# Patient Record
Sex: MALE | Race: WHITE | NOT HISPANIC OR LATINO | Employment: FULL TIME | URBAN - METROPOLITAN AREA
[De-identification: names, ages, dates, MRNs, and addresses within clinical notes are randomized per-mention and may not be internally consistent; named-entity substitution may affect disease eponyms.]

---

## 2017-01-24 ENCOUNTER — RADIANT APPOINTMENT (OUTPATIENT)
Dept: GENERAL RADIOLOGY | Facility: CLINIC | Age: 42
End: 2017-01-24
Attending: NURSE PRACTITIONER
Payer: COMMERCIAL

## 2017-01-24 ENCOUNTER — OFFICE VISIT (OUTPATIENT)
Dept: FAMILY MEDICINE | Facility: CLINIC | Age: 42
End: 2017-01-24
Payer: COMMERCIAL

## 2017-01-24 VITALS
DIASTOLIC BLOOD PRESSURE: 82 MMHG | HEART RATE: 90 BPM | RESPIRATION RATE: 18 BRPM | OXYGEN SATURATION: 97 % | HEIGHT: 74 IN | SYSTOLIC BLOOD PRESSURE: 134 MMHG | TEMPERATURE: 99 F | BODY MASS INDEX: 38.07 KG/M2 | WEIGHT: 296.6 LBS

## 2017-01-24 DIAGNOSIS — J06.9 VIRAL URI WITH COUGH: ICD-10-CM

## 2017-01-24 DIAGNOSIS — R50.9 FEVER, UNSPECIFIED: ICD-10-CM

## 2017-01-24 DIAGNOSIS — J98.9 REACTIVE AIRWAY DISEASE THAT IS NOT ASTHMA: Primary | ICD-10-CM

## 2017-01-24 LAB
FLUAV+FLUBV AG SPEC QL: NORMAL
FLUAV+FLUBV AG SPEC QL: NORMAL
SPECIMEN SOURCE: NORMAL

## 2017-01-24 PROCEDURE — 99213 OFFICE O/P EST LOW 20 MIN: CPT | Performed by: NURSE PRACTITIONER

## 2017-01-24 PROCEDURE — 87804 INFLUENZA ASSAY W/OPTIC: CPT | Performed by: NURSE PRACTITIONER

## 2017-01-24 PROCEDURE — 71020 XR CHEST 2 VW: CPT

## 2017-01-24 RX ORDER — ALBUTEROL SULFATE 90 UG/1
2 AEROSOL, METERED RESPIRATORY (INHALATION) EVERY 4 HOURS PRN
Qty: 1 INHALER | Refills: 1 | Status: SHIPPED | OUTPATIENT
Start: 2017-01-24 | End: 2017-03-14

## 2017-01-24 RX ORDER — PREDNISONE 20 MG/1
40 TABLET ORAL DAILY
Qty: 10 TABLET | Refills: 0 | Status: SHIPPED | OUTPATIENT
Start: 2017-01-24 | End: 2017-01-29

## 2017-01-24 NOTE — NURSING NOTE
"Chief Complaint   Patient presents with     Urgent Care     URI       Initial /82 mmHg  Pulse 90  Temp(Src) 99  F (37.2  C) (Oral)  Resp 18  Ht 6' 2\" (1.88 m)  Wt 296 lb 9.6 oz (134.537 kg)  BMI 38.07 kg/m2  SpO2 97% Estimated body mass index is 38.07 kg/(m^2) as calculated from the following:    Height as of this encounter: 6' 2\" (1.88 m).    Weight as of this encounter: 296 lb 9.6 oz (134.537 kg).  BP completed using cuff size: charles Hernandez CMA      "

## 2017-01-24 NOTE — PROGRESS NOTES
"  SUBJECTIVE:                                                    Abhijit Garcia is a 42 year old male who presents to clinic today for the following health issues:      RESPIRATORY SYMPTOMS      Duration: 4 days    Description  Heavy in chest, cough, hearing popping noise in chest. Low fever    Severity: moderate    Accompanying signs and symptoms: None    History (predisposing factors):  none    Precipitating or alleviating factors: None    Therapies tried and outcome:  oral decongestant     It appears like patient developed flulike symptoms about 4 days ago which included some body aches, coughing, sore throat and fevers. Some of those symptoms have improved but he continues to have some fevers and persistent cough with occasional noisiness of the lungs. Has been using over-the-counter treatments. Never smoked. No history of asthma. No shortness of breath.          ROS:  Constitutional, HEENT, cardiovascular, pulmonary, gi and gu systems are negative, except as otherwise noted.    OBJECTIVE:                                                    /82 mmHg  Pulse 90  Temp(Src) 99  F (37.2  C) (Oral)  Resp 18  Ht 6' 2\" (1.88 m)  Wt 296 lb 9.6 oz (134.537 kg)  BMI 38.07 kg/m2  SpO2 97%  Body mass index is 38.07 kg/(m^2).  GENERAL: healthy, alert and no distress  EYES: Eyes grossly normal to inspection, PERRL and conjunctivae and sclerae normal  HENT: ear canals and TM's normal, nose and mouth without ulcers or lesions  NECK: no adenopathy, no asymmetry, masses, or scars and thyroid normal to palpation  RESP: rhonchi R lower posterior, expiratory wheezes throughout and inspiratory wheezes throughout  CV: regular rate and rhythm, normal S1 S2, no S3 or S4, no murmur, click or rub, no peripheral edema and peripheral pulses strong  ABDOMEN: soft, nontender, no hepatosplenomegaly, no masses and bowel sounds normal  MS: no gross musculoskeletal defects noted, no edema    Results for orders placed or performed in " visit on 01/24/17 (from the past 24 hour(s))   Influenza A/B antigen   Result Value Ref Range    Influenza A/B Agn Specimen Nasal     Influenza A  NEG     Negative   Test results must be correlated with clinical data. If necessary, results   should be confirmed by a molecular assay or viral culture.      Influenza B  NEG     Negative   Test results must be correlated with clinical data. If necessary, results   should be confirmed by a molecular assay or viral culture.      chest x-ray was also normal      ASSESSMENT/PLAN:                                                      Probable upper respiratory symptoms with reactive airway component. Was treated as below. Differentials discussed. Continue to monitor closely. Follow-up with persisting concerns.        ICD-10-CM    1. Fever, unspecified R50.9 Influenza A/B antigen   2. Viral URI with cough J06.9 XR Chest 2 Views    B97.89    3. Reactive airway disease that is not asthma R09.89 predniSONE (DELTASONE) 20 MG tablet     albuterol (PROAIR HFA/PROVENTIL HFA/VENTOLIN HFA) 108 (90 BASE) MCG/ACT Inhaler         STEFANI [facility]

## 2017-01-24 NOTE — PATIENT INSTRUCTIONS
Bronchospasm (Adult)    Bronchospasm occurs when the airways (bronchial tubes) go into spasm and contract. This makes it hard to breathe and causes wheezing (a high-pitched whistling sound). Bronchospasm can also cause frequent coughing without wheezing.  Bronchospasm is due to irritation, inflammation, or allergic reaction of the airways. People with asthma get bronchospasm. However, not everyone with bronchospasm has asthma.  Being exposed to harmful fumes, a recent case of bronchitis, exercise, or a flare-up of chronic obstructive pulmonary disease (COPD) may cause the airways to spasm. An episode of bronchospasm may last 7 to 14 days. Medicine may be prescribed to relax the airways and prevent wheezing. Antibiotics will be prescribed only if your healthcare provider thinks there is a bacterial infection. Antibiotics do not help a viral infection.  Home care     Drink lots of water or other fluids (at least 10 glasses a day) during an attack. This will loosen lung secretions and make it easier to breathe. If you have heart or kidney disease, check with your doctor before you drink extra fluids.    Take prescribed medicine exactly at the times advised. If you take an inhaled medicine to help with breathing, do not use it more than once every 4 hours, unless told to do so. If prescribed an antibiotic or prednisone, take all of the medicine, even if you are feeling better after a few days.    Do not smoke. Also avoid being exposed to secondhand smoke.    If you were given an inhaler, use it exactly as directed. If you need to use it more often than prescribed, your condition may be getting worse. Contact your healthcare provider.  Follow-up care  Follow up with your healthcare provider, or as advised.   (Note: If you are age 65 or older, have a chronic lung disease or condition that affects your immune system, or you smoke, we recommend getting pneumococcal vaccinations, as well as an influenza vaccination (flu  shot) every autumn. Ask your healthcare provider about this.)  When to seek medical advice  Call your healthcare provider right away if any of these occur:    You need to use your inhalers more often than usual.    You develop a fever of 100.4 F (38 C) or higher.    You are coughing up lots of dark-colored sputum (mucus).    You do not start to improve within 24 hours.  Call 911, or get immediate medical care  Contact emergency services if any of these occur:    Coughing up bloody sputum (mucus)    Chest pain with each breath    Increased wheezing or shortness of breath    0184-7546 [employer]. [address] [place] [address], [place] [address]. All rights reserved. This information is not intended as a substitute for professional medical care. Always follow your healthcare professional's instructions.

## 2017-01-24 NOTE — MR AVS SNAPSHOT
After Visit Summary   1/24/2017    Abhijit Garcia    MRN: [id number]           Patient Information     Date Of Birth          1975        Visit Information        Provider Department      1/24/2017 4:15 PM Rhona Lopez APRN [facility]        Today's Diagnoses     Fever, unspecified    -  1     Viral URI with cough         Reactive airway disease that is not asthma           Care Instructions      Bronchospasm (Adult)    Bronchospasm occurs when the airways (bronchial tubes) go into spasm and contract. This makes it hard to breathe and causes wheezing (a high-pitched whistling sound). Bronchospasm can also cause frequent coughing without wheezing.  Bronchospasm is due to irritation, inflammation, or allergic reaction of the airways. People with asthma get bronchospasm. However, not everyone with bronchospasm has asthma.  Being exposed to harmful fumes, a recent case of bronchitis, exercise, or a flare-up of chronic obstructive pulmonary disease (COPD) may cause the airways to spasm. An episode of bronchospasm may last 7 to 14 days. Medicine may be prescribed to relax the airways and prevent wheezing. Antibiotics will be prescribed only if your healthcare provider thinks there is a bacterial infection. Antibiotics do not help a viral infection.  Home care     Drink lots of water or other fluids (at least 10 glasses a day) during an attack. This will loosen lung secretions and make it easier to breathe. If you have heart or kidney disease, check with your doctor before you drink extra fluids.    Take prescribed medicine exactly at the times advised. If you take an inhaled medicine to help with breathing, do not use it more than once every 4 hours, unless told to do so. If prescribed an antibiotic or prednisone, take all of the medicine, even if you are feeling better after a few days.    Do not smoke. Also avoid being exposed to secondhand smoke.    If you were  given an inhaler, use it exactly as directed. If you need to use it more often than prescribed, your condition may be getting worse. Contact your healthcare provider.  Follow-up care  Follow up with your healthcare provider, or as advised.   (Note: If you are age 65 or older, have a chronic lung disease or condition that affects your immune system, or you smoke, we recommend getting pneumococcal vaccinations, as well as an influenza vaccination (flu shot) every autumn. Ask your healthcare provider about this.)  When to seek medical advice  Call your healthcare provider right away if any of these occur:    You need to use your inhalers more often than usual.    You develop a fever of 100.4 F (38 C) or higher.    You are coughing up lots of dark-colored sputum (mucus).    You do not start to improve within 24 hours.  Call 911, or get immediate medical care  Contact emergency services if any of these occur:    Coughing up bloody sputum (mucus)    Chest pain with each breath    Increased wheezing or shortness of breath    2211-2964 [employer]. [address] [place] [address]. All rights reserved. This information is not intended as a substitute for professional medical care. Always follow your healthcare professional's instructions.              Follow-ups after your visit        Who to contact     If you have questions or need follow up information about today's clinic visit or your schedule please contact [facility] directly at [phone].  Normal or non-critical lab and imaging results will be communicated to you by MyChart, letter or phone within 4 business days after the clinic has received the results. If you do not hear from us within 7 days, please contact the clinic through MyChart or phone. If you have a critical or abnormal lab result, we will notify you by phone as soon as possible.  Submit refill requests through [employer] or call your pharmacy and they will forward  "the refill request to us. Please allow 3 business days for your refill to be completed.          Additional Information About Your Visit        "[employer] Information     [employer] gives you secure access to your electronic health record. If you see a primary care provider, you can also send messages to your care team and make appointments. If you have questions, please call your primary care clinic.  If you do not have a primary care provider, please call [phone] and they will assist you.        Care EveryWhere ID     This is your Care EveryWhere ID. This could be used by other organizations to access your [place] medical records  [id number]        Your Vitals Were     Pulse Temperature Respirations Height BMI (Body Mass Index) Pulse Oximetry    90 99  F (37.2  C) (Oral) 18 6' 2\" (1.88 m) 38.07 kg/m2 97%       Blood Pressure from Last 3 Encounters:   01/24/17 134/82   11/02/16 122/88   10/28/16 122/84    Weight from Last 3 Encounters:   01/24/17 296 lb 9.6 oz (134.537 kg)   11/02/16 312 lb 1.6 oz (141.568 kg)   10/28/16 310 lb 9.6 oz (140.887 kg)              We Performed the Following     Influenza A/B antigen          Today's Medication Changes          These changes are accurate as of: 1/24/17  5:29 PM.  If you have any questions, ask your nurse or doctor.               Start taking these medicines.        Dose/Directions    albuterol 108 (90 BASE) MCG/ACT Inhaler   Commonly known as:  PROAIR HFA/PROVENTIL HFA/VENTOLIN HFA   Used for:  Reactive airway disease that is not asthma   Started by:  Rhona Lopez APRN CNP        Dose:  2 puff   Inhale 2 puffs into the lungs every 4 hours as needed for shortness of breath / dyspnea or wheezing   Quantity:  1 Inhaler   Refills:  1       predniSONE 20 MG tablet   Commonly known as:  DELTASONE   Used for:  Reactive airway disease that is not asthma   Started by:  Rhona Lopez APRN CNP        Dose:  40 mg   Take 2 tablets (40 mg) by mouth daily for " 5 days   Quantity:  10 tablet   Refills:  0            Where to get your medicines      These medications were sent to [facility] - [place], [place] - [address] [facility] AT [address] [address] [facility], [facility] [address]     Phone:  [phone]    - albuterol 108 (90 BASE) MCG/ACT Inhaler  - predniSONE 20 MG tablet             Primary Care Provider Office Phone # Fax #    Samir De La O [phone]       [facility] [address] [facility] [address]        Thank you!     Thank you for choosing [facility]  for your care. Our goal is always to provide you with excellent care. Hearing back from our patients is one way we can continue to improve our services. Please take a few minutes to complete the written survey that you may receive in the mail after your visit with us. Thank you!             Your Updated Medication List - Protect others around you: Learn how to safely use, store and throw away your medicines at www.disposemymeds.org.          This list is accurate as of: 1/24/17  5:29 PM.  Always use your most recent med list.                   Brand Name Dispense Instructions for use    albuterol 108 (90 BASE) MCG/ACT Inhaler    PROAIR HFA/PROVENTIL HFA/VENTOLIN HFA    1 Inhaler    Inhale 2 puffs into the lungs every 4 hours as needed for shortness of breath / dyspnea or wheezing       irbesartan 300 MG tablet    AVAPRO    90 tablet    Take 1 tablet (300 mg) by mouth daily       predniSONE 20 MG tablet    DELTASONE    10 tablet    Take 2 tablets (40 mg) by mouth daily for 5 days

## 2017-02-04 ENCOUNTER — OFFICE VISIT (OUTPATIENT)
Dept: URGENT CARE | Facility: URGENT CARE | Age: 42
End: 2017-02-04
Payer: COMMERCIAL

## 2017-02-04 VITALS
OXYGEN SATURATION: 98 % | DIASTOLIC BLOOD PRESSURE: 78 MMHG | WEIGHT: 296 LBS | TEMPERATURE: 98.7 F | RESPIRATION RATE: 18 BRPM | BODY MASS INDEX: 37.99 KG/M2 | SYSTOLIC BLOOD PRESSURE: 126 MMHG | HEART RATE: 65 BPM

## 2017-02-04 DIAGNOSIS — M79.645 PAIN OF FINGER OF LEFT HAND: Primary | ICD-10-CM

## 2017-02-04 PROCEDURE — 99213 OFFICE O/P EST LOW 20 MIN: CPT | Performed by: FAMILY MEDICINE

## 2017-02-04 RX ORDER — CEFDINIR 300 MG/1
300 CAPSULE ORAL 2 TIMES DAILY
Qty: 20 CAPSULE | Refills: 0 | Status: SHIPPED | OUTPATIENT
Start: 2017-02-04 | End: 2017-03-10

## 2017-02-04 NOTE — PROGRESS NOTES
SUBJECTIVE:                                                    Abhijit Garcia is a 42 year old male who presents to clinic today for the following health issues:    Swollen middle finger right hand, injured last week. Swelling and stiffness           Problem list and histories reviewed & adjusted, as indicated.  Additional history:     Patient Active Problem List   Diagnosis     Herpes simplex virus (HSV) infection     Mixed Hyperlipidemia LDL goal <130     Predominant disturbance of emotions     Essential hypertension     Morbid obesity (HCC)     Gout     Lumbar radiculitis     Prostatitis     Past Surgical History   Procedure Laterality Date     [place] teeth       Appendectomy         Social History   Substance Use Topics     Smoking status: Never Smoker      Smokeless tobacco: Never Used     Alcohol Use: 0.0 oz/week     0 Standard drinks or equivalent per week      Comment: 1-5 drinks per week     Family History   Problem Relation Age of Onset     DIABETES Mother      Obesity Mother      Cancer - colorectal Mother      Hypertension Mother      Arthritis Father      Respiratory Father      asbestos exposure     Chronic Obstructive Pulmonary Disease Father      Coronary Artery Disease Father 71     Allergies Brother      Respiratory Brother          OBJECTIVE:  /78 mmHg  Pulse 65  Temp(Src) 98.7  F (37.1  C) (Oral)  Resp 18  Wt 296 lb (134.265 kg)  SpO2 98%  Exam; RT middle finger. Swelling literally from the MCP  To the distal end. Hard to close his finger. Finger erythematous ventral part of finger has a laceration which is superficial.   Both hands are eczematous      ASSESSMENT:  1. Finger infection; this could be viral  In nature. In addition could be bacterial.     PLAN:  1. Cefdinir  2. Lotion to his hands  3. Follow-up in 2 weeks  4. Leave  message with us, Monday to inform us how he is doing.

## 2017-02-04 NOTE — NURSING NOTE
"Chief Complaint   Patient presents with     Urgent Care     Finger       Initial /78 mmHg  Pulse 65  Temp(Src) 98.7  F (37.1  C) (Oral)  Resp 18  Wt 296 lb (134.265 kg)  SpO2 98% Estimated body mass index is 37.99 kg/(m^2) as calculated from the following:    Height as of 1/24/17: 6' 2\" (1.88 m).    Weight as of this encounter: 296 lb (134.265 kg).  Medication Reconciliation: complete     Ilda Hernandez CMA      "

## 2017-02-06 ENCOUNTER — TELEPHONE (OUTPATIENT)
Dept: URGENT CARE | Facility: URGENT CARE | Age: 42
End: 2017-02-06

## 2017-02-06 NOTE — TELEPHONE ENCOUNTER
Pt calling with update on his finger.  Pain is no worse and swelling is not any better but no worsening issues.  Pt states that on Sunday he came down with a gout flare in his left ankle and the pain is very different from his finger.    He will continue taking antibiotic as directed    FYI to UC per Dr Marroquin's request    Dawood Spencer RN, BSN

## 2017-02-14 ENCOUNTER — MYC MEDICAL ADVICE (OUTPATIENT)
Dept: INTERNAL MEDICINE | Facility: CLINIC | Age: 42
End: 2017-02-14

## 2017-02-14 DIAGNOSIS — M79.644 PAIN OF FINGER OF RIGHT HAND: Primary | ICD-10-CM

## 2017-02-28 ENCOUNTER — MYC MEDICAL ADVICE (OUTPATIENT)
Dept: INTERNAL MEDICINE | Facility: CLINIC | Age: 42
End: 2017-02-28

## 2017-03-01 ENCOUNTER — TRANSFERRED RECORDS (OUTPATIENT)
Dept: HEALTH INFORMATION MANAGEMENT | Facility: CLINIC | Age: 42
End: 2017-03-01

## 2017-03-04 ENCOUNTER — OFFICE VISIT (OUTPATIENT)
Dept: URGENT CARE | Facility: URGENT CARE | Age: 42
End: 2017-03-04
Payer: COMMERCIAL

## 2017-03-04 DIAGNOSIS — R30.0 PAINFUL URGING TO URINATE: Primary | ICD-10-CM

## 2017-03-04 LAB
ALBUMIN UR-MCNC: NEGATIVE MG/DL
APPEARANCE UR: CLEAR
BILIRUB UR QL STRIP: NEGATIVE
COLOR UR AUTO: YELLOW
GLUCOSE UR STRIP-MCNC: NEGATIVE MG/DL
HGB UR QL STRIP: NEGATIVE
KETONES UR STRIP-MCNC: NEGATIVE MG/DL
LEUKOCYTE ESTERASE UR QL STRIP: NEGATIVE
NITRATE UR QL: NEGATIVE
PH UR STRIP: 6 PH (ref 5–7)
SP GR UR STRIP: 1.02 (ref 1–1.03)
URN SPEC COLLECT METH UR: NORMAL
UROBILINOGEN UR STRIP-ACNC: 0.2 EU/DL (ref 0.2–1)

## 2017-03-04 PROCEDURE — 99213 OFFICE O/P EST LOW 20 MIN: CPT | Performed by: FAMILY MEDICINE

## 2017-03-04 PROCEDURE — 81003 URINALYSIS AUTO W/O SCOPE: CPT | Performed by: FAMILY MEDICINE

## 2017-03-04 RX ORDER — DOXYCYCLINE 100 MG/1
100 CAPSULE ORAL 2 TIMES DAILY
Qty: 20 CAPSULE | Refills: 0 | Status: SHIPPED | OUTPATIENT
Start: 2017-03-04 | End: 2017-03-14

## 2017-03-04 NOTE — MR AVS SNAPSHOT
After Visit Summary   3/4/2017    Abhijit Garcia    MRN: [id number]           Patient Information     Date Of Birth          1975        Visit Information        Provider Department      3/4/2017 11:00 AM Mark Marroquin MD [facility]        Today's Diagnoses     Painful urging to urinate    -  1       Follow-ups after your visit        Your next 10 appointments already scheduled     Mar 14, 2017  7:40 AM CDT   Pre-Op physical with Mary Gonzalez PA-C   [facility]    [address] [address] [phone]              Who to contact     If you have questions or need follow up information about today's clinic visit or your schedule please contact [facility] directly at [phone].  Normal or non-critical lab and imaging results will be communicated to you by MyChart, letter or phone within 4 business days after the clinic has received the results. If you do not hear from us within 7 days, please contact the clinic through [employer] or phone. If you have a critical or abnormal lab result, we will notify you by phone as soon as possible.  Submit refill requests through [employer] or call your pharmacy and they will forward the refill request to us. Please allow 3 business days for your refill to be completed.          Additional Information About Your Visit        MyChart Information     [employer] gives you secure access to your electronic health record. If you see a primary care provider, you can also send messages to your care team and make appointments. If you have questions, please call your primary care clinic.  If you do not have a primary care provider, please call [phone] and they will assist you.        Care EveryWhere ID     This is your Care EveryWhere ID. This could be used by other organizations to access your [place] medical records  [id number]          Blood Pressure from Last 3 Encounters:   02/04/17 126/78   01/24/17 134/82   11/02/16 122/88    Weight from Last 3 Encounters:   02/04/17 296 lb (134.3 kg)   01/24/17 296 lb 9.6 oz (134.5 kg)   11/02/16 (!) 312 lb 1.6 oz (141.6 kg)              We Performed the Following     UA reflex to Microscopic and Culture          Today's Medication Changes          These changes are accurate as of: 3/4/17 11:59 PM.  If you have any questions, ask your nurse or doctor.               Start taking these medicines.        Dose/Directions    doxycycline 100 MG capsule   Commonly known as:  VIBRAMYCIN   Used for:  Painful urging to urinate        Dose:  100 mg   Take 1 capsule (100 mg) by mouth 2 times daily   Quantity:  20 capsule   Refills:  0            Where to get your medicines      These medications were sent to [facility] [address] [place] [address] [facility] AT [address] [address] [facility] [address]     Phone:  [phone]     doxycycline 100 MG capsule                Primary Care Provider Office Phone # Fax #    Samir De La O [phone]       [facility] [address] [facility] [address]        Thank you!     Thank you for choosing [facility]  for your care. Our goal is always to provide you with excellent care. Hearing back from our patients is one way we can continue to improve our services. Please take a few minutes to complete the written survey that you may receive in the mail after your visit with us. Thank you!             Your Updated Medication List - Protect others around you: Learn how to safely use, store and throw away your medicines at www.disposemymeds.org.          This list is accurate as of: 3/4/17 11:59 PM.  Always use your most recent med list.                   Brand Name Dispense Instructions for use    albuterol 108 (90 BASE) MCG/ACT Inhaler    PROAIR HFA/PROVENTIL HFA/VENTOLIN HFA    1  Inhaler    Inhale 2 puffs into the lungs every 4 hours as needed for shortness of breath / dyspnea or wheezing       cefdinir 300 MG capsule    OMNICEF    20 capsule    Take 1 capsule (300 mg) by mouth 2 times daily       COLCHICINE PO          doxycycline 100 MG capsule    VIBRAMYCIN    20 capsule    Take 1 capsule (100 mg) by mouth 2 times daily       irbesartan 300 MG tablet    AVAPRO    90 tablet    Take 1 tablet (300 mg) by mouth daily       MELOXICAM PO

## 2017-03-04 NOTE — NURSING NOTE
"Chief Complaint   Patient presents with     UTI     Burning, pain post urination x1.5 week       Initial There were no vitals taken for this visit. Estimated body mass index is 38 kg/(m^2) as calculated from the following:    Height as of 1/24/17: 6' 2\" (1.88 m).    Weight as of 2/4/17: 296 lb (134.3 kg).  Medication Reconciliation: complete     Grazyna Cardona CMA (JOSEE)        "

## 2017-03-04 NOTE — PROGRESS NOTES
SUBJECTIVE:                                                    Abhijit Garcia is a 42 year old male who presents to clinic today for the following health issues:      Genitourinary symptoms      Duration: x1.5 weeks    Description:  dysuria, frequency and hesitancy    Intensity:  severe    Accompanying signs and symptoms (fever/discharge/nausea/vomiting/back or abdominal pain):  None    History (frequent UTI's/kidney stones/prostate problems):   Sexually active: YES    Precipitating or alleviating factors: None    Therapies tried and outcome: none   Outcome: N/A       OBJECTIVE: Appears well, in no apparent distress.  Vital signs are normal. The abdomen is soft without tenderness, guarding, mass, rebound or organomegaly. No CVA tenderness or inguinal adenopathy noted.   Results for orders placed or performed in visit on 03/04/17   UA reflex to Microscopic and Culture   Result Value Ref Range    Color Urine Yellow     Appearance Urine Clear     Glucose Urine Negative NEG mg/dL    Bilirubin Urine Negative NEG    Ketones Urine Negative NEG mg/dL    Specific Gravity Urine 1.025 1.003 - 1.035    Blood Urine Negative NEG    pH Urine 6.0 5.0 - 7.0 pH    Protein Albumin Urine Negative NEG mg/dL    Urobilinogen Urine 0.2 0.2 - 1.0 EU/dL    Nitrite Urine Negative NEG    Leukocyte Esterase Urine Negative NEG    Source Midstream Urine      .     ASSESSMENT: UTI uncomplicated without evidence of pyelonephritis    PLAN: Treatment per orders - also push fluids, may use Pyridium OTC prn. Call or return to clinic prn if these symptoms worsen or fail to improve as anticipated.

## 2017-03-10 ENCOUNTER — OFFICE VISIT (OUTPATIENT)
Dept: URGENT CARE | Facility: URGENT CARE | Age: 42
End: 2017-03-10
Payer: COMMERCIAL

## 2017-03-10 VITALS
SYSTOLIC BLOOD PRESSURE: 110 MMHG | DIASTOLIC BLOOD PRESSURE: 70 MMHG | OXYGEN SATURATION: 98 % | HEIGHT: 74 IN | HEART RATE: 98 BPM | RESPIRATION RATE: 18 BRPM | BODY MASS INDEX: 37.99 KG/M2 | WEIGHT: 296 LBS | TEMPERATURE: 98.2 F

## 2017-03-10 DIAGNOSIS — K64.4 EXTERNAL HEMORRHOIDS: ICD-10-CM

## 2017-03-10 DIAGNOSIS — L01.00 IMPETIGO: Primary | ICD-10-CM

## 2017-03-10 PROCEDURE — 99213 OFFICE O/P EST LOW 20 MIN: CPT | Performed by: FAMILY MEDICINE

## 2017-03-10 RX ORDER — MUPIROCIN 20 MG/G
OINTMENT TOPICAL 2 TIMES DAILY
Qty: 22 G | Refills: 1 | Status: SHIPPED | OUTPATIENT
Start: 2017-03-10 | End: 2017-03-17

## 2017-03-10 NOTE — MR AVS SNAPSHOT
After Visit Summary   3/10/2017    Abhijit Gracia    MRN: [id number]           Patient Information     Date Of Birth          1975        Visit Information        Provider Department      3/10/2017 7:15 PM Perla Lucas MD [facility] URGENT CARE        Today's Diagnoses     Impetigo    -  1      Care Instructions      Understanding Impetigo (Adult)  Impetigo is a skin infection that causes red, crusty sores. It s more common in young children, but adults can also get it. It can be spread easily from person to person.  What causes impetigo?  Impetigo is caused by bacteria. It can be caused by group A streptococci (strep). Or it can be caused by Staphylococcus aureus (staph). The bacteria can cause the infection after an insect bite, cut, or other skin problem causes a break in your skin.  Symptoms of impetigo  Impetigo causes itchy red sores that ooze fluid (pus) and form honey-colored crusts. They are most common on the face, arms, and legs. The sores can get bigger and spread to other parts of your body.  Diagnosing and treating impetigo  Your health care provider will give you a physical exam. He or she will be able to diagnose impetigo by looking at your sores. A bacterial culture may be performed. Impetigo can be treated with antibiotics. You may be given oral medication. Use the antibiotic medication exactly as directed. Do not stop using it if your symptoms get better. Use all of the medication until it is gone.  Your symptoms will likely get better within 3 days. The sores will take several weeks to heal fully.  If you have impetigo  Impetigo is easily spread from person to person. A person can get sores 1 to 3 days after being exposed to another person s sores. Make sure to protect those around you. To prevent the bacteria from spreading to others:    Cover your sores with a bandage. Wash your hands often. This will also help you avoid spreading the infection to other  parts of your body.    Don t share washcloths, towels, pillows, sheets, or clothes with others. Wash these items in hot water before using them again.    Don t scratch or pick at your sores.    Wash your sores with soap and water. Apply an antibacterial cream as advised.     When to call the health care provider  Make sure to contact your health care provider if you have:    Sores that spread or have more pus after 3 days of treatment    Pain or swelling that s new or worse    Fever of 100.4 F (38 C) or higher    Loss of appetite or vomiting     3850-2602 [employer]. [address] [place], [place] [address]. All rights reserved. This information is not intended as a substitute for professional medical care. Always follow your healthcare professional's instructions.              Follow-ups after your visit        Your next 10 appointments already scheduled     Mar 14, 2017  7:40 AM CDT   Pre-Op physical with Mary Gonzalez PA-C   [facility]    [address] [place] [address] [phone]              Who to contact     If you have questions or need follow up information about today's clinic visit or your schedule please contact [facility] URGENT CARE directly at [phone].  Normal or non-critical lab and imaging results will be communicated to you by [employer], letter or phone within 4 business days after the clinic has received the results. If you do not hear from us within 7 days, please contact the clinic through MyChart or phone. If you have a critical or abnormal lab result, we will notify you by phone as soon as possible.  Submit refill requests through [employer] or call your pharmacy and they will forward the refill request to us. Please allow 3 business days for your refill to be completed.          Additional Information About Your Visit        [employer] Information     [employer] gives you secure  "access to your electronic health record. If you see a primary care provider, you can also send messages to your care team and make appointments. If you have questions, please call your primary care clinic.  If you do not have a primary care provider, please call [phone] and they will assist you.        Care EveryWhere ID     This is your Care EveryWhere ID. This could be used by other organizations to access your [facility] medical records  [id number]        Your Vitals Were     Pulse Temperature Respirations Height Pulse Oximetry BMI (Body Mass Index)    98 98.2  F (36.8  C) (Oral) 18 6' 2\" (1.88 m) 98% 38 kg/m2       Blood Pressure from Last 3 Encounters:   03/10/17 110/70   02/04/17 126/78   01/24/17 134/82    Weight from Last 3 Encounters:   03/10/17 296 lb (134.3 kg)   02/04/17 296 lb (134.3 kg)   01/24/17 296 lb 9.6 oz (134.5 kg)              Today, you had the following     No orders found for display         Today's Medication Changes          These changes are accurate as of: 3/10/17  7:46 PM.  If you have any questions, ask your nurse or doctor.               Start taking these medicines.        Dose/Directions    mupirocin 2 % ointment   Commonly known as:  BACTROBAN   Used for:  Impetigo   Started by:  Perla Lucas MD        Apply topically 2 times daily for 7 days   Quantity:  22 g   Refills:  1            Where to get your medicines      These medications were sent to [facility] [address] [place] [address] AT [address] [address] [facility] [address]     Phone:  [phone]     mupirocin 2 % ointment                Primary Care Provider Office Phone # Fax #    Samir Jaison De La O [phone]       [facility] [address] [facility] [address]        Thank you!     Thank you for choosing [facility] URGENT CARE  for your care. Our goal is always to provide you with excellent care. Hearing back from our " patients is one way we can continue to improve our services. Please take a few minutes to complete the written survey that you may receive in the mail after your visit with us. Thank you!             Your Updated Medication List - Protect others around you: Learn how to safely use, store and throw away your medicines at www.disposemymeds.org.          This list is accurate as of: 3/10/17  7:46 PM.  Always use your most recent med list.                   Brand Name Dispense Instructions for use    albuterol 108 (90 BASE) MCG/ACT Inhaler    PROAIR HFA/PROVENTIL HFA/VENTOLIN HFA    1 Inhaler    Inhale 2 puffs into the lungs every 4 hours as needed for shortness of breath / dyspnea or wheezing       COLCHICINE PO          doxycycline 100 MG capsule    VIBRAMYCIN    20 capsule    Take 1 capsule (100 mg) by mouth 2 times daily       irbesartan 300 MG tablet    AVAPRO    90 tablet    Take 1 tablet (300 mg) by mouth daily       MELOXICAM PO          mupirocin 2 % ointment    BACTROBAN    22 g    Apply topically 2 times daily for 7 days

## 2017-03-11 NOTE — PATIENT INSTRUCTIONS
Understanding Impetigo (Adult)  Impetigo is a skin infection that causes red, crusty sores. It s more common in young children, but adults can also get it. It can be spread easily from person to person.  What causes impetigo?  Impetigo is caused by bacteria. It can be caused by group A streptococci (strep). Or it can be caused by Staphylococcus aureus (staph). The bacteria can cause the infection after an insect bite, cut, or other skin problem causes a break in your skin.  Symptoms of impetigo  Impetigo causes itchy red sores that ooze fluid (pus) and form honey-colored crusts. They are most common on the face, arms, and legs. The sores can get bigger and spread to other parts of your body.  Diagnosing and treating impetigo  Your health care provider will give you a physical exam. He or she will be able to diagnose impetigo by looking at your sores. A bacterial culture may be performed. Impetigo can be treated with antibiotics. You may be given oral medication. Use the antibiotic medication exactly as directed. Do not stop using it if your symptoms get better. Use all of the medication until it is gone.  Your symptoms will likely get better within 3 days. The sores will take several weeks to heal fully.  If you have impetigo  Impetigo is easily spread from person to person. A person can get sores 1 to 3 days after being exposed to another person s sores. Make sure to protect those around you. To prevent the bacteria from spreading to others:    Cover your sores with a bandage. Wash your hands often. This will also help you avoid spreading the infection to other parts of your body.    Don t share washcloths, towels, pillows, sheets, or clothes with others. Wash these items in hot water before using them again.    Don t scratch or pick at your sores.    Wash your sores with soap and water. Apply an antibacterial cream as advised.     When to call the health care provider  Make sure to contact your health care  provider if you have:    Sores that spread or have more pus after 3 days of treatment    Pain or swelling that s new or worse    Fever of 100.4 F (38 C) or higher    Loss of appetite or vomiting     8991-4213 [employer]. [address] [place] [address], [place] [address]. All rights reserved. This information is not intended as a substitute for professional medical care. Always follow your healthcare professional's instructions.

## 2017-03-11 NOTE — NURSING NOTE
"Abhijit Garcia is a 42 year old male.      Chief Complaint   Patient presents with     Urgent Care     Derm Problem     blister on his nose     Rectal Problem     thinks that he possible may have a hemrroid        Initial /70 (BP Location: Right arm, Patient Position: Chair, Cuff Size: Adult Large)  Pulse 98  Temp 98.2  F (36.8  C) (Oral)  Resp 18  Ht 6' 2\" (1.88 m)  Wt 296 lb (134.3 kg)  SpO2 98%  BMI 38 kg/m2 Estimated body mass index is 38 kg/(m^2) as calculated from the following:    Height as of this encounter: 6' 2\" (1.88 m).    Weight as of this encounter: 296 lb (134.3 kg).  Medication Reconciliation: complete      Questioned patient about current smoking habits.  Pt. has never smoked.      Nina Woo CMA      "

## 2017-03-11 NOTE — PROGRESS NOTES
SUBJECTIVE:  Chief Complaint   Patient presents with     Urgent Care     Derm Problem     blister on his nose     Rectal Problem     thinks that he possible may have a hemrroid      Abhijit Garcia is a 42 year old male  who presents with chief complaint of blistering with honey crust on the nose-  Had sunburn with blistering -  Has been taking doxycycline for  infection    Also had symptoms of anal tenderness/pain which began today.  He complains of sudden onset and improving of pain which is mild.  This AM it felt like a tender pea near the anus.  He applied preparation H  Symptoms exacerbated by prolonged sitting.  Pain improved with topical treatment.  He denies any abdominal pain, nausea or vomiting or fevers.  No history of melena, profuse bleeding.      Past Medical History   Diagnosis Date     Dysuria      Hypertension        ALLERGIES:  Allopurinol; Amlodipine; Contrast dye; Indomethacin; Levaquin [levofloxacin]; Lisinopril; Losartan; and Seasonal allergies      Current Outpatient Prescriptions on File Prior to Visit:  MELOXICAM PO    COLCHICINE PO    doxycycline (VIBRAMYCIN) 100 MG capsule Take 1 capsule (100 mg) by mouth 2 times daily   albuterol (PROAIR HFA/PROVENTIL HFA/VENTOLIN HFA) 108 (90 BASE) MCG/ACT Inhaler Inhale 2 puffs into the lungs every 4 hours as needed for shortness of breath / dyspnea or wheezing   irbesartan (AVAPRO) 300 MG tablet Take 1 tablet (300 mg) by mouth daily     No current facility-administered medications on file prior to visit.     Social History   Substance Use Topics     Smoking status: Never Smoker     Smokeless tobacco: Never Used     Alcohol use 0.0 oz/week     0 Standard drinks or equivalent per week      Comment: 1-5 drinks per week       Family History   Problem Relation Age of Onset     DIABETES Mother      Obesity Mother      Cancer - colorectal Mother      Hypertension Mother      Arthritis Father      Respiratory Father      asbestos exposure     Chronic  "Obstructive Pulmonary Disease Father      Coronary Artery Disease Father 71     Allergies Brother      Respiratory Brother          ROS:  EYES: NEGATIVE for vision changes or irritation  RESP:NEGATIVE for significant cough or SOB  GI: NEGATIVE for nausea, abdominal pain, heartburn, or change in bowel habits    OBJECTIVE:  /70 (BP Location: Right arm, Patient Position: Chair, Cuff Size: Adult Large)  Pulse 98  Temp 98.2  F (36.8  C) (Oral)  Resp 18  Ht 6' 2\" (1.88 m)  Wt 296 lb (134.3 kg)  SpO2 98%  BMI 38 kg/m2  General appearance: in no apparent distress.  Face-  Nose bridge with few blisters with honey crust rash  Chest: clear to auscultation  Cardiac: regular rate and rhythm.  Abdomen: normal bowel sound, soft,non-tender.  Rectal exam: soft hemorrhoid at 10 o'clock  Mildly tender, soft,  Not inflamed    ASSESSMENT:  Impetigo   on the nose  - mupirocin (BACTROBAN) 2 % ointment; Apply topically 2 times daily for 7 days    External hemorrhoids     Recommend Sitz baths, ice pack to perineum, high fiber diet for stool softening and f/u with primary physician.  Patient may use a squirt bottle with warm water to clean the rectal area to limit irritation to the hemorrhoid  May treat local discomfort with OTC hemorrhoid treatment       "

## 2017-03-14 ENCOUNTER — OFFICE VISIT (OUTPATIENT)
Dept: INTERNAL MEDICINE | Facility: CLINIC | Age: 42
End: 2017-03-14
Payer: COMMERCIAL

## 2017-03-14 VITALS
WEIGHT: 298 LBS | BODY MASS INDEX: 38.26 KG/M2 | HEART RATE: 74 BPM | DIASTOLIC BLOOD PRESSURE: 86 MMHG | OXYGEN SATURATION: 95 % | SYSTOLIC BLOOD PRESSURE: 112 MMHG | TEMPERATURE: 97.8 F

## 2017-03-14 DIAGNOSIS — Z01.818 PRE-OP EXAM: Primary | ICD-10-CM

## 2017-03-14 DIAGNOSIS — I10 ESSENTIAL HYPERTENSION: ICD-10-CM

## 2017-03-14 DIAGNOSIS — S96.912D TEAR OF TENDON OF LEFT ANKLE, SUBSEQUENT ENCOUNTER: ICD-10-CM

## 2017-03-14 DIAGNOSIS — E78.5 HYPERLIPIDEMIA LDL GOAL <130: ICD-10-CM

## 2017-03-14 LAB
ANION GAP SERPL CALCULATED.3IONS-SCNC: 6 MMOL/L (ref 3–14)
BUN SERPL-MCNC: 15 MG/DL (ref 7–30)
CALCIUM SERPL-MCNC: 8.8 MG/DL (ref 8.5–10.1)
CHLORIDE SERPL-SCNC: 106 MMOL/L (ref 94–109)
CO2 SERPL-SCNC: 30 MMOL/L (ref 20–32)
CREAT SERPL-MCNC: 1.13 MG/DL (ref 0.66–1.25)
GFR SERPL CREATININE-BSD FRML MDRD: 70 ML/MIN/1.7M2
GLUCOSE SERPL-MCNC: 88 MG/DL (ref 70–99)
POTASSIUM SERPL-SCNC: 4.2 MMOL/L (ref 3.4–5.3)
SODIUM SERPL-SCNC: 142 MMOL/L (ref 133–144)

## 2017-03-14 PROCEDURE — 80048 BASIC METABOLIC PNL TOTAL CA: CPT | Performed by: PHYSICIAN ASSISTANT

## 2017-03-14 PROCEDURE — 99215 OFFICE O/P EST HI 40 MIN: CPT | Performed by: PHYSICIAN ASSISTANT

## 2017-03-14 PROCEDURE — 36415 COLL VENOUS BLD VENIPUNCTURE: CPT | Performed by: PHYSICIAN ASSISTANT

## 2017-03-14 NOTE — NURSING NOTE
"Chief Complaint   Patient presents with     Pre-Op Exam       Initial /86  Pulse 74  Temp 97.8  F (36.6  C) (Oral)  Wt 298 lb (135.2 kg)  SpO2 95%  BMI 38.26 kg/m2 Estimated body mass index is 38.26 kg/(m^2) as calculated from the following:    Height as of 3/10/17: 6' 2\" (1.88 m).    Weight as of this encounter: 298 lb (135.2 kg).  Medication Reconciliation: complete    "

## 2017-03-14 NOTE — PROGRESS NOTES
[address] [address]  [phone]  Dept: [phone]    PRE-OP EVALUATION:  Today's date: 3/14/2017    Abhijit Garcia (: 1975) presents for pre-operative evaluation assessment as requested by Dr. Ohara.  He requires evaluation and anesthesia risk assessment prior to undergoing surgery/procedure for treatment of torn tendon left ankle .  Proposed procedure: repair torn tendon exterior left ankle    Date of Surgery/ Procedure: 3-23-17  Time of Surgery/ Procedure: 9am  Hospital/Surgical Facility: [facility]  Fax number for surgical facility:   Primary Physician: Samir De La O  Type of Anesthesia Anticipated: to be determined    Patient has a Health Care Directive or Living Will:  NO    1. NO - Do you have a history of heart attack, stroke, stent, bypass or surgery on an artery in the head, neck, heart or legs?  2. NO - Do you ever have any pain or discomfort in your chest?  3. NO - Do you have a history of  Heart Failure?  4. NO - Are you troubled by shortness of breath when: walking on the level, up a slight hill or at night?  5. NO - Do you currently have a cold, bronchitis or other respiratory infection?  6. NO - Do you have a cough, shortness of breath or wheezing?  7. NO - Do you sometimes get pains in the calves of your legs when you walk?  8. NO - Do you or anyone in your family have previous history of blood clots?  9. NO - Do you or does anyone in your family have a serious bleeding problem such as prolonged bleeding following surgeries or cuts?  10. NO - Have you ever had problems with anemia or been told to take iron pills?  11. NO - Have you had any abnormal blood loss such as black, tarry or bloody stools, or abnormal vaginal bleeding?  12. NO - Have you ever had a blood transfusion?  13. NO - Have you or any of your relatives ever had problems with anesthesia?  14. NO - Do you have sleep apnea,  excessive snoring or daytime drowsiness?  15. NO - Do you have any prosthetic heart valves?  16. NO - Do you have prosthetic joints?  17. NO - Is there any chance that you may be pregnant?          HPI:                                                      Brief HPI related to upcoming procedure: tendon repair left ankle       See problem list for active medical problems.  Problems all longstanding and stable, except as noted/documented.  See ROS for pertinent symptoms related to these conditions.                                                                                                  .  HYPERTENSION - Patient has longstanding history of mod-severe HTN , currently denies any symptoms referable to elevated blood pressure. Specifically denies chest pain, palpitations, dyspnea, orthopnea, PND or peripheral edema. Blood pressure readings have been in normal range. Current medication regimen is as listed below. Patient denies any side effects of medication.                                                                                                                                                                                          .  HYPERLIPIDEMIA - Patient has a long history of significant Hyperlipidemia diet for treatment with recent good control.    MEDICAL HISTORY:                                                      Patient Active Problem List    Diagnosis Date Noted     Prostatitis 05/16/2016     Priority: Medium     ~ 1999, again 2016       Lumbar radiculitis 11/15/2015     Priority: Medium     Gout 01/07/2015     Priority: Medium     Onset age 33.  High uric acid.  Has had flares of great toe, midfoot, and ankles.  Uses meloxicam and cholchicine for acute flares.  Used allopurinol, developed low back pain after a few months.  Ultrasound and CT fine.  Eye problems.  Symptoms all resolved with stopping med       Morbid obesity (HCC) 04/19/2007     Priority: Medium     Essential hypertension  04/18/2007     Priority: Medium     Onset age 17, medications started in 2015.       Herpes simplex virus (HSV) infection 02/06/2007     Priority: Medium     Gets on lips.  Valtrex and Abreva work.  Episodes x 2 tip of nose, including 2/2016.  No fluid collected.         Mixed Hyperlipidemia LDL goal <130 02/06/2007     Priority: Medium     Predominant disturbance of emotions 02/06/2007     Priority: Medium      Past Medical History   Diagnosis Date     Dysuria      Hypertension      Past Surgical History   Procedure Laterality Date     [place] teeth       Appendectomy       Current Outpatient Prescriptions   Medication Sig Dispense Refill     mupirocin (BACTROBAN) 2 % ointment Apply topically 2 times daily for 7 days 22 g 1     MELOXICAM PO        COLCHICINE PO        irbesartan (AVAPRO) 300 MG tablet Take 1 tablet (300 mg) by mouth daily 90 tablet prn     OTC products: no recent use of OTC ASA, NSAIDS or Steroids    Allergies   Allergen Reactions     Allopurinol      Kidney area pain, eye problems     Amlodipine      Edema at 10 mg dose     Contrast Dye      Indomethacin GI Disturbance     Levaquin [Levofloxacin]      Gout flare in 2011     Lisinopril      cough     Losartan      Penile rash     Seasonal Allergies       Latex Allergy: NO    Social History   Substance Use Topics     Smoking status: Never Smoker     Smokeless tobacco: Never Used     Alcohol use 0.0 oz/week     0 Standard drinks or equivalent per week      Comment: 1-5 drinks per week     History   Drug Use No       REVIEW OF SYSTEMS:                                                    C: NEGATIVE for fever, chills, change in weight  INTEGUMENTARY/SKIN: NEGATIVE for worrisome rashes, moles or lesions  EYES: NEGATIVE for vision changes or irritation  E/M: NEGATIVE for ear, mouth and throat problems  R: NEGATIVE for significant cough or SOB  CV: NEGATIVE for chest pain, palpitations or peripheral edema  GI: NEGATIVE for nausea, abdominal pain, heartburn,  or change in bowel habits  MUSCULOSKELETAL: NEGATIVE for significant arthralgias or myalgia  NEURO: NEGATIVE for weakness, dizziness or paresthesias  ENDOCRINE: NEGATIVE for temperature intolerance, skin/hair changes  HEME/ALLERGY/IMMUNE: NEGATIVE for bleeding problems  PSYCHIATRIC: NEGATIVE for changes in mood or affect  ROS otherwise negative    EXAM:                                                    /86  Pulse 74  Temp 97.8  F (36.6  C) (Oral)  Wt 298 lb (135.2 kg)  SpO2 95%  BMI 38.26 kg/m2  GENERAL APPEARANCE: healthy, alert and no distress  EYES: Eyes grossly normal to inspection, PERRL and conjunctivae and sclerae normal  HENT: ear canals and TM's normal and nose and mouth without ulcers or lesions  RESP: lungs clear to auscultation - no rales, rhonchi or wheezes  CV: regular rate and rhythm, normal S1 S2, no S3 or S4 and no murmur, click or rub   ABDOMEN: soft, nontender, no HSM or masses and bowel sounds normal  MS: extremities normal- no gross deformities noted  SKIN: no suspicious lesions or rashes  NEURO: Normal strength and tone, sensory exam grossly normal, mentation intact and speech normal  PSYCH: mentation appears normal and affect normal/bright    DIAGNOSTICS:                                                      Labs Resulted Today:   Results for orders placed or performed in visit on 03/14/17   Basic metabolic panel   Result Value Ref Range    Sodium 142 133 - 144 mmol/L    Potassium 4.2 3.4 - 5.3 mmol/L    Chloride 106 94 - 109 mmol/L    Carbon Dioxide 30 20 - 32 mmol/L    Anion Gap 6 3 - 14 mmol/L    Glucose 88 70 - 99 mg/dL    Urea Nitrogen 15 7 - 30 mg/dL    Creatinine 1.13 0.66 - 1.25 mg/dL    GFR Estimate 70 >60 mL/min/1.7m2    GFR Estimate If Black 85 >60 mL/min/1.7m2    Calcium 8.8 8.5 - 10.1 mg/dL       Recent Labs   Lab Test  06/10/16   0823  05/12/16   0757  03/31/16   1943   HGB   --    --   14.7   PLT   --    --   237   NA  141  138  137   POTASSIUM  4.2  4.1  3.8   CR  1.13   1.35*  1.03        IMPRESSION:                                                    Reason for surgery/procedure: left ankle tendon tear  Diagnosis/reason for consult: hypertension, hyperlipidemia, cardiopulmonary clearance for surgery     The proposed surgical procedure is considered INTERMEDIATE risk.    REVISED CARDIAC RISK INDEX  The patient has the following serious cardiovascular risks for perioperative complications such as (MI, PE, VFib and 3  AV Block):  No serious cardiac risks  INTERPRETATION: 0 risks: Class I (very low risk - 0.4% complication rate)    The patient has the following additional risks for perioperative complications:  No identified additional risks      ICD-10-CM    1. Pre-op exam Z01.818 Basic metabolic panel   2. Tear of tendon of left ankle, subsequent encounter S96.912D Basic metabolic panel   3. Essential hypertension I10 Basic metabolic panel   4. Mixed Hyperlipidemia LDL goal <130 E78.5        RECOMMENDATIONS:                                                          --Patient is to take all scheduled medications on the day of surgery EXCEPT for modifications listed below.    ACE Inhibitor or Angiotensin Receptor Blocker (ARB) Use  Ace inhibitor or Angiotensin Receptor Blocker (ARB) and should HOLD this medication for the 24 hours prior to surgery.      APPROVAL GIVEN to proceed with proposed procedure, without further diagnostic evaluation       Signed Electronically by: Mary Gonzalez PA-C    Copy of this evaluation report is provided to requesting physician.    [facility] Preop Guidelines

## 2017-03-14 NOTE — MR AVS SNAPSHOT
After Visit Summary   3/14/2017    Abhijit Garcia    MRN: [id number]           Patient Information     Date Of Birth          1975        Visit Information        Provider Department      3/14/2017 7:40 AM Mary Gonzalez PA-C [facility]        Today's Diagnoses     Pre-op exam    -  1    Tear of tendon of left ankle, subsequent encounter        Essential hypertension        Mixed Hyperlipidemia LDL goal <130          Care Instructions    Hold irbesartan the morning of surgery.   NO ibuprofen or aspirin Aleve    Ok for tylenol            Follow-ups after your visit        Who to contact     If you have questions or need follow up information about today's clinic visit or your schedule please contact [facility] directly at [phone].  Normal or non-critical lab and imaging results will be communicated to you by dELiAshart, letter or phone within 4 business days after the clinic has received the results. If you do not hear from us within 7 days, please contact the clinic through dELiAshart or phone. If you have a critical or abnormal lab result, we will notify you by phone as soon as possible.  Submit refill requests through [employer]Lake or call your pharmacy and they will forward the refill request to us. Please allow 3 business days for your refill to be completed.          Additional Information About Your Visit        MyChart Information     PolarLake gives you secure access to your electronic health record. If you see a primary care provider, you can also send messages to your care team and make appointments. If you have questions, please call your primary care clinic.  If you do not have a primary care provider, please call [phone] and they will assist you.        Care EveryWhere ID     This is your Care EveryWhere ID. This could be used by other organizations to access your [place] medical records  [id number]        Your Vitals Were      Pulse Temperature Pulse Oximetry BMI (Body Mass Index)          74 97.8  F (36.6  C) (Oral) 95% 38.26 kg/m2         Blood Pressure from Last 3 Encounters:   03/14/17 112/86   03/10/17 110/70   02/04/17 126/78    Weight from Last 3 Encounters:   03/14/17 298 lb (135.2 kg)   03/10/17 296 lb (134.3 kg)   02/04/17 296 lb (134.3 kg)              We Performed the Following     Basic metabolic panel        Primary Care Provider Office Phone # Fax #    Samir De La O [phone]       [facility] [address] [facility] [address]        Thank you!     Thank you for choosing [facility]  for your care. Our goal is always to provide you with excellent care. Hearing back from our patients is one way we can continue to improve our services. Please take a few minutes to complete the written survey that you may receive in the mail after your visit with us. Thank you!             Your Updated Medication List - Protect others around you: Learn how to safely use, store and throw away your medicines at www.disposemymeds.org.          This list is accurate as of: 3/14/17  7:59 AM.  Always use your most recent med list.                   Brand Name Dispense Instructions for use    albuterol 108 (90 BASE) MCG/ACT Inhaler    PROAIR HFA/PROVENTIL HFA/VENTOLIN HFA    1 Inhaler    Inhale 2 puffs into the lungs every 4 hours as needed for shortness of breath / dyspnea or wheezing       COLCHICINE PO          irbesartan 300 MG tablet    AVAPRO    90 tablet    Take 1 tablet (300 mg) by mouth daily       MELOXICAM PO          mupirocin 2 % ointment    BACTROBAN    22 g    Apply topically 2 times daily for 7 days

## 2017-03-22 ENCOUNTER — TELEPHONE (OUTPATIENT)
Dept: INTERNAL MEDICINE | Facility: CLINIC | Age: 42
End: 2017-03-22

## 2017-03-22 NOTE — TELEPHONE ENCOUNTER
Reason for Call:  Other PreOp    Detailed comments: Please fax pre op report from 3/14 to [phone]. Pt has surgery tomorrow 3/23/17    Phone Number Patient can be reached at: Other phone number:  [phone]    Best Time: ASAP    Can we leave a detailed message on this number? YES    Call taken on 3/22/2017 at 8:52 AM by Shanthi Enriquez

## 2017-03-27 ENCOUNTER — TELEPHONE (OUTPATIENT)
Dept: INTERNAL MEDICINE | Facility: CLINIC | Age: 42
End: 2017-03-27

## 2017-03-27 NOTE — TELEPHONE ENCOUNTER
Pt called stating he accidentally took 2 of his BP pills instead of taking 1.  Pt just had ankle surgery and meant to take a pain pill.  Informed pt to monitor BP through out day, increase fluids, get up slowly as BP could drop, have lightheadedness or dizziness.  Current BP has been 117/80 and 112/85.  Poison Control # given  Will CB if any other sx.   Mary Gonzalez PAC notified  Carolina Elizabeth RN    Guideline used: Overdose [phone]  Telephone Triage Protocols for Nurses, Fifth Edition, Saranya ELIZABETH, RN

## 2017-04-04 ENCOUNTER — HOSPITAL ENCOUNTER (OUTPATIENT)
Dept: ULTRASOUND IMAGING | Facility: CLINIC | Age: 42
Discharge: HOME | End: 2017-04-04
Attending: PHYSICIAN ASSISTANT | Admitting: PHYSICIAN ASSISTANT
Payer: OTHER MISCELLANEOUS

## 2017-04-04 DIAGNOSIS — M79.662 PAIN OF LEFT CALF: ICD-10-CM

## 2017-04-04 PROCEDURE — 93971 EXTREMITY STUDY: CPT | Mod: LT

## 2017-04-05 ENCOUNTER — OFFICE VISIT (OUTPATIENT)
Dept: FAMILY MEDICINE | Facility: CLINIC | Age: 42
End: 2017-04-05
Payer: OTHER MISCELLANEOUS

## 2017-04-05 ENCOUNTER — ANTICOAGULATION THERAPY VISIT (OUTPATIENT)
Dept: FAMILY MEDICINE | Facility: CLINIC | Age: 42
End: 2017-04-05
Payer: COMMERCIAL

## 2017-04-05 VITALS
TEMPERATURE: 99 F | HEART RATE: 93 BPM | HEIGHT: 74 IN | DIASTOLIC BLOOD PRESSURE: 86 MMHG | BODY MASS INDEX: 38.24 KG/M2 | SYSTOLIC BLOOD PRESSURE: 110 MMHG | WEIGHT: 298 LBS

## 2017-04-05 DIAGNOSIS — I82.409 DEEP VEIN THROMBOSIS (DVT) (H): ICD-10-CM

## 2017-04-05 DIAGNOSIS — I82.492 ACUTE DEEP VEIN THROMBOSIS (DVT) OF OTHER SPECIFIED VEIN OF LEFT LOWER EXTREMITY (H): Primary | ICD-10-CM

## 2017-04-05 DIAGNOSIS — Z79.01 LONG TERM CURRENT USE OF ANTICOAGULANT THERAPY: Primary | ICD-10-CM

## 2017-04-05 DIAGNOSIS — Z79.01 LONG-TERM (CURRENT) USE OF ANTICOAGULANTS: ICD-10-CM

## 2017-04-05 PROCEDURE — 99207 ZZC NO CHARGE NURSE ONLY: CPT | Performed by: FAMILY MEDICINE

## 2017-04-05 PROCEDURE — 99214 OFFICE O/P EST MOD 30 MIN: CPT | Performed by: FAMILY MEDICINE

## 2017-04-05 RX ORDER — WARFARIN SODIUM 5 MG/1
TABLET ORAL
Qty: 30 TABLET | Refills: 0 | Status: SHIPPED | OUTPATIENT
Start: 2017-04-05 | End: 2017-04-19 | Stop reason: DRUGHIGH

## 2017-04-05 RX ORDER — TRAMADOL HYDROCHLORIDE 50 MG/1
TABLET ORAL DAILY PRN
Refills: 0 | COMMUNITY
Start: 2017-03-23 | End: 2017-04-05

## 2017-04-05 NOTE — NURSING NOTE
"Chief Complaint   Patient presents with     Work Comp     from 10-4-2016 leg injury       Initial /86 (BP Location: Right arm, Patient Position: Chair, Cuff Size: Adult Large)  Pulse 93  Temp 99  F (37.2  C) (Oral)  Ht 6' 2\" (1.88 m)  Wt 298 lb (135.2 kg)  BMI 38.26 kg/m2 Estimated body mass index is 38.26 kg/(m^2) as calculated from the following:    Height as of this encounter: 6' 2\" (1.88 m).    Weight as of this encounter: 298 lb (135.2 kg).  Medication Reconciliation: complete     Matt Granados CMA          "

## 2017-04-05 NOTE — MR AVS SNAPSHOT
After Visit Summary   4/5/2017    Abhijit Garcia    MRN: [id number]           Patient Information     Date Of Birth          1975        Visit Information        Provider Department      4/5/2017 10:00 AM Madeleine Lynch MD [facility]        Today's Diagnoses     Acute deep vein thrombosis (DVT) of other specified vein of left lower extremity (H)    -  1       Follow-ups after your visit        Additional Services     INR CLINIC REFERRAL       Your provider has referred you to INR Services.    Please be aware that coverage of these services is subject to the terms and limitations of your health insurance plan.  Call member services at your health plan with any benefit or coverage questions.    Indication for Anticoagulation: DVT (first time)  If nonstandard INR is desired, indicate goal range and explanation: 2-3  Expected Duration of Therapy: 3 Months                  Your next 10 appointments already scheduled     Apr 07, 2017  8:30 AM CDT   Anticoagulation Visit with  ANTICOAGULATION CLINIC   [facility] ([facility])    [address] [place] [address] [phone]              Future tests that were ordered for you today     Open Future Orders        Priority Expected Expires Ordered    US Lower Extremity Venous Duplex Left STAT  4/4/2018 4/4/2017            Who to contact     If you have questions or need follow up information about today's clinic visit or your schedule please contact [facility] directly at [phone].  Normal or non-critical lab and imaging results will be communicated to you by MyChart, letter or phone within 4 business days after the clinic has received the results. If you do not hear from us within 7 days, please contact the clinic through MyChart or phone. If you have a critical or abnormal lab result, we will notify you by phone as soon as possible.  Submit refill requests through  [facility] or call your pharmacy and they will forward the refill request to us. Please allow 3 business days for your refill to be completed.          Additional Information About Your Visit        MyChart Information     [employer] gives you secure access to your electronic health record. If you see a primary care provider, you can also send messages to your care team and make appointments. If you have questions, please call your primary care clinic.  If you do not have a primary care provider, please call [phone] and they will assist you.        Care EveryWhere ID     This is your Care EveryWhere ID. This could be used by other organizations to access your [place] medical records  [id number]        Your Vitals Were     Pulse Temperature Height BMI (Body Mass Index)          93 99  F (37.2  C) (Oral) 6' 2\" (1.88 m) 38.26 kg/m2         Blood Pressure from Last 3 Encounters:   04/05/17 110/86   03/14/17 112/86   03/10/17 110/70    Weight from Last 3 Encounters:   04/05/17 298 lb (135.2 kg)   03/14/17 298 lb (135.2 kg)   03/10/17 296 lb (134.3 kg)              We Performed the Following     INR CLINIC REFERRAL          Today's Medication Changes          These changes are accurate as of: 4/5/17  2:52 PM.  If you have any questions, ask your nurse or doctor.               Start taking these medicines.        Dose/Directions    warfarin 5 MG tablet   Commonly known as:  COUMADIN   Used for:  Long-term (current) use of anticoagulants, Deep vein thrombosis (DVT) (H)   Started by:  Madeleine Lynch MD        5 mg daily or as direct by INR   Quantity:  30 tablet   Refills:  0         Stop taking these medicines if you haven't already. Please contact your care team if you have questions.     traMADol 50 MG tablet   Commonly known as:  ULTRAM   Stopped by:  Madeleine Lynch MD                Where to get your medicines      These medications were sent to [facility] [address] [address] [place] " [address]  [address] [facility] [address]     Phone:  [phone]     warfarin 5 MG tablet                Primary Care Provider Office Phone # Fax #    Samir De La O [phone]       [facility] [address]  [facility] [address]        Thank you!     Thank you for choosing [facility]  for your care. Our goal is always to provide you with excellent care. Hearing back from our patients is one way we can continue to improve our services. Please take a few minutes to complete the written survey that you may receive in the mail after your visit with us. Thank you!             Your Updated Medication List - Protect others around you: Learn how to safely use, store and throw away your medicines at www.disposemymeds.org.          This list is accurate as of: 4/5/17  2:52 PM.  Always use your most recent med list.                   Brand Name Dispense Instructions for use    COLCHICINE PO          irbesartan 300 MG tablet    AVAPRO    90 tablet    Take 1 tablet (300 mg) by mouth daily       MELOXICAM PO      Reported on 4/5/2017       warfarin 5 MG tablet    COUMADIN    30 tablet    5 mg daily or as direct by INR

## 2017-04-05 NOTE — PROGRESS NOTES
SUBJECTIVE:                                                    Abhijit Garcia is a 42 year old male who presents to clinic today for the following health issues:      Workers Comp Visit    Date of Injury - 10/4/2016    Mechanism of Injury - was working as a [employer] location, moving boxes, rolled his left ankle    Subsequent events - I do not see any specialists notes in his chart, but he reports that he met with orthopedics, had imaging identifying ligament tears in the lateral ankle. On March 23, 2017, he had surgery to repair these ligaments. Following this he was in a walking boot. He developed posterior left calf pain on April 3, was seen by orthopedics, who ordered an ultrasound. He was noted to have a DVT in his left peroneal vein at the mid calf.    Today, he is here to establish care and to discuss anticoagulation. He was given a prescription for Xarelto by orthopedics. He has questions regarding taking this medication and if it will be a good fit for him.      Problem list and histories reviewed & adjusted, as indicated.  Additional history: as documented    Patient Active Problem List   Diagnosis     Herpes simplex virus (HSV) infection     Mixed Hyperlipidemia LDL goal <130     Predominant disturbance of emotions     Essential hypertension     Morbid obesity (HCC)     Gout     Lumbar radiculitis     Prostatitis     Long-term (current) use of anticoagulants [Z79.01]     Deep vein thrombosis (DVT) (H) [I82.409]     Past Surgical History:   Procedure Laterality Date     APPENDECTOMY       wisdom teeth         Social History   Substance Use Topics     Smoking status: Never Smoker     Smokeless tobacco: Never Used     Alcohol use 0.0 oz/week     0 Standard drinks or equivalent per week      Comment: 1-5 drinks per week     Family History   Problem Relation Age of Onset     DIABETES Mother      Obesity Mother      Cancer - colorectal Mother      Hypertension Mother      Arthritis Father      Respiratory  "Father      asbestos exposure     Chronic Obstructive Pulmonary Disease Father      Coronary Artery Disease Father 71     Allergies Brother      Respiratory Brother            Reviewed and updated as needed this visit by clinical staff       Reviewed and updated as needed this visit by Provider         ROS:  Constitutional, HEENT, cardiovascular, pulmonary, gi and gu systems are negative, except as otherwise noted.    OBJECTIVE:                                                    /86 (BP Location: Right arm, Patient Position: Chair, Cuff Size: Adult Large)  Pulse 93  Temp 99  F (37.2  C) (Oral)  Ht 6' 2\" (1.88 m)  Wt 298 lb (135.2 kg)  BMI 38.26 kg/m2  Body mass index is 38.26 kg/(m^2).  GENERAL: healthy, alert and no distress  MS: Tenderness to palpation at the left posterior mid calf, swelling of the left ankle and foot, nontender to palpation, no palpable cords of the midcalf, positive Homans sign    Diagnostic Test Results:  Results for orders placed or performed during the hospital encounter of 04/04/17 (from the past 24 hour(s))   US Lower Extremity Venous Duplex Left    Narrative    ULTRASOUND LEFT LOWER EXTREMITY VENOUS DUPLEX  4/4/2017 3:05 PM     HISTORY: Left lower leg pain.    COMPARISON: None.    TECHNIQUE: Spectral doppler and waveform analysis were performed on  the left lower extremity veins.    FINDINGS: The left common femoral, femoral, and popliteal veins are  patent, compressible, and negative for deep venous thrombosis. There  is a short segment of occlusive deep venous thrombosis in the left  peroneal vein at the level of the midcalf. Calf veins are segmentally  seen but appear otherwise negative for DVT where visualized.       Impression    IMPRESSION: Short segment of occlusive deep venous thrombosis is noted  in the left peroneal vein at the level of the midcalf.      Findings were phoned to the referring physician by the ultrasound  technologist at the conclusion of the " exam.    BRITTNEY LINDQUIST MD        ASSESSMENT/PLAN:                                                      1. Acute deep vein thrombosis (DVT) of other specified vein of left lower extremity (H) - discussed anticoagulation options. He elects to initiate warfarin given its potential for reversibility. He is willing to participate in periodic monitoring. Will plan for a 3 month course, check a lower extremity ultrasound at that time, determine if further treatment is needed.  - INR CLINIC REFERRAL    25 minutes spent with patient, greater than 50% in counseling    Madeleine Lynch MD  [facility]

## 2017-04-05 NOTE — PROGRESS NOTES
ANTICOAGULATION INITIAL CLINIC VISIT    Patient Name:  Abhijit Garcia  Date:  4/5/2017  Referred by: Madeleine Lynch  Contact Type:  Face to Face    SUBJECTIVE:  Coumadin education was completed today.  Topics covered include:  -Introduction to coumadin  -Proper Administration  -INR Testing  -Sign/Symptoms of Bleeding  -Signs/Symptoms of Clot Formation or Stroke  -Dietary Intake of Vitamin K  -Drug Interactions  -Anticoagulation Identification (bracelet, necklace or wallet card)  -Future Surgery  -Effects of Alcohol, Tobacco, and Exercise on Coumadin    Coumadin Education Booklet and Coumadin Identification Wallet Card were given to the patient.       Patient Findings     Positives Initiation of therapy          OBJECTIVE    No results found for: INR, [id number], [id number], 2AGN, F2    ASSESSMENT / PLAN  INR assessment SUB    Recheck INR In: 2 DAYS    INR Location Clinic      Anticoagulation Summary as of 4/5/2017     INR goal 2.0-3.0   Today's INR No new INR was available at the time of this encounter.   Maintenance plan No maintenance plan   Full instructions 4/5: 10 mg; 4/6: 10 mg; Otherwise No maintenance plan   Next INR check 4/7/2017   Target end date 7/5/2017    Indications   Long-term (current) use of anticoagulants [Z79.01] [Z79.01]  Deep vein thrombosis (DVT) (H) [I82.409] [I82.409]         Anticoagulation Episode Summary     INR check location     Preferred lab     Send INR reminders to RN POOL - LK    Comments             See the Encounter Report to view Anticoagulation Flowsheet and Dosing Calendar (Go to Encounters tab in chart review, and find the Anticoagulation Therapy Visit)    Dosage adjustment made based on physician directed care plan.    Deya Spencer RN

## 2017-04-05 NOTE — MR AVS SNAPSHOT
Abhijit Jose   4/5/2017   Anticoagulation Therapy Visit    Description:  42 year old male   Provider:  Madeleine Lynch MD   Department:   Family Practice           INR as of 4/5/2017     Today's INR No new INR was available at the time of this encounter.      Anticoagulation Summary as of 4/5/2017     INR goal 2.0-3.0   Today's INR No new INR was available at the time of this encounter.   Full instructions 4/5: 10 mg; 4/6: 10 mg; Otherwise No maintenance plan   Next INR check 4/7/2017    Indications   Long-term (current) use of anticoagulants [Z79.01] [Z79.01]  Deep vein thrombosis (DVT) (H) [I82.409] [I82.409]         Your next Anticoagulation Clinic appointment(s)     Apr 07, 2017  8:30 AM CDT   Anticoagulation Visit with  ANTICOAGULATION CLINIC   [facility]    [id number] [place] [address]   [phone]              April 2017 Details    Sun Mon Tue Wed Thu Fri Sat           1                 2               3               4               5      10 mg   See details      6      10 mg         7            8                 9               10               11               12               13               14               15                 16               17               18               19               20               21               22                 23               24               25               26               27               28               29                 30                      Date Details   04/05 This INR check       Date of next INR:  4/7/2017         How to take your warfarin dose     To take:  10 mg Take 2 of the 5 mg tablets.

## 2017-04-07 ENCOUNTER — ANTICOAGULATION THERAPY VISIT (OUTPATIENT)
Dept: NURSING | Facility: CLINIC | Age: 42
End: 2017-04-07
Payer: OTHER MISCELLANEOUS

## 2017-04-07 DIAGNOSIS — Z79.01 LONG-TERM (CURRENT) USE OF ANTICOAGULANTS: ICD-10-CM

## 2017-04-07 LAB — INR POINT OF CARE: 1.1 (ref 0.86–1.14)

## 2017-04-07 PROCEDURE — 36416 COLLJ CAPILLARY BLOOD SPEC: CPT

## 2017-04-07 PROCEDURE — 85610 PROTHROMBIN TIME: CPT | Mod: QW

## 2017-04-07 NOTE — PROGRESS NOTES
ANTICOAGULATION FOLLOW-UP CLINIC VISIT    Patient Name:  Abhijit Garcia  Date:  4/7/2017  Contact Type:  Face to Face    SUBJECTIVE:     Patient Findings     Positives Initiation of therapy           OBJECTIVE    INR Protime   Date Value Ref Range Status   04/07/2017 1.1 0.86 - 1.14 Final       ASSESSMENT / PLAN  INR assessment SUB    Recheck INR In: 3 DAYS    INR Location Clinic      Anticoagulation Summary as of 4/7/2017     INR goal 2.0-3.0   Today's INR 1.1!   Maintenance plan No maintenance plan   Full instructions 4/7: 15 mg; 4/8: 10 mg; 4/9: 5 mg; Otherwise No maintenance plan   Next INR check 4/10/2017   Target end date 7/5/2017    Indications   Long-term (current) use of anticoagulants [Z79.01] [Z79.01]  Deep vein thrombosis (DVT) (H) [I82.409] (Resolved) [I82.409]         Anticoagulation Episode Summary     INR check location     Preferred lab     Send INR reminders to RN POOL - LK    Comments             See the Encounter Report to view Anticoagulation Flowsheet and Dosing Calendar (Go to Encounters tab in chart review, and find the Anticoagulation Therapy Visit)    Dosage adjustment made based on physician directed care plan.    Deya Spencer RN

## 2017-04-07 NOTE — MR AVS SNAPSHOT
Abhijit Garcia   4/7/2017 8:30 AM   Anticoagulation Therapy Visit    Description:  42 year old male   Provider:   ANTICOAGULATION CLINIC   Department:   Nurse           INR as of 4/7/2017     Today's INR 1.1!      Anticoagulation Summary as of 4/7/2017     INR goal 2.0-3.0   Today's INR 1.1!   Full instructions 4/7: 15 mg; 4/8: 10 mg; 4/9: 5 mg; Otherwise No maintenance plan   Next INR check 4/10/2017    Indications   Long-term (current) use of anticoagulants [Z79.01] [Z79.01]  Deep vein thrombosis (DVT) (H) [I82.409] (Resolved) [I82.409]         Your next Anticoagulation Clinic appointment(s)     Apr 10, 2017  9:30 AM CDT   Anticoagulation Visit with  [facility]AGULATION CLINIC   [facility]    [address] [facility] [address] [phone]              Contact Numbers     [facility]  Please call [phone] with any problems or questions regarding your therapy, or to cancel or reschedule your appointment.          April 2017 Details    Sun Mon Tue Wed Thu Fri Sat           1                 2               3               4               5               6               7      15 mg   See details      8      10 mg           9      5 mg         10            11               12               13               14               15                 16               17               18               19               20               21               22                 23               24               25               26               27               28               29                 30                      Date Details   04/07 This INR check       Date of next INR:  4/10/2017         How to take your warfarin dose     To take:  5 mg Take 1 of the 5 mg tablets.    To take:  10 mg Take 2 of the 5 mg tablets.    To take:  15 mg Take 3 of the 5 mg tablets.

## 2017-04-10 ENCOUNTER — ANTICOAGULATION THERAPY VISIT (OUTPATIENT)
Dept: NURSING | Facility: CLINIC | Age: 42
End: 2017-04-10
Payer: OTHER MISCELLANEOUS

## 2017-04-10 DIAGNOSIS — Z79.01 LONG-TERM (CURRENT) USE OF ANTICOAGULANTS: ICD-10-CM

## 2017-04-10 LAB — INR POINT OF CARE: 1.3 (ref 0.86–1.14)

## 2017-04-10 PROCEDURE — 36416 COLLJ CAPILLARY BLOOD SPEC: CPT

## 2017-04-10 PROCEDURE — 85610 PROTHROMBIN TIME: CPT | Mod: QW

## 2017-04-10 NOTE — MR AVS SNAPSHOT
Abhijit Sanchezveland   4/10/2017 11:00 AM   Anticoagulation Therapy Visit    Description:  42 year old male   Provider:   ANTICOAGULATION CLINIC   Department:   Nurse           INR as of 4/10/2017     Today's INR 1.3!      Anticoagulation Summary as of 4/10/2017     INR goal 2.0-3.0   Today's INR 1.3!   Full instructions 4/10: 15 mg; 4/11: 10 mg; Otherwise No maintenance plan   Next INR check 4/12/2017    Indications   Long-term (current) use of anticoagulants [Z79.01] [Z79.01]  Deep vein thrombosis (DVT) (H) [I82.409] (Resolved) [I82.409]         Your next Anticoagulation Clinic appointment(s)     Apr 12, 2017  8:45 AM CDT   Anticoagulation Visit with  ANTICOAGULATION CLINIC   [facility]    [address] [facility] [address] [phone]              Contact Numbers     [facility]  Please call [phone] with any problems or questions regarding your therapy, or to cancel or reschedule your appointment.          April 2017 Details    Sun Mon Tue Wed Thu Fri Sat           1                 2               3               4               5               6               7               8                 9               10      15 mg   See details      11      10 mg         12            13               14               15                 16               17               18               19               20               21               22                 23               24               25               26               27               28               29                 30                      Date Details   04/10 This INR check       Date of next INR:  4/12/2017         How to take your warfarin dose     To take:  10 mg Take 2 of the 5 mg tablets.    To take:  15 mg Take 3 of the 5 mg tablets.

## 2017-04-12 ENCOUNTER — ANTICOAGULATION THERAPY VISIT (OUTPATIENT)
Dept: NURSING | Facility: CLINIC | Age: 42
End: 2017-04-12
Payer: COMMERCIAL

## 2017-04-12 DIAGNOSIS — Z79.01 LONG-TERM (CURRENT) USE OF ANTICOAGULANTS: ICD-10-CM

## 2017-04-12 DIAGNOSIS — M10.9 ACUTE GOUTY ARTHRITIS: Primary | ICD-10-CM

## 2017-04-12 LAB — INR POINT OF CARE: 1.6 (ref 0.86–1.14)

## 2017-04-12 PROCEDURE — 36416 COLLJ CAPILLARY BLOOD SPEC: CPT

## 2017-04-12 PROCEDURE — 85610 PROTHROMBIN TIME: CPT | Mod: QW

## 2017-04-12 RX ORDER — WARFARIN SODIUM 10 MG/1
TABLET ORAL
Qty: 45 TABLET | Refills: 0 | Status: SHIPPED | OUTPATIENT
Start: 2017-04-12 | End: 2017-05-25

## 2017-04-12 NOTE — PROGRESS NOTES
ANTICOAGULATION FOLLOW-UP CLINIC VISIT    Patient Name:  Abhijit Garcia  Date:  4/12/2017  Contact Type:  Face to Face    SUBJECTIVE:     Patient Findings     Positives Initiation of therapy           OBJECTIVE    INR Protime   Date Value Ref Range Status   04/12/2017 1.6 (A) 0.86 - 1.14 Final       ASSESSMENT / PLAN  INR assessment SUB    Recheck INR In: 1 WEEK    INR Location Clinic      Anticoagulation Summary as of 4/12/2017     INR goal 2.0-3.0   Today's INR 1.6!   Maintenance plan No maintenance plan   Full instructions 4/12: 15 mg; 4/13: 10 mg; 4/14: 10 mg; 4/15: 10 mg; 4/16: 10 mg; 4/17: 10 mg; 4/18: 10 mg; Otherwise No maintenance plan   Next INR check 4/19/2017   Target end date 7/5/2017    Indications   Long-term (current) use of anticoagulants [Z79.01] [Z79.01]  Deep vein thrombosis (DVT) (H) [I82.409] (Resolved) [I82.409]         Anticoagulation Episode Summary     INR check location     Preferred lab     Send INR reminders to RN POOL - LK    Comments             See the Encounter Report to view Anticoagulation Flowsheet and Dosing Calendar (Go to Encounters tab in chart review, and find the Anticoagulation Therapy Visit)    Dosage adjustment made based on physician directed care plan.    Madeleine Lynch MD

## 2017-04-12 NOTE — MR AVS SNAPSHOT
After Visit Summary   4/12/2017    Abhijit Garcia    MRN: [id number]           Patient Information     Date Of Birth          1975        Visit Information        Provider Department      4/12/2017 8:45 AM  ANTICOAGULATION CLINIC [facility]        Today's Diagnoses     Acute gouty arthritis    -  1    Long-term (current) use of anticoagulants          Care Instructions    Evening dose tonight, take 1.2 mg colcrys, followed by 0.6 mg colcrys 1 hour later    Then resume 0.6 mg twice daily    Call if you need something stronger for pain        Follow-ups after your visit        Additional Services     RHEUMATOLOGY REFERRAL       Your provider has referred you to: FMG:  [facility] [phone]  [email or web address]    Please be aware that coverage of these services is subject to the terms and limitations of your health insurance plan.  Call member services at your health plan with any benefit or coverage questions.      Please bring the following with you to your appointment:    (1) Any X-Rays, CTs or MRIs which have been performed.  Contact the facility where they were done to arrange for  prior to your scheduled appointment.    (2) List of current medications   (3) This referral request   (4) Any documents/labs given to you for this referral                  Who to contact     If you have questions or need follow up information about today's clinic visit or your schedule please contact [facility] directly at [phone].  Normal or non-critical lab and imaging results will be communicated to you by MyChart, letter or phone within 4 business days after the clinic has received the results. If you do not hear from us within 7 days, please contact the clinic through MyChart or phone. If you have a critical or abnormal lab result, we will notify you by phone as soon as  possible.  Submit refill requests through [employer] or call your pharmacy and they will forward the refill request to us. Please allow 3 business days for your refill to be completed.          Additional Information About Your Visit        [facility]iju Information     [employer] gives you secure access to your electronic health record. If you see a primary care provider, you can also send messages to your care team and make appointments. If you have questions, please call your primary care clinic.  If you do not have a primary care provider, please call [phone] and they will assist you.        Care EveryWhere ID     This is your Care EveryWhere ID. This could be used by other organizations to access your [facility] medical records  [id number]         Blood Pressure from Last 3 Encounters:   04/05/17 110/86   03/14/17 112/86   03/10/17 110/70    Weight from Last 3 Encounters:   04/05/17 298 lb (135.2 kg)   03/14/17 298 lb (135.2 kg)   03/10/17 296 lb (134.3 kg)              We Performed the Following     INR point of care     RHEUMATOLOGY REFERRAL        Primary Care Provider Office Phone # Fax #    Samir De La O [phone]       [facility] [address] [place] [phone]        Thank you!     Thank you for choosing [facility]  for your care. Our goal is always to provide you with excellent care. Hearing back from our patients is one way we can continue to improve our services. Please take a few minutes to complete the written survey that you may receive in the mail after your visit with us. Thank you!             Your Updated Medication List - Protect others around you: Learn how to safely use, store and throw away your medicines at www.disposemymeds.org.          This list is accurate as of: 4/12/17  9:01 AM.  Always use your most recent med list.                   Brand Name Dispense Instructions for use    COLCHICINE PO          irbesartan 300 MG tablet     AVAPRO    90 tablet    Take 1 tablet (300 mg) by mouth daily       MELOXICAM PO      Reported on 4/5/2017       warfarin 5 MG tablet    COUMADIN    30 tablet    5 mg daily or as direct by INR

## 2017-04-12 NOTE — PATIENT INSTRUCTIONS
Evening dose tonight, take 1.2 mg colcrys, followed by 0.6 mg colcrys 1 hour later    Then resume 0.6 mg twice daily    Call if you need something stronger for pain

## 2017-04-13 ENCOUNTER — TELEPHONE (OUTPATIENT)
Dept: NURSING | Facility: CLINIC | Age: 42
End: 2017-04-13

## 2017-04-13 ENCOUNTER — TELEPHONE (OUTPATIENT)
Dept: FAMILY MEDICINE | Facility: CLINIC | Age: 42
End: 2017-04-13

## 2017-04-13 DIAGNOSIS — M10.9 ACUTE GOUT, UNSPECIFIED CAUSE, UNSPECIFIED SITE: Primary | ICD-10-CM

## 2017-04-13 RX ORDER — HYDROCODONE BITARTRATE AND ACETAMINOPHEN 5; 325 MG/1; MG/1
1-2 TABLET ORAL EVERY 4 HOURS PRN
Qty: 20 TABLET | Refills: 0 | Status: SHIPPED | OUTPATIENT
Start: 2017-04-13 | End: 2017-04-19

## 2017-04-13 NOTE — TELEPHONE ENCOUNTER
He said he didn't do well with tramadol. I suggest we try norco, will have script ready but please call to see if that's okay with Deven DUTTA

## 2017-04-13 NOTE — TELEPHONE ENCOUNTER
Reason for call: Medication   If this is a refill request, has the caller requested the refill from the pharmacy already? No  Will the patient be using a [facility]? No  Name of the pharmacy and phone number for the current request: [facility]    Name of the medication requested: Tramadol/ Seen in clinic by Dr Lynch yesterday    Other request: pain having increasing pain and would now like a pain reliever.  What would you like to prescribe?    Phone Number Pt can be reached at: Home number on file [phone] (home)  Best Time: any  Can we leave a detailed message on this number? YES     Candi Escamilla

## 2017-04-13 NOTE — TELEPHONE ENCOUNTER
Pharmacy calling to say they need a hand carried document. Couldn't accept the faxed Norco prescription. Please call Yvonne.  Jena Escobar RN-[facility]

## 2017-04-13 NOTE — TELEPHONE ENCOUNTER
Faxed at first to [facility] [address] in [place], but have to p/u  Instead, will be a , will advise pt at 4pm on 4/13/2017.Arcadio Owens CMA, also talked to pt at 430pm on 4/13/2017 and advised will be at .Arcadio Owens CMA

## 2017-04-18 ENCOUNTER — APPOINTMENT (OUTPATIENT)
Dept: ULTRASOUND IMAGING | Facility: CLINIC | Age: 42
End: 2017-04-18
Attending: EMERGENCY MEDICINE
Payer: OTHER MISCELLANEOUS

## 2017-04-18 ENCOUNTER — TELEPHONE (OUTPATIENT)
Dept: FAMILY MEDICINE | Facility: CLINIC | Age: 42
End: 2017-04-18

## 2017-04-18 ENCOUNTER — HOSPITAL ENCOUNTER (EMERGENCY)
Facility: CLINIC | Age: 42
Discharge: HOME | End: 2017-04-18
Attending: EMERGENCY MEDICINE | Admitting: EMERGENCY MEDICINE
Payer: OTHER MISCELLANEOUS

## 2017-04-18 VITALS
HEART RATE: 89 BPM | OXYGEN SATURATION: 99 % | DIASTOLIC BLOOD PRESSURE: 94 MMHG | SYSTOLIC BLOOD PRESSURE: 125 MMHG | TEMPERATURE: 98.6 F

## 2017-04-18 DIAGNOSIS — Z86.718 HISTORY OF DEEP VENOUS THROMBOSIS: ICD-10-CM

## 2017-04-18 DIAGNOSIS — R79.1 SUBTHERAPEUTIC INTERNATIONAL NORMALIZED RATIO (INR): ICD-10-CM

## 2017-04-18 LAB — INR PPP: 1.71 (ref 0.86–1.14)

## 2017-04-18 PROCEDURE — 85610 PROTHROMBIN TIME: CPT | Performed by: EMERGENCY MEDICINE

## 2017-04-18 PROCEDURE — 93971 EXTREMITY STUDY: CPT | Mod: LT

## 2017-04-18 PROCEDURE — 99284 EMERGENCY DEPT VISIT MOD MDM: CPT | Mod: 25

## 2017-04-18 ASSESSMENT — ENCOUNTER SYMPTOMS: SHORTNESS OF BREATH: 0

## 2017-04-18 NOTE — ED AVS SNAPSHOT
[facility]    [address]    [facility] [address]    Phone:  [phone]    Fax:  [phone]                                       Abhijit Garcia   MRN: [id number]    Department:  [facility]   Date of Visit:  4/18/2017           After Visit Summary Signature Page     I have received my discharge instructions, and my questions have been answered. I have discussed any challenges I see with this plan with the nurse or doctor.    ..........................................................................................................................................  Patient/Patient Representative Signature      ..........................................................................................................................................  Patient Representative Print Name and Relationship to Patient    ..................................................               ................................................  Date                                            Time    ..........................................................................................................................................  Reviewed by Signature/Title    ...................................................              ..............................................  Date                                                            Time

## 2017-04-18 NOTE — DISCHARGE INSTRUCTIONS
Understanding Deep Vein Thrombosis  Deep vein thrombosis (DVT) is a condition with a blood clot or thrombus in a deep vein. A blood clot is most common in the leg, but can develop in a large vein deep inside the leg, arm, or other part of the body. Part of the clot called an embolus can separate from the vein. It may travel to the lungs and form a pulmonary embolus (PE). This can cut off the flow of blood. A PE is a medical emergency and may cause death. Health care providers use the term venous thromboembolism (VTE) to describe the 2 conditions, DVT and PE. They use the term VTE because the 2 conditions are very closely related. And, because their prevention and treatment are closely related.  Over time, a blood clot can also permanently damage veins. To protect your health, a blood clot must be treated right away.  How DVT develops  The deep veins of the legs are located in the muscles. These help carry blood from the legs to the heart. When leg muscles contract and relax, blood is squeezed through the veins back to the heart. One-way valves inside the veins help keep the blood moving in the right direction. When blood moves too slowly or not at all, it can pool in the veins. This makes a clot more likely to form.    Risk factors  Anyone can develop a blood clot. The following risk factors make a blood more likely to happen:    Being inactive for a long period, such as when you re in the hospital, or traveling by plane or car    Injury to a vein from an accident, a broken bone, or surgery    Having blood clots in the past    Personal or family history of a blood-clotting disorder    Recent surgery    Cancer and certain cancer treatments    Smoking  Other factors can also put you at higher risk for a blood clot. They include:    Age over 60 years    Pregnancy    Taking birth control or hormone replacement    Having other vein problems    Being overweight  Common symptoms  A blood clot does not always cause  obvious symptoms. If you do have symptoms, they usually occur suddenly. They may include:    Pain, especially deep in the muscle    Swelling    Aching or tenderness    Red or warm skin  Call your health care provider if you have these symptoms.  Symptoms of pulmonary embolism may include:    Trouble breathing    Fast heartbeat    Chest pain    Sweating    Coughing (may cough up blood)    Fainting  Call 911 if you have these symptoms.   If you take medicine to help prevent blood clots, you have an increased risk of bleeding. Call 911 if you have heavy or uncontrolled bleeding. Call your health care provider if you have other signs or symptoms of bleeding like blood in the urine, bleeding with bowel movements, or bleeding from the nose, gums, a cut, or vagina.  Diagnosing DVT  Diagnosis begins with thorough questions about your symptoms and medical history along with a physical exam.  Diagnostic tests include:    An imaging test called a duplex ultrasound. This test uses sound waves to create pictures of veins and blood flow.    Blood tests to check for clotting and other problems.  If your health care provider thinks you may have pulmonary embolism, additional testing will be done.  Treating DVT  Treating a blood clot may include:    Medicine to thin the blood and prevent pulmonary embolism and other complications.    Staying in the hospital. This may or may not be necessary.    Surgery for some people, like those who cannot take blood-thinning medicines.  Preventing DVT  Many people who are in the hospital are at an increased risk of developing blood clots. So, preventing blood clots is an important part of in-hospital care. The care may include getting out of bed regularly, taking medicine, or using special therapies or devices. Other factors and conditions may increase the risk of blood clots. Review your risk with your health care provider.    8936-8472 [employer] [address] [place] [address]  [place] [address]. All rights reserved. This information is not intended as a substitute for professional medical care. Always follow your healthcare professional's instructions.

## 2017-04-18 NOTE — ED AVS SNAPSHOT
[facility] Emergency Department    [address]    [facility] [address]    Phone:  [phone]    Fax:  [phone]                                       Abhijit Garcia   MRN: [id number]    Department:  [facility] Emergency Department   Date of Visit:  4/18/2017           Patient Information     Date Of Birth          1975        Your diagnoses for this visit were:     Subtherapeutic international normalized ratio (INR)     History of deep venous thrombosis        You were seen by Hany Hoyt MD.      Follow-up Information     Follow up with Samir De La O MD.    Specialty:  Internal Medicine    Why:  for re-evaluation of your symptoms, As needed    Contact information:    [facility]  [address]  [facility] [address] [phone]          Discharge Instructions         Understanding Deep Vein Thrombosis  Deep vein thrombosis (DVT) is a condition with a blood clot or thrombus in a deep vein. A blood clot is most common in the leg, but can develop in a large vein deep inside the leg, arm, or other part of the body. Part of the clot called an embolus can separate from the vein. It may travel to the lungs and form a pulmonary embolus (PE). This can cut off the flow of blood. A PE is a medical emergency and may cause death. Health care providers use the term venous thromboembolism (VTE) to describe the 2 conditions, DVT and PE. They use the term VTE because the 2 conditions are very closely related. And, because their prevention and treatment are closely related.  Over time, a blood clot can also permanently damage veins. To protect your health, a blood clot must be treated right away.  How DVT develops  The deep veins of the legs are located in the muscles. These help carry blood from the legs to the heart. When leg muscles contract and relax, blood is squeezed through the veins back to the heart. One-way valves inside the veins help keep  the blood moving in the right direction. When blood moves too slowly or not at all, it can pool in the veins. This makes a clot more likely to form.    Risk factors  Anyone can develop a blood clot. The following risk factors make a blood more likely to happen:    Being inactive for a long period, such as when you re in the hospital, or traveling by plane or car    Injury to a vein from an accident, a broken bone, or surgery    Having blood clots in the past    Personal or family history of a blood-clotting disorder    Recent surgery    Cancer and certain cancer treatments    Smoking  Other factors can also put you at higher risk for a blood clot. They include:    Age over 60 years    Pregnancy    Taking birth control or hormone replacement    Having other vein problems    Being overweight  Common symptoms  A blood clot does not always cause obvious symptoms. If you do have symptoms, they usually occur suddenly. They may include:    Pain, especially deep in the muscle    Swelling    Aching or tenderness    Red or warm skin  Call your health care provider if you have these symptoms.  Symptoms of pulmonary embolism may include:    Trouble breathing    Fast heartbeat    Chest pain    Sweating    Coughing (may cough up blood)    Fainting  Call 911 if you have these symptoms.   If you take medicine to help prevent blood clots, you have an increased risk of bleeding. Call 911 if you have heavy or uncontrolled bleeding. Call your health care provider if you have other signs or symptoms of bleeding like blood in the urine, bleeding with bowel movements, or bleeding from the nose, gums, a cut, or vagina.  Diagnosing DVT  Diagnosis begins with thorough questions about your symptoms and medical history along with a physical exam.  Diagnostic tests include:    An imaging test called a duplex ultrasound. This test uses sound waves to create pictures of veins and blood flow.    Blood tests to check for clotting and other  problems.  If your health care provider thinks you may have pulmonary embolism, additional testing will be done.  Treating DVT  Treating a blood clot may include:    Medicine to thin the blood and prevent pulmonary embolism and other complications.    Staying in the hospital. This may or may not be necessary.    Surgery for some people, like those who cannot take blood-thinning medicines.  Preventing DVT  Many people who are in the hospital are at an increased risk of developing blood clots. So, preventing blood clots is an important part of in-hospital care. The care may include getting out of bed regularly, taking medicine, or using special therapies or devices. Other factors and conditions may increase the risk of blood clots. Review your risk with your health care provider.    2810-1858 [employer]. [address] [place] [address], [place] [address]. All rights reserved. This information is not intended as a substitute for professional medical care. Always follow your healthcare professional's instructions.          Future Appointments        Provider Department Dept Phone Center    4/19/2017 8:30 AM [facility] [phone] [facility]      24 Hour Appointment Hotline       To make an appointment at any [facility], call [phone] ([phone]). If you don't have a family doctor or clinic, we will help you find one. [facility] are conveniently located to serve the needs of you and your family.             Review of your medicines      Our records show that you are taking the medicines listed below. If these are incorrect, please call your family doctor or clinic.        Dose / Directions Last dose taken    COLCHICINE PO        Refills:  0        HYDROcodone-acetaminophen 5-325 MG per tablet   Commonly known as:  NORCO   Dose:  1-2 tablet   Quantity:  20 tablet        Take 1-2 tablets by mouth every 4 hours as needed for moderate to severe pain maximum  6 tablet(s) per day   Refills:  0        irbesartan 300 MG tablet   Commonly known as:  AVAPRO   Dose:  300 mg   Quantity:  90 tablet        Take 1 tablet (300 mg) by mouth daily   Refills:  prn        MELOXICAM PO        Reported on 4/5/2017   Refills:  0        * warfarin 5 MG tablet   Commonly known as:  COUMADIN   Quantity:  30 tablet        5 mg daily or as direct by INR   Refills:  0        * warfarin 10 MG tablet   Commonly known as:  COUMADIN   Quantity:  45 tablet        15mg W 10mg all other days or as directed by warfarin clinic..   Refills:  0        * Notice:  This list has 2 medication(s) that are the same as other medications prescribed for you. Read the directions carefully, and ask your doctor or other care provider to review them with you.            Procedures and tests performed during your visit     INR    US Lower Extremity Venous Duplex Left      Orders Needing Specimen Collection     None      Pending Results     No orders found from 4/16/2017 to 4/19/2017.            Pending Culture Results     No orders found from 4/16/2017 to 4/19/2017.            Test Results From Your Hospital Stay        4/18/2017  6:21 PM      Narrative     LEFT LOWER EXTREMITY VENOUS DOPPLER ULTRASOUND  4/18/2017 6:07 PM    HISTORY: Left lower extremity pain. The concern is for deep venous  thrombosis.    COMPARISON: An ultrasound on 4/4/2017 at which time there was thrombus  in the peroneal vein.    FINDINGS: Color flow and Doppler spectral waveform analysis  demonstrates normal blood flow in the common femoral, femoral,  popliteal, posterior tibial, and greater saphenous veins of the left  lower extremity. No thrombus is seen.        Impression     IMPRESSION: There is no evidence of deep venous thrombosis in the left  lower extremity. The previously seen peroneal vein thrombus has  resolved.    AD LOCKETT MD         4/18/2017  5:49 PM      Component Results     Component Value Ref Range & Units Status     INR 1.71 (H) 0.86 - 1.14 Final                Clinical Quality Measure: Blood Pressure Screening     Your blood pressure was checked while you were in the emergency department today. The last reading we obtained was  BP: 120/86 . Please read the guidelines below about what these numbers mean and what you should do about them.  If your systolic blood pressure (the top number) is less than 120 and your diastolic blood pressure (the bottom number) is less than 80, then your blood pressure is normal. There is nothing more that you need to do about it.  If your systolic blood pressure (the top number) is 120-139 or your diastolic blood pressure (the bottom number) is 80-89, your blood pressure may be higher than it should be. You should have your blood pressure rechecked within a year by a primary care provider.  If your systolic blood pressure (the top number) is 140 or greater or your diastolic blood pressure (the bottom number) is 90 or greater, you may have high blood pressure. High blood pressure is treatable, but if left untreated over time it can put you at risk for heart attack, stroke, or kidney failure. You should have your blood pressure rechecked by a primary care provider within the next 4 weeks.  If your provider in the emergency department today gave you specific instructions to follow-up with your doctor or provider even sooner than that, you should follow that instruction and not wait for up to 4 weeks for your follow-up visit.        Thank you for choosing [facility]       Thank you for choosing [facility] for your care. Our goal is always to provide you with excellent care. Hearing back from our patients is one way we can continue to improve our services. Please take a few minutes to complete the written survey that you may receive in the mail after you visit with us. Thank you!        [facility] Information     [employer] gives you secure access to your electronic health record. If you see a primary care  provider, you can also send messages to your care team and make appointments. If you have questions, please call your primary care clinic.  If you do not have a primary care provider, please call [phone] and they will assist you.        Care EveryWhere ID     This is your Care EveryWhere ID. This could be used by other organizations to access your [facility] medical records  [id number]        After Visit Summary       This is your record. Keep this with you and show to your community pharmacist(s) and doctor(s) at your next visit.

## 2017-04-18 NOTE — TELEPHONE ENCOUNTER
"Pt calling having pain in left thigh \"somewhat like when I had the DVT\"  Pt is in walking boot and trying to be more ambulatory but still not walking as much as he has in the past.      C/O pain in the back of thigh.   Unsure of redness or warmth.   Denies SOB or any other respiratory symptoms no chest pain.      As pt has not yet been therapeutic on coumadin advised to ER to r/o worsening or new DVT    Pt expressed understanding and acceptance of the plan.  Pt had no further questions at this time.  Advised can call back to clinic at any time with concerns.       Rebecca Montoya, RN    "

## 2017-04-18 NOTE — ED NOTES
Here for thigh pain to the left leg. Ankle surgery following a injury at work 3/23. On 4/11 noted calf muscle pain and was DX with a blood clot and has been on coumadin 10 mg. Last INR 1.6 about 3 to 4 days ago Recently placed in the walking boot. Today noted thigh pain that is more painful than the surgery.

## 2017-04-18 NOTE — ED PROVIDER NOTES
History     Chief Complaint:  Leg Pain      HPI   Abhijit Garcia is a 42 year old male on Coumadin who presents to the ED with leg pain. He is on warfarin after having a blood clot believed to be provoked from 2 weeks of immobilization following left ankle surgery 3/23/17. The patient reports that his last INR was 1.6, and when he called regarding new leg pain today, it was recommended that he be seen in the ED because of his recent history and non-therapeutic INR level.  He reports that he has pain in his left calf, thigh, and now his forearm.  He denies any recent travel, family history of blood clots, chest pain, or trouble breathing.  He states that his normal dose of Coumadin is 10 mg which he has not taken today. He denies any other symptoms at this time.      Cardiac/PE/DVT Risk Factors:  History of hypertension - positive  History of hyperlipidemia - unknown  History of diabetes - negative  History of smoking - negative  Personal history of PE/DVT - positive  Family history of PE/DVT - negative  Family history of heart complications - positive  Recent travel - negative  Recent surgery - 3/23/17 tendon surgery  Other immobilizations - positive - 2 weeks leg immobilization post surgery above  Cancer - negative    Allergies:  Allopurinol  Amlodipine  Contrast dye  Indomethacin  Levaquin  Seasonal allergies  Lisinopril     Medications:    Avalide  Valtrex  Colchicine  Mobic     Past Medical History:    Gout  HTN     Past Surgical History:   Appendectomy  [place] teeth     Family History:   DM: Mother  Cancer: Mother  HTN: Mother  Arthritis: Father  COPD: Father  CAD: Father  Respiratory: Father, Brother  Allergies: Brother     Social History:  The patient presents to the ED alone.  Smoking Status: Never Smoker  Smokeless Tobacco: Never Used  Alcohol Use: No  Marital Status:           Review of Systems   Respiratory: Negative for shortness of breath.    Cardiovascular: Positive for leg swelling.  Negative for chest pain.        Left calf, thigh, and forearm   All other systems reviewed and are negative.      Physical Exam   First Vitals:  Pulse: 89  Temp: 98.6  F (37  C)  SpO2: 98 %    Physical Exam  General: Patient is alert and cooperative.  Eyes: EOMI, PERRLA.  Normal conjunctiva.  Neck: Normal range of motion, no tenderness.  Cardiovascular: Normal rate.  Pulmonary/Chest: Effort normal.   Abdominal: There is no tenderness.       Musculoskeletal: healed surgical incision overlying lateral aspect of left ankle.  No discernible left calf or thigh swelling or palpable cord or other findings.   Neurological: Patient  is alert and oriented.  Skin: Skin is warm and dry. No rash, bruising, or erythema of LLE.  Psychiatric: Patient has a normal mood and affect. Normal behavior and judgement.    Emergency Department Course   Imaging:  Radiology findings were communicated with the patient who voiced understanding of the findings.    US Lower Extremity Venous Duplex, Left    IMPRESSION:    There is no evidence of deep venous thrombosis in the left  lower extremity. The previously seen peroneal vein thrombus has resolved.    Reading per radiology          Laboratory:  Laboratory findings were communicated with the patient who voiced understanding of the findings.    INR: 1.71 (H)      Emergency Department Course:  Nursing notes and vitals reviewed.  I performed an exam of the patient as documented above.   IV was inserted and blood was drawn for laboratory testing, results above.  The patient was sent for a ultrasound of his left leg while in the emergency department, results above.   At 1829 the patient was rechecked and was updated on the results of his laboratory and imaging studies.   I discussed the treatment plan with the patient. They expressed understanding of this plan and consented to discharge. They will be discharged home with instructions for care and follow up. In addition, the patient will return to  the emergency department if their symptoms persist, worsen, if new symptoms arise or if there is any concern.  All questions were answered.  I personally reviewed the laboratory results with the patient and answered all related questions prior to discharge.    Impression & Plan      Medical Decision Making:  Abhijit Garcia is a 42 year old male who presents to the emergency department today for evaluation of leg pain. He was referred by his clinic for evaluation of left thigh discomfort.  He has a history noteworthy of left ankle surgery in March, complicated by development of a DVT in his calf diagnosed on April 4, 2017.  He is being treated with Coumadin but despite fairly high doses he has consistently been below therapeutic levels.  He has had no associated chest pain or shortness of breath.  Exam shows no significant discernable left calf tenderness or asymmetric swelling.  The same goes for the thigh.  Workup here has included repeat left lower extremity imaging shows no evidence of DVT.  Previously seen DVT thrombosis from the study earlier this month is now resolved.  His INR is 1.71. These findings were discussed with him and I recommended that he follow up with his provider as needed for further INR management and for recheck of his symptoms.  He has been using a walking boot and a knee scooter and I suspect his thigh discomfort is musculoskeletal.  Dx Left leg pain with negative left lower extremity [place]   subtherapeutic INR>        Diagnosis:      ICD-10-CM    1. Subtherapeutic international normalized ratio (INR) R79.1    2. History of deep venous thrombosis Z86.718        Disposition:   Discharge home.        Scribe Disclosure:  I, Monse Segura, am serving as a scribe at 5:21 PM on 4/18/2017 to document services personally performed by Hayn Hoyt MD, based on my observations and the provider's statements to me.      Monse Segura  4/18/2017   [facility] EMERGENCY DEPARTMENT        Hany Hoyt MD  04/19/17 0990

## 2017-04-19 ENCOUNTER — ANTICOAGULATION THERAPY VISIT (OUTPATIENT)
Dept: NURSING | Facility: CLINIC | Age: 42
End: 2017-04-19
Payer: OTHER MISCELLANEOUS

## 2017-04-19 DIAGNOSIS — Z79.01 LONG-TERM (CURRENT) USE OF ANTICOAGULANTS: ICD-10-CM

## 2017-04-19 LAB — INR POINT OF CARE: 1.71 (ref 0.86–1.14)

## 2017-04-19 PROCEDURE — 36416 COLLJ CAPILLARY BLOOD SPEC: CPT

## 2017-04-19 PROCEDURE — 85610 PROTHROMBIN TIME: CPT | Mod: QW

## 2017-04-19 NOTE — MR AVS SNAPSHOT
Abhijit Garcia   4/19/2017 8:30 AM   Anticoagulation Therapy Visit    Description:  42 year old male   Provider:   ANTICOAGULATION CLINIC   Department:   Nurse           INR as of 4/19/2017     Today's INR       Anticoagulation Summary as of 4/19/2017     INR goal 2.0-3.0   Today's INR    Full instructions 4/19: 15 mg; 4/20: 10 mg; 4/21: 15 mg; 4/22: 10 mg; 4/23: 15 mg; 4/24: 15 mg; 4/25: 10 mg   Next INR check 4/26/2017    Indications   Long-term (current) use of anticoagulants [Z79.01] [Z79.01]  Deep vein thrombosis (DVT) (H) [I82.409] (Resolved) [I82.409]         Description     Reviewed with Lucio Andrea will increase to 15 mg X 4 and 10 all other days.   Recheck in 1 week.       Your next Anticoagulation Clinic appointment(s)     Apr 19, 2017  8:30 AM CDT   Anticoagulation [facility] with  [facility] CLINIC   [facility]    [address] [address] [phone]            Apr 26, 2017  8:30 AM CDT   Anticoagulation Visit with  [facility]ULATION CLINIC   [facility]    [address] [address] [phone]              Contact Numbers     [facility]  Please call [phone] with any problems or questions regarding your therapy, or to cancel or reschedule your appointment.          April 2017 Details    Sun Mon Tue Wed Thu Fri Sat           1                 2               3               4               5               6               7               8                 9               10               11               12               13               14               15                 16               17               18               19      15 mg   See details      20      10 mg         21      15 mg         22      10 mg           23      15 mg         24      15 mg         25      10 mg         26            27               28               29                  30                      Date Details   04/19 This INR check       Date of next INR:  4/26/2017         How to take your warfarin dose     To take:  10 mg Take 1 of the 10 mg tablets.    To take:  15 mg Take 1.5 of the 10 mg tablets.

## 2017-04-19 NOTE — PROGRESS NOTES
ANTICOAGULATION FOLLOW-UP CLINIC VISIT    Patient Name:  Abhijit Garcia  Date:  4/19/2017  Contact Type:  Face to Face    SUBJECTIVE:     Patient Findings     Positives Other complaints    Comments Improving pain in left thigh.                OBJECTIVE    INR   Date Value Ref Range Status   04/18/2017 1.71 (H) 0.86 - 1.14 Final       ASSESSMENT / PLAN  INR assessment SUB    Recheck INR In: 1 WEEK    INR Location Clinic      Anticoagulation Summary as of 4/19/2017     INR goal 2.0-3.0   Today's INR    Maintenance plan No maintenance plan   Full instructions 4/19: 15 mg; 4/20: 10 mg; 4/21: 15 mg; 4/22: 10 mg; 4/23: 15 mg; 4/24: 15 mg; 4/25: 10 mg   Plan last modified Rebecca Montoya RN (4/19/2017)   Next INR check 4/26/2017   Target end date 7/5/2017    Indications   Long-term (current) use of anticoagulants [Z79.01] [Z79.01]  Deep vein thrombosis (DVT) (H) [I82.409] (Resolved) [I82.409]         Anticoagulation Episode Summary     INR check location     Preferred lab     Send INR reminders to RN POOL - LK    Comments             See the Encounter Report to view Anticoagulation Flowsheet and Dosing Calendar (Go to Encounters tab in chart review, and find the Anticoagulation Therapy Visit)    Dosage adjustment made based on physician directed care plan.    Rebecca Montoya RN

## 2017-04-26 ENCOUNTER — ANTICOAGULATION THERAPY VISIT (OUTPATIENT)
Dept: NURSING | Facility: CLINIC | Age: 42
End: 2017-04-26
Payer: OTHER MISCELLANEOUS

## 2017-04-26 DIAGNOSIS — Z79.01 LONG-TERM (CURRENT) USE OF ANTICOAGULANTS: ICD-10-CM

## 2017-04-26 LAB — INR POINT OF CARE: 2.3 (ref 0.86–1.14)

## 2017-04-26 PROCEDURE — 36416 COLLJ CAPILLARY BLOOD SPEC: CPT

## 2017-04-26 PROCEDURE — 85610 PROTHROMBIN TIME: CPT | Mod: QW

## 2017-04-26 NOTE — MR AVS SNAPSHOT
Abhijit Garcia   4/26/2017 8:30 AM   Anticoagulation Therapy Visit    Description:  42 year old male   Provider:   ANTICOAGULATION CLINIC   Department:   Nurse           INR as of 4/26/2017     Today's INR 2.3      Anticoagulation Summary as of 4/26/2017     INR goal 2.0-3.0   Today's INR 2.3   Full instructions 4/26: 15 mg; 4/27: 10 mg; 4/28: 15 mg; 4/29: 10 mg; 4/30: 15 mg; 5/1: 15 mg; 5/2: 10 mg   Next INR check 5/3/2017    Indications   Long-term (current) use of anticoagulants [Z79.01] [Z79.01]  Deep vein thrombosis (DVT) (H) [I82.409] (Resolved) [I82.409]         Your next Anticoagulation Clinic appointment(s)     May 03, 2017  8:30 AM CDT   Anticoagulation Visit with  [facility]ULATION CLINIC   [facility]    [address] [address] [phone]              Contact Numbers     [facility]  Please call [phone] with any problems or questions regarding your therapy, or to cancel or reschedule your appointment.          April 2017 Details    Sun Mon Tue Wed Thu Fri Sat           1                 2               3               4               5               6               7               8                 9               10               11               12               13               14               15                 16               17               18               19               20               21               22                 23               24               25               26      15 mg   See details      27      10 mg         28      15 mg         29      10 mg           30      15 mg                Date Details   04/26 This INR check               How to take your warfarin dose     To take:  10 mg Take 1 of the 10 mg tablets.    To take:  15 mg Take 1.5 of the 10 mg tablets.           May 2017 Details    Sun Mon Tue Wed Thu Fri Sat      1      15 mg         2      10 mg         3            4                5               6                 7               8               9               10               11               12               13                 14               15               16               17               18               19               20                 21               22               23               24               25               26               27                 28               29               30               31                   Date Details   No additional details    Date of next INR:  5/3/2017         How to take your warfarin dose     To take:  10 mg Take 1 of the 10 mg tablets.    To take:  15 mg Take 1.5 of the 10 mg tablets.

## 2017-05-03 ENCOUNTER — ANTICOAGULATION THERAPY VISIT (OUTPATIENT)
Dept: NURSING | Facility: CLINIC | Age: 42
End: 2017-05-03
Payer: OTHER MISCELLANEOUS

## 2017-05-03 DIAGNOSIS — Z79.01 LONG-TERM (CURRENT) USE OF ANTICOAGULANTS: ICD-10-CM

## 2017-05-03 LAB — INR POINT OF CARE: 2.3 (ref 0.86–1.14)

## 2017-05-03 PROCEDURE — 85610 PROTHROMBIN TIME: CPT | Mod: QW

## 2017-05-03 PROCEDURE — 99207 ZZC NO CHARGE NURSE ONLY: CPT

## 2017-05-03 PROCEDURE — 36416 COLLJ CAPILLARY BLOOD SPEC: CPT

## 2017-05-03 NOTE — PROGRESS NOTES
ANTICOAGULATION FOLLOW-UP CLINIC VISIT    Patient Name:  Abhijit Garcia  Date:  5/3/2017  Contact Type:  Face to Face    SUBJECTIVE:     Patient Findings     Positives No Problem Findings           OBJECTIVE    INR Protime   Date Value Ref Range Status   05/03/2017 2.3 (A) 0.86 - 1.14 Final       ASSESSMENT / PLAN  INR assessment THER    Recheck INR In: 2 WEEKS    INR Location Clinic      Anticoagulation Summary as of 5/3/2017     INR goal 2.0-3.0   Today's INR 2.3   Maintenance plan 15 mg (10 mg x 1.5) on Wed; 10 mg (10 mg x 1) all other days   Full instructions 15 mg on Wed; 10 mg all other days   Weekly total 75 mg   Plan last modified Rebecca Montoya RN (5/3/2017)   Next INR check    Target end date 7/5/2017    Indications   Long-term (current) use of anticoagulants [Z79.01] [Z79.01]  Deep vein thrombosis (DVT) (H) [I82.409] (Resolved) [I82.409]         Anticoagulation Episode Summary     INR check location     Preferred lab     Send INR reminders to RN POOL - [place]    Comments             See the Encounter Report to view Anticoagulation Flowsheet and Dosing Calendar (Go to Encounters tab in chart review, and find the Anticoagulation Therapy Visit)      Rebecca Montoya RN

## 2017-05-03 NOTE — MR AVS SNAPSHOT
Abhijit Garcia   5/3/2017 8:30 AM   Anticoagulation Therapy Visit    Description:  42 year old male   Provider:   ANTICOAGULATION CLINIC   Department:   Nurse           INR as of 5/3/2017     Today's INR 2.3      Anticoagulation Summary as of 5/3/2017     INR goal 2.0-3.0   Today's INR 2.3   Full instructions 15 mg on Wed; 10 mg all other days   Next INR check 5/17/2017    Indications   Long-term (current) use of anticoagulants [Z79.01] [Z79.01]  Deep vein thrombosis (DVT) (H) [I82.409] (Resolved) [I82.409]         Your next Anticoagulation Clinic appointment(s)     May 17, 2017  4:00 PM CDT   Anticoagulation Visit with  [facility]ULATION CLINIC   [facility]    [address] [address] [phone]              Contact Numbers     [facility]  Please call [phone] with any problems or questions regarding your therapy, or to cancel or reschedule your appointment.          May 2017 Details    Sun Mon Tue Wed Thu Fri Sat      1               2               3      15 mg   See details      4      10 mg         5      10 mg         6      10 mg           7      10 mg         8      10 mg         9      10 mg         10      15 mg         11      10 mg         12      10 mg         13      10 mg           14      10 mg         15      10 mg         16      10 mg         17            18               19               20                 21               22               23               24               25               26               27                 28               29               30               31                   Date Details   05/03 This INR check       Date of next INR:  5/17/2017         How to take your warfarin dose     To take:  10 mg Take 1 of the 10 mg tablets.    To take:  15 mg Take 1.5 of the 10 mg tablets.

## 2017-05-08 ENCOUNTER — OFFICE VISIT (OUTPATIENT)
Dept: FAMILY MEDICINE | Facility: CLINIC | Age: 42
End: 2017-05-08
Payer: OTHER MISCELLANEOUS

## 2017-05-08 ENCOUNTER — THERAPY VISIT (OUTPATIENT)
Dept: PHYSICAL THERAPY | Facility: CLINIC | Age: 42
End: 2017-05-08
Payer: OTHER MISCELLANEOUS

## 2017-05-08 ENCOUNTER — TELEPHONE (OUTPATIENT)
Dept: FAMILY MEDICINE | Facility: CLINIC | Age: 42
End: 2017-05-08

## 2017-05-08 VITALS
BODY MASS INDEX: 38.46 KG/M2 | WEIGHT: 299.7 LBS | DIASTOLIC BLOOD PRESSURE: 83 MMHG | SYSTOLIC BLOOD PRESSURE: 123 MMHG | OXYGEN SATURATION: 96 % | HEART RATE: 89 BPM | HEIGHT: 74 IN | TEMPERATURE: 97.9 F

## 2017-05-08 DIAGNOSIS — Z47.89 AFTERCARE FOLLOWING SURGERY OF THE MUSCULOSKELETAL SYSTEM: ICD-10-CM

## 2017-05-08 DIAGNOSIS — R60.0 LEG EDEMA, LEFT: Primary | ICD-10-CM

## 2017-05-08 DIAGNOSIS — S86.312D PERONEAL TENDON TEAR, LEFT, SUBSEQUENT ENCOUNTER: Primary | ICD-10-CM

## 2017-05-08 PROCEDURE — 97110 THERAPEUTIC EXERCISES: CPT | Mod: GP | Performed by: PHYSICAL THERAPIST

## 2017-05-08 PROCEDURE — 99213 OFFICE O/P EST LOW 20 MIN: CPT | Performed by: FAMILY MEDICINE

## 2017-05-08 PROCEDURE — 97161 PT EVAL LOW COMPLEX 20 MIN: CPT | Mod: GP | Performed by: PHYSICAL THERAPIST

## 2017-05-08 NOTE — TELEPHONE ENCOUNTER
Pt calling with concerns for left LE edema.   He states it is pitting.     No pain, redness or warmth     Advised to be seen-  appt with float after PT today    Rebecca Montoya RN

## 2017-05-08 NOTE — NURSING NOTE
"No chief complaint on file.      Initial /83 (BP Location: Right arm, Patient Position: Chair, Cuff Size: Adult Large)  Pulse 89  Temp 97.9  F (36.6  C) (Oral)  Ht 6' 2\" (1.88 m)  Wt 299 lb 11.2 oz (135.9 kg)  SpO2 96%  BMI 38.48 kg/m2 Estimated body mass index is 38.48 kg/(m^2) as calculated from the following:    Height as of this encounter: 6' 2\" (1.88 m).    Weight as of this encounter: 299 lb 11.2 oz (135.9 kg).  Medication Reconciliation: complete Yennifer Arnold CMA      "

## 2017-05-08 NOTE — PROGRESS NOTES
SUBJECTIVE:                                                    Abhijit Garcia is a 42 year old male who presents to clinic today for the following health issues:       Left leg edema    Abhijit Garcia is a 42 year old male who presents with a chief complaint of left leg   swelling.  Symptoms began 2 day(s) ago, are moderate and gradual onset  With finger pressure to the swelling a depression remains in the skin  No open wounds or weeping of the legs    He was taken out of a walking cast on the left after achilles surgery,  Now he is in a tight ankle brace  The swelling decreases with elevating the extremities and in the morning after sleeping    Has history of past DVT  -  He is taking coumadin-   Most recent INR's 2.3 twice in the past 2 weeks  Since his DVT he has had some aching in the veins, but no new or worsening pain  No color change of the leg  Has history of prolonged sitting or inactivity - sitting at desk and some standing at work     Increased salt in the diet - -  Says he has little salt in the diet     Seasonal or other allergic exposures: - he is having some allergy symptoms now    Injury: he feels some aching as he has started rehab of his achilles injury.      Patient denies chest pain, shortness of breath, orthopnea, CHF history, weakness, pallor, dysuria, change in urine characteristics, history of renal disease, history of liver disease    Past Medical History:   Diagnosis Date     Dysuria      Hypertension        ALLERGIES:  Allopurinol; Amlodipine; Contrast dye; Indomethacin; Levaquin [levofloxacin]; Lisinopril; Losartan; and Seasonal allergies      Current Outpatient Prescriptions on File Prior to Visit:  warfarin (COUMADIN) 10 MG tablet 15mg W 10mg all other days or as directed by warfarin clinic..   COLCHICINE PO    irbesartan (AVAPRO) 300 MG tablet Take 1 tablet (300 mg) by mouth daily     No current facility-administered medications on file prior to visit.     Social History  "  Substance Use Topics     Smoking status: Never Smoker     Smokeless tobacco: Never Used     Alcohol use 0.0 oz/week     0 Standard drinks or equivalent per week      Comment: 1-5 drinks per week       Family History   Problem Relation Age of Onset     DIABETES Mother      Obesity Mother      Cancer - colorectal Mother      Hypertension Mother      Arthritis Father      Respiratory Father      asbestos exposure     Chronic Obstructive Pulmonary Disease Father      Coronary Artery Disease Father 71     Allergies Brother      Respiratory Brother      Heart Defect Son          ROS:  EYES: NEGATIVE for vision changes or irritation  ENT/MOUTH: NEGATIVE for ear, mouth and throat problems  RESP:NEGATIVE for significant cough or SOB  GI: NEGATIVE for nausea, abdominal pain, heartburn, or change in bowel habits    EXAM:   /83 (BP Location: Right arm, Patient Position: Chair, Cuff Size: Adult Large)  Pulse 89  Temp 97.9  F (36.6  C) (Oral)  Ht 6' 2\" (1.88 m)  Wt 299 lb 11.2 oz (135.9 kg)  SpO2 96%  BMI 38.48 kg/m2   Initially he had tight ankle brace on arrival to clinic,  Pitting edema was noted in the region about 5 inches above the brace.  The brace was taken off.  After about 10 minutes the edema was about a third of the previous amount with no massage or treatment,       M/S Exam:left ankle, leg and thigh-  No edema of foot or ankle ( was in splint) and no edema of thigh  No palpable cords  Color of legs the same,  Calf size the same  GENERAL APPEARANCE: healthy, alert and no distress  EXTREMITIES: peripheral pulses normal  SKIN: no suspicious lesions or rashes  NEURO: Normal strength and tone, sensory exam grossly normal, mentation intact and speech normal  Marilee sign negative       ASSESSMENT:  Leg edema, left       We discussed that many conditions may produce edema/ swelling including allergies,  Inactivity,   Venous insufficiency,  Hot weather,  Also the inflammation of starting to move his frozen/ post " surgical achilles would tend to make more edema  Since the edema improved with removing the ankle splint he was having obstruction of blood flow due to the splint    Discussed that since he has had theraputic INR his risk for DVT is low, plus he has no other symptoms of DVT ( tender cords, color change, whole leg swelling, drew)        Recommend elevation of extremities to help reduce swelling and to perform ROM exercises to help with mobilize the fluid in the tissue  The edema associated with the  Splint is not extremely concerning if he can periodically remove the splint and elevate the leg

## 2017-05-08 NOTE — MR AVS SNAPSHOT
After Visit Summary   5/8/2017    Abhijit Garcia    MRN: [id number]           Patient Information     Date Of Birth          1975        Visit Information        Provider Department      5/8/2017 4:15 PM Perla Lucas MD [facility]        Today's Diagnoses     Leg edema, left    -  1       Follow-ups after your visit        Your next 10 appointments already scheduled     May 10, 2017 12:10 PM CDT   PEACE Extremity with Maximino Myers PT   [facility]    [address] [facility] [address] [phone]            May 17, 2017  4:00 PM CDT   An[facility] with  [facility] CLINIC   [facility]    [address] [facility] [address] [phone]              Who to contact     If you have questions or need follow up information about today's clinic visit or your schedule please contact [facility] directly at [phone].  Normal or non-critical lab and imaging results will be communicated to you by [employer], letter or phone within 4 business days after the clinic has received the results. If you do not hear from us within 7 days, please contact the clinic through [employer] or phone. If you have a critical or abnormal lab result, we will notify you by phone as soon as possible.  Submit refill requests through [employer] or call your pharmacy and they will forward the refill request to us. Please allow 3 business days for your refill to be completed.          Additional Information About Your Visit        [employer] Information     [employer] gives you secure access to your electronic health record. If you see a primary care provider, you can also send messages to your care team and make appointments. If you have questions, please call your primary care clinic.  If you do not have a primary care provider, please call [phone] and they will assist you.    "     Care EveryWhere ID     This is your Care EveryWhere ID. This could be used by other organizations to access your [facility] medical records  [id number]        Your Vitals Were     Pulse Temperature Height Pulse Oximetry BMI (Body Mass Index)       89 97.9  F (36.6  C) (Oral) 6' 2\" (1.88 m) 96% 38.48 kg/m2        Blood Pressure from Last 3 Encounters:   05/08/17 123/83   04/18/17 (!) 125/94   04/05/17 110/86    Weight from Last 3 Encounters:   05/08/17 299 lb 11.2 oz (135.9 kg)   04/05/17 298 lb (135.2 kg)   03/14/17 298 lb (135.2 kg)              Today, you had the following     No orders found for display       Primary Care Provider Office Phone # Fax #    Samir De La O [phone]       [facility] [address] [place] [address]        Thank you!     Thank you for choosing [facility]  for your care. Our goal is always to provide you with excellent care. Hearing back from our patients is one way we can continue to improve our services. Please take a few minutes to complete the written survey that you may receive in the mail after your visit with us. Thank you!             Your Updated Medication List - Protect others around you: Learn how to safely use, store and throw away your medicines at www.disposemymeds.org.          This list is accurate as of: 5/8/17  7:26 PM.  Always use your most recent med list.                   Brand Name Dispense Instructions for use    COLCHICINE PO          irbesartan 300 MG tablet    AVAPRO    90 tablet    Take 1 tablet (300 mg) by mouth daily       warfarin 10 MG tablet    COUMADIN    45 tablet    15mg W 10mg all other days or as directed by warfarin clinic..         "

## 2017-05-09 NOTE — PROGRESS NOTES
Subjective:    Patient is a 42 year old male presenting with rehab left ankle/foot hpi.                                      Pertinent medical history includes:  Overweight, high blood pressure and migraines (blood clot/dvt in left leg following surgery).    Other surgeries include:  Orthopedic surgery and other (left ankle/wisdom teeth, appendix).  Current medications:  Heparin/coumadin and high blood pressure medication (tylenol).  Current occupation is  and .    Primary job tasks include:  Prolonged sitting, operating a machine, lifting and repetitive tasks (carrying, pushing, pulling, computer work).        Red flags:  Numbness in perianal region, pain at rest/night, chest pain and calf pain, swelling, warmth.                        Objective:    System    Physical Exam    General     ROS    Assessment/Plan:

## 2017-05-09 NOTE — PROGRESS NOTES
Subjective:    Patient is a 42 year old male presenting with rehab left ankle/foot hpi.           Pt presents to PT s/p left ankle peroneal tendon repair.  Surgery occurred on 3/23/17.  He injured the ankle on 10/4/16 when he rolled his ankle at work.  Pt developed a blood clot in his left calf following the surgery and is currently taking blood thinners.  He is FWB and wearing an ankle brace..    Patient reports pain:  Medial and posterior.  Radiates to:  No radiation.  Pain is described as aching and is intermittent and reported as 8/10.  Associated symptoms:  Loss of motion/stiffness, loss of strength and edema. Pain is the same all the time.  Symptoms are exacerbated by activity and relieved by bracing/immobilizing and activity/movement.  Since onset symptoms are gradually improving.  Special tests:  MRI and x-ray.      General health as reported by patient is good.                  Barriers include:  None as reported by patient.                            Objective:    System    Ankle/Foot Evaluation  ROM:    AROM:    Dorsiflexion:  Left:   5  Right:   14  Plantarflexion:  Left:  36    Right:  50            Strength:    Dorsiflexion:  Left: 5-/5     Pain:   Right: 5/5   Pain:  Plantarflexion: Left: 5-/5   Pain:   Right: 5/5  Pain:  Inversion:Left: 5-/5  Pain:     Right: 5/5  Pain:  Eversion:Left: 5-/5  Pain:  Right: 5/5  Pain:                  LIGAMENT TESTING: not assessed                  EDEMA: Edema ankle: Pitting edema in left LE below the knee (pt will be seeing MD later today)                                                              General     ROS    Assessment/Plan:      Patient is a 42 year old male with left side ankle complaints.    Patient has the following significant findings with corresponding treatment plan.                Diagnosis 1:  Left ankle peroneal tendon repair  Pain -  hot/cold therapy and education  Decreased ROM/flexibility - therapeutic exercise and home program  Decreased  strength - therapeutic exercise, therapeutic activities and home program    Therapy Evaluation Codes:   1) History comprised of:   Personal factors that impact the plan of care:      None.    Comorbidity factors that impact the plan of care are:      None.     Medications impacting care: None.  2) Examination of Body Systems comprised of:   Body structures and functions that impact the plan of care:      Ankle.   Activity limitations that impact the plan of care are:      Jumping, Running, Stairs and Walking.  3) Clinical presentation characteristics are:   Stable/Uncomplicated.  4) Decision-Making    Low complexity using standardized patient assessment instrument and/or measureable assessment of functional outcome.  Cumulative Therapy Evaluation is: Low complexity.    Previous and current functional limitations:  (See Goal Flow Sheet for this information)    Short term and Long term goals: (See Goal Flow Sheet for this information)     Communication ability:  Patient appears to be able to clearly communicate and understand verbal and written communication and follow directions correctly.  Treatment Explanation - The following has been discussed with the patient:   RX ordered/plan of care  Anticipated outcomes  Possible risks and side effects  This patient would benefit from PT intervention to resume normal activities.   Rehab potential is good.    Frequency:  2 X week, once daily  Duration:  for 3 weeks tapering to 1 X a week over 2-4 weeks  Discharge Plan:  Achieve all LTG.  Independent in home treatment program.  Reach maximal therapeutic benefit.    Please refer to the daily flowsheet for treatment today, total treatment time and time spent performing 1:1 timed codes.

## 2017-05-10 ENCOUNTER — THERAPY VISIT (OUTPATIENT)
Dept: PHYSICAL THERAPY | Facility: CLINIC | Age: 42
End: 2017-05-10
Payer: OTHER MISCELLANEOUS

## 2017-05-10 DIAGNOSIS — S86.312D PERONEAL TENDON TEAR, LEFT, SUBSEQUENT ENCOUNTER: ICD-10-CM

## 2017-05-10 DIAGNOSIS — Z47.89 AFTERCARE FOLLOWING SURGERY OF THE MUSCULOSKELETAL SYSTEM: ICD-10-CM

## 2017-05-10 PROCEDURE — 97110 THERAPEUTIC EXERCISES: CPT | Mod: GP

## 2017-05-15 ENCOUNTER — THERAPY VISIT (OUTPATIENT)
Dept: PHYSICAL THERAPY | Facility: CLINIC | Age: 42
End: 2017-05-15
Payer: OTHER MISCELLANEOUS

## 2017-05-15 DIAGNOSIS — Z47.89 AFTERCARE FOLLOWING SURGERY OF THE MUSCULOSKELETAL SYSTEM: ICD-10-CM

## 2017-05-15 DIAGNOSIS — S86.312D PERONEAL TENDON TEAR, LEFT, SUBSEQUENT ENCOUNTER: ICD-10-CM

## 2017-05-15 PROCEDURE — 97110 THERAPEUTIC EXERCISES: CPT | Mod: GP

## 2017-05-17 ENCOUNTER — ANTICOAGULATION THERAPY VISIT (OUTPATIENT)
Dept: NURSING | Facility: CLINIC | Age: 42
End: 2017-05-17
Payer: OTHER MISCELLANEOUS

## 2017-05-17 DIAGNOSIS — Z79.01 LONG-TERM (CURRENT) USE OF ANTICOAGULANTS: ICD-10-CM

## 2017-05-17 LAB — INR POINT OF CARE: 2.1 (ref 0.86–1.14)

## 2017-05-17 PROCEDURE — 36416 COLLJ CAPILLARY BLOOD SPEC: CPT

## 2017-05-17 PROCEDURE — 85610 PROTHROMBIN TIME: CPT | Mod: QW

## 2017-05-17 NOTE — PROGRESS NOTES
ANTICOAGULATION FOLLOW-UP CLINIC VISIT    Patient Name:  Abhijit Garcia  Date:  5/17/2017  Contact Type:  Face to Face    SUBJECTIVE:     Patient Findings     Positives No Problem Findings           OBJECTIVE    INR Protime   Date Value Ref Range Status   05/17/2017 2.1 (A) 0.86 - 1.14 Final       ASSESSMENT / PLAN  INR assessment THER    Recheck INR In: 3 WEEKS    INR Location Clinic      Anticoagulation Summary as of 5/17/2017     INR goal 2.0-3.0   Today's INR 2.1   Maintenance plan 15 mg (10 mg x 1.5) on Wed; 10 mg (10 mg x 1) all other days   Full instructions 15 mg on Wed; 10 mg all other days   Weekly total 75 mg   Plan last modified Rebecca Montoya RN (5/3/2017)   Next INR check    Target end date 7/5/2017    Indications   Long-term (current) use of anticoagulants [Z79.01] [Z79.01]  Deep vein thrombosis (DVT) (H) [I82.409] (Resolved) [I82.409]         Anticoagulation Episode Summary     INR check location     Preferred lab     Send INR reminders to RN POOL - FARIBA    Comments             See the Encounter Report to view Anticoagulation Flowsheet and Dosing Calendar (Go to Encounters tab in chart review, and find the Anticoagulation Therapy Visit)        Rebecca Montoya RN

## 2017-05-17 NOTE — MR AVS SNAPSHOT
Abhijit Jose   5/17/2017 4:00 PM   Anticoagulation Therapy Visit    Description:  42 year old male   Provider:   ANTICOAGULATION CLINIC   Department:   Nurse           INR as of 5/17/2017     Today's INR 2.1      Anticoagulation Summary as of 5/17/2017     INR goal 2.0-3.0   Today's INR 2.1   Full instructions 15 mg on Wed; 10 mg all other days   Next INR check 6/7/2017    Indications   Long-term (current) use of anticoagulants [Z79.01] [Z79.01]  Deep vein thrombosis (DVT) (H) [I82.409] (Resolved) [I82.409]         Your next Anticoagulation Clinic appointment(s)     Jun 07, 2017  4:00 PM CDT   Anticoagulation Visit with  ANTICOAGULATION CLINIC   [facility]    [address] [facility] [address] [phone]              Contact Numbers     [facility]  Please call [phone] with any problems or questions regarding your therapy, or to cancel or reschedule your appointment.          May 2017 Details    Sun Mon Tue Wed Thu Fri Sat      1               2               3               4               5               6                 7               8               9               10               11               12               13                 14               15               16               17      15 mg   See details      18      10 mg         19      10 mg         20      10 mg           21      10 mg         22      10 mg         23      10 mg         24      15 mg         25      10 mg         26      10 mg         27      10 mg           28      10 mg         29      10 mg         30      10 mg         31      15 mg             Date Details   05/17 This INR check               How to take your warfarin dose     To take:  10 mg Take 1 of the 10 mg tablets.    To take:  15 mg Take 1.5 of the 10 mg tablets.           June 2017 Details    Sun Mon Tue Wed Thu Fri Sat         1      10 mg         2      10 mg         3      10 mg            4      10 mg         5      10 mg         6      10 mg         7            8               9               10                 11               12               13               14               15               16               17                 18               19               20               21               22               23               24                 25               26               27               28               29               30                 Date Details   No additional details    Date of next INR:  6/7/2017         How to take your warfarin dose     To take:  10 mg Take 1 of the 10 mg tablets.    To take:  15 mg Take 1.5 of the 10 mg tablets.

## 2017-05-18 ENCOUNTER — THERAPY VISIT (OUTPATIENT)
Dept: PHYSICAL THERAPY | Facility: CLINIC | Age: 42
End: 2017-05-18
Payer: OTHER MISCELLANEOUS

## 2017-05-18 DIAGNOSIS — S86.312D PERONEAL TENDON TEAR, LEFT, SUBSEQUENT ENCOUNTER: ICD-10-CM

## 2017-05-18 DIAGNOSIS — Z47.89 AFTERCARE FOLLOWING SURGERY OF THE MUSCULOSKELETAL SYSTEM: ICD-10-CM

## 2017-05-18 PROCEDURE — 97110 THERAPEUTIC EXERCISES: CPT | Mod: GP | Performed by: PHYSICAL THERAPIST

## 2017-05-22 ENCOUNTER — THERAPY VISIT (OUTPATIENT)
Dept: PHYSICAL THERAPY | Facility: CLINIC | Age: 42
End: 2017-05-22
Payer: OTHER MISCELLANEOUS

## 2017-05-22 DIAGNOSIS — S86.312D PERONEAL TENDON TEAR, LEFT, SUBSEQUENT ENCOUNTER: ICD-10-CM

## 2017-05-22 DIAGNOSIS — Z47.89 AFTERCARE FOLLOWING SURGERY OF THE MUSCULOSKELETAL SYSTEM: ICD-10-CM

## 2017-05-22 PROCEDURE — 97110 THERAPEUTIC EXERCISES: CPT | Mod: GP

## 2017-05-25 DIAGNOSIS — Z79.01 LONG-TERM (CURRENT) USE OF ANTICOAGULANTS: ICD-10-CM

## 2017-05-25 RX ORDER — WARFARIN SODIUM 10 MG/1
TABLET ORAL
Qty: 45 TABLET | Refills: 1 | Status: SHIPPED | OUTPATIENT
Start: 2017-05-25 | End: 2017-07-20

## 2017-05-25 NOTE — TELEPHONE ENCOUNTER
Prescription approved per [facility] Refill Protocol.  Rebecca Montoya RN    Warfarin   Last Written Prescription Date: 4/12/17  Last Fill Qty: 45, # refills: 0  Last Office Visit with [facility], [facility] or [facility] prescribing provider:   4/5/17       Date and Result of Last PT/INR:   Lab Results   Component Value Date    INR 2.1 05/17/2017    INR 2.3 05/03/2017    INR 1.71 04/18/2017

## 2017-05-26 ENCOUNTER — THERAPY VISIT (OUTPATIENT)
Dept: PHYSICAL THERAPY | Facility: CLINIC | Age: 42
End: 2017-05-26
Payer: OTHER MISCELLANEOUS

## 2017-05-26 DIAGNOSIS — Z47.89 AFTERCARE FOLLOWING SURGERY OF THE MUSCULOSKELETAL SYSTEM: ICD-10-CM

## 2017-05-26 DIAGNOSIS — S86.312D PERONEAL TENDON TEAR, LEFT, SUBSEQUENT ENCOUNTER: ICD-10-CM

## 2017-05-26 PROCEDURE — 97110 THERAPEUTIC EXERCISES: CPT | Mod: GP | Performed by: PHYSICAL THERAPIST

## 2017-05-30 ENCOUNTER — MYC MEDICAL ADVICE (OUTPATIENT)
Dept: INTERNAL MEDICINE | Facility: CLINIC | Age: 42
End: 2017-05-30

## 2017-05-30 ENCOUNTER — OFFICE VISIT (OUTPATIENT)
Dept: FAMILY MEDICINE | Facility: CLINIC | Age: 42
End: 2017-05-30
Payer: OTHER MISCELLANEOUS

## 2017-05-30 VITALS
TEMPERATURE: 97.9 F | WEIGHT: 300 LBS | HEART RATE: 70 BPM | SYSTOLIC BLOOD PRESSURE: 114 MMHG | OXYGEN SATURATION: 97 % | RESPIRATION RATE: 16 BRPM | HEIGHT: 74 IN | DIASTOLIC BLOOD PRESSURE: 76 MMHG | BODY MASS INDEX: 38.5 KG/M2

## 2017-05-30 DIAGNOSIS — S86.312D PERONEAL TENDON TEAR, LEFT, SUBSEQUENT ENCOUNTER: ICD-10-CM

## 2017-05-30 DIAGNOSIS — I82.492 ACUTE DEEP VEIN THROMBOSIS (DVT) OF OTHER SPECIFIED VEIN OF LEFT LOWER EXTREMITY (H): Primary | ICD-10-CM

## 2017-05-30 DIAGNOSIS — B00.9 HERPES SIMPLEX VIRUS (HSV) INFECTION: ICD-10-CM

## 2017-05-30 DIAGNOSIS — I10 ESSENTIAL HYPERTENSION: ICD-10-CM

## 2017-05-30 PROCEDURE — 99213 OFFICE O/P EST LOW 20 MIN: CPT | Performed by: FAMILY MEDICINE

## 2017-05-30 RX ORDER — VALACYCLOVIR HYDROCHLORIDE 1 G/1
TABLET, FILM COATED ORAL
Qty: 8 TABLET | Status: SHIPPED | OUTPATIENT
Start: 2017-05-30 | End: 2018-06-22

## 2017-05-30 NOTE — TELEPHONE ENCOUNTER
Valtrex      Last Written Prescription Date: not on current med list  Last Fill Quantity: NA,  # refills: NA   Last Office Visit with [facility] or [facility] prescribing provider: 5/30/17-    Routing refill request to provider for review/approval because:  Drug not active on patient's medication list

## 2017-05-30 NOTE — MR AVS SNAPSHOT
After Visit Summary   5/30/2017    Abhijit Garcia    MRN: [id number]           Patient Information     Date Of Birth          1975        Visit Information        Provider Department      5/30/2017 11:45 AM Madeleine Lynch MD [facility]        Today's Diagnoses     Acute deep vein thrombosis (DVT) of other specified vein of left lower extremity (H)    -  1    Peroneal tendon tear, left, subsequent encounter        Essential hypertension           Follow-ups after your visit        Your next 10 appointments already scheduled     May 31, 2017  4:00 PM CDT   [facility]    [address] [facility] [address] [phone]            Jun 07, 2017  4:00 PM CDT   Anticoagulation Visit with  ANTICOAGULATION CLINIC   [facility]    [address] [facility] [id number] [phone]            Jun 07, 2017  4:40 PM CDT   PEACE Extremity with Gilda Gallagher PTA   [facility] ([facility]    [address] [facility] [address] [phone]              Who to contact     If you have questions or need follow up information about today's clinic visit or your schedule please contact [facility] directly at [phone].  Normal or non-critical lab and imaging results will be communicated to you by MyChart, letter or phone within 4 business days after the clinic has received the results. If you do not hear from us within 7 days, please contact the clinic through MyChart or phone. If you have a critical or abnormal lab result, we will notify you by phone as soon as possible.  Submit refill requests through [employer] or call your pharmacy and they will forward the refill request to us. Please allow 3 business days for your refill to be completed.          Additional Information About Your Visit       "  MyChart Information     [employer] gives you secure access to your electronic health record. If you see a primary care provider, you can also send messages to your care team and make appointments. If you have questions, please call your primary care clinic.  If you do not have a primary care provider, please call [phone] and they will assist you.        Care EveryWhere ID     This is your Care EveryWhere ID. This could be used by other organizations to access your [place] medical records  [id number]        Your Vitals Were     Pulse Temperature Respirations Height Pulse Oximetry BMI (Body Mass Index)    70 97.9  F (36.6  C) (Oral) 16 6' 2\" (1.88 m) 97% 38.52 kg/m2       Blood Pressure from Last 3 Encounters:   05/30/17 114/76   05/08/17 123/83   04/18/17 (!) 125/94    Weight from Last 3 Encounters:   05/30/17 300 lb (136.1 kg)   05/08/17 299 lb 11.2 oz (135.9 kg)   04/05/17 298 lb (135.2 kg)              Today, you had the following     No orders found for display       Primary Care Provider Office Phone # Fax #    Samir De La O [phone]       [place] [address] [place] [address]        Thank you!     Thank you for choosing [facility]  for your care. Our goal is always to provide you with excellent care. Hearing back from our patients is one way we can continue to improve our services. Please take a few minutes to complete the written survey that you may receive in the mail after your visit with us. Thank you!             Your Updated Medication List - Protect others around you: Learn how to safely use, store and throw away your medicines at www.disposemymeds.org.          This list is accurate as of: 5/30/17  1:04 PM.  Always use your most recent med list.                   Brand Name Dispense Instructions for use    COLCHICINE PO          irbesartan 300 MG tablet    AVAPRO    90 tablet    Take 1 tablet (300 mg) by mouth daily       warfarin " 10 MG tablet    COUMADIN    45 tablet    15mg W 10mg all other days or as directed by warfarin clinic..

## 2017-05-30 NOTE — NURSING NOTE
"Chief Complaint   Patient presents with     Leg Swelling     patient is concerned about possible DVT       Initial /76 (BP Location: Right arm, Patient Position: Chair, Cuff Size: Adult Regular)  Pulse 70  Temp 97.9  F (36.6  C) (Oral)  Resp 16  Ht 6' 2\" (1.88 m)  Wt 300 lb (136.1 kg)  SpO2 97%  BMI 38.52 kg/m2 Estimated body mass index is 38.52 kg/(m^2) as calculated from the following:    Height as of this encounter: 6' 2\" (1.88 m).    Weight as of this encounter: 300 lb (136.1 kg).  Medication Reconciliation: complete     Arcadio Owens CMA      "

## 2017-05-30 NOTE — PROGRESS NOTES
SUBJECTIVE:                                                    Abhijit Garcia is a 42 year old male who presents to clinic today for the following health issues:    Continues to have     Surgery late March to repair peroneal tendon, still taking PT regularly. 2 weeks following surgery, increased pain lateral calf, diagnosed with peroneal vein thrombosis. On warfarin. Ultrasound mid April showed resolution.     Since then, has occasional pain in the area of the previous DVT. Has issues with swelling of the left calf but reports today it is good.     Has been more physically active through work. Was gardening this weekend.   Wearing his brace regularly, removing for sleep, rest, and showering.     Follow up with surgery next week.       Problem list and histories reviewed & adjusted, as indicated.  Additional history: none    Patient Active Problem List   Diagnosis     Herpes simplex virus (HSV) infection     Mixed Hyperlipidemia LDL goal <130     Essential hypertension     Gout     Lumbar radiculitis     Long-term (current) use of anticoagulants [Z79.01]     Acute deep vein thrombosis (DVT) of other specified vein of left lower extremity (H)     Peroneal tendon tear, left, subsequent encounter     Aftercare following surgery of the musculoskeletal system     Past Surgical History:   Procedure Laterality Date     ANKLE SURGERY       APPENDECTOMY       wisdom teeth         Social History   Substance Use Topics     Smoking status: Never Smoker     Smokeless tobacco: Never Used     Alcohol use 0.0 oz/week     0 Standard drinks or equivalent per week      Comment: 1-5 drinks per week     Family History   Problem Relation Age of Onset     DIABETES Mother      Obesity Mother      Cancer - colorectal Mother      Hypertension Mother      Arthritis Father      Respiratory Father      asbestos exposure     Chronic Obstructive Pulmonary Disease Father      Coronary Artery Disease Father 71     Allergies Brother       "Respiratory Brother      Heart Defect Son            Reviewed and updated as needed this visit by clinical staff       Reviewed and updated as needed this visit by Provider       ROS:  Constitutional, HEENT, cardiovascular, pulmonary, gi and gu systems are negative, except as otherwise noted.    OBJECTIVE:                                                    /76 (BP Location: Right arm, Patient Position: Chair, Cuff Size: Adult Regular)  Pulse 70  Temp 97.9  F (36.6  C) (Oral)  Resp 16  Ht 6' 2\" (1.88 m)  Wt 300 lb (136.1 kg)  SpO2 97%  BMI 38.52 kg/m2  Body mass index is 38.52 kg/(m^2).  GENERAL: healthy, alert and no distress  RESP: lungs clear to auscultation - no rales, rhonchi or wheezes  CV: regular rate and rhythm, normal S1 S2, no S3 or S4, no murmur, click or rub, no peripheral edema and peripheral pulses strong  MS: no edema LLE, normal pulses, good color, cap refill < 2 sec    Diagnostic Test Results:  none      ASSESSMENT/PLAN:                                                      1. Acute deep vein thrombosis (DVT) of other specified vein of left lower extremity (H)  - continues on warfarin, plan for another month on this  - April 18th ultrasound showed resolution of clot    2. Peroneal tendon tear, left, subsequent encounter  - following with surgery, sounds like - per PT - recovering well  - seems that brace may be contributing to swelling in left calf, although improved today    3. Essential hypertension   - well controlled, continue current agent    Madeleine Lynch MD  [facility]    "

## 2017-05-31 ENCOUNTER — THERAPY VISIT (OUTPATIENT)
Dept: PHYSICAL THERAPY | Facility: CLINIC | Age: 42
End: 2017-05-31
Payer: OTHER MISCELLANEOUS

## 2017-05-31 DIAGNOSIS — Z47.89 AFTERCARE FOLLOWING SURGERY OF THE MUSCULOSKELETAL SYSTEM: ICD-10-CM

## 2017-05-31 DIAGNOSIS — S86.312D PERONEAL TENDON TEAR, LEFT, SUBSEQUENT ENCOUNTER: ICD-10-CM

## 2017-05-31 PROCEDURE — 97110 THERAPEUTIC EXERCISES: CPT | Mod: GP

## 2017-06-04 ENCOUNTER — OFFICE VISIT (OUTPATIENT)
Dept: URGENT CARE | Facility: URGENT CARE | Age: 42
End: 2017-06-04
Payer: COMMERCIAL

## 2017-06-04 VITALS
OXYGEN SATURATION: 96 % | DIASTOLIC BLOOD PRESSURE: 89 MMHG | SYSTOLIC BLOOD PRESSURE: 112 MMHG | HEART RATE: 93 BPM | TEMPERATURE: 98.7 F

## 2017-06-04 DIAGNOSIS — R30.0 DYSURIA: Primary | ICD-10-CM

## 2017-06-04 DIAGNOSIS — N34.2 URETHRITIS: ICD-10-CM

## 2017-06-04 LAB
ALBUMIN UR-MCNC: NEGATIVE MG/DL
APPEARANCE UR: CLEAR
BILIRUB UR QL STRIP: NEGATIVE
COLOR UR AUTO: YELLOW
GLUCOSE UR STRIP-MCNC: NEGATIVE MG/DL
HGB UR QL STRIP: ABNORMAL
KETONES UR STRIP-MCNC: NEGATIVE MG/DL
LEUKOCYTE ESTERASE UR QL STRIP: NEGATIVE
NITRATE UR QL: NEGATIVE
PH UR STRIP: 6 PH (ref 5–7)
RBC #/AREA URNS AUTO: NORMAL /HPF (ref 0–2)
SP GR UR STRIP: 1.02 (ref 1–1.03)
URN SPEC COLLECT METH UR: ABNORMAL
UROBILINOGEN UR STRIP-ACNC: 0.2 EU/DL (ref 0.2–1)
WBC #/AREA URNS AUTO: NORMAL /HPF (ref 0–2)

## 2017-06-04 PROCEDURE — 99213 OFFICE O/P EST LOW 20 MIN: CPT | Performed by: NURSE PRACTITIONER

## 2017-06-04 PROCEDURE — 81001 URINALYSIS AUTO W/SCOPE: CPT | Performed by: NURSE PRACTITIONER

## 2017-06-04 RX ORDER — DOXYCYCLINE 100 MG/1
100 CAPSULE ORAL 2 TIMES DAILY
Qty: 20 CAPSULE | Refills: 0 | Status: SHIPPED | OUTPATIENT
Start: 2017-06-04 | End: 2017-06-14

## 2017-06-04 NOTE — PROGRESS NOTES
Chief Complaint   Patient presents with     Urgent Care     UTI     Burning sensation when urinating and w/o urination x1 week, Hx of Urethraritis       SUBJECTIVE:  Abhijit Garcia is a 42 year old male who presents with discomfort and dysuria for about a week. Reportedly he has a history of nongonococcal recurrent urethritis. He reports being worked up without any definitive findings. However reviewing his EMR I do not see any recent chlamydia or gonorrhea. Reportedly symptoms developed after anal sex. No flank discomfort. No fevers and no chills. Denying STD concerns.        OBJECTIVE:  /89 (BP Location: Right arm, Patient Position: Chair, Cuff Size: Adult Large)  Pulse 93  Temp 98.7  F (37.1  C) (Oral)  SpO2 96%   middle-aged male in no acute distress. Nontoxic looking. Soft nondistended abdomen with bowel sounds active throughout. No organomegaly. No rebound tenderness .Uncircumcised penis without any unusual lesions.    Results for orders placed or performed in visit on 06/04/17   UA reflex to Microscopic and Culture   Result Value Ref Range    Color Urine Yellow     Appearance Urine Clear     Glucose Urine Negative NEG mg/dL    Bilirubin Urine Negative NEG    Ketones Urine Negative NEG mg/dL    Specific Gravity Urine 1.025 1.003 - 1.035    Blood Urine Trace (A) NEG    pH Urine 6.0 5.0 - 7.0 pH    Protein Albumin Urine Negative NEG mg/dL    Urobilinogen Urine 0.2 0.2 - 1.0 EU/dL    Nitrite Urine Negative NEG    Leukocyte Esterase Urine Negative NEG    Source Midstream Urine    Urine Microscopic   Result Value Ref Range    WBC Urine O - 2 0 - 2 /HPF    RBC Urine O - 2 0 - 2 /HPF         ASSESSMENT/PLAN:    (R30.0) Dysuria  (primary encounter diagnosis)/(N34.2) Urethritis  Comment: Dysuria of unclear etiology. It is possible that this may be a case of urethritis but I do not see any clear documentation regarding workup. He was reluctant to have STD testing today. I advised him to contact his  PCP to document clearly whether he has a history of recurring nongonococcal urethritis which we can treat clinically. Otherwise in future we will be more inclined to work him up appropriately. Follow up with any persisting concerns. He has INR in 3 days & will inform Coumadin nurses regarding findings  Plan: doxycycline Monohydrate 100 MG CAPS            STEFANI Tafoya CNP

## 2017-06-04 NOTE — PATIENT INSTRUCTIONS
Urethritis in Men  Urethritis occurs when the urethra is inflamed. The urethra is the tube that runs from the bladder through the penis. The urethra can become swollen and cause burning pain when you urinate. Symptoms may include itching or tingling of the penis or a pus discharge from the penis. You may also have pain with sex and masturbation. Some men may not have symptoms.  What causes urethritis?     An inflamed urethra can cause pain during urination.   Urethritis can be caused by a bacterial or viral infection. Such an infection can lead to conditions such as a urinary tract infection (UTI) or sexually transmitted diseases (STD). Urethritis can also be caused by injury or sensitivity or allergy to chemicals in lotions and other products.  How is urethritis diagnosed?  Your health care provider will examine you and ask about your symptoms and health history. You may also have one or more of the following tests:    Urine test to take samples of urine and have them checked for problems.    Blood test to take a sample of blood and have it checked for problems.    Urethral discharge to take a sample of fluid from inside the urethra. A cotton swab is inserted into the opening of the penis and into the urethra.    Cystoscopy to allow the health care provider to look for problems in the urinary tract. The test uses a thin, flexible telescope called a cystoscope with a light and camera attached. The scope is inserted into the urethra.  How is urethritis treated?  Treatment depends on the cause of urethritis. If it s due to a bacterial infection, antibiotics (medications that fight infection) will be given. Your health care provider can tell you more about your treatment options. In the meantime, your symptoms can be treated. To relieve pain and swelling, anti-inflammatory medications, such as ibuprofen, may be given. Untreated, symptoms may get worse. Also, scar tissue can form in the urethra, causing it to  narrow.  When to seek medical care   Call the health care provider right away if you have any of the following:    Fever of 100.4 F (38.0 C) or higher     Blood in the urine or semen    Burning pain with urination    Increased urge to urinate    Discharge from the penis    Itching, tenderness, or swelling in the penis or groin    Pain during sex or masturbation   Preventing STDs  When it comes to sex, it s important to take care and be safe. Any sexual contact with the penis, vagina, anus, or mouth can spread an STD. The only sure way to prevent STDs is abstinence (not having sex). But there are ways to make sex safer. Use a latex condom each time you have sex. And talk to your partner about STDs before you have sex.        4634-3270 [employer]. [address] [place] [address], [place] [address]. All rights reserved. This information is not intended as a substitute for professional medical care. Always follow your healthcare professional's instructions.

## 2017-06-04 NOTE — MR AVS SNAPSHOT
After Visit Summary   6/4/2017    Abhijit Garcia    MRN: [id number]           Patient Information     Date Of Birth          1975        Visit Information        Provider Department      6/4/2017 4:40 PM Rhona Lopez APRN [facility]        Today's Diagnoses     Dysuria    -  1    Urethritis          Care Instructions      Urethritis in Men  Urethritis occurs when the urethra is inflamed. The urethra is the tube that runs from the bladder through the penis. The urethra can become swollen and cause burning pain when you urinate. Symptoms may include itching or tingling of the penis or a pus discharge from the penis. You may also have pain with sex and masturbation. Some men may not have symptoms.  What causes urethritis?     An inflamed urethra can cause pain during urination.   Urethritis can be caused by a bacterial or viral infection. Such an infection can lead to conditions such as a urinary tract infection (UTI) or sexually transmitted diseases (STD). Urethritis can also be caused by injury or sensitivity or allergy to chemicals in lotions and other products.  How is urethritis diagnosed?  Your health care provider will examine you and ask about your symptoms and health history. You may also have one or more of the following tests:    Urine test to take samples of urine and have them checked for problems.    Blood test to take a sample of blood and have it checked for problems.    Urethral discharge to take a sample of fluid from inside the urethra. A cotton swab is inserted into the opening of the penis and into the urethra.    Cystoscopy to allow the health care provider to look for problems in the urinary tract. The test uses a thin, flexible telescope called a cystoscope with a light and camera attached. The scope is inserted into the urethra.  How is urethritis treated?  Treatment depends on the cause of urethritis. If it s due to a bacterial  infection, antibiotics (medications that fight infection) will be given. Your health care provider can tell you more about your treatment options. In the meantime, your symptoms can be treated. To relieve pain and swelling, anti-inflammatory medications, such as ibuprofen, may be given. Untreated, symptoms may get worse. Also, scar tissue can form in the urethra, causing it to narrow.  When to seek medical care   Call the health care provider right away if you have any of the following:    Fever of 100.4 F (38.0 C) or higher     Blood in the urine or semen    Burning pain with urination    Increased urge to urinate    Discharge from the penis    Itching, tenderness, or swelling in the penis or groin    Pain during sex or masturbation   Preventing STDs  When it comes to sex, it s important to take care and be safe. Any sexual contact with the penis, vagina, anus, or mouth can spread an STD. The only sure way to prevent STDs is abstinence (not having sex). But there are ways to make sex safer. Use a latex condom each time you have sex. And talk to your partner about STDs before you have sex.        2617-9623 [employer]. [address] [place] [address]. All rights reserved. This information is not intended as a substitute for professional medical care. Always follow your healthcare professional's instructions.                Follow-ups after your visit        Your next 10 appointments already scheduled     Jun 07, 2017  4:00 PM CDT   Anticoagulation Visit with  ANTICOAGULATION CLINIC   [facility]    [address] [facility] [address] [phone]            Jun 07, 2017  4:40 PM CDT   PEACE Extremity with Gilda Gallagher, [facility] [place]   [facility]    [address] [facility] [address] [phone]              Who to contact     If you have questions or need follow up information about today's clinic  visit or your schedule please contact [facility] directly at [phone].  Normal or non-critical lab and imaging results will be communicated to you by [employer], letter or phone within 4 business days after the clinic has received the results. If you do not hear from us within 7 days, please contact the clinic through MT[employer] or phone. If you have a critical or abnormal lab result, we will notify you by phone as soon as possible.  Submit refill requests through [employer] or call your pharmacy and they will forward the refill request to us. Please allow 3 business days for your refill to be completed.          Additional Information About Your Visit        [employer] Information     [employer] gives you secure access to your electronic health record. If you see a primary care provider, you can also send messages to your care team and make appointments. If you have questions, please call your primary care clinic.  If you do not have a primary care provider, please call [phone] and they will assist you.        Care EveryWhere ID     This is your Care EveryWhere ID. This could be used by other organizations to access your [place] medical records  [id number]        Your Vitals Were     Pulse Temperature Pulse Oximetry             93 98.7  F (37.1  C) (Oral) 96%          Blood Pressure from Last 3 Encounters:   06/04/17 112/89   05/30/17 114/76   05/08/17 123/83    Weight from Last 3 Encounters:   05/30/17 300 lb (136.1 kg)   05/08/17 299 lb 11.2 oz (135.9 kg)   04/05/17 298 lb (135.2 kg)              We Performed the Following     UA reflex to Microscopic and Culture     Urine Microscopic          Today's Medication Changes          These changes are accurate as of: 6/4/17  5:14 PM.  If you have any questions, ask your nurse or doctor.               Start taking these medicines.        Dose/Directions    doxycycline Monohydrate 100 MG Caps   Used for:  Urethritis        Dose:  100 mg   Take 1 capsule  (100 mg) by mouth 2 times daily for 10 days   Quantity:  20 capsule   Refills:  0            Where to get your medicines      These medications were sent to [facility] [address] [facility] [address] [facility] [address] [address] [facility], [facility] [address]     Phone:  [phone]     doxycycline Monohydrate 100 MG Caps                Primary Care Provider Office Phone # Fax #    Samir De La O [phone]       [facility] [address] [facility] [address]        Thank you!     Thank you for choosing [facility]  for your care. Our goal is always to provide you with excellent care. Hearing back from our patients is one way we can continue to improve our services. Please take a few minutes to complete the written survey that you may receive in the mail after your visit with us. Thank you!             Your Updated Medication List - Protect others around you: Learn how to safely use, store and throw away your medicines at www.disposemymeds.org.          This list is accurate as of: 6/4/17  5:14 PM.  Always use your most recent med list.                   Brand Name Dispense Instructions for use    COLCHICINE PO          doxycycline Monohydrate 100 MG Caps     20 capsule    Take 1 capsule (100 mg) by mouth 2 times daily for 10 days       irbesartan 300 MG tablet    AVAPRO    90 tablet    Take 1 tablet (300 mg) by mouth daily       valACYclovir 1000 mg tablet    VALTREX    8 tablet    Take 2000 mg twice daily for one day, for Herpes simplex outbreaks.       warfarin 10 MG tablet    COUMADIN    45 tablet    15mg W 10mg all other days or as directed by warfarin clinic..

## 2017-06-04 NOTE — NURSING NOTE
"Chief Complaint   Patient presents with     Urgent Care     Burning sensation when urinating and w/o urination x1 week, Hx of Urethraritis       Initial /89 (BP Location: Right arm, Patient Position: Chair, Cuff Size: Adult Large)  Pulse 93  Temp 98.7  F (37.1  C) (Oral)  SpO2 96% Estimated body mass index is 38.52 kg/(m^2) as calculated from the following:    Height as of 5/30/17: 6' 2\" (1.88 m).    Weight as of 5/30/17: 300 lb (136.1 kg).  Medication Reconciliation: complete     Grazyna Cardona CMA (AAMA)        "

## 2017-06-07 ENCOUNTER — ANTICOAGULATION THERAPY VISIT (OUTPATIENT)
Dept: NURSING | Facility: CLINIC | Age: 42
End: 2017-06-07
Payer: OTHER MISCELLANEOUS

## 2017-06-07 ENCOUNTER — THERAPY VISIT (OUTPATIENT)
Dept: PHYSICAL THERAPY | Facility: CLINIC | Age: 42
End: 2017-06-07
Payer: OTHER MISCELLANEOUS

## 2017-06-07 DIAGNOSIS — S86.312D PERONEAL TENDON TEAR, LEFT, SUBSEQUENT ENCOUNTER: ICD-10-CM

## 2017-06-07 DIAGNOSIS — Z79.01 LONG-TERM (CURRENT) USE OF ANTICOAGULANTS: ICD-10-CM

## 2017-06-07 DIAGNOSIS — Z47.89 AFTERCARE FOLLOWING SURGERY OF THE MUSCULOSKELETAL SYSTEM: ICD-10-CM

## 2017-06-07 LAB — INR POINT OF CARE: 1.4 (ref 0.86–1.14)

## 2017-06-07 PROCEDURE — 85610 PROTHROMBIN TIME: CPT | Mod: QW

## 2017-06-07 PROCEDURE — 97110 THERAPEUTIC EXERCISES: CPT | Mod: GP

## 2017-06-07 PROCEDURE — 99207 ZZC NO CHARGE NURSE ONLY: CPT

## 2017-06-07 PROCEDURE — 36416 COLLJ CAPILLARY BLOOD SPEC: CPT

## 2017-06-07 NOTE — PROGRESS NOTES
ANTICOAGULATION FOLLOW-UP CLINIC VISIT    Patient Name:  Abhijit Garcia  Date:  6/7/2017  Contact Type:  Face to Face    SUBJECTIVE:     Patient Findings     Positives Change in medications, Activity level change           OBJECTIVE    INR Protime   Date Value Ref Range Status   06/07/2017 1.4 (A) 0.86 - 1.14 Final       ASSESSMENT / PLAN  INR assessment SUB    Recheck INR In: 1 WEEK    INR Location Clinic      Anticoagulation Summary as of 6/7/2017     INR goal 2.0-3.0   Today's INR 1.4!   Maintenance plan 15 mg (10 mg x 1.5) on Wed; 10 mg (10 mg x 1) all other days   Full instructions 6/7: 20 mg; 6/8: 15 mg; Otherwise 15 mg on Wed; 10 mg all other days   Weekly total 75 mg   Plan last modified Rebecca Montoya RN (5/3/2017)   Next INR check 6/14/2017   Target end date 7/5/2017    Indications   Long-term (current) use of anticoagulants [Z79.01] [Z79.01]  Deep vein thrombosis (DVT) (H) [I82.409] (Resolved) [I82.409]         Anticoagulation Episode Summary     INR check location     Preferred lab     Send INR reminders to RN POOL - LK    Comments             See the Encounter Report to view Anticoagulation Flowsheet and Dosing Calendar (Go to Encounters tab in chart review, and find the Anticoagulation Therapy Visit)    Dosage adjustment made based on physician directed care plan.    Deya Spencer RN

## 2017-06-07 NOTE — MR AVS SNAPSHOT
Abhijit Garcia   6/7/2017 4:00 PM   Anticoagulation Therapy Visit    Description:  42 year old male   Provider:   ANTICOAGULATION CLINIC   Department:   Nurse           INR as of 6/7/2017     Today's INR 1.4!      Anticoagulation Summary as of 6/7/2017     INR goal 2.0-3.0   Today's INR 1.4!   Full instructions 6/7: 20 mg; 6/8: 15 mg; Otherwise 15 mg on Wed; 10 mg all other days   Next INR check 6/14/2017    Indications   Long-term (current) use of anticoagulants [Z79.01] [Z79.01]  Deep vein thrombosis (DVT) (H) [I82.409] (Resolved) [I82.409]         Your next Anticoagulation Clinic appointment(s)     Jun 14, 2017  4:00 PM CDT   Anticoagulation Visit with  [facility]ULATION CLINIC   [facility]    [address] [facility] [address] [phone]              Contact Numbers     [facility]  Please call [phone] with any problems or questions regarding your therapy, or to cancel or reschedule your appointment.          June 2017 Details    Sun Mon Tue Wed Thu Fri Sat         1               2               3                 4               5               6               7      20 mg   See details      8      15 mg         9      10 mg         10      10 mg           11      10 mg         12      10 mg         13      10 mg         14            15               16               17                 18               19               20               21               22               23               24                 25               26               27               28               29               30                 Date Details   06/07 This INR check       Date of next INR:  6/14/2017         How to take your warfarin dose     To take:  10 mg Take 1 of the 10 mg tablets.    To take:  15 mg Take 1.5 of the 10 mg tablets.    To take:  20 mg Take 2 of the 10 mg tablets.

## 2017-06-08 ENCOUNTER — MYC MEDICAL ADVICE (OUTPATIENT)
Dept: FAMILY MEDICINE | Facility: CLINIC | Age: 42
End: 2017-06-08

## 2017-06-09 NOTE — TELEPHONE ENCOUNTER
Pt calling to verify message was received and will call again next week if he still has not heard from Dr. Lynch.    Davie Bajwa   06/09/17

## 2017-06-12 ENCOUNTER — MYC MEDICAL ADVICE (OUTPATIENT)
Dept: FAMILY MEDICINE | Facility: CLINIC | Age: 42
End: 2017-06-12

## 2017-06-12 NOTE — TELEPHONE ENCOUNTER
Do you want the end of June or end of July?  Referral was placed on 4/5/17 so 3 months would be the end of July    Dawood Spencer RN, BSN

## 2017-06-14 ENCOUNTER — ANTICOAGULATION THERAPY VISIT (OUTPATIENT)
Dept: NURSING | Facility: CLINIC | Age: 42
End: 2017-06-14
Payer: COMMERCIAL

## 2017-06-14 DIAGNOSIS — Z79.01 LONG-TERM (CURRENT) USE OF ANTICOAGULANTS: ICD-10-CM

## 2017-06-14 LAB — INR POINT OF CARE: 1.4 (ref 0.86–1.14)

## 2017-06-14 PROCEDURE — 36416 COLLJ CAPILLARY BLOOD SPEC: CPT

## 2017-06-14 PROCEDURE — 85610 PROTHROMBIN TIME: CPT | Mod: QW

## 2017-06-14 PROCEDURE — 99207 ZZC NO CHARGE NURSE ONLY: CPT

## 2017-06-14 NOTE — MR AVS SNAPSHOT
Abhijit Garcia   6/14/2017 4:00 PM   Anticoagulation Therapy Visit    Description:  42 year old male   Provider:   ANTICOAGULATION CLINIC   Department:   Nurse           INR as of 6/14/2017     Today's INR 1.4!      Anticoagulation Summary as of 6/14/2017     INR goal 2.0-3.0   Today's INR 1.4!   Full instructions 6/14: 20 mg; 6/16: 15 mg; 6/19: 15 mg; 6/21: 20 mg; Otherwise 15 mg on Wed; 10 mg all other days   Next INR check 6/23/2017    Indications   Long-term (current) use of anticoagulants [Z79.01] [Z79.01]  Deep vein thrombosis (DVT) (H) [I82.409] (Resolved) [I82.409]         Your next Anticoagulation Clinic appointment(s)     Jun 23, 2017  4:00 PM CDT   Anticoagulation Visit with  [facility]ULATION CLINIC   [facility]    [address] [facility] [address] [phone]              Contact Numbers     [facility]  Please call [phone] with any problems or questions regarding your therapy, or to cancel or reschedule your appointment.          June 2017 Details    Sun Mon Tue Wed Thu Fri Sat         1               2               3                 4               5               6               7               8               9               10                 11               12               13               14      20 mg   See details      15      10 mg         16      15 mg         17      10 mg           18      10 mg         19      15 mg         20      10 mg         21      20 mg         22      10 mg         23            24                 25               26               27               28               29               30                 Date Details   06/14 This INR check       Date of next INR:  6/23/2017         How to take your warfarin dose     To take:  10 mg Take 1 of the 10 mg tablets.    To take:  15 mg Take 1.5 of the 10 mg tablets.    To take:  20 mg Take 2 of the 10 mg tablets.

## 2017-06-14 NOTE — PROGRESS NOTES
ANTICOAGULATION FOLLOW-UP CLINIC VISIT    Patient Name:  Abhijit Garcia  Date:  6/14/2017  Contact Type:  Face to Face    SUBJECTIVE:     Patient Findings     Positives No Problem Findings           OBJECTIVE    INR Protime   Date Value Ref Range Status   06/14/2017 1.4 (A) 0.86 - 1.14 Final       ASSESSMENT / PLAN  INR assessment SUB    Recheck INR In: 1 WEEK    INR Location Clinic      Anticoagulation Summary as of 6/14/2017     INR goal 2.0-3.0   Today's INR 1.4!   Maintenance plan 15 mg (10 mg x 1.5) on Wed; 10 mg (10 mg x 1) all other days   Full instructions 6/14: 20 mg; 6/16: 15 mg; 6/19: 15 mg; 6/21: 20 mg; Otherwise 15 mg on Wed; 10 mg all other days   Weekly total 75 mg   Plan last modified Rebecca Montoya RN (5/3/2017)   Next INR check 6/23/2017   Target end date 7/5/2017    Indications   Long-term (current) use of anticoagulants [Z79.01] [Z79.01]  Deep vein thrombosis (DVT) (H) [I82.409] (Resolved) [I82.409]         Anticoagulation Episode Summary     INR check location     Preferred lab     Send INR reminders to RN POOL - LK    Comments             See the Encounter Report to view Anticoagulation Flowsheet and Dosing Calendar (Go to Encounters tab in chart review, and find the Anticoagulation Therapy Visit)    Dosage adjustment made based on physician directed care plan.    Deya Spencer RN

## 2017-06-18 DIAGNOSIS — M10.9 ACUTE GOUT, UNSPECIFIED CAUSE, UNSPECIFIED SITE: Primary | ICD-10-CM

## 2017-06-20 NOTE — TELEPHONE ENCOUNTER
COLCHICINE PO      Last Written Prescription Date:  n/a  Last Fill Quantity: n/a,   # refills: n/a  Last Office Visit with [facility], [facility] or [facility] prescribing provider: 03/14/2017   Future Office visit:       Routing refill request to provider for review/approval because:  Medication is reported/historical

## 2017-06-21 RX ORDER — COLCHICINE 0.6 MG/1
TABLET ORAL
Qty: 60 TABLET | Refills: 0 | Status: SHIPPED | OUTPATIENT
Start: 2017-06-21 | End: 2018-03-21

## 2017-06-22 ENCOUNTER — THERAPY VISIT (OUTPATIENT)
Dept: PHYSICAL THERAPY | Facility: CLINIC | Age: 42
End: 2017-06-22
Payer: OTHER MISCELLANEOUS

## 2017-06-22 DIAGNOSIS — S86.312D PERONEAL TENDON TEAR, LEFT, SUBSEQUENT ENCOUNTER: ICD-10-CM

## 2017-06-22 DIAGNOSIS — Z47.89 AFTERCARE FOLLOWING SURGERY OF THE MUSCULOSKELETAL SYSTEM: ICD-10-CM

## 2017-06-22 PROCEDURE — 97110 THERAPEUTIC EXERCISES: CPT | Mod: GP

## 2017-06-23 ENCOUNTER — ANTICOAGULATION THERAPY VISIT (OUTPATIENT)
Dept: NURSING | Facility: CLINIC | Age: 42
End: 2017-06-23
Payer: OTHER MISCELLANEOUS

## 2017-06-23 DIAGNOSIS — Z79.01 LONG-TERM (CURRENT) USE OF ANTICOAGULANTS: ICD-10-CM

## 2017-06-23 LAB — INR POINT OF CARE: 2.4 (ref 0.86–1.14)

## 2017-06-23 PROCEDURE — 36416 COLLJ CAPILLARY BLOOD SPEC: CPT

## 2017-06-23 PROCEDURE — 99207 ZZC NO CHARGE NURSE ONLY: CPT

## 2017-06-23 PROCEDURE — 85610 PROTHROMBIN TIME: CPT | Mod: QW

## 2017-06-23 NOTE — PROGRESS NOTES
ANTICOAGULATION FOLLOW-UP CLINIC VISIT    Patient Name:  Abhijit Garcia  Date:  6/23/2017  Contact Type:  Face to Face    SUBJECTIVE:     Patient Findings     Positives No Problem Findings           OBJECTIVE    INR Protime   Date Value Ref Range Status   06/23/2017 2.4 (A) 0.86 - 1.14 Final       ASSESSMENT / PLAN  INR assessment THER    Recheck INR In: 1 WEEK    INR Location Clinic      Anticoagulation Summary as of 6/23/2017     INR goal 2.0-3.0   Today's INR 2.4   Maintenance plan 15 mg (10 mg x 1.5) on Wed; 10 mg (10 mg x 1) all other days   Full instructions 6/23: 15 mg; 6/26: 15 mg; Otherwise 15 mg on Wed; 10 mg all other days   Weekly total 75 mg   Plan last modified Rebecca Montoya RN (5/3/2017)   Next INR check    Target end date 7/5/2017    Indications   Long-term (current) use of anticoagulants [Z79.01] [Z79.01]  Deep vein thrombosis (DVT) (H) [I82.409] (Resolved) [I82.409]         Anticoagulation Episode Summary     INR check location     Preferred lab     Resolved date 6/28/2017    Resolved reason Therapy  Complete    Send INR reminders to RN POOL - LK    Comments             See the Encounter Report to view Anticoagulation Flowsheet and Dosing Calendar (Go to Encounters tab in chart review, and find the Anticoagulation Therapy Visit)    Pt will stop coumadin next week as directed by Dr Cris Spencer RN

## 2017-06-23 NOTE — MR AVS SNAPSHOT
Abhijit Jose   6/23/2017 4:00 PM   Anticoagulation Therapy Visit    Description:  42 year old male   Provider:  [facility]   Department:   Nurse           INR as of 6/23/2017     Today's INR 2.4      Anticoagulation Summary as of 6/23/2017     INR goal 2.0-3.0   Today's INR 2.4   Full instructions 6/23: 15 mg; 6/26: 15 mg; Otherwise 15 mg on Wed; 10 mg all other days   Next INR check     Indications   Long-term (current) use of anticoagulants [Z79.01] [Z79.01]  Deep vein thrombosis (DVT) (H) [I82.409] (Resolved) [I82.409]         Anticoagulation Episode Summary     Resolved date 6/28/2017    Resolved reason Therapy  Complete      Contact Numbers     [facility]  Please call [phone] with any problems or questions regarding your therapy, or to cancel or reschedule your appointment.

## 2017-06-30 DIAGNOSIS — I10 ESSENTIAL HYPERTENSION WITH GOAL BLOOD PRESSURE LESS THAN 140/90: ICD-10-CM

## 2017-06-30 DIAGNOSIS — E78.5 HYPERLIPIDEMIA LDL GOAL <130: ICD-10-CM

## 2017-06-30 LAB
ANION GAP SERPL CALCULATED.3IONS-SCNC: 7 MMOL/L (ref 3–14)
BUN SERPL-MCNC: 18 MG/DL (ref 7–30)
CALCIUM SERPL-MCNC: 9.2 MG/DL (ref 8.5–10.1)
CHLORIDE SERPL-SCNC: 105 MMOL/L (ref 94–109)
CHOLEST SERPL-MCNC: 246 MG/DL
CO2 SERPL-SCNC: 27 MMOL/L (ref 20–32)
CREAT SERPL-MCNC: 1.1 MG/DL (ref 0.66–1.25)
GFR SERPL CREATININE-BSD FRML MDRD: 73 ML/MIN/1.7M2
GLUCOSE SERPL-MCNC: 90 MG/DL (ref 70–99)
HDLC SERPL-MCNC: 38 MG/DL
LDLC SERPL CALC-MCNC: 141 MG/DL
NONHDLC SERPL-MCNC: 208 MG/DL
POTASSIUM SERPL-SCNC: 4 MMOL/L (ref 3.4–5.3)
SODIUM SERPL-SCNC: 139 MMOL/L (ref 133–144)
TRIGL SERPL-MCNC: 336 MG/DL

## 2017-06-30 PROCEDURE — 80061 LIPID PANEL: CPT | Performed by: INTERNAL MEDICINE

## 2017-06-30 PROCEDURE — 80048 BASIC METABOLIC PNL TOTAL CA: CPT | Performed by: INTERNAL MEDICINE

## 2017-06-30 PROCEDURE — 36415 COLL VENOUS BLD VENIPUNCTURE: CPT | Performed by: INTERNAL MEDICINE

## 2017-07-07 ENCOUNTER — THERAPY VISIT (OUTPATIENT)
Dept: PHYSICAL THERAPY | Facility: CLINIC | Age: 42
End: 2017-07-07
Payer: OTHER MISCELLANEOUS

## 2017-07-07 DIAGNOSIS — S86.312D PERONEAL TENDON TEAR, LEFT, SUBSEQUENT ENCOUNTER: ICD-10-CM

## 2017-07-07 DIAGNOSIS — Z47.89 AFTERCARE FOLLOWING SURGERY OF THE MUSCULOSKELETAL SYSTEM: ICD-10-CM

## 2017-07-07 PROCEDURE — 97110 THERAPEUTIC EXERCISES: CPT | Mod: GP

## 2017-07-20 ENCOUNTER — OFFICE VISIT (OUTPATIENT)
Dept: INTERNAL MEDICINE | Facility: CLINIC | Age: 42
End: 2017-07-20
Payer: COMMERCIAL

## 2017-07-20 VITALS
HEIGHT: 74 IN | WEIGHT: 303 LBS | SYSTOLIC BLOOD PRESSURE: 130 MMHG | OXYGEN SATURATION: 95 % | DIASTOLIC BLOOD PRESSURE: 82 MMHG | HEART RATE: 75 BPM | BODY MASS INDEX: 38.89 KG/M2 | TEMPERATURE: 99 F

## 2017-07-20 DIAGNOSIS — I10 ESSENTIAL HYPERTENSION: Primary | ICD-10-CM

## 2017-07-20 DIAGNOSIS — M10.9 GOUT OF MULTIPLE SITES, UNSPECIFIED CAUSE, UNSPECIFIED CHRONICITY: ICD-10-CM

## 2017-07-20 DIAGNOSIS — E66.01 MORBID OBESITY, UNSPECIFIED OBESITY TYPE (H): ICD-10-CM

## 2017-07-20 DIAGNOSIS — E78.5 HYPERLIPIDEMIA LDL GOAL <130: ICD-10-CM

## 2017-07-20 PROCEDURE — 99214 OFFICE O/P EST MOD 30 MIN: CPT | Performed by: INTERNAL MEDICINE

## 2017-07-20 RX ORDER — MELOXICAM 15 MG/1
15 TABLET ORAL DAILY PRN
Qty: 30 TABLET | Refills: 1 | Status: SHIPPED | OUTPATIENT
Start: 2017-07-20 | End: 2017-10-11

## 2017-07-20 NOTE — NURSING NOTE
"Chief Complaint   Patient presents with     Deep Vein Thrombosis     follow up        Initial /82  Pulse 75  Temp 99  F (37.2  C) (Oral)  Ht 6' 2\" (1.88 m)  Wt (!) 303 lb (137.4 kg)  SpO2 95%  BMI 38.9 kg/m2 Estimated body mass index is 38.9 kg/(m^2) as calculated from the following:    Height as of this encounter: 6' 2\" (1.88 m).    Weight as of this encounter: 303 lb (137.4 kg).  Medication Reconciliation: complete   Cris Edwards MA   "

## 2017-07-20 NOTE — MR AVS SNAPSHOT
After Visit Summary   7/20/2017    Abhijit Garcia    MRN: [id number]           Patient Information     Date Of Birth          1975        Visit Information        Provider Department      7/20/2017 6:00 PM Saimr De La O MD [facility]        Today's Diagnoses     Gout of multiple sites, unspecified cause, unspecified chronicity    -  1    Essential hypertension        Morbid obesity, unspecified obesity type (H)        Mixed Hyperlipidemia LDL goal <130          Care Instructions    PLAN:                                                      1.  MEDICATIONS:  Continue current medications without change  2.  I would favor a trial of febuxostat 40 mg daily, then recheck of uric acid level a couple of weeks later.    3.  Rheumatology opinion regarding above plan, renal risks, etc.          Follow-ups after your visit        Additional Services     RHEUMATOLOGY REFERRAL       Your provider has referred you to: [facility]:  [facility]  [phone] [email or web address]      Please be aware that coverage of these services is subject to the terms and limitations of your health insurance plan.  Call member services at your health plan with any benefit or coverage questions.      Please bring the following with you to your appointment:    (1) Any X-Rays, CTs or MRIs which have been performed.  Contact the facility where they were done to arrange for  prior to your scheduled appointment.    (2) List of current medications   (3) This referral request   (4) Any documents/labs given to you for this referral                  Who to contact     If you have questions or need follow up information about today's clinic visit or your schedule please contact [facility] directly at [phone].  Normal or non-critical lab and imaging results will be communicated to you by MyChart, letter or phone within 4  "business days after the clinic has received the results. If you do not hear from us within 7 days, please contact the clinic through [employer] or phone. If you have a critical or abnormal lab result, we will notify you by phone as soon as possible.  Submit refill requests through [employer] or call your pharmacy and they will forward the refill request to us. Please allow 3 business days for your refill to be completed.          Additional Information About Your Visit        Join[employer] Information     [employer] gives you secure access to your electronic health record. If you see a primary care provider, you can also send messages to your care team and make appointments. If you have questions, please call your primary care clinic.  If you do not have a primary care provider, please call [phone] and they will assist you.        Care EveryWhere ID     This is your Care EveryWhere ID. This could be used by other organizations to access your [place] medical records  [id number]        Your Vitals Were     Pulse Temperature Height Pulse Oximetry BMI (Body Mass Index)       75 99  F (37.2  C) (Oral) 6' 2\" (1.88 m) 95% 38.9 kg/m2        Blood Pressure from Last 3 Encounters:   07/20/17 130/82   06/04/17 112/89   05/30/17 114/76    Weight from Last 3 Encounters:   07/20/17 (!) 303 lb (137.4 kg)   05/30/17 300 lb (136.1 kg)   05/08/17 299 lb 11.2 oz (135.9 kg)              We Performed the Following     RHEUMATOLOGY REFERRAL          Today's Medication Changes          These changes are accurate as of: 7/20/17  7:11 PM.  If you have any questions, ask your nurse or doctor.               These medicines have changed or have updated prescriptions.        Dose/Directions    colchicine 0.6 MG tablet   This may have changed:  Another medication with the same name was removed. Continue taking this medication, and follow the directions you see here.   Used for:  Acute gout, unspecified cause, unspecified site   Changed by:  Jaylyn, " Samir Blackwood MD        TAKE 1 TABLET(0.6 MG) BY MOUTH TWICE DAILY AS NEEDED FOR MODERATE PAIN   Quantity:  60 tablet   Refills:  0                Primary Care Provider Office Phone # Fax #    Samir De La O [phone]       [facility] [address] [facility] [address]        Equal Access to Services     OLIVIA HARRELL : Hadii aad ku hadasho Soomaali, waaxda luqadaha, qaybta kaalmada adeegyada, waxay idiin hayaan adeeg khbaltash lakdn ah. So [facility] [phone].    ATENCIÓN: Si maloula esptalita, tiene a cruz disposición servicios gratuitos de asistencia lingüística. Mary al [phone].    We comply with applicable federal civil rights laws and [place] laws. We do not discriminate on the basis of race, color, national origin, age, disability sex, sexual orientation or gender identity.            Thank you!     Thank you for choosing [facility]  for your care. Our goal is always to provide you with excellent care. Hearing back from our patients is one way we can continue to improve our services. Please take a few minutes to complete the written survey that you may receive in the mail after your visit with us. Thank you!             Your Updated Medication List - Protect others around you: Learn how to safely use, store and throw away your medicines at www.disposemymeds.org.          This list is accurate as of: 7/20/17  7:11 PM.  Always use your most recent med list.                   Brand Name Dispense Instructions for use Diagnosis    colchicine 0.6 MG tablet     60 tablet    TAKE 1 TABLET(0.6 MG) BY MOUTH TWICE DAILY AS NEEDED FOR MODERATE PAIN    Acute gout, unspecified cause, unspecified site       irbesartan 300 MG tablet    AVAPRO    90 tablet    Take 1 tablet (300 mg) by mouth daily    Essential hypertension with goal blood pressure less than 140/90       valACYclovir 1000 mg tablet    VALTREX    8 tablet    Take 2000 mg twice daily for one day, for  Herpes simplex outbreaks.    Herpes simplex virus (HSV) infection       warfarin 10 MG tablet    COUMADIN    45 tablet    15mg W 10mg all other days or as directed by warfarin clinic..    Long-term (current) use of anticoagulants

## 2017-07-20 NOTE — PROGRESS NOTES
"  SUBJECTIVE:                                                    Abhijit Garcia is a 42 year old male who presents to clinic today for the following health issues:    Concern - Follow up on DVT   Patient had tendon repair surgery 3/23/17 after ankle fracture.  Subsequent DVT L peroneal vein, felt due to immobilization of the leg for 2 weeks.  Has been on warfarin since, finished end of June.   Ultrasound done in April showed resolution of clot.     Still some aching in L calf, and decreased muscle mass, gradually better.      Ankle is \"OK\", more past past couple weeks after mowing yard.  Some paresthesias toes L foot postop.    Continues to wear ankle brace.    Therapies Tried and outcome: Patient states pain is not bad enough to need any medication at this point.       Hypertension Follow-up      Outpatient blood pressures are being checked at home.  Results are 120/80 range, quite consistent.    Low Salt Diet: no added salt      Patient feels like there is something in his throat. Started about 2 weeks ago. Intermittently painful. Red and swollen. About a month ago he had a cough and it tasted like blood was coming up.     Patient would like a referral to a podiatrist for a potential bunyun on R foot.     Reviewed and updated as needed this visit by clinical staff:  Medications  Allergies  Soc Hx   Additional history: as documented    Additional issues to address:  1.  Frustrated by lack of weigh loss despite losing inches.   Very significant work,   2.  7/2/17 was eating BBQ chicken, may have ingested some BBQ brush (wire).  Since then, mild posterior oropharyngeal discomfort.   Had \"throat pearls\" in past in [place].  Wants checked.   3.  Previous CT chest series due to pulm nodules.  Wants to discuss.   Most recent (2014) showed stable nodules for > 2 years, without need for further follow-up   4.  Check lump over back of scalp    ROS:    Constitutional, HEENT, cardiovascular, pulmonary, gi and gu systems " "are negative, except as otherwise noted.      OBJECTIVE:                                                    /82  Pulse 75  Temp 99  F (37.2  C) (Oral)  Ht 6' 2\" (1.88 m)  Wt (!) 303 lb (137.4 kg)  SpO2 95%  BMI 38.9 kg/m2  Body mass index is 38.9 kg/(m^2).   No apparent distress  No cervical adenopathy   Normal visualization of oropharynx, also normal digital exam with gloved finger to L post/inferior mouth.     Probable small lipoma over back of scalp  Well-healing skin over left lateral ankle, mild tenderness long scar  Right first MTP is thickened and minimally tender medially, decreased range of motion dorsi and plantar flexion       ASSESSMENT:                                                      HTN, controlled   Follow-up DVT, treatment completed.  Follow-up ankle fracture, doing well, still some healing  Possible foreign body sensation throat.  Observation planned at this time.  Modest hyperlipidemia  DJD of R first MTP.   Suspect more effects from repeated bouts of gout, rather than just a simple bunion.   Patient describes perhaps 100 episodes of gout during his lifetime. Unfortunately, has not been intolerant to allopurinol in the past    PLAN:                                                      1.  MEDICATIONS:    - Continue current medications without change  - Refill of meloxicam  2.  I would favor a trial of febuxostat 40 mg daily, then recheck of uric acid level a couple of weeks later.    3.  Rheumatology opinion regarding above plan, renal risks, etc.       Samir De La O MD  [facility]     "

## 2017-07-21 NOTE — PATIENT INSTRUCTIONS
PLAN:                                                      1.  MEDICATIONS:  Continue current medications without change  2.  I would favor a trial of febuxostat 40 mg daily, then recheck of uric acid level a couple of weeks later.    3.  Rheumatology opinion regarding above plan, renal risks, etc.

## 2017-07-31 ENCOUNTER — TELEPHONE (OUTPATIENT)
Dept: INTERNAL MEDICINE | Facility: CLINIC | Age: 42
End: 2017-07-31

## 2017-07-31 ENCOUNTER — MYC MEDICAL ADVICE (OUTPATIENT)
Dept: FAMILY MEDICINE | Facility: CLINIC | Age: 42
End: 2017-07-31

## 2017-07-31 NOTE — TELEPHONE ENCOUNTER
Called patient and informed per Dr. De La O's message.  Patient stated understanding.  Denies CP, SOB, dizziness, or light-headedness.  No breathing changes or difficulties.  CMS of LLE intact.  Denies warmth, redness, red streaking, discoloration, or swelling.  Patient reports he feels his muscle is sore as he was in a cast for so long and lately he's been more active.  Has numbness and tingling of L) foot at baseline since surgery (surgeon aware) but numbness/tingling hasn't increased.  Advised patient if symptoms do not improve, worsen or new symptoms develop, to go to ER as soon as possible.  Patient stated understanding.

## 2017-07-31 NOTE — TELEPHONE ENCOUNTER
Pt Woke up this morning with a pain in his left calf. Rated 4-5/10. At times it is a sharp pain. Other times it is a dull pain. There is no swelling, no redness, no heat. It is tender to the touch, and tender when he walks. He denies any recent abnormal physical activity, no sitting for long periods of time.  Took  meloxicam 15mg tablet this morning for the pain, which has not been effective at relieving the pain.   Pt is concerned because in March he had ankle surgery  and then his leg was immobilized for a few weeks. And then on April 4th, he was diagnosed with a blood clot. He was then prescribed warfarin for 3 months to treat the blood clot, and has  Just finished treatment at the end of June.   Pt is also concerned because this coming Saturday, he will be traveling out of town via  Air plane.    Appt scheduled for pt with Mary tomorrow at 8am. But he is wondering if he will be needing ultrasounds done of the lower leg?  What are your recommendations for pt?  Please advise.

## 2017-07-31 NOTE — TELEPHONE ENCOUNTER
Patient clearly needs exam, unclear if will need ultrasound.  He needs to go to ED for any significant cardiorespiratory symptoms.    Note that follow-up ultrasound (after treatment for DVT) showed no residual clot.    Appointment tomorrow early should otherwise be acceptable.

## 2017-08-01 ENCOUNTER — RADIANT APPOINTMENT (OUTPATIENT)
Dept: ULTRASOUND IMAGING | Facility: CLINIC | Age: 42
End: 2017-08-01
Attending: PHYSICIAN ASSISTANT
Payer: COMMERCIAL

## 2017-08-01 ENCOUNTER — OFFICE VISIT (OUTPATIENT)
Dept: INTERNAL MEDICINE | Facility: CLINIC | Age: 42
End: 2017-08-01
Payer: COMMERCIAL

## 2017-08-01 VITALS
DIASTOLIC BLOOD PRESSURE: 78 MMHG | SYSTOLIC BLOOD PRESSURE: 116 MMHG | TEMPERATURE: 98.5 F | BODY MASS INDEX: 38.77 KG/M2 | HEART RATE: 79 BPM | OXYGEN SATURATION: 98 % | WEIGHT: 302 LBS

## 2017-08-01 DIAGNOSIS — Z86.718 HISTORY OF DEEP VENOUS THROMBOSIS: ICD-10-CM

## 2017-08-01 DIAGNOSIS — M79.605 PAIN OF LEFT LOWER EXTREMITY: Primary | ICD-10-CM

## 2017-08-01 DIAGNOSIS — M79.605 PAIN OF LEFT LOWER EXTREMITY: ICD-10-CM

## 2017-08-01 PROCEDURE — 93971 EXTREMITY STUDY: CPT | Mod: LT

## 2017-08-01 PROCEDURE — 99213 OFFICE O/P EST LOW 20 MIN: CPT | Performed by: PHYSICIAN ASSISTANT

## 2017-08-01 NOTE — MR AVS SNAPSHOT
After Visit Summary   8/1/2017    Abhijit Garcia    MRN: [id number]           Patient Information     Date Of Birth          1975        Visit Information        Provider Department      8/1/2017 8:00 AM Mary Gonzalez PA-C [facility]        Today's Diagnoses     Pain of left lower extremity    -  1    History of deep venous thrombosis           Follow-ups after your visit        Who to contact     If you have questions or need follow up information about today's clinic visit or your schedule please contact [facility] directly at [phone].  Normal or non-critical lab and imaging results will be communicated to you by MyChart, letter or phone within 4 business days after the clinic has received the results. If you do not hear from us within 7 days, please contact the clinic through [employer] or phone. If you have a critical or abnormal lab result, we will notify you by phone as soon as possible.  Submit refill requests through [employer] or call your pharmacy and they will forward the refill request to us. Please allow 3 business days for your refill to be completed.          Additional Information About Your Visit        MyChart Information     [employer] gives you secure access to your electronic health record. If you see a primary care provider, you can also send messages to your care team and make appointments. If you have questions, please call your primary care clinic.  If you do not have a primary care provider, please call [phone] and they will assist you.        Care EveryWhere ID     This is your Care EveryWhere ID. This could be used by other organizations to access your [facility] medical records  [id number]        Your Vitals Were     Pulse Temperature Pulse Oximetry BMI (Body Mass Index)          79 98.5  F (36.9  C) (Oral) 98% 38.77 kg/m2         Blood Pressure from Last 3 Encounters:   08/01/17 116/78   07/20/17 130/82    06/04/17 112/89    Weight from Last 3 Encounters:   08/01/17 (!) 302 lb (137 kg)   07/20/17 (!) 303 lb (137.4 kg)   05/30/17 300 lb (136.1 kg)               Primary Care Provider Office Phone # Fax #    Samir Jaison De La O [phone]       [facility] [address] [facility] [address]        Equal Access to Services     OLIVIA HARRELL : Hadii aad ku hadasho Soomaali, waaxda luqadaha, qaybta kaalmada adeegyada, waxay idiin hayaan adeeg kharash laritika correa. So [facility] [phone].    ATENCIÓN: Si habla español, tiene a cruz disposición servicios gratuitos de asistencia lingüística. Llame al [phone].    We comply with applicable federal civil rights laws and [place] laws. We do not discriminate on the basis of race, color, national origin, age, disability sex, sexual orientation or gender identity.            Thank you!     Thank you for choosing [facility]  for your care. Our goal is always to provide you with excellent care. Hearing back from our patients is one way we can continue to improve our services. Please take a few minutes to complete the written survey that you may receive in the mail after your visit with us. Thank you!             Your Updated Medication List - Protect others around you: Learn how to safely use, store and throw away your medicines at www.disposemymeds.org.          This list is accurate as of: 8/1/17  2:20 PM.  Always use your most recent med list.                   Brand Name Dispense Instructions for use Diagnosis    colchicine 0.6 MG tablet     60 tablet    TAKE 1 TABLET(0.6 MG) BY MOUTH TWICE DAILY AS NEEDED FOR MODERATE PAIN    Acute gout, unspecified cause, unspecified site       irbesartan 300 MG tablet    AVAPRO    90 tablet    Take 1 tablet (300 mg) by mouth daily    Essential hypertension with goal blood pressure less than 140/90       meloxicam 15 MG tablet    MOBIC    30 tablet    Take 1 tablet (15 mg) by mouth daily  as needed    Gout of multiple sites, unspecified cause, unspecified chronicity       valACYclovir 1000 mg tablet    VALTREX    8 tablet    Take 2000 mg twice daily for one day, for Herpes simplex outbreaks.    Herpes simplex virus (HSV) infection

## 2017-08-01 NOTE — NURSING NOTE
"Chief Complaint   Patient presents with     Musculoskeletal Problem       Initial /78  Pulse 79  Temp 98.5  F (36.9  C) (Oral)  Wt (!) 302 lb (137 kg)  SpO2 98%  BMI 38.77 kg/m2 Estimated body mass index is 38.77 kg/(m^2) as calculated from the following:    Height as of 7/20/17: 6' 2\" (1.88 m).    Weight as of this encounter: 302 lb (137 kg).  Medication Reconciliation: complete    "

## 2017-08-01 NOTE — PROGRESS NOTES
SUBJECTIVE:                                                    Abhijit Garcia is a 42 year old male who presents to clinic today for the following health issues:      Leg Pain    Onset: 1 day ago    Description:   Location: left leg in the calf region  Character: Dull ache    Intensity: mild    Progression of Symptoms: same    Accompanying Signs & Symptoms:  Other symptoms: none    History:   Previous similar pain: YES   Hx of DVT after ankle surgery , just finished anticoag therapy in June.   US then no clot      Precipitating factors:   Trauma or overuse: no     Alleviating factors:  Improved by: nothing    Therapies Tried and outcome: none    Will be traveling this weekend- airplane to [place], [place]       -------------------------------------    Problem list and histories reviewed & adjusted, as indicated.  Additional history: as documented    Labs reviewed in EPIC    Reviewed and updated as needed this visit by clinical staffAllergies       Reviewed and updated as needed this visit by Provider  Allergies  Meds         ROS:  Constitutional, HEENT, cardiovascular, pulmonary, gi and gu systems are negative, except as otherwise noted.      OBJECTIVE:   /78  Pulse 79  Temp 98.5  F (36.9  C) (Oral)  Wt (!) 302 lb (137 kg)  SpO2 98%  BMI 38.77 kg/m2  Body mass index is 38.77 kg/(m^2).  GENERAL: healthy, alert and no distress  RESP: lungs clear to auscultation - no rales, rhonchi or wheezes  CV: regular rate and rhythm, normal S1 S2, no S3 or S4, no murmur, click or rub, no peripheral edema and peripheral pulses strong  MS: mild tenderness on exam.   No redness or swelling      Diagnostic Test Results:  Done at 1 pm  VENOUS ULTRASOUND LEFT LEG  8/1/2017 1:47 PM      HISTORY: Pain in left leg, Personal history of other venous thrombosis  and embolism     COMPARISON: None.     FINDINGS:  Examination of the deep veins with graded compression and  color flow Doppler with spectral wave form analysis  shows no evidence  of thrombus in the common femoral vein, femoral vein, popliteal vein  or calf veins.           IMPRESSION: No evidence of deep venous thrombosis.     JESSIE BURRIS, DO    ASSESSMENT/PLAN:             1. Pain of left lower extremity    - US Lower Extremity Venous Duplex Left; Future    2. History of deep venous thrombosis    - US Lower Extremity Venous Duplex Left; Future    Patient informed after testing of negative results.  Recommend good stretching and walking in plan during upcoming trip     Mary Gonzalez PA-C  [facility]

## 2017-08-22 ENCOUNTER — MYC MEDICAL ADVICE (OUTPATIENT)
Dept: INTERNAL MEDICINE | Facility: CLINIC | Age: 42
End: 2017-08-22

## 2017-08-22 DIAGNOSIS — M25.529 ELBOW PAIN, UNSPECIFIED LATERALITY: Primary | ICD-10-CM

## 2017-08-24 ENCOUNTER — THERAPY VISIT (OUTPATIENT)
Dept: PHYSICAL THERAPY | Facility: CLINIC | Age: 42
End: 2017-08-24
Payer: COMMERCIAL

## 2017-08-24 ENCOUNTER — OFFICE VISIT (OUTPATIENT)
Dept: DERMATOLOGY | Facility: CLINIC | Age: 42
End: 2017-08-24
Payer: COMMERCIAL

## 2017-08-24 VITALS
RESPIRATION RATE: 20 BRPM | HEART RATE: 81 BPM | OXYGEN SATURATION: 97 % | SYSTOLIC BLOOD PRESSURE: 125 MMHG | DIASTOLIC BLOOD PRESSURE: 78 MMHG

## 2017-08-24 DIAGNOSIS — M25.522 LEFT ELBOW PAIN: Primary | ICD-10-CM

## 2017-08-24 DIAGNOSIS — B07.8 VERRUCA PLANA: ICD-10-CM

## 2017-08-24 DIAGNOSIS — D48.5 NEOPLASM OF UNCERTAIN BEHAVIOR OF SKIN: Primary | ICD-10-CM

## 2017-08-24 PROCEDURE — 88305 TISSUE EXAM BY PATHOLOGIST: CPT | Performed by: PHYSICIAN ASSISTANT

## 2017-08-24 PROCEDURE — 11100 HC DESTRUCT BENIGN LESION, UP TO 14: CPT | Mod: 59 | Performed by: PHYSICIAN ASSISTANT

## 2017-08-24 PROCEDURE — 17110 DESTRUCTION B9 LES UP TO 14: CPT | Performed by: PHYSICIAN ASSISTANT

## 2017-08-24 PROCEDURE — 97035 APP MDLTY 1+ULTRASOUND EA 15: CPT | Mod: GP | Performed by: PHYSICAL THERAPIST

## 2017-08-24 PROCEDURE — 97161 PT EVAL LOW COMPLEX 20 MIN: CPT | Mod: GP | Performed by: PHYSICAL THERAPIST

## 2017-08-24 PROCEDURE — 97110 THERAPEUTIC EXERCISES: CPT | Mod: GP | Performed by: PHYSICAL THERAPIST

## 2017-08-24 PROCEDURE — 99213 OFFICE O/P EST LOW 20 MIN: CPT | Mod: 25 | Performed by: PHYSICIAN ASSISTANT

## 2017-08-24 RX ORDER — IMIQUIMOD 12.5 MG/.25G
CREAM TOPICAL
Qty: 12 PACKET | Refills: 3 | Status: SHIPPED | OUTPATIENT
Start: 2017-08-24 | End: 2018-03-13

## 2017-08-24 NOTE — MR AVS SNAPSHOT
After Visit Summary   8/24/2017    Abhijit Garcia    MRN: [id number]           Patient Information     Date Of Birth          1975        Visit Information        Provider Department      8/24/2017 1:45 PM Shanthi Red PA-C [facility]        Today's Diagnoses     Neoplasm of uncertain behavior of skin    -  1    Verruca plana          Care Instructions      Wound Care Instructions     FOR SUPERFICIAL WOUNDS     [facility] [phone]                 AFTER 24 HOURS YOU SHOULD REMOVE THE BANDAGE AND BEGIN DAILY DRESSING CHANGES AS FOLLOWS:     1) Remove Dressing.     2) Clean and dry the area with tap water using a Q-tip or sterile gauze pad.     3) Apply Vaseline, Aquaphor, Polysporin ointment or Bacitracin ointment over entire wound.  Do NOT use Neosporin ointment.     4) Cover the wound with a band-aid, or a sterile non-stick gauze pad and micropore paper tape      REPEAT THESE INSTRUCTIONS AT LEAST ONCE A DAY UNTIL THE WOUND HAS COMPLETELY HEALED.    It is an old wives tale that a wound heals better when it is exposed to air and allowed to dry out. The wound will heal faster with a better cosmetic result if it is kept moist with ointment and covered with a bandage.    **Do not let the wound dry out.**      Supplies Needed:      *Cotton tipped applicators (Q-tips)    *Polysporin Ointment or Bacitracin Ointment (NOT NEOSPORIN)    *Band-aids or non-stick gauze pads and micropore paper tape.      PATIENT INFORMATION:    During the healing process you will notice a number of changes. All wounds develop a small halo of redness surrounding the wound.  This means healing is occurring. Severe itching with extensive redness usually indicates sensitivity to the ointment or bandage tape used to dress the wound.  You should call our office if this develops.      Swelling  and/or discoloration around your surgical site is common, particularly when performed  around the eye.    All wounds normally drain.  The larger the wound the more drainage there will be.  After 7-10 days, you will notice the wound beginning to shrink and new skin will begin to grow.  The wound is healed when you can see skin has formed over the entire area.  A healed wound has a healthy, shiny look to the surface and is red to dark pink in color to normalize.  Wounds may take approximately 4-6 weeks to heal.  Larger wounds may take 6-8 weeks.  After the wound is healed you may discontinue dressing changes.    You may experience a sensation of tightness as your wound heals. This is normal and will gradually subside.    Your healed wound may be sensitive to temperature changes. This sensitivity improves with time, but if you re having a lot of discomfort, try to avoid temperature extremes.    Patients frequently experience itching after their wound appears to have healed because of the continue healing under the skin.  Plain Vaseline will help relieve the itching.        POSSIBLE COMPLICATIONS    BLEEDIN. Leave the bandage in place.  2. Use tightly rolled up gauze or a cloth to apply direct pressure over the bandage for 30  minutes.  3. Reapply pressure for an additional 30 minutes if necessary  4. Use additional gauze and tape to maintain pressure once the bleeding has stopped.            Follow-ups after your visit        Your next 10 appointments already scheduled     Aug 31, 2017  3:40 PM CDT   PEACE Extremity with Maximino Myers PT   [facility] [place] ([facility])    [address] [address] [phone]              Who to contact     If you have questions or need follow up information about today's clinic visit or your schedule please contact [facility] directly at [phone].  Normal or non-critical lab and imaging results will be communicated to you by MyChart, letter or phone within 4 business days after the clinic has  received the results. If you do not hear from us within 7 days, please contact the clinic through [employer] or phone. If you have a critical or abnormal lab result, we will notify you by phone as soon as possible.  Submit refill requests through [employer] or call your pharmacy and they will forward the refill request to us. Please allow 3 business days for your refill to be completed.          Additional Information About Your Visit        [email or web address] [facility] Information     [employer] gives you secure access to your electronic health record. If you see a primary care provider, you can also send messages to your care team and make appointments. If you have questions, please call your primary care clinic.  If you do not have a primary care provider, please call [phone] and they will assist you.        Care EveryWhere ID     This is your Care EveryWhere ID. This could be used by other organizations to access your [place] medical records  [id number]        Your Vitals Were     Pulse Respirations Pulse Oximetry             81 20 97%          Blood Pressure from Last 3 Encounters:   08/24/17 125/78   08/01/17 116/78   07/20/17 130/82    Weight from Last 3 Encounters:   08/01/17 (!) 137 kg (302 lb)   07/20/17 (!) 137.4 kg (303 lb)   05/30/17 136.1 kg (300 lb)              We Performed the Following     BIOPSY SKIN/SUBQ/MUC MEM, SINGLE LESION     Surgical pathology exam          Today's Medication Changes          These changes are accurate as of: 8/24/17  2:09 PM.  If you have any questions, ask your nurse or doctor.               Start taking these medicines.        Dose/Directions    imiquimod 5 % cream   Commonly known as:  ALDARA   Used for:  Verruca plana   Started by:  Shanthi Red PA-C        Apply a small sized amount to warts QHS at bedtime.   Wash off after 8 hours.   Quantity:  12 packet   Refills:  3            Where to get your medicines      These medications were sent to [facility] [address] [facility]  [place] - [address] [facility] [address]  [address] [facility] [facility] [address]     Phone:  [phone]     imiquimod 5 % cream                Primary Care Provider Office Phone # Fax #    Samir De La O [phone]       [address] [facility] [address]        Equal Access to Services     OLIVIA HARRELL : Hadii aad ku hadasho Soomaali, waaxda luqadaha, qaybta kaalmada adeegyada, waxay idiin hayaan adeeg kharash la'aan . So [facility] [phone].    ATENCIÓN: Si habla esptalita, tiene a cruz disposición servicios gratuitos de asistencia lingüística. Llame al [phone].    We comply with applicable federal civil rights laws and [place] laws. We do not discriminate on the basis of race, color, national origin, age, disability sex, sexual orientation or gender identity.            Thank you!     Thank you for choosing [facility]  for your care. Our goal is always to provide you with excellent care. Hearing back from our patients is one way we can continue to improve our services. Please take a few minutes to complete the written survey that you may receive in the mail after your visit with us. Thank you!             Your Updated Medication List - Protect others around you: Learn how to safely use, store and throw away your medicines at www.disposemymeds.org.          This list is accurate as of: 8/24/17  2:09 PM.  Always use your most recent med list.                   Brand Name Dispense Instructions for use Diagnosis    colchicine 0.6 MG tablet     60 tablet    TAKE 1 TABLET(0.6 MG) BY MOUTH TWICE DAILY AS NEEDED FOR MODERATE PAIN    Acute gout, unspecified cause, unspecified site       imiquimod 5 % cream    ALDARA    12 packet    Apply a small sized amount to warts QHS at bedtime.   Wash off after 8 hours.    Verruca plana       irbesartan 300 MG tablet    AVAPRO    90 tablet    Take 1 tablet (300 mg) by mouth daily    Essential hypertension with goal  blood pressure less than 140/90       meloxicam 15 MG tablet    MOBIC    30 tablet    Take 1 tablet (15 mg) by mouth daily as needed    Gout of multiple sites, unspecified cause, unspecified chronicity       valACYclovir 1000 mg tablet    VALTREX    8 tablet    Take 2000 mg twice daily for one day, for Herpes simplex outbreaks.    Herpes simplex virus (HSV) infection

## 2017-08-24 NOTE — NURSING NOTE
"Chief Complaint   Patient presents with     Derm Problem     Rt arm and scrotum       Initial /78  Pulse 81  Resp 20  SpO2 97% Estimated body mass index is 38.77 kg/(m^2) as calculated from the following:    Height as of 7/20/17: 6' 2\" (1.88 m).    Weight as of 8/1/17: 302 lb (137 kg).  Blood pressure completed using cuff size: large    "

## 2017-08-24 NOTE — PROGRESS NOTES
[facility] Initial Evaluation    Subjective:    Patient is a 42 year old male presenting with rehab left elbow hpi.   Abhijit Garcia is a 42 year old male with a left elbow (left medial elbow pain) condition.      This is a new condition  Patient has chief complaint of left medial elbow pain which started 3-4 months ago of insidious onset. 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 He has tried icing, resting x 1 week while on recent vacation and elbow brace without relief..    Patient reports pain:  In the joint, medial and medial epicondyle.    Pain is described as aching and sharp and is intermittent and reported as 2/10.   Pain is the same all the time.  Symptoms are exacerbated by certain positions, lifting and activity and relieved by ice, NSAID's and bracing/immobilizing.  Since onset symptoms are gradually worsening.        General health as reported by patient is good.                                              Objective:    System                        ROM:  AROM:  normal                    PROM:  normal                        Strength:    Flexion Elbow:  Left: 5/5  Pain:-      Extension Elbow: Left: 5/5  Pain:-      Flexion Wrist:  Left: 5/5 Pain:-      Extension Wrist: Left: 5/5  Pain:+/-    Supination Elbow/Wrist: Left: 5/5  Pain:-      Pronation Elbow/Wrist: Left: 5-/5  Pain:+          Radial Deviation:  Left: 5/5  Pain:+/-      Ulnar Deviation: Left: 5/5  Pain:-        Special Testing:    Left wrist/elbow positive for the following special tests: Pronator TeresLeft wrist/elbow negative for the following special tests:   Lateral Epicondylitis or Medial Epicondylitis    Palpation:    Left wrist/elbow tenderness present at: Pronator TeresLeft wrist/elbow tenderness not present at:Medial Epicondyle; Wrist Flexors or Wrist Extensors                               Left shoulder stiffness with flexion, abduction and internal rotation    General     ROS    Assessment/Plan:      Patient is a 42 year old male with left side elbow complaints.    Patient has the following significant findings with corresponding treatment plan.                Diagnosis 1:  Left elbow pain  Pain -  US, manual therapy and home program  Decreased strength - therapeutic exercise, therapeutic activities and home program  Inflammation - cold therapy and US  Decreased function - therapeutic activities and home program    Therapy Evaluation Codes:   1) History comprised of:   Personal factors that impact the plan of care:      None.    Comorbidity factors that impact the plan of care are:      None.     Medications impacting care: None.  2) Examination of Body Systems comprised of:   Body structures and functions that impact the plan of care:      Elbow.   Activity limitations that impact the plan of care are:      Lifting and Working.  3) Clinical presentation characteristics are:   Stable/Uncomplicated.  4) Decision-Making    Low complexity using standardized patient assessment instrument and/or measureable assessment of functional outcome.  Cumulative Therapy Evaluation is: Low complexity.    Previous and current functional limitations:  (See Goal Flow Sheet for this information)    Short term and Long term goals: (See Goal Flow Sheet for this information)     Communication ability:  Patient appears to be able to clearly communicate and understand verbal and written communication and follow directions correctly.  Treatment Explanation - The following has been discussed with the patient:   RX ordered/plan of care  Anticipated outcomes  Possible risks and side effects  This patient would benefit from PT intervention to resume normal activities.   Rehab potential is good.    Frequency:  2 X week, once daily  Duration:  for 4 weeks  Discharge Plan:  Achieve all LTG.  Independent in  home treatment program.  Reach maximal therapeutic benefit.    Please refer to the daily flowsheet for treatment today, total treatment time and time spent performing 1:1 timed codes.

## 2017-08-24 NOTE — PROGRESS NOTES
Subjective:    Patient is a 42 year old male presenting with rehab left ankle/foot hpi.                                      Pertinent medical history includes:  Osteoarthritis, history of fractures, overweight and high blood pressure.  Medical allergies: iodine contrast for CT''S.  Other surgeries include:  Orthopedic surgery (left ankle repair, appendex, wisdom teeth).  Current medications:  High blood pressure medication.  Current occupation is  and part time fork .    Primary job tasks include:  Prolonged sitting, operating a machine, lifting and repetitive tasks (carrying, pushing, pulling, computer work).        Red flags:  Numbness in perianal region and chest pain.                        Objective:    System    Physical Exam    General     ROS    Assessment/Plan:

## 2017-08-24 NOTE — PATIENT INSTRUCTIONS
Wound Care Instructions     FOR SUPERFICIAL WOUNDS     [facility] [phone]                 AFTER 24 HOURS YOU SHOULD REMOVE THE BANDAGE AND BEGIN DAILY DRESSING CHANGES AS FOLLOWS:     1) Remove Dressing.     2) Clean and dry the area with tap water using a Q-tip or sterile gauze pad.     3) Apply Vaseline, Aquaphor, Polysporin ointment or Bacitracin ointment over entire wound.  Do NOT use Neosporin ointment.     4) Cover the wound with a band-aid, or a sterile non-stick gauze pad and micropore paper tape      REPEAT THESE INSTRUCTIONS AT LEAST ONCE A DAY UNTIL THE WOUND HAS COMPLETELY HEALED.    It is an old wives tale that a wound heals better when it is exposed to air and allowed to dry out. The wound will heal faster with a better cosmetic result if it is kept moist with ointment and covered with a bandage.    **Do not let the wound dry out.**      Supplies Needed:      *Cotton tipped applicators (Q-tips)    *Polysporin Ointment or Bacitracin Ointment (NOT NEOSPORIN)    *Band-aids or non-stick gauze pads and micropore paper tape.      PATIENT INFORMATION:    During the healing process you will notice a number of changes. All wounds develop a small halo of redness surrounding the wound.  This means healing is occurring. Severe itching with extensive redness usually indicates sensitivity to the ointment or bandage tape used to dress the wound.  You should call our office if this develops.      Swelling  and/or discoloration around your surgical site is common, particularly when performed around the eye.    All wounds normally drain.  The larger the wound the more drainage there will be.  After 7-10 days, you will notice the wound beginning to shrink and new skin will begin to grow.  The wound is healed when you can see skin has formed over the entire area.  A healed wound has a healthy, shiny look to the surface and is red to dark pink in color to normalize.  Wounds may take approximately 4-6  weeks to heal.  Larger wounds may take 6-8 weeks.  After the wound is healed you may discontinue dressing changes.    You may experience a sensation of tightness as your wound heals. This is normal and will gradually subside.    Your healed wound may be sensitive to temperature changes. This sensitivity improves with time, but if you re having a lot of discomfort, try to avoid temperature extremes.    Patients frequently experience itching after their wound appears to have healed because of the continue healing under the skin.  Plain Vaseline will help relieve the itching.        POSSIBLE COMPLICATIONS    BLEEDIN. Leave the bandage in place.  2. Use tightly rolled up gauze or a cloth to apply direct pressure over the bandage for 30  minutes.  3. Reapply pressure for an additional 30 minutes if necessary  4. Use additional gauze and tape to maintain pressure once the bleeding has stopped.

## 2017-08-24 NOTE — PROGRESS NOTES
HPI:   Abhijit Garcia is a 42 year old male who presents for evaluation of new growths on the arm  chief complaint  Location: right forearm   Condition present for:  Noticed a month ago.   Previous treatments include: none    Review Of Systems  Eyes: negative  Ears/Nose/Throat: negative  Respiratory: No shortness of breath, dyspnea on exertion, cough, or hemoptysis  Cardiovascular: negative  Gastrointestinal: negative  Genitourinary: negative  Musculoskeletal: negative  Neurologic: negative  Psychiatric: negative    [place] Works for [employer]. Tore ankle tendon over the winter, needed surgery. Then developed a DVT and ingrown toenails from walking cast. On the mend now.     PHYSICAL EXAM:      Skin exam performed as follows: Type 2 skin. Mood appropriate  Alert and Oriented X 3. Well developed, well nourished in no distress.  General appearance: Normal  Head including face: Normal  Eyes: conjunctiva and lids: Normal  Mouth: Lips, teeth, gums: Normal  Neck: Normal  Chest-breast/axillae: Normal  Back: Normal  Spleen and liver: Normal  Cardiovascular: Exam of peripheral vascular system by observation for swelling, varicosities, edema: Normal  Genitalia: groin, buttocks: Normal  Extremities: digits/nails (clubbing): Normal  Eccrine and Apocrine glands: Normal  Right upper extremity: Normal  Left upper extremity: Normal  Right lower extremity: Normal  Left lower extremity: Normal  Skin: Scalp and body hair: See below    1. 2 mm pearly papule on right scrotum  2. Flat toped small verrucous papules on right forearm x 8, right neck x 2    ASSESSMENT/PLAN:     1. Epidermal cyst r/o other on right scrotum. Shave bx in typical fashion .  Area cleaned with betadyne and anesthetized with 1% lidocaine with epi .  Dermablade used to remove the lesion and sent to pathology. Bleeding was cauterized. Pt tolerated procedure well.  2. Verruca plana on right forearm, right neck - advised.  As bothersome to pt cryosurgery performed. Advised on blistering and post op care.   --Start imiquimod QHS as tolerated for any residual or new areas          Follow-up: PRN/yearly FSE/PRN sooner  CC:   Scribed By: Shanthi Red, MS, PA-C

## 2017-08-25 ENCOUNTER — TELEPHONE (OUTPATIENT)
Dept: DERMATOLOGY | Facility: CLINIC | Age: 42
End: 2017-08-25

## 2017-08-25 DIAGNOSIS — B07.8 VERRUCA PLANA: ICD-10-CM

## 2017-08-25 NOTE — TELEPHONE ENCOUNTER
Pharmacy is asking to confirm the directions for Imiquimod. Is he supposed to be using it 3 times weekly versus every night?  Please advise.  DONA Bradford LPN

## 2017-08-28 LAB — COPATH REPORT: NORMAL

## 2017-08-30 ENCOUNTER — MYC MEDICAL ADVICE (OUTPATIENT)
Dept: DERMATOLOGY | Facility: CLINIC | Age: 42
End: 2017-08-30

## 2017-08-31 ENCOUNTER — THERAPY VISIT (OUTPATIENT)
Dept: PHYSICAL THERAPY | Facility: CLINIC | Age: 42
End: 2017-08-31
Payer: COMMERCIAL

## 2017-08-31 DIAGNOSIS — M25.522 LEFT ELBOW PAIN: ICD-10-CM

## 2017-08-31 PROCEDURE — 97010 HOT OR COLD PACKS THERAPY: CPT | Mod: GP | Performed by: PHYSICAL THERAPIST

## 2017-08-31 PROCEDURE — 97110 THERAPEUTIC EXERCISES: CPT | Mod: GP | Performed by: PHYSICAL THERAPIST

## 2017-08-31 PROCEDURE — 97035 APP MDLTY 1+ULTRASOUND EA 15: CPT | Mod: GP | Performed by: PHYSICAL THERAPIST

## 2017-08-31 NOTE — TELEPHONE ENCOUNTER
He can start with EOD for 2 or so weeks- if no irritation then can increase to QHS.     Warts can theoretically be in the mouth, but rare. Hard to say if what is in his mouth is a wart or not - I'm happy to look at it next time I see him.

## 2017-09-11 ENCOUNTER — THERAPY VISIT (OUTPATIENT)
Dept: PHYSICAL THERAPY | Facility: CLINIC | Age: 42
End: 2017-09-11
Payer: COMMERCIAL

## 2017-09-11 DIAGNOSIS — M25.522 LEFT ELBOW PAIN: ICD-10-CM

## 2017-09-11 PROCEDURE — 97010 HOT OR COLD PACKS THERAPY: CPT | Mod: GP | Performed by: PHYSICAL THERAPIST

## 2017-09-11 PROCEDURE — 97110 THERAPEUTIC EXERCISES: CPT | Mod: GP | Performed by: PHYSICAL THERAPIST

## 2017-09-11 PROCEDURE — 97035 APP MDLTY 1+ULTRASOUND EA 15: CPT | Mod: GP | Performed by: PHYSICAL THERAPIST

## 2017-09-13 ENCOUNTER — THERAPY VISIT (OUTPATIENT)
Dept: PHYSICAL THERAPY | Facility: CLINIC | Age: 42
End: 2017-09-13
Payer: OTHER MISCELLANEOUS

## 2017-09-13 DIAGNOSIS — S86.312D PERONEAL TENDON TEAR, LEFT, SUBSEQUENT ENCOUNTER: ICD-10-CM

## 2017-09-13 DIAGNOSIS — Z47.89 AFTERCARE FOLLOWING SURGERY OF THE MUSCULOSKELETAL SYSTEM: ICD-10-CM

## 2017-09-13 PROCEDURE — 97112 NEUROMUSCULAR REEDUCATION: CPT | Mod: GP | Performed by: PHYSICAL THERAPIST

## 2017-09-13 PROCEDURE — 97110 THERAPEUTIC EXERCISES: CPT | Mod: GP | Performed by: PHYSICAL THERAPIST

## 2017-09-13 NOTE — LETTER
[facility]  [address]  [facility] [address]  [phone]    2017    Re: Abhijit Garcia   :   1975  MRN:  [id number]   REFERRING PHYSICIAN:   Yakov Ohara    [facility]  Date of Initial Evaluation:  2017  Visits:  Rxs Used: 11  Reason for Referral:     Peroneal tendon tear, left, subsequent encounter  Aftercare following surgery of the musculoskeletal system    DISCHARGE REPORT  Progress reporting period is from 17 to 17 (11 visits).       SUBJECTIVE  Subjective changes noted by patient:  Emerson reports that his ankle is doing very well.  Doing all walking without a brace and reports occasional fleeting lateral ankle pain and anterior ankle pain.  He rates his recovery at 90%.  .       Current pain level is  Current Pain level: 0/10.     Previous pain level was   Initial Pain level: 9/10.   Changes in function:  Yes (See Goal flowsheet attached for changes in current functional level)  Adverse reaction to treatment or activity: None    OBJECTIVE  Changes noted in objective findings:  MMT is strong throughout.  Single leg calf raise is rated 75% of the uninvolved side.  No swelling.  Slight loss of ankle DF and PF.      ASSESSMENT/PLAN  Updated problem list and treatment plan: Diagnosis 1:  Left ankle peroneal repair    STG/LTGs have been met or progress has been made towards goals:  Yes (See Goal flow sheet completed today.)  Assessment of Progress: The patient's condition is improving.  Self Management Plans:  Patient has been instructed in a home treatment program.  Abhijit continues to require the following intervention to meet STG and LTG's:  PT intervention is no longer required to meet STG/LTG.    Recommendations:  This patient is ready to be discharged from therapy and continue their home treatment program.              Re: Abhijit Garcia   :   1975                                         Thank you for your referral.    INQUIRIES  Therapist: Maximino Myers PT  [facility]  [address]  [facility] [address]  Phone: [phone]  Fax: [phone]

## 2017-09-14 PROBLEM — Z47.89 AFTERCARE FOLLOWING SURGERY OF THE MUSCULOSKELETAL SYSTEM: Status: RESOLVED | Noted: 2017-05-08 | Resolved: 2017-09-14

## 2017-09-14 PROBLEM — S86.312D PERONEAL TENDON TEAR, LEFT, SUBSEQUENT ENCOUNTER: Status: RESOLVED | Noted: 2017-05-08 | Resolved: 2017-09-14

## 2017-09-14 NOTE — PROGRESS NOTES
Subjective:    HPI                    Objective:    System    Physical Exam    General     ROS    Assessment/Plan:      DISCHARGE REPORT    Progress reporting period is from 5/8/17 to 9/13/17 (11 visits).       SUBJECTIVE  Subjective changes noted by patient:  Emerson reports that his ankle is doing very well.  Doing all walking without a brace and reports occasional fleeting lateral ankle pain and anterior ankle pain.  He rates his recovery at 90%.  .       Current pain level is  Current Pain level: 0/10.     Previous pain level was   Initial Pain level: 9/10.   Changes in function:  Yes (See Goal flowsheet attached for changes in current functional level)  Adverse reaction to treatment or activity: None    OBJECTIVE  Changes noted in objective findings:  MMT is strong throughout.  Single leg calf raise is rated 75% of the uninvolved side.  No swelling.  Slight loss of ankle DF and PF.      ASSESSMENT/PLAN  Updated problem list and treatment plan: Diagnosis 1:  Left ankle peroneal repair    STG/LTGs have been met or progress has been made towards goals:  Yes (See Goal flow sheet completed today.)  Assessment of Progress: The patient's condition is improving.  Self Management Plans:  Patient has been instructed in a home treatment program.    Abhijit continues to require the following intervention to meet STG and LTG's:  PT intervention is no longer required to meet STG/LTG.    Recommendations:  This patient is ready to be discharged from therapy and continue their home treatment program.    Please refer to the daily flowsheet for treatment today, total treatment time and time spent performing 1:1 timed codes.

## 2017-09-20 ENCOUNTER — THERAPY VISIT (OUTPATIENT)
Dept: PHYSICAL THERAPY | Facility: CLINIC | Age: 42
End: 2017-09-20
Payer: COMMERCIAL

## 2017-09-20 DIAGNOSIS — M25.522 LEFT ELBOW PAIN: ICD-10-CM

## 2017-09-20 PROCEDURE — 97035 APP MDLTY 1+ULTRASOUND EA 15: CPT | Mod: GP | Performed by: PHYSICAL THERAPIST

## 2017-09-20 PROCEDURE — 97110 THERAPEUTIC EXERCISES: CPT | Mod: GP | Performed by: PHYSICAL THERAPIST

## 2017-09-20 PROCEDURE — 97010 HOT OR COLD PACKS THERAPY: CPT | Mod: GP | Performed by: PHYSICAL THERAPIST

## 2017-09-28 ENCOUNTER — THERAPY VISIT (OUTPATIENT)
Dept: PHYSICAL THERAPY | Facility: CLINIC | Age: 42
End: 2017-09-28
Payer: COMMERCIAL

## 2017-09-28 DIAGNOSIS — M25.522 LEFT ELBOW PAIN: ICD-10-CM

## 2017-09-28 PROCEDURE — 97010 HOT OR COLD PACKS THERAPY: CPT | Mod: GP | Performed by: PHYSICAL THERAPIST

## 2017-09-28 PROCEDURE — 97110 THERAPEUTIC EXERCISES: CPT | Mod: GP | Performed by: PHYSICAL THERAPIST

## 2017-09-28 PROCEDURE — 97035 APP MDLTY 1+ULTRASOUND EA 15: CPT | Mod: GP | Performed by: PHYSICAL THERAPIST

## 2017-10-05 ENCOUNTER — THERAPY VISIT (OUTPATIENT)
Dept: PHYSICAL THERAPY | Facility: CLINIC | Age: 42
End: 2017-10-05
Payer: COMMERCIAL

## 2017-10-05 DIAGNOSIS — M25.522 LEFT ELBOW PAIN: ICD-10-CM

## 2017-10-05 PROCEDURE — 97010 HOT OR COLD PACKS THERAPY: CPT | Mod: GP | Performed by: PHYSICAL THERAPIST

## 2017-10-05 PROCEDURE — 97035 APP MDLTY 1+ULTRASOUND EA 15: CPT | Mod: GP | Performed by: PHYSICAL THERAPIST

## 2017-10-05 PROCEDURE — 97110 THERAPEUTIC EXERCISES: CPT | Mod: GP | Performed by: PHYSICAL THERAPIST

## 2017-10-05 NOTE — PROGRESS NOTES
Subjective:    HPI                    Objective:    System    Physical Exam    General     ROS    Assessment/Plan:      PROGRESS  REPORT    Progress reporting period is from 8/24/17 to 10/5/17 (6 visits).       SUBJECTIVE  Subjective changes noted by patient:  Emerson reports slight improvement in his left elbow over the last 6 weeks.  He continues to have pain to touch at his medial elbow and with repetitive use of his left arm with work related activities.  He has been compliant with PT.       Current pain level is  Current Pain level: 2/10.     Previous pain level was   Initial Pain level: 2/10.   Changes in function:  None  Adverse reaction to treatment or activity: None    OBJECTIVE  Changes noted in objective findings:  Remains locally tender to palpation at the medial epicondyle.  Slight pain with resisted pronation and wrist flexion.        ASSESSMENT/PLAN  Updated problem list and treatment plan: Diagnosis 1:  Left medial epicondylitis    STG/LTGs have been met or progress has been made towards goals:  Minimal.  Assessment of Progress: The patient's condition has potential to improve.  Self Management Plans:  Patient has been instructed in a home treatment program.    Abhijit continues to require the following intervention to meet STG and LTG's:  PT intervention is no longer required to meet STG/LTG.    Recommendations:  I am recommending Emerson return to his MD for further assessment due to the lack of improvement.    Please refer to the daily flowsheet for treatment today, total treatment time and time spent performing 1:1 timed codes.

## 2017-10-09 ENCOUNTER — OFFICE VISIT (OUTPATIENT)
Dept: DERMATOLOGY | Facility: CLINIC | Age: 42
End: 2017-10-09
Payer: COMMERCIAL

## 2017-10-09 VITALS — DIASTOLIC BLOOD PRESSURE: 87 MMHG | HEART RATE: 74 BPM | SYSTOLIC BLOOD PRESSURE: 139 MMHG | OXYGEN SATURATION: 97 %

## 2017-10-09 DIAGNOSIS — B07.8 VERRUCA PLANA: Primary | ICD-10-CM

## 2017-10-09 PROCEDURE — 17110 DESTRUCTION B9 LES UP TO 14: CPT | Performed by: PHYSICIAN ASSISTANT

## 2017-10-09 NOTE — MR AVS SNAPSHOT
After Visit Summary   10/9/2017    Abhijit Garcia    MRN: [id number]           Patient Information     Date Of Birth          1975        Visit Information        Provider Department      10/9/2017 12:15 PM Shanthi Red PA-C [facility]        Today's Diagnoses     Verruca plana    -  1       Follow-ups after your visit        Your next 10 appointments already scheduled     Oct 10, 2017  1:20 PM CDT   New Visit with Dre Del Rio MD   [facility] [facility] ([facility] [place])    [address] , [address]  [place] [address] [phone]              Who to contact     If you have questions or need follow up information about today's clinic visit or your schedule please contact [facility] directly at [phone].  Normal or non-critical lab and imaging results will be communicated to you by MyChart, letter or phone within 4 business days after the clinic has received the results. If you do not hear from us within 7 days, please contact the clinic through AMEChart or phone. If you have a critical or abnormal lab result, we will notify you by phone as soon as possible.  Submit refill requests through [email or web address] or call your pharmacy and they will forward the refill request to us. Please allow 3 business days for your refill to be completed.          Additional Information About Your Visit        MyChart Information     [email or web address] gives you secure access to your electronic health record. If you see a primary care provider, you can also send messages to your care team and make appointments. If you have questions, please call your primary care clinic.  If you do not have a primary care provider, please call [phone] and they will assist you.        Care EveryWhere ID     This is your Care EveryWhere ID. This could be used by other organizations to access your [facility]  records  [id number]        Your Vitals Were     Pulse Pulse Oximetry                74 97%           Blood Pressure from Last 3 Encounters:   10/09/17 139/87   08/24/17 125/78   08/01/17 116/78    Weight from Last 3 Encounters:   08/01/17 (!) 137 kg (302 lb)   07/20/17 (!) 137.4 kg (303 lb)   05/30/17 136.1 kg (300 lb)              We Performed the Following     DESTRUCT BENIGN LESION, UP TO 14        Primary Care Provider Office Phone # Fax #    Samir De La O [phone]       [address] [facility] [address]        Equal Access to Services     OLIVIA HARRELL : Hadii mir Joshua, waagatada sena, qaybta kaalmada adeabdoulyajennifer, pauly correa. So [facility] [phone].    ATENCIÓN: Si habla español, tiene a cruz disposición servicios gratuitos de asistencia lingüística. Llame al [phone].    We comply with applicable federal civil rights laws and [place] laws. We do not discriminate on the basis of race, color, national origin, age, disability, sex, sexual orientation, or gender identity.            Thank you!     Thank you for choosing [facility]  for your care. Our goal is always to provide you with excellent care. Hearing back from our patients is one way we can continue to improve our services. Please take a few minutes to complete the written survey that you may receive in the mail after your visit with us. Thank you!             Your Updated Medication List - Protect others around you: Learn how to safely use, store and throw away your medicines at www.disposemymeds.org.          This list is accurate as of: 10/9/17  3:52 PM.  Always use your most recent med list.                   Brand Name Dispense Instructions for use Diagnosis    colchicine 0.6 MG tablet     60 tablet    TAKE 1 TABLET(0.6 MG) BY MOUTH TWICE DAILY AS NEEDED FOR MODERATE PAIN    Acute gout, unspecified cause, unspecified site       imiquimod 5 % cream    ALDARA     12 packet    Apply a small sized amount to warts QHS at bedtime.   Wash off after 8 hours.    Verruca plana       irbesartan 300 MG tablet    AVAPRO    90 tablet    Take 1 tablet (300 mg) by mouth daily    Essential hypertension with goal blood pressure less than 140/90       meloxicam 15 MG tablet    MOBIC    30 tablet    Take 1 tablet (15 mg) by mouth daily as needed    Gout of multiple sites, unspecified cause, unspecified chronicity       valACYclovir 1000 mg tablet    VALTREX    8 tablet    Take 2000 mg twice daily for one day, for Herpes simplex outbreaks.    Herpes simplex virus (HSV) infection

## 2017-10-09 NOTE — NURSING NOTE
"Chief Complaint   Patient presents with     Derm Problem     Wart treatment       Initial /87 (BP Location: Left arm, Patient Position: Sitting, Cuff Size: Adult Large)  Pulse 74  SpO2 97% Estimated body mass index is 38.77 kg/(m^2) as calculated from the following:    Height as of 7/20/17: 1.88 m (6' 2\").    Weight as of 8/1/17: 137 kg (302 lb).  Medication Reconciliation: complete   Madeleine Parry CMA      "

## 2017-10-09 NOTE — PROGRESS NOTES
HPI:   Abhijit Garcia is a 42 year old male who presents for evaluation of new growths on the arm  chief complaint  Location: right forearm   Condition present for:  Noticed a month ago.   Previous treatments include: none    Review Of Systems  Eyes: negative  Ears/Nose/Throat: negative  Respiratory: No shortness of breath, dyspnea on exertion, cough, or hemoptysis  Cardiovascular: negative  Gastrointestinal: negative  Genitourinary: negative  Musculoskeletal: negative  Neurologic: negative  Psychiatric: negative    [place] Works for [employer]. Tore ankle tendon over the winter, needed surgery. Then developed a DVT and ingrown toenails from walking cast. On the mend now.     PHYSICAL EXAM:      Skin exam performed as follows: Type 2 skin. Mood appropriate  Alert and Oriented X 3. Well developed, well nourished in no distress.  General appearance: Normal  Head including face: Normal  Eyes: conjunctiva and lids: Normal  Mouth: Lips, teeth, gums: Normal  Neck: Normal  Chest-breast/axillae: Normal  Back: Normal  Spleen and liver: Normal  Cardiovascular: Exam of peripheral vascular system by observation for swelling, varicosities, edema: Normal  Genitalia: groin, buttocks: Normal  Extremities: digits/nails (clubbing): Normal  Eccrine and Apocrine glands: Normal  Right upper extremity: Normal  Left upper extremity: Normal  Right lower extremity: Normal  Left lower extremity: Normal  Skin: Scalp and body hair: See below    1. Flat toped small verrucous papules on right forearm x 10    ASSESSMENT/PLAN:     1. Verruca plana on right forearm - advised. As bothersome to pt cryosurgery performed. Advised on blistering and post op care.   --Continue imiquimod QHS as tolerated for any residual or new areas          Follow-up: PRN/yearly FSE/PRN sooner  CC:   Scribed By: Shanthi Red, MS, PA-C

## 2017-10-10 ENCOUNTER — OFFICE VISIT (OUTPATIENT)
Dept: ORTHOPEDICS | Facility: CLINIC | Age: 42
End: 2017-10-10
Payer: COMMERCIAL

## 2017-10-10 VITALS
SYSTOLIC BLOOD PRESSURE: 124 MMHG | BODY MASS INDEX: 37.22 KG/M2 | WEIGHT: 290 LBS | DIASTOLIC BLOOD PRESSURE: 86 MMHG | HEIGHT: 74 IN

## 2017-10-10 DIAGNOSIS — M77.02 MEDIAL EPICONDYLITIS OF ELBOW, LEFT: Primary | ICD-10-CM

## 2017-10-10 PROCEDURE — 99243 OFF/OP CNSLTJ NEW/EST LOW 30: CPT | Performed by: FAMILY MEDICINE

## 2017-10-10 NOTE — NURSING NOTE
"Chief Complaint   Patient presents with     Elbow Pain       Initial /86  Ht 6' 2\" (1.88 m)  Wt 290 lb (131.5 kg)  BMI 37.23 kg/m2 Estimated body mass index is 37.23 kg/(m^2) as calculated from the following:    Height as of this encounter: 6' 2\" (1.88 m).    Weight as of this encounter: 290 lb (131.5 kg).  Medication Reconciliation: complete    "

## 2017-10-10 NOTE — PROGRESS NOTES
[facility]   Clinic Visit s Oct 10, 2017    PCP: Samir De La O      Abhijit is a 42 year old male who is seen in consultation at the request of Dr. De La O for   Chief Complaint   Patient presents with     Elbow Pain       Injury: Reports insidious onset without acute precipitating event. 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      right-hand dominant    Location of Pain: left medial  Duration of Pain: 6 month(s)  Rating of Pain at worst: 8/10  Rating of Pain Currently: 1/10  Pain is worse with: holding heavy objects, lifting, tender to palpation   Pain is better with: ice and physical therapy   Treatment so far consists of: ice, ibuprofen, other medications: Mobic, physical therapy (4-6 weeks) and elbow neoprene sleeve.   Associated symptoms: no distal numbness or tingling; denies swelling or warmth  Prior History of related problems: none    Social History: works at [employer], and drives [email or web address]       Past Medical History:   Diagnosis Date     Dysuria      Hypertension        Patient Active Problem List    Diagnosis Date Noted     Left elbow pain 08/24/2017     Priority: Medium     Obesity, BMI > 35 (H) 07/20/2017     Priority: Medium     Acute deep vein thrombosis (DVT) of other specified vein of left lower extremity (H) 04/05/2017     Priority: Medium     Lumbar radiculitis 11/15/2015     Priority: Medium     Gout 01/07/2015     Priority: Medium     Onset age 33.  High uric acid.  Has had flares of great toe, midfoot, and ankles.  Uses meloxicam and cholchicine for acute flares.  Used allopurinol, developed low back pain after a few months.  Ultrasound and CT fine.  Eye problems.  Symptoms all resolved with stopping med       Essential hypertension 04/18/2007     Priority: Medium     Onset age 17, medications started in 2015.       Herpes simplex virus (HSV) infection 02/06/2007     Priority: Medium     Gets on lips.  Valtrex and  "Abreva work.  Episodes x 2 tip of nose, including 2/2016.  No fluid collected.         Mixed Hyperlipidemia LDL goal <130 02/06/2007     Priority: Medium       Family History   Problem Relation Age of Onset     DIABETES Mother      Obesity Mother      Cancer - colorectal Mother      Hypertension Mother      Arthritis Father      Respiratory Father      asbestos exposure     Chronic Obstructive Pulmonary Disease Father      Coronary Artery Disease Father 71     Allergies Brother      Respiratory Brother      Heart Defect Son        Social History     Social History     Marital status:      Spouse name: N/A     Number of children: 1     Years of education: N/A     Occupational History      [employer]     Social History Main Topics     Smoking status: Never Smoker     Smokeless tobacco: Never Used     Alcohol use 0.0 oz/week     0 Standard drinks or equivalent per week      Comment: 1-5 drinks per week       Past Surgical History:   Procedure Laterality Date     ANKLE SURGERY       APPENDECTOMY       ORTHOPEDIC SURGERY  03/23/2017    ankle     wisdom teeth         Review of Systems   Musculoskeletal: Positive for joint pain.   All other systems reviewed and are negative.        Physical Exam   Musculoskeletal:        Left elbow: Medial epicondyle tenderness noted.     /86  Ht 6' 2\" (1.88 m)  Wt 290 lb (131.5 kg)  BMI 37.23 kg/m2  Constitutional:well-developed, well-nourished, and in no distress.   Cardiovascular: Intact distal pulses.    Neurological: alert. Gait Normal:   Gait, station, stance, and balance appear normal for age  Skin: Skin is warm and dry.   Psychiatric: Mood and affect normal.   Respiratory: unlabored, speaks in full sentences  Lymph: no LAD, no lymphangitis    Left Elbow Exam     Tenderness   The patient is experiencing tenderness in the medial epicondyle.    Range of Motion   Extension:  Normal  Flexion:      Normal  Pronation:   Normal  Supination: Normal    Muscle Strength "   Wrist Extension: 5/5  Wrist Flexion:      5/5  :                    5/5  Pronation:           5/5  Supination:          5/5    Tests   Varus:   Negative  Valgus:  Negative  Tinels Sign (Cubital tunnel): n/t    Right Elbow Exam     Tenderness   None    Range of Motion   Extension:  Normal  Flexion:      Normal  Pronation:   Normal  Supination: Normal    Muscle Strength   Wrist Extension: 5/5  Wrist Flexion:     5/5  :                   5/5  Pronation:           5/5  Supination:         5/5    Tests   Varus:   Negative  Valgus:  Negative  Tinels Sign (Cubital tunnel): n/t        ASSESSMENT/PLAN    ICD-10-CM    1. Medial epicondylitis of elbow, left M77.02 ORTHO  REFERRAL     Nature of injury discussed noting degenerative nature of these injuries as opposed to inflammatory conditions.  Treatment options reviewed including continued rest, therapy either home program or formal hand therapy referral, and injection of cortisone or irritant therapy protocols.    Noted that PRP and autologous blood injections are considered investigational and not covered by health plans. NG patches can be helpful but may take several months to be effective. Cortisone injections are commonly done, and can be effective though are at risk for relapse, but pain control often valuable to many patients.     Pt opts to look into PRP injection  Referred to Dr Godrillo for consultation. Aware of investigational nature of procedure, and likely need for self pay.

## 2017-10-10 NOTE — MR AVS SNAPSHOT
After Visit Summary   10/10/2017    Abhijit Garcia    MRN: [id number]           Patient Information     Date Of Birth          1975        Visit Information        Provider Department      10/10/2017 1:20 PM Dre Del Rio MD [facility]        Today's Diagnoses     Medial epicondylitis of elbow, left    -  1       Follow-ups after your visit        Additional Services     ORTHO  REFERRAL       [facility] is referring you to the Orthopedic  Services at [facility] and [facility].       The  Representative will assist you in the coordination of your Orthopedic and Musculoskeletal Care as prescribed by your physician.    The  Representative will call you within 1 business day to help schedule your appointment, or you may contact the  Representative at:    All areas ~ [phone]     Type of Referral : Non Surgical  - Dr Anibal Gordillo [facility] for PRP injection consultation      Timeframe requested: Routine    Coverage of these services is subject to the terms and limitations of your health insurance plan.  Please call member services at your health plan with any benefit or coverage questions.      If X-rays, CT or MRI's have been performed, please contact the facility where they were done to arrange for , prior to your scheduled appointment.  Please bring this referral request to your appointment and present it to your specialist.                  Who to contact     If you have questions or need follow up information about today's clinic visit or your schedule please contact [facility] directly at [phone].  Normal or non-critical lab and imaging results will be communicated to you by MyChart, letter or phone within 4 business days after the clinic has received the results. If you do not hear from us within 7 days,  "please contact the clinic through [employer] or phone. If you have a critical or abnormal lab result, we will notify you by phone as soon as possible.  Submit refill requests through [employer] or call your pharmacy and they will forward the refill request to us. Please allow 3 business days for your refill to be completed.          Additional Information About Your Visit        [employer] Information     [employer] gives you secure access to your electronic health record. If you see a primary care provider, you can also send messages to your care team and make appointments. If you have questions, please call your primary care clinic.  If you do not have a primary care provider, please call [phone] and they will assist you.        Care EveryWhere ID     This is your Care EveryWhere ID. This could be used by other organizations to access your [facility] medical records  [id number]        Your Vitals Were     Height BMI (Body Mass Index)                6' 2\" (1.88 m) 37.23 kg/m2           Blood Pressure from Last 3 Encounters:   10/10/17 124/86   10/09/17 139/87   08/24/17 125/78    Weight from Last 3 Encounters:   10/10/17 290 lb (131.5 kg)   08/01/17 (!) 302 lb (137 kg)   07/20/17 (!) 303 lb (137.4 kg)              We Performed the Following     ORTHO  REFERRAL        Primary Care Provider Office Phone # Fax #    Samir Jaison De La O [phone]       [address] [place] [address]        Equal Access to Services     JEANNINE HARRELL : Hadii aad ku hadasho Soomaali, waaxda luqadaha, qaybta kaalmada adeegyada, waxay nubia hayaguilar rodriguez . So [facility] [phone].    ATENCIÓN: Si habla español, tiene a cruz disposición servicios gratuitos de asistencia lingüística. Llame al [phone].    We comply with applicable federal civil rights laws and [place] laws. We do not discriminate on the basis of race, color, national origin, age, disability, sex, sexual orientation, or gender " identity.            Thank you!     Thank you for choosing [place] [facility]  for your care. Our goal is always to provide you with excellent care. Hearing back from our patients is one way we can continue to improve our services. Please take a few minutes to complete the written survey that you may receive in the mail after your visit with us. Thank you!             Your Updated Medication List - Protect others around you: Learn how to safely use, store and throw away your medicines at www.disposemymeds.org.          This list is accurate as of: 10/10/17  9:22 PM.  Always use your most recent med list.                   Brand Name Dispense Instructions for use Diagnosis    colchicine 0.6 MG tablet     60 tablet    TAKE 1 TABLET(0.6 MG) BY MOUTH TWICE DAILY AS NEEDED FOR MODERATE PAIN    Acute gout, unspecified cause, unspecified site       imiquimod 5 % cream    ALDARA    12 packet    Apply a small sized amount to warts QHS at bedtime.   Wash off after 8 hours.    Verruca plana       irbesartan 300 MG tablet    AVAPRO    90 tablet    Take 1 tablet (300 mg) by mouth daily    Essential hypertension with goal blood pressure less than 140/90       meloxicam 15 MG tablet    MOBIC    30 tablet    Take 1 tablet (15 mg) by mouth daily as needed    Gout of multiple sites, unspecified cause, unspecified chronicity       valACYclovir 1000 mg tablet    VALTREX    8 tablet    Take 2000 mg twice daily for one day, for Herpes simplex outbreaks.    Herpes simplex virus (HSV) infection

## 2017-10-11 ENCOUNTER — TELEPHONE (OUTPATIENT)
Dept: ORTHOPEDICS | Facility: CLINIC | Age: 42
End: 2017-10-11

## 2017-10-11 ENCOUNTER — MYC REFILL (OUTPATIENT)
Dept: INTERNAL MEDICINE | Facility: CLINIC | Age: 42
End: 2017-10-11

## 2017-10-11 DIAGNOSIS — M10.9 GOUT OF MULTIPLE SITES, UNSPECIFIED CAUSE, UNSPECIFIED CHRONICITY: ICD-10-CM

## 2017-10-11 RX ORDER — MELOXICAM 15 MG/1
15 TABLET ORAL DAILY PRN
Qty: 30 TABLET | Refills: 1 | Status: SHIPPED | OUTPATIENT
Start: 2017-10-11 | End: 2018-03-21

## 2017-10-11 NOTE — TELEPHONE ENCOUNTER
Message from Kijamii Villaget:  Original authorizing provider: MD Emerson Franklin would like a refill of the following medications:  meloxicam (MOBIC) 15 MG tablet [Samir De La O MD]    Preferred pharmacy: [facility] [id number] [place] [address] [address] [facility] [address]    Comment:  Delia De La O. My prescription for Meloxicam 15 MG has run out. I would appreciate it if you could issue a refill for it, please. Please send the prescription to the [facility] in [place] on [address]. I have several prescriptions from you already on file with them there. Thank you. - Emerson Garcia [phone]

## 2017-10-11 NOTE — TELEPHONE ENCOUNTER
Emerson called and left voicemail wondering if he would need time off after the PRP procedure that he schedule.     I called him back and let him know that the first appointment is for a consult and then we would schedule the procedure after that.    He was fine with that plan.    Dwayne Freeman, ATC

## 2017-10-11 NOTE — TELEPHONE ENCOUNTER
Fely      Last Written Prescription Date: 7/20/17  Last Quantity: 30, # refills: 1  Last Office Visit with [facility], [facility] or [facility] prescribing provider: 7/2017       Creatinine   Date Value Ref Range Status   06/30/2017 1.10 0.66 - 1.25 mg/dL Final     Lab Results   Component Value Date    AST 29 03/31/2016     Lab Results   Component Value Date    ALT 65 03/31/2016     BP Readings from Last 3 Encounters:   10/10/17 124/86   10/09/17 139/87   08/24/17 125/78     Routing refill request to provider for review/approval because:  Labs not current:  ALT/AST

## 2017-10-20 ENCOUNTER — OFFICE VISIT (OUTPATIENT)
Dept: FAMILY MEDICINE | Facility: CLINIC | Age: 42
End: 2017-10-20
Payer: OTHER MISCELLANEOUS

## 2017-10-20 VITALS
HEART RATE: 76 BPM | DIASTOLIC BLOOD PRESSURE: 84 MMHG | WEIGHT: 296 LBS | SYSTOLIC BLOOD PRESSURE: 108 MMHG | TEMPERATURE: 97.9 F | HEIGHT: 74 IN | BODY MASS INDEX: 37.99 KG/M2

## 2017-10-20 DIAGNOSIS — Z02.6 ENCOUNTER RELATED TO WORKER'S COMPENSATION CLAIM: Primary | ICD-10-CM

## 2017-10-20 DIAGNOSIS — T14.8XXA WOUND OF SKIN: ICD-10-CM

## 2017-10-20 PROCEDURE — 99213 OFFICE O/P EST LOW 20 MIN: CPT | Performed by: FAMILY MEDICINE

## 2017-10-20 NOTE — MR AVS SNAPSHOT
After Visit Summary   10/20/2017    Abhijit Garcia    MRN: [id number]           Patient Information     Date Of Birth          1975        Visit Information        Provider Department      10/20/2017 8:45 AM Madeleine Lynch MD [facility]        Today's Diagnoses     Encounter related to worker's compensation claim    -  1    Wound of skin           Follow-ups after your visit        Your next 10 appointments already scheduled     Oct 24, 2017  4:00 PM CDT   New Visit with Anibal Gordillo DO   [facility] ([facility])    [address]  [address]  [facility] [address]   [phone]              Who to contact     If you have questions or need follow up information about today's clinic visit or your schedule please contact [facility] directly at [phone].  Normal or non-critical lab and imaging results will be communicated to you by MyChart, letter or phone within 4 business days after the clinic has received the results. If you do not hear from us within 7 days, please contact the clinic through MyChart or phone. If you have a critical or abnormal lab result, we will notify you by phone as soon as possible.  Submit refill requests through [employer] or call your pharmacy and they will forward the refill request to us. Please allow 3 business days for your refill to be completed.          Additional Information About Your Visit        MyChart Information     [employer] gives you secure access to your electronic health record. If you see a primary care provider, you can also send messages to your care team and make appointments. If you have questions, please call your primary care clinic.  If you do not have a primary care provider, please call [phone] and they will assist you.        Care EveryWhere ID     This is your Care EveryWhere ID. This could be used by other organizations to access your [facility]  "medical records  [id number]        Your Vitals Were     Pulse Temperature Height BMI (Body Mass Index)          76 97.9  F (36.6  C) (Oral) 6' 2\" (1.88 m) 38 kg/m2         Blood Pressure from Last 3 Encounters:   10/20/17 108/84   10/10/17 124/86   10/09/17 139/87    Weight from Last 3 Encounters:   10/20/17 296 lb (134.3 kg)   10/10/17 290 lb (131.5 kg)   08/01/17 (!) 302 lb (137 kg)              Today, you had the following     No orders found for display       Primary Care Provider Office Phone # Fax #    Samir De La O [phone]       [address] [place] [address]        Equal Access to Services     OLIVIA HARRELL : Bhavani floreso [place], waaxda luqadaha, qaybta kaalmada angie, pauly rodriguez . So [facility] [phone].    ATENCIÓN: Si habla español, tiene a cruz disposición servicios gratuitos de asistencia lingüística. Mary al [phone].    We comply with applicable federal civil rights laws and [place] laws. We do not discriminate on the basis of race, color, national origin, age, disability, sex, sexual orientation, or gender identity.            Thank you!     Thank you for choosing [facility]  for your care. Our goal is always to provide you with excellent care. Hearing back from our patients is one way we can continue to improve our services. Please take a few minutes to complete the written survey that you may receive in the mail after your visit with us. Thank you!             Your Updated Medication List - Protect others around you: Learn how to safely use, store and throw away your medicines at www.disposemymeds.org.          This list is accurate as of: 10/20/17  9:55 AM.  Always use your most recent med list.                   Brand Name Dispense Instructions for use Diagnosis    colchicine 0.6 MG tablet     60 tablet    TAKE 1 TABLET(0.6 MG) BY MOUTH TWICE DAILY AS NEEDED FOR MODERATE PAIN    Acute gout, " unspecified cause, unspecified site       imiquimod 5 % cream    ALDARA    12 packet    Apply a small sized amount to warts QHS at bedtime.   Wash off after 8 hours.    Verruca plana       irbesartan 300 MG tablet    AVAPRO    90 tablet    Take 1 tablet (300 mg) by mouth daily    Essential hypertension with goal blood pressure less than 140/90       meloxicam 15 MG tablet    MOBIC    30 tablet    Take 1 tablet (15 mg) by mouth daily as needed    Gout of multiple sites, unspecified cause, unspecified chronicity       valACYclovir 1000 mg tablet    VALTREX    8 tablet    Take 2000 mg twice daily for one day, for Herpes simplex outbreaks.    Herpes simplex virus (HSV) infection

## 2017-10-20 NOTE — NURSING NOTE
"Chief Complaint   Patient presents with     Work Comp     wound follow up. from 10-2-2017 injury to lower left leg.        Initial /84 (BP Location: Right arm, Patient Position: Chair, Cuff Size: Adult Large)  Pulse 76  Temp 97.9  F (36.6  C) (Oral)  Ht 6' 2\" (1.88 m)  Wt 296 lb (134.3 kg)  BMI 38 kg/m2 Estimated body mass index is 38 kg/(m^2) as calculated from the following:    Height as of this encounter: 6' 2\" (1.88 m).    Weight as of this encounter: 296 lb (134.3 kg).  Medication Reconciliation: darlene Granados CMA          "

## 2017-10-24 ENCOUNTER — OFFICE VISIT (OUTPATIENT)
Dept: ORTHOPEDICS | Facility: CLINIC | Age: 42
End: 2017-10-24
Payer: COMMERCIAL

## 2017-10-24 VITALS
BODY MASS INDEX: 37.99 KG/M2 | DIASTOLIC BLOOD PRESSURE: 84 MMHG | WEIGHT: 296 LBS | SYSTOLIC BLOOD PRESSURE: 108 MMHG | HEIGHT: 74 IN

## 2017-10-24 DIAGNOSIS — M77.02 MEDIAL EPICONDYLITIS OF LEFT ELBOW: Primary | ICD-10-CM

## 2017-10-24 PROCEDURE — 99244 OFF/OP CNSLTJ NEW/EST MOD 40: CPT | Mod: 25 | Performed by: FAMILY MEDICINE

## 2017-10-24 PROCEDURE — 76882 US LMTD JT/FCL EVL NVASC XTR: CPT | Performed by: FAMILY MEDICINE

## 2017-10-24 RX ORDER — NITROGLYCERIN 0.1MG/HR
1 PATCH, TRANSDERMAL 24 HOURS TRANSDERMAL DAILY
Qty: 10 PATCH | Refills: 0 | Status: SHIPPED | OUTPATIENT
Start: 2017-10-24 | End: 2017-12-22

## 2017-10-24 NOTE — NURSING NOTE
"Chief Complaint   Patient presents with     Musculoskeletal Problem     left elbow pain       Initial /84  Ht 6' 2\" (1.88 m)  Wt 296 lb (134.3 kg)  BMI 38 kg/m2 Estimated body mass index is 38 kg/(m^2) as calculated from the following:    Height as of this encounter: 6' 2\" (1.88 m).    Weight as of this encounter: 296 lb (134.3 kg).  Medication Reconciliation: complete     Dwayne Freeman, ATC    "

## 2017-10-24 NOTE — MR AVS SNAPSHOT
After Visit Summary   10/24/2017    Abhjiit Garcia    MRN: [id number]           Patient Information     Date Of Birth          1975        Visit Information        Provider Department      10/24/2017 4:00 PM Anibal Gordillo,  [facility]        Today's Diagnoses     Medial epicondylitis of left elbow    -  1      Care Instructions    Thank you for allowing us to participate in your care today.  Please find below your visit diagnosis and the plan going forward.    1. Medial epicondylitis of left elbow      Discussed your elbow pain and US  Due to local nature of pain and intermittent presentation would recommend nitro first    Nitroglycerin patch - OK to cut the patch in quarters, even if the pharmacist says you cannot.  Apply 1/4 patch daily to most painful area - move around ~1/2 cm from day to day to avoid skin irritation.  Start with wearing the patch 2-4 hours/day and remove if/when you start to develop headache or lightheadedness.  Next day, try to wear for a bit longer.  Over the course of ~2 weeks you should be able to put on the patch in the morning and take off the following morning.  Monitor for persistent headache, blurry vision, and/or lightheadedness.  Once you are wearing the patch for the full day, check your blood pressure once to make sure it is not too low    If not improved can consider PRP  PRP (Platelet Rich Plasma) Pre-Procedure Instructions  1. Please stop all aspirin, aspirin products and all non steroidal anti-inflammatories (Motrin, Advil, Naprosyn, Naproxen, Aleve, Indocin, Mobic, Toradol, Voltaren, Arthrotec, Ibuprofen, Diclofenac) 7 days prior to the procedure  2. If you take fish oil or vitamin E please stop 5 days prior to the procedure  3. If you take Coumadin, Lovenox, Warfarin, Pradaxa, Plavix, Prasugrel or any other blood thinner please discuss this with Dr. Gordillo. These medications will need to be stopped prior to the procedure and  requires the permission of the physician prescribing them.  4. Begin to hydrate by drinking 8 glasses (64 ounces) of water in the 24 hours prior to your procedure.  5. For your safety you should arrange to have someone drive you home after the procedure.  6. Please note that PRP is not covered by insurance and therefore will be an out-of-pocket expense. $450  Check with BCBS - code is 0232T    You will have increased pain after the procedure and will be given a narcotic pain medication to help with pain. No alcohol or driving while taking this medication. Otherwise you can use Tylenol for pain. No anti-inflammatories for 2 weeks after the PRP injection  You will see me one week after your injection and we will plan to start formal physical therapy 1-2 weeks after the injection  Will will see how your pain and function are approximately 6-8 weeks from the procedure. The decision to do another injection, if necessary, will not be determined until this time.    For elbow tendinopathy / tearing  1. You will be in a sling for 3 - 7 days. Gentle range of motion out of the sling is necessary / encouraged.  2. No lifting more than a glass of water for first 3 days, no more than a pot of coffee from 3- 7 days.    3. No strength training, hitting or tennis for at least 6-8 weeks    [facility] 4-6 weeks of using Nitro or sooner if needed. Call direct clinic number [[phone]] at any time with questions or concerns.    Anibal Gordillo DO CAQSM  [facility]  Website: [email or web address]  Twitter: [email or web address]            Follow-ups after your visit        Your next 10 appointments already scheduled     Nov 13, 2017  3:00 PM CST   Office Visit with Clarissa Ponce MD   [facility] ([facility] )    [address] [place] [address]  [address] [place] [address] [phone]           Bring a current list of meds and any  "records pertaining to this visit. For Physicals, please bring immunization records and any forms needing to be filled out. Please arrive 10 minutes early to complete paperwork.              Who to contact     If you have questions or need follow up information about today's clinic visit or your schedule please contact [facility] directly at [phone].  Normal or non-critical lab and imaging results will be communicated to you by [employer], letter or phone within 4 business days after the clinic has received the results. If you do not hear from us within 7 days, please contact the clinic through [employer] or phone. If you have a critical or abnormal lab result, we will notify you by phone as soon as possible.  Submit refill requests through [employer] or call your pharmacy and they will forward the refill request to us. Please allow 3 business days for your refill to be completed.          Additional Information About Your Visit        MyChart Information     [employer] gives you secure access to your electronic health record. If you see a primary care provider, you can also send messages to your care team and make appointments. If you have questions, please call your primary care clinic.  If you do not have a primary care provider, please call [phone] and they will assist you.        Care EveryWhere ID     This is your Care EveryWhere ID. This could be used by other organizations to access your [facility] medical records  [id number]        Your Vitals Were     Height BMI (Body Mass Index)                6' 2\" (1.88 m) 38 kg/m2           Blood Pressure from Last 3 Encounters:   10/24/17 108/84   10/20/17 108/84   10/10/17 124/86    Weight from Last 3 Encounters:   10/24/17 296 lb (134.3 kg)   10/20/17 296 lb (134.3 kg)   10/10/17 290 lb (131.5 kg)              Today, you had the following     No orders found for display         Today's Medication Changes          These changes are accurate as of: " 10/24/17  5:11 PM.  If you have any questions, ask your nurse or doctor.               Start taking these medicines.        Dose/Directions    nitroGLYcerin 0.1 MG/HR 24 hr patch   Commonly known as:  NITRODUR   Used for:  Medial epicondylitis of left elbow   Started by:  Anibal Gordillo DO        Dose:  1 patch   Place 1 patch onto the skin daily for 10 days   Quantity:  10 patch   Refills:  0            Where to get your medicines      These medications were sent to [facility] [id number] [place] [address] [address] [address] [facility] [address]     Phone:  [phone]     nitroGLYcerin 0.1 MG/HR 24 hr patch                Primary Care Provider Office Phone # Fax #    Samir De La O [phone]       [address] [facility] [address]        Equal Access to Services     JEANNINE HARRELL : Hadii mir ku hadasho Soomaali, waaxda luqadaha, qaybta kaalmada adeegyada, waxay idiin haykojon enrike rodriguez . So [facility] [phone].    ATENCIÓN: Si habla español, tiene a cruz disposición servicios gratuitos de asistencia lingüística. Llame al [phone].    We comply with applicable federal civil rights laws and [place] laws. We do not discriminate on the basis of race, color, national origin, age, disability, sex, sexual orientation, or gender identity.            Thank you!     Thank you for choosing [facility]  for your care. Our goal is always to provide you with excellent care. Hearing back from our patients is one way we can continue to improve our services. Please take a few minutes to complete the written survey that you may receive in the mail after your visit with us. Thank you!             Your Updated Medication List - Protect others around you: Learn how to safely use, store and throw away your medicines at www.disposemymeds.org.          This list is accurate as of: 10/24/17  5:11 PM.  Always use your most  recent med list.                   Brand Name Dispense Instructions for use Diagnosis    colchicine 0.6 MG tablet     60 tablet    TAKE 1 TABLET(0.6 MG) BY MOUTH TWICE DAILY AS NEEDED FOR MODERATE PAIN    Acute gout, unspecified cause, unspecified site       imiquimod 5 % cream    ALDARA    12 packet    Apply a small sized amount to warts QHS at bedtime.   Wash off after 8 hours.    Verruca plana       irbesartan 300 MG tablet    AVAPRO    90 tablet    Take 1 tablet (300 mg) by mouth daily    Essential hypertension with goal blood pressure less than 140/90       meloxicam 15 MG tablet    MOBIC    30 tablet    Take 1 tablet (15 mg) by mouth daily as needed    Gout of multiple sites, unspecified cause, unspecified chronicity       nitroGLYcerin 0.1 MG/HR 24 hr patch    NITRODUR    10 patch    Place 1 patch onto the skin daily for 10 days    Medial epicondylitis of left elbow       valACYclovir 1000 mg tablet    VALTREX    8 tablet    Take 2000 mg twice daily for one day, for Herpes simplex outbreaks.    Herpes simplex virus (HSV) infection

## 2017-10-24 NOTE — PROGRESS NOTES
"ASSESSMENT & PLAN    ICD-10-CM    1. Medial epicondylitis of left elbow M77.02 nitroGLYcerin (NITRODUR) 0.1 MG/HR 24 hr patch   No pain with strength testing  Has point tenderness over the origin/medial epicondyle  Due to local nature of pain and intermittent presentation would recommend nitro first  If not improved can consider PRP    Follow up over [facility] in 4-6 weeks of using Nitro or sooner if needed. Call direct clinic number [[phone]] at any time with questions or concerns.  -----    SUBJECTIVE  Abhijit Garcia is a/an 42 year old  right hand dominant male who is seen in consultation at the request of Dr. Del Rio for evaluation of left medial elbow pain. The patient is seen by themselves. He would like to discuss PRP option.    Onset: 6-7 month(s) ago. Reports insidious onset without acute precipitating event.  Worsened by: reaching out with weight, elbow flexion, pressure/tension to medial elbow, guitar  Better with: PT to a point  Quality: sharp, dull, alternating  Pain Scale (maximum/current)/10: 6/10 / 1/10  Treatments tried: ibuprofen, ice, rest, activity avoidance, PT, tennis elbow brace  Orthopedic history: NO  Relevant surgical history: NO  Patient Social History: works at ,     Patient's past medical, surgical, social, and family histories were reviewed today and no changes are noted.    REVIEW OF SYSTEMS:  10 point ROS is negative other than symptoms noted above in HPI, Past Medical History or as stated below  Constitutional: NEGATIVE for fever, chills, change in weight  Skin: NEGATIVE for worrisome rashes, moles or lesions  GI/: NEGATIVE for bowel or bladder changes  Neuro: NEGATIVE for weakness, dizziness or paresthesias    OBJECTIVE:  /84  Ht 6' 2\" (1.88 m)  Wt 296 lb (134.3 kg)  BMI 38 kg/m2   General: healthy, alert and in no distress  HEENT: no scleral icterus or conjunctival erythema  Skin: no suspicious lesions or rash. No jaundice.  CV: " regular rhythm by palpation  Resp: normal respiratory effort without conversational dyspnea   Psych: normal mood and affect  Gait: normal steady gait with appropriate coordination and balance  Neuro: Normal sensory exam of bilateral hands.   MSK:  LEFT ELBOW  Inspection:    No swelling  Palpation:    Tender about the medial epicondyle. Nontender over flexor compartment/wad. Remainder of bony, ligamentous and tendinous landmarks are nontender.    Crepitus is Absent  Range of Motion:     Extension full / flexion full / pronation full / supination full  Strength:    No deficits in flexion, extension, pronation, or supination.  Special Tests:    Negative: Pain with resisted wrist extension, pain with resisted middle finger extension, pain with resisted wrist flexion, passive valgus stress. Tinel (cubital tunnel)    Limited US scan of the right medial elbow:  History: Pain  Findings: Scan of the medial / flexor tendon shows small area of hypoechoic focus at the origin associated with an enthesophyte.    Interpretation: 1) Flexor tendinopathy     Patient's conditions were thoroughly discussed during today's visit with greater than 50% of the visit spent counseling the patient with total time spent face-to-face with the patient being 35 minutes with US taking 5 minutes.    Anibal Gordillo, DO [facility]

## 2017-10-24 NOTE — Clinical Note
Minimal area of tendinopathy. We decided to do short trail of nitro and if not improved then move to PRP.  Thanks for sending him my way. Anibal

## 2017-10-24 NOTE — PATIENT INSTRUCTIONS
Thank you for allowing us to participate in your care today.  Please find below your visit diagnosis and the plan going forward.    1. Medial epicondylitis of left elbow      Discussed your elbow pain and US  Due to local nature of pain and intermittent presentation would recommend nitro first    Nitroglycerin patch - OK to cut the patch in quarters, even if the pharmacist says you cannot.  Apply 1/4 patch daily to most painful area - move around ~1/2 cm from day to day to avoid skin irritation.  Start with wearing the patch 2-4 hours/day and remove if/when you start to develop headache or lightheadedness.  Next day, try to wear for a bit longer.  Over the course of ~2 weeks you should be able to put on the patch in the morning and take off the following morning.  Monitor for persistent headache, blurry vision, and/or lightheadedness.  Once you are wearing the patch for the full day, check your blood pressure once to make sure it is not too low    If not improved can consider PRP  PRP (Platelet Rich Plasma) Pre-Procedure Instructions  1. Please stop all aspirin, aspirin products and all non steroidal anti-inflammatories (Motrin, Advil, Naprosyn, Naproxen, Aleve, Indocin, Mobic, Toradol, Voltaren, Arthrotec, Ibuprofen, Diclofenac) 7 days prior to the procedure  2. If you take fish oil or vitamin E please stop 5 days prior to the procedure  3. If you take Coumadin, Lovenox, Warfarin, Pradaxa, Plavix, Prasugrel or any other blood thinner please discuss this with Dr. Gordillo. These medications will need to be stopped prior to the procedure and requires the permission of the physician prescribing them.  4. Begin to hydrate by drinking 8 glasses (64 ounces) of water in the 24 hours prior to your procedure.  5. For your safety you should arrange to have someone drive you home after the procedure.  6. Please note that PRP is not covered by insurance and therefore will be an out-of-pocket expense. $450  Check with [employer] - code  is 0232T    You will have increased pain after the procedure and will be given a narcotic pain medication to help with pain. No alcohol or driving while taking this medication. Otherwise you can use Tylenol for pain. No anti-inflammatories for 2 weeks after the PRP injection  You will see me one week after your injection and we will plan to start formal physical therapy 1-2 weeks after the injection  Will will see how your pain and function are approximately 6-8 weeks from the procedure. The decision to do another injection, if necessary, will not be determined until this time.    For elbow tendinopathy / tearing  1. You will be in a sling for 3 - 7 days. Gentle range of motion out of the sling is necessary / encouraged.  2. No lifting more than a glass of water for first 3 days, no more than a pot of coffee from 3- 7 days.    3. No strength training, hitting or tennis for at least 6-8 weeks    Follow up over [facility] in 4-6 weeks of using Nitro or sooner if needed. Call direct clinic number [[phone]] at any time with questions or concerns.    Anibal Gordillo DO CAQSM  [facility]  Website: [email or web address]  Twitter: [email or web address]

## 2017-10-31 ENCOUNTER — OFFICE VISIT (OUTPATIENT)
Dept: INTERNAL MEDICINE | Facility: CLINIC | Age: 42
End: 2017-10-31
Payer: COMMERCIAL

## 2017-10-31 VITALS
WEIGHT: 298 LBS | HEIGHT: 74 IN | TEMPERATURE: 98.3 F | DIASTOLIC BLOOD PRESSURE: 84 MMHG | HEART RATE: 82 BPM | BODY MASS INDEX: 38.24 KG/M2 | SYSTOLIC BLOOD PRESSURE: 122 MMHG | OXYGEN SATURATION: 97 %

## 2017-10-31 DIAGNOSIS — M25.521 RIGHT ELBOW PAIN: ICD-10-CM

## 2017-10-31 DIAGNOSIS — M25.50 MULTIPLE JOINT PAIN: Primary | ICD-10-CM

## 2017-10-31 DIAGNOSIS — I10 ESSENTIAL HYPERTENSION: ICD-10-CM

## 2017-10-31 LAB
CRP SERPL-MCNC: <2.9 MG/L (ref 0–8)
ERYTHROCYTE [SEDIMENTATION RATE] IN BLOOD BY WESTERGREN METHOD: 4 MM/H (ref 0–15)

## 2017-10-31 PROCEDURE — 85652 RBC SED RATE AUTOMATED: CPT | Performed by: INTERNAL MEDICINE

## 2017-10-31 PROCEDURE — 99214 OFFICE O/P EST MOD 30 MIN: CPT | Performed by: INTERNAL MEDICINE

## 2017-10-31 PROCEDURE — 86140 C-REACTIVE PROTEIN: CPT | Performed by: INTERNAL MEDICINE

## 2017-10-31 PROCEDURE — 86618 LYME DISEASE ANTIBODY: CPT | Performed by: INTERNAL MEDICINE

## 2017-10-31 PROCEDURE — 36415 COLL VENOUS BLD VENIPUNCTURE: CPT | Performed by: INTERNAL MEDICINE

## 2017-10-31 RX ORDER — IRBESARTAN 300 MG/1
300 TABLET ORAL DAILY
Qty: 90 TABLET | Status: SHIPPED | OUTPATIENT
Start: 2017-10-31 | End: 2018-03-21

## 2017-10-31 NOTE — PATIENT INSTRUCTIONS
PLAN:                                                      1.  MEDICATIONS:        - Start taking meloxicam again for a couple weeks       - Continue other medications without change  2.  If elbow pains do not improve, consider seeing Dr. Gordillo for this, as well.  3.  Continue ice after using elbow  4.  Check labs

## 2017-10-31 NOTE — MR AVS SNAPSHOT
After Visit Summary   10/31/2017    Abhijit Garcia    MRN: [id number]           Patient Information     Date Of Birth          1975        Visit Information        Provider Department      10/31/2017 11:30 AM Samir De La O MD [facility]        Today's Diagnoses     Multiple joint pain    -  1    Right elbow pain        Essential hypertension          Care Instructions    PLAN:                                                      1.  MEDICATIONS:        - Start taking meloxicam again for a couple weeks       - Continue other medications without change  2.  If elbow pains do not improve, consider seeing Dr. Gordillo for this, as well.  3.  Continue ice after using elbow  4.  Check labs           Follow-ups after your visit        Your next 10 appointments already scheduled     Nov 13, 2017  3:00 PM CST   Office Visit with Clarissa Ponce MD   [facility] ([facility] )    [address] [place] [address] [phone]           Bring a current list of meds and any records pertaining to this visit. For Physicals, please bring immunization records and any forms needing to be filled out. Please arrive 10 minutes early to complete paperwork.              Who to contact     If you have questions or need follow up information about today's clinic visit or your schedule please contact [facility] directly at [phone].  Normal or non-critical lab and imaging results will be communicated to you by MyChart, letter or phone within 4 business days after the clinic has received the results. If you do not hear from us within 7 days, please contact the clinic through MyChart or phone. If you have a critical or abnormal lab result, we will notify you by phone as soon as possible.  Submit refill requests through "[employer] or call your pharmacy and they will forward the  "refill request to us. Please allow 3 business days for your refill to be completed.          Additional Information About Your Visit        Hem[employer] Information     [employer] gives you secure access to your electronic health record. If you see a primary care provider, you can also send messages to your care team and make appointments. If you have questions, please call your primary care clinic.  If you do not have a primary care provider, please call [phone] and they will assist you.        Care EveryWhere ID     This is your Care EveryWhere ID. This could be used by other organizations to access your [facility] medical records  [id number]        Your Vitals Were     Pulse Temperature Height Pulse Oximetry BMI (Body Mass Index)       82 98.3  F (36.8  C) (Oral) 6' 2\" (1.88 m) 97% 38.26 kg/m2        Blood Pressure from Last 3 Encounters:   10/31/17 122/84   10/24/17 108/84   10/20/17 108/84    Weight from Last 3 Encounters:   10/31/17 298 lb (135.2 kg)   10/24/17 296 lb (134.3 kg)   10/20/17 296 lb (134.3 kg)              We Performed the Following     **Lyme Disease Elisabeth with reflex to WB Serum FUTURE 14d     CRP, inflammation     Erythrocyte sedimentation rate auto          Where to get your medicines      These medications were sent to [facility] [id number] [place] [address] [id number] [place] [address] AT [address]  [address] [facility], [facility] [address]     Phone:  [phone]     irbesartan 300 MG tablet          Primary Care Provider Office Phone # Fax #    Samir Jaison De La O [phone]       [address] [facility] [address]        Equal Access to Services     [facility] JULIO CESAR AH: Hadii mir Joshua, waagatada luqadaha, qaybta kaalmada angie, pauly correa. So [facility] [phone].    ATENCIÓN: Si habla español, tiene a cruz disposición servicios gratuitos de asistencia lingüística. Llame al [phone].    We comply with applicable federal " civil rights laws and [place] laws. We do not discriminate on the basis of race, color, national origin, age, disability, sex, sexual orientation, or gender identity.            Thank you!     Thank you for choosing [facility]  for your care. Our goal is always to provide you with excellent care. Hearing back from our patients is one way we can continue to improve our services. Please take a few minutes to complete the written survey that you may receive in the mail after your visit with us. Thank you!             Your Updated Medication List - Protect others around you: Learn how to safely use, store and throw away your medicines at www.disposemymeds.org.          This list is accurate as of: 10/31/17 11:59 PM.  Always use your most recent med list.                   Brand Name Dispense Instructions for use Diagnosis    colchicine 0.6 MG tablet     60 tablet    TAKE 1 TABLET(0.6 MG) BY MOUTH TWICE DAILY AS NEEDED FOR MODERATE PAIN    Acute gout, unspecified cause, unspecified site       imiquimod 5 % cream    ALDARA    12 packet    Apply a small sized amount to warts QHS at bedtime.   Wash off after 8 hours.    Verruca plana       irbesartan 300 MG tablet    AVAPRO    90 tablet    Take 1 tablet (300 mg) by mouth daily    Essential hypertension       meloxicam 15 MG tablet    MOBIC    30 tablet    Take 1 tablet (15 mg) by mouth daily as needed    Gout of multiple sites, unspecified cause, unspecified chronicity       nitroGLYcerin 0.1 MG/HR 24 hr patch    NITRODUR    10 patch    Place 1 patch onto the skin daily for 10 days    Medial epicondylitis of left elbow       valACYclovir 1000 mg tablet    VALTREX    8 tablet    Take 2000 mg twice daily for one day, for Herpes simplex outbreaks.    Herpes simplex virus (HSV) infection

## 2017-10-31 NOTE — NURSING NOTE
"Chief Complaint   Patient presents with     Elbow Pain       Initial /84  Pulse 82  Temp 98.3  F (36.8  C) (Oral)  Ht 6' 2\" (1.88 m)  Wt 298 lb (135.2 kg)  SpO2 97%  BMI 38.26 kg/m2 Estimated body mass index is 38.26 kg/(m^2) as calculated from the following:    Height as of this encounter: 6' 2\" (1.88 m).    Weight as of this encounter: 298 lb (135.2 kg).  Medication Reconciliation: complete   Cris Edwards MA   "

## 2017-10-31 NOTE — PROGRESS NOTES
"  SUBJECTIVE:   Abhijit Garcia is a 42 year old male who presents to clinic today for the following health issues:      Joint Pain.   Wonders if age related or something more.    Onset: mild symptoms last week, worse last night at work and today    Description:   Location: right elbow, laterally.  Character: Sharp, sometimes dull    Intensity: moderate    Progression of Symptoms: same    Accompanying Signs & Symptoms:  Other symptoms: none    History:   Previous similar pain: YES      Precipitating factors:   Trauma or overuse: no     Alleviating factors:  Improved by: nothing     Therapies Tried and outcome: ice    Some pain R 2nd MCP x 2 days.  L elbow pain, Dr. Gordillo following.       Reviewed and updated as needed this visit by clinical staff:  Medications  Allergies  Soc Hx   Additional history: as documented    Additional issues to address:  1.  Follow-up HTN.  Patient is checking outpatient blood pressures, at home.  Results are 125/84 range, a few lower.  He reports no side effects from BP medications.      ROS:  No rashes  One gout flare in toe past few months, great toe  Constitutional, HEENT, cardiovascular, pulmonary, gi and gu systems are negative, except as otherwise noted.      OBJECTIVE:                                                    /84  Pulse 82  Temp 98.3  F (36.8  C) (Oral)  Ht 6' 2\" (1.88 m)  Wt 298 lb (135.2 kg)  SpO2 97%  BMI 38.26 kg/m2  Body mass index is 38.26 kg/(m^2).   No apparent distress  Exam of R elbow shows no effusion, tenderness, erythema  Minimal pain with supination, forearm extension  R 2nd MCP with modest medial tenderness, L medial knee joint pain  Wrists and fingers otherwise unremarkable  No focal tenderness R elbow exam.   Minimal pain with arm extension, +/- supination       ASSESSMENT:                                                      Multiple joint/muscle pains.   Most likely from overuse.     R elbow pain, seems more likely related to " brachioradialis than tennis elbow  HTN, controlled HTN    PLAN:                                                      1.  MEDICATIONS:        - Start taking meloxicam again for a couple weeks       - Continue other medications without change  2.  If elbow pains do not improve, consider seeing Dr. Gordillo for this, as well.  3.  Continue ice after using elbow  4.  Check labs --> negative    4.  Check     Samir De La O MD  [facility]

## 2017-11-01 LAB — B BURGDOR IGG+IGM SER QL: 0.36 (ref 0–0.89)

## 2017-11-13 ENCOUNTER — OFFICE VISIT (OUTPATIENT)
Dept: FAMILY MEDICINE | Facility: CLINIC | Age: 42
End: 2017-11-13
Payer: COMMERCIAL

## 2017-11-13 DIAGNOSIS — Z09 FOLLOW-UP FOR RESOLVED CONDITION: Primary | ICD-10-CM

## 2017-11-13 DIAGNOSIS — B07.8 FLAT WART: ICD-10-CM

## 2017-11-13 PROCEDURE — 99213 OFFICE O/P EST LOW 20 MIN: CPT | Performed by: FAMILY MEDICINE

## 2017-11-13 NOTE — MR AVS SNAPSHOT
"              After Visit Summary   11/13/2017    Abhijit Garcia    MRN: [id number]           Patient Information     Date Of Birth          1975        Visit Information        Provider Department      11/13/2017 3:00 PM Clarissa Ponce MD [facility]        Today's Diagnoses     Follow-up for resolved condition    -  1      Care Instructions    FUTURE APPOINTMENTS  Follow up as needed.    Discontinue use of imiquimod.    Apply moisturizer regularly.    SUN PROTECTION INSTRUCTIONS  Sun damage can lead to skin cancer and premature aging of the skin.      The best way to protect from sun damage to your skin is to avoid the sun during peak hours (10 am - 2 pm) even on overcast days.      Use UPF sun-protective clothing, which while more expensive initially provides longer lasting coverage without having to worry about remembering to re-apply.  1. Wear a wide-brimmed hat and sunglasses.   2. Wear sun-protective clothing.  [employer] and other qcue make sun protective clothing that are stylish, comfortable and cool. [employer]Watch and other qcue make UV arm sleeves suitable for golfing, gardening and other activities.      Sunscreen instructions:  1. Use sunscreens with Zinc Oxide, Titanium Dioxide or Avobenzone to protect from UVA rays.  2. Use SPF 30-50+ to protect from UVB rays.  3. Re-apply every 2 hours even if water resistant.  4. Apply on your face every day even when cloudy and even in the winter. UVA \"aging rays\" penetrate window glass and are just as strong in the winter as in the summer.    Product Recommendations:    Good examples include: Zhou Colmenares, EltaMD, Solbar    Good daily moisturizers with SPF: Vanicream, CeraVe.    For sensitive skin, consider : SkinMedica Essential Defense Mineral Shield Broad Spectrum SPF 35      Never use tanning beds. Tanning beds are associated with much higher risks of skin cancer.    All tanning damages the skin. " "Aim for ivory skin year round and you will have less trouble with your skin in years to come. There is no merit in getting \"a base tan\" before a warm weather vacation, as any tanning indicates your body's response to sun damage.    Stop smoking. Smokers have higher rates of skin cancer and also have premature skin wrinkling.    FYI  You should use about 3 tablespoons of sunscreen to protect your whole body. Thus a typical eight ounce bottle of sunscreen should last 4 applications. Remember, that the SPF rating is compromised if you don t apply enough. Most people only apply 1/2 - 1/3 of the amount they need. Also don t forget areas such as your ears, feet, upper back and harder to reach places. Keep in mind that these amounts should be increased for larger body sizes.    Sunscreens with titanium dioxide and/or zinc oxide in the active ingredients are physical blockers as opposed to chemical blockers. Chemical-free sunscreens should not irritate the skin.    Spray-on sunscreens may be used for touch-up application only, not as a base layer. Also, use with caution around small children due to inhalation risk.    Avoid retinyl palmitate products.    Avoid combination products that include both sunscreen and insect repellant, as sunscreen should be applied every 2 hours, but insect repellant should not be applied as frequently.    SPF means sun protection factor, which is just the degree to which the sunscreen can protect against UVB rays. There is no rating system for UVA rays. SPF is calculated as the time skin will burn when sunscreen is applied vs. skin without sunscreen.    Water resistant sunscreens should be re-applied every 1-2 hours.    For more information:  http://www.skincancer.org/prevention/sun-protection/sunscreen/sunscreens-safe-and-effective          Follow-ups after your visit        Who to contact     If you have questions or need follow up information about today's clinic visit or your schedule " please contact [facility] - PRIMARY [facility] SK[facility] directly at [phone].  Normal or non-critical lab and imaging results will be communicated to you by MyChart, letter or phone within 4 business days after the clinic has received the results. If you do not hear from us within 7 days, please contact the clinic through MyChart or phone. If you have a critical or abnormal lab result, we will notify you by phone as soon as possible.  Submit refill requests through [employer] or call your pharmacy and they will forward the refill request to us. Please allow 3 business days for your refill to be completed.          Additional Information About Your Visit        [employer] Information     [employer] [employer] gives you secure access to your electronic health record. If you see a primary care provider, you can also send messages to your care team and make appointments. If you have questions, please call your primary care clinic.  If you do not have a primary care provider, please call [phone] and they will assist you.        Care EveryWhere ID     This is your Care EveryWhere ID. This could be used by other organizations to access your [facility] medical records  [id number]         Blood Pressure from Last 3 Encounters:   10/31/17 122/84   10/24/17 108/84   10/20/17 108/84    Weight from Last 3 Encounters:   10/31/17 298 lb (135.2 kg)   10/24/17 296 lb (134.3 kg)   10/20/17 296 lb (134.3 kg)              Today, you had the following     No orders found for display       Primary Care Provider Office Phone # Fax #    Samir Jaison De La O [phone]       [address] [place] [address]        Equal Access to Services     [facility] AH: Hadii mir call hadasho Sopark, waaxda luqadaha, qaybta kaalmada angie, pauly correa. So [facility] [phone].    ATENCIÓN: Si habla español, tiene a cruz disposición servicios gratuitos de asistencia lingüística. Llame al [phone].    We comply  with applicable federal civil rights laws and [place] laws. We do not discriminate on the basis of race, color, national origin, age, disability, sex, sexual orientation, or gender identity.            Thank you!     Thank you for choosing [facility]  for your care. Our goal is always to provide you with excellent care. Hearing back from our patients is one way we can continue to improve our services. Please take a few minutes to complete the written survey that you may receive in the mail after your visit with us. Thank you!             Your Updated Medication List - Protect others around you: Learn how to safely use, store and throw away your medicines at www.disposemymeds.org.          This list is accurate as of: 11/13/17  3:09 PM.  Always use your most recent med list.                   Brand Name Dispense Instructions for use Diagnosis    colchicine 0.6 MG tablet     60 tablet    TAKE 1 TABLET(0.6 MG) BY MOUTH TWICE DAILY AS NEEDED FOR MODERATE PAIN    Acute gout, unspecified cause, unspecified site       imiquimod 5 % cream    ALDARA    12 packet    Apply a small sized amount to warts QHS at bedtime.   Wash off after 8 hours.    Verruca plana       irbesartan 300 MG tablet    AVAPRO    90 tablet    Take 1 tablet (300 mg) by mouth daily    Essential hypertension       meloxicam 15 MG tablet    MOBIC    30 tablet    Take 1 tablet (15 mg) by mouth daily as needed    Gout of multiple sites, unspecified cause, unspecified chronicity       nitroGLYcerin 0.1 MG/HR 24 hr patch    NITRODUR    10 patch    Place 1 patch onto the skin daily for 10 days    Medial epicondylitis of left elbow       valACYclovir 1000 mg tablet    VALTREX    8 tablet    Take 2000 mg twice daily for one day, for Herpes simplex outbreaks.    Herpes simplex virus (HSV) infection

## 2017-11-13 NOTE — PATIENT INSTRUCTIONS
"FUTURE APPOINTMENTS  Follow up as needed.    Discontinue use of imiquimod.    Apply moisturizer regularly.    SUN PROTECTION INSTRUCTIONS  Sun damage can lead to skin cancer and premature aging of the skin.      The best way to protect from sun damage to your skin is to avoid the sun during peak hours (10 am - 2 pm) even on overcast days.      Use UPF sun-protective clothing, which while more expensive initially provides longer lasting coverage without having to worry about remembering to re-apply.  1. Wear a wide-brimmed hat and sunglasses.   2. Wear sun-protective clothing.  [employer] and other [employer] make sun protective clothing that are stylish, comfortable and cool. [employer] and other [employer] make UV arm sleeves suitable for golfing, gardening and other activities.      Sunscreen instructions:  1. Use sunscreens with Zinc Oxide, Titanium Dioxide or Avobenzone to protect from UVA rays.  2. Use SPF 30-50+ to protect from UVB rays.  3. Re-apply every 2 hours even if water resistant.  4. Apply on your face every day even when cloudy and even in the winter. UVA \"aging rays\" penetrate window glass and are just as strong in the winter as in the summer.    Product Recommendations:    Good examples include: Blue Lizard, EltaMD, Solbar    Good daily moisturizers with SPF: Vanicream, CeraVe.    For sensitive skin, consider : SkinMedica Essential Defense Mineral Shield Broad Spectrum SPF 35      Never use tanning beds. Tanning beds are associated with much higher risks of skin cancer.    All tanning damages the skin. Aim for ivory skin year round and you will have less trouble with your skin in years to come. There is no merit in getting \"a base tan\" before a warm weather vacation, as any tanning indicates your body's response to sun damage.    Stop smoking. Smokers have higher rates of skin cancer and also have premature skin wrinkling.    FYI  You should use about 3 tablespoons of sunscreen to protect " your whole body. Thus a typical eight ounce bottle of sunscreen should last 4 applications. Remember, that the SPF rating is compromised if you don t apply enough. Most people only apply 1/2 - 1/3 of the amount they need. Also don t forget areas such as your ears, feet, upper back and harder to reach places. Keep in mind that these amounts should be increased for larger body sizes.    Sunscreens with titanium dioxide and/or zinc oxide in the active ingredients are physical blockers as opposed to chemical blockers. Chemical-free sunscreens should not irritate the skin.    Spray-on sunscreens may be used for touch-up application only, not as a base layer. Also, use with caution around small children due to inhalation risk.    Avoid retinyl palmitate products.    Avoid combination products that include both sunscreen and insect repellant, as sunscreen should be applied every 2 hours, but insect repellant should not be applied as frequently.    SPF means sun protection factor, which is just the degree to which the sunscreen can protect against UVB rays. There is no rating system for UVA rays. SPF is calculated as the time skin will burn when sunscreen is applied vs. skin without sunscreen.    Water resistant sunscreens should be re-applied every 1-2 hours.    For more information:  http://www.skincancer.org/prevention/sun-protection/sunscreen/sunscreens-safe-and-effective

## 2017-11-13 NOTE — LETTER
11/13/2017         RE: Abhijit Garcia  [address] [facility] [address]        Dear Colleague,    Thank you for referring your patient, Abhijit Garcia, to the [facility] - PRIMARY CARE SK[facility]. Please see a copy of my visit note below.    [facility] SK[facility]    CC : Wart(s)   SUBJECTIVE:                                                    Abhijit Garcia is a 42 year old male who presents to clinic today for follow-up of warts on the right forearm that started 3-4 months ago. Two new lesions may have developed over the last 1 month. Moisturizer is infrequently used, and he has last applied imiquimod one week ago.    He also recalls an episode of similar flat warts on the neck in 2016.    Last treatment date : 10/9/2017.  Type of treatment :  Cryotherapy. Imiquimod initiated on 8/24/2017.  Treatment number : 2  Treatment response : new lesions may have developed.  Side effects : none.    Issue Two : He is concerned about brown spots on the back of the hand    Issue Three : He recalls an instance of blistering sunburn on the nose, and he reports that he has had skin changes at the site ever since.    Refer to electronic medical record (EMR) for past medical history and medications.    INTEGUMENTARY/SKIN: POSITIVE for warts  ROS : 14 point review of systems was negative except the symptoms listed above in the HPI.    This document serves as a record of the services and decisions personally performed and made by Gabby Ponce MD. It was created on her behalf by Cr Hopper, a trained medical scribe.  The creation of this document is based on the scribe's personal observations and the provider's statements to the medical scribe.  Cr Hopper, November 13, 2017 3:01 PM      OBJECTIVE:                                                    GENERAL: healthy, alert and no distress  SKIN: [place] Skin Type - II.  Arms were examined. The dermatoscope was used to help evaluate pigmented  lesions.  Skin Pertinent Findings:  Right forearm : Multiple residual sites of post-inflammatory erythema secondary to previous cryotherapy. No evidence of any new verrucous lesion(s).    Left forearm : Clear, no evidence of verrucous lesion(s).    Dorsal hands : brown, macule(s) most consistent with benign solar lentigo.    Diagnostic Test Results:  none           ASSESSMENT:                                                      Encounter Diagnoses   Name Primary?     Follow-up for resolved condition Yes     Flat wart          PLAN:                                                    Patient Instructions   FUTURE APPOINTMENTS  Follow up as needed.    Discontinue use of imiquimod.    Apply moisturizer regularly.        PROCEDURES:                                                    None.    TT : 20 minutes.  CT : 15 minutes, discussing treatment plan, side effects, risks and benefits of the medications and when to return if not improving.      The information in this document, created by the medical scribe for me, accurately reflects the services I personally performed and the decisions made by me. I have reviewed and approved this document for accuracy prior to leaving the patient care area.  Gabby Ponce MD November 13, 2017 3:01 PM  [facility] - PRIMARY [facility]ARE SK[facility]    Again, thank you for allowing me to participate in the care of your patient.        Sincerely,        Clarissa Ponce MD

## 2017-11-13 NOTE — PROGRESS NOTES
[facility] - PRIMARY CARE SKIN    CC : Wart(s)   SUBJECTIVE:                                                    Abhijit Garcia is a 42 year old male who presents to clinic today for follow-up of warts on the right forearm that started 3-4 months ago. Two new lesions may have developed over the last 1 month. Moisturizer is infrequently used, and he has last applied imiquimod one week ago.    He also recalls an episode of similar flat warts on the neck in 2016.    Last treatment date : 10/9/2017.  Type of treatment :  Cryotherapy. Imiquimod initiated on 8/24/2017.  Treatment number : 2  Treatment response : new lesions may have developed.  Side effects : none.    Issue Two : He is concerned about brown spots on the back of the hand    Issue Three : He recalls an instance of blistering sunburn on the nose, and he reports that he has had skin changes at the site ever since.    Refer to electronic medical record (EMR) for past medical history and medications.    INTEGUMENTARY/SKIN: POSITIVE for warts  ROS : 14 point review of systems was negative except the symptoms listed above in the HPI.    This document serves as a record of the services and decisions personally performed and made by Gabyb Ponce MD. It was created on her behalf by Cr Hopper, a trained medical scribe.  The creation of this document is based on the scribe's personal observations and the provider's statements to the medical scribe.  Cr Hopper, November 13, 2017 3:01 PM      OBJECTIVE:                                                    GENERAL: healthy, alert and no distress  SKIN: [place] Skin Type - II.  Arms were examined. The dermatoscope was used to help evaluate pigmented lesions.  Skin Pertinent Findings:  Right forearm : Multiple residual sites of post-inflammatory erythema secondary to previous cryotherapy. No evidence of any new verrucous lesion(s).    Left forearm : Clear, no evidence of verrucous lesion(s).    Dorsal hands :  brown, macule(s) most consistent with benign solar lentigo.    Diagnostic Test Results:  none           ASSESSMENT:                                                      Encounter Diagnoses   Name Primary?     Follow-up for resolved condition Yes     Flat wart          PLAN:                                                    Patient Instructions   FUTURE APPOINTMENTS  Follow up as needed.    Discontinue use of imiquimod.    Apply moisturizer regularly.        PROCEDURES:                                                    None.    TT : 20 minutes.  CT : 15 minutes, discussing treatment plan, side effects, risks and benefits of the medications and when to return if not improving.      The information in this document, created by the medical scribe for me, accurately reflects the services I personally performed and the decisions made by me. I have reviewed and approved this document for accuracy prior to leaving the patient care area.  Gabby Ponce MD November 13, 2017 3:01 PM  [facility] - PRIMARY CARE SKIN

## 2017-11-14 ENCOUNTER — TRANSFERRED RECORDS (OUTPATIENT)
Dept: HEALTH INFORMATION MANAGEMENT | Facility: CLINIC | Age: 42
End: 2017-11-14

## 2017-12-12 ENCOUNTER — TRANSFERRED RECORDS (OUTPATIENT)
Dept: HEALTH INFORMATION MANAGEMENT | Facility: CLINIC | Age: 42
End: 2017-12-12

## 2017-12-22 ENCOUNTER — MYC MEDICAL ADVICE (OUTPATIENT)
Dept: ORTHOPEDICS | Facility: CLINIC | Age: 42
End: 2017-12-22

## 2017-12-22 DIAGNOSIS — M77.02 MEDIAL EPICONDYLITIS OF LEFT ELBOW: ICD-10-CM

## 2017-12-22 RX ORDER — NITROGLYCERIN 0.1MG/HR
1 PATCH, TRANSDERMAL 24 HOURS TRANSDERMAL DAILY
Qty: 10 PATCH | Refills: 0 | Status: SHIPPED | OUTPATIENT
Start: 2017-12-22 | End: 2018-07-11

## 2017-12-28 ENCOUNTER — OFFICE VISIT (OUTPATIENT)
Dept: URGENT CARE | Facility: URGENT CARE | Age: 42
End: 2017-12-28
Payer: COMMERCIAL

## 2017-12-28 VITALS
OXYGEN SATURATION: 100 % | BODY MASS INDEX: 38.26 KG/M2 | DIASTOLIC BLOOD PRESSURE: 82 MMHG | WEIGHT: 298 LBS | HEART RATE: 90 BPM | RESPIRATION RATE: 18 BRPM | TEMPERATURE: 98.6 F | SYSTOLIC BLOOD PRESSURE: 124 MMHG

## 2017-12-28 DIAGNOSIS — S61.216A LACERATION OF RIGHT LITTLE FINGER WITHOUT FOREIGN BODY WITHOUT DAMAGE TO NAIL, INITIAL ENCOUNTER: Primary | ICD-10-CM

## 2017-12-28 PROCEDURE — 12001 RPR S/N/AX/GEN/TRNK 2.5CM/<: CPT | Performed by: FAMILY MEDICINE

## 2017-12-28 PROCEDURE — 99212 OFFICE O/P EST SF 10 MIN: CPT | Mod: 25 | Performed by: FAMILY MEDICINE

## 2017-12-29 NOTE — NURSING NOTE
"Chief Complaint   Patient presents with     Urgent Care     Laceration     pinky finger right hand        Initial /82  Pulse 90  Temp 98.6  F (37  C) (Oral)  Resp 18  Wt 298 lb (135.2 kg)  SpO2 100%  BMI 38.26 kg/m2 Estimated body mass index is 38.26 kg/(m^2) as calculated from the following:    Height as of 10/31/17: 6' 2\" (1.88 m).    Weight as of this encounter: 298 lb (135.2 kg).  Medication Reconciliation: complete       Ilda Hernandez CMA      "

## 2017-12-29 NOTE — PROGRESS NOTES
SUBJECTIVE:  Abhijit Garcia is a 42 year old male complains of laceration to the finger.  This is the distal small finger.    OBJECTIVE:  /82  Pulse 90  Temp 98.6  F (37  C) (Oral)  Resp 18  Wt 298 lb (135.2 kg)  SpO2 100%  BMI 38.26 kg/m2  Exam; examination heart and lungs are clear.  Neurologic exam normal     Distal CMS on the affected finger is normal  Skin laceration distal and of the affected small finger right hand shows a laceration crescent-shaped approximately 2 cm in length.  Closed approximated well    ASSESSMENT:  1.  Laceration;   2.  Distal neurologic exam as well as vascular exam is normal    PLAN:  1.  She was taken to the procedure room there the area was cleaned prepped and draped.  It was anesthetized using lidocaine without epinephrine  It was closed using 5 5-0 sutures in finger 2 cm in length.    She tolerated the procedure well the dressing was placed    Follow-up with your primary care or here at the urgent care in 10 days for suture removal

## 2018-01-04 ENCOUNTER — OFFICE VISIT (OUTPATIENT)
Dept: INTERNAL MEDICINE | Facility: CLINIC | Age: 43
End: 2018-01-04
Payer: COMMERCIAL

## 2018-01-04 VITALS
OXYGEN SATURATION: 96 % | DIASTOLIC BLOOD PRESSURE: 86 MMHG | BODY MASS INDEX: 39.13 KG/M2 | TEMPERATURE: 99.5 F | WEIGHT: 304.9 LBS | HEART RATE: 97 BPM | SYSTOLIC BLOOD PRESSURE: 112 MMHG | HEIGHT: 74 IN

## 2018-01-04 DIAGNOSIS — S61.217D LACERATION OF LEFT LITTLE FINGER WITHOUT FOREIGN BODY WITHOUT DAMAGE TO NAIL, SUBSEQUENT ENCOUNTER: ICD-10-CM

## 2018-01-04 DIAGNOSIS — R10.32 ABDOMINAL PAIN, LEFT LOWER QUADRANT: Primary | ICD-10-CM

## 2018-01-04 PROCEDURE — 99214 OFFICE O/P EST MOD 30 MIN: CPT | Performed by: INTERNAL MEDICINE

## 2018-01-04 RX ORDER — SULFAMETHOXAZOLE/TRIMETHOPRIM 800-160 MG
1 TABLET ORAL 2 TIMES DAILY
Qty: 14 TABLET | Refills: 0 | Status: SHIPPED | OUTPATIENT
Start: 2018-01-04 | End: 2018-03-13

## 2018-01-04 RX ORDER — METRONIDAZOLE 500 MG/1
500 TABLET ORAL 3 TIMES DAILY
Qty: 21 TABLET | Refills: 0 | Status: SHIPPED | OUTPATIENT
Start: 2018-01-04 | End: 2018-01-11

## 2018-01-04 NOTE — PATIENT INSTRUCTIONS
PLAN:                                                      1.  MEDICATIONS:        - take septra and flagyl        - Continue other medications without change  2.  Notify MD if symptoms worsen  3.  Follow-up if lack of resolution of abdominal symptoms   4.  Cover finger until well healed

## 2018-01-04 NOTE — MR AVS SNAPSHOT
After Visit Summary   1/4/2018    Abhijit Garcia    MRN: [id number]           Patient Information     Date Of Birth          1975        Visit Information        Provider Department      1/4/2018 3:30 PM Samir De La O MD [facility]        Today's Diagnoses     Abdominal pain, left lower quadrant    -  1    Laceration of left little finger without foreign body without damage to nail, subsequent encounter          Care Instructions    PLAN:                                                      1.  MEDICATIONS:        - take septra and flagyl        - Continue other medications without change  2.  Notify MD if symptoms worsen  3.  Follow-up if lack of resolution of abdominal symptoms   4.  Cover finger until well healed          Follow-ups after your visit        Who to contact     If you have questions or need follow up information about today's clinic visit or your schedule please contact [facility] directly at [phone].  Normal or non-critical lab and imaging results will be communicated to you by J[employer], letter or phone within 4 business days after the clinic has received the results. If you do not hear from us within 7 days, please contact the clinic through JUNIQEhart or phone. If you have a critical or abnormal lab result, we will notify you by phone as soon as possible.  Submit refill requests through [employer] or call your pharmacy and they will forward the refill request to us. Please allow 3 business days for your refill to be completed.          Additional Information About Your Visit        MyChart Information     [employer] gives you secure access to your electronic health record. If you see a primary care provider, you can also send messages to your care team and make appointments. If you have questions, please call your primary care clinic.  If you do not have a primary care provider, please call [phone] and they will assist  "you.        Care EveryWhere ID     This is your Care EveryWhere ID. This could be used by other organizations to access your [facility] medical records  [id number]        Your Vitals Were     Pulse Temperature Height Pulse Oximetry BMI (Body Mass Index)       97 99.5  F (37.5  C) (Oral) 6' 2\" (1.88 m) 96% 39.15 kg/m2        Blood Pressure from Last 3 Encounters:   01/04/18 112/86   12/28/17 124/82   10/31/17 122/84    Weight from Last 3 Encounters:   01/04/18 (!) 304 lb 14.4 oz (138.3 kg)   12/28/17 298 lb (135.2 kg)   10/31/17 298 lb (135.2 kg)              Today, you had the following     No orders found for display         Today's Medication Changes          These changes are accurate as of: 1/4/18  5:00 PM.  If you have any questions, ask your nurse or doctor.               Start taking these medicines.        Dose/Directions    metroNIDAZOLE 500 MG tablet   Commonly known as:  FLAGYL   Used for:  Abdominal pain, left lower quadrant   Started by:  Samir De La O MD        Dose:  500 mg   Take 1 tablet (500 mg) by mouth 3 times daily for 7 days   Quantity:  21 tablet   Refills:  0       sulfamethoxazole-trimethoprim 800-160 MG per tablet   Commonly known as:  BACTRIM DS/SEPTRA DS   Used for:  Abdominal pain, left lower quadrant   Started by:  Samir De La O MD        Dose:  1 tablet   Take 1 tablet by mouth 2 times daily   Quantity:  14 tablet   Refills:  0         These medicines have changed or have updated prescriptions.        Dose/Directions    irbesartan 300 MG tablet   Commonly known as:  AVAPRO   This may have changed:    - when to take this  - reasons to take this   Used for:  Essential hypertension        Dose:  300 mg   Take 1 tablet (300 mg) by mouth daily   Quantity:  90 tablet   Refills:  prn            Where to get your medicines      These medications were sent to [facility] [address] [facility] [address] [facility] [address] [address] [facility], [facility] " [id number]     Phone:  [phone]     metroNIDAZOLE 500 MG tablet    sulfamethoxazole-trimethoprim 800-160 MG per tablet                Primary Care Provider Office Phone # Fax #    Samir De La O [phone] [address] [facility] [address]        Equal Access to Services     PARVEENJEANNINE JULIO CESAR : Hadii aad ku hadasho Soomaali, waaxda luqadaha, qaybta kaalmada adeegyada, waxay idiin hayaan adeeg khbaltash lakddoni . So [facility] [phone].    ATENCIÓN: Si habla español, tiene a cruz disposición servicios gratuitos de asistencia lingüística. [facility] [phone].    We comply with applicable federal civil rights laws and [place] laws. We do not discriminate on the basis of race, color, national origin, age, disability, sex, sexual orientation, or gender identity.            Thank you!     Thank you for choosing [facility]  for your care. Our goal is always to provide you with excellent care. Hearing back from our patients is one way we can continue to improve our services. Please take a few minutes to complete the written survey that you may receive in the mail after your visit with us. Thank you!             Your Updated Medication List - Protect others around you: Learn how to safely use, store and throw away your medicines at www.disposemymeds.org.          This list is accurate as of: 1/4/18  5:00 PM.  Always use your most recent med list.                   Brand Name Dispense Instructions for use Diagnosis    colchicine 0.6 MG tablet     60 tablet    TAKE 1 TABLET(0.6 MG) BY MOUTH TWICE DAILY AS NEEDED FOR MODERATE PAIN    Acute gout, unspecified cause, unspecified site       imiquimod 5 % cream    ALDARA    12 packet    Apply a small sized amount to warts QHS at bedtime.   Wash off after 8 hours.    Verruca plana       irbesartan 300 MG tablet    AVAPRO    90 tablet    Take 1 tablet (300 mg) by mouth daily    Essential hypertension       meloxicam 15 MG tablet     MOBIC    30 tablet    Take 1 tablet (15 mg) by mouth daily as needed    Gout of multiple sites, unspecified cause, unspecified chronicity       metroNIDAZOLE 500 MG tablet    FLAGYL    21 tablet    Take 1 tablet (500 mg) by mouth 3 times daily for 7 days    Abdominal pain, left lower quadrant       nitroGLYcerin 0.1 MG/HR 24 hr patch    NITRODUR    10 patch    Place 1 patch onto the skin daily    Medial epicondylitis of left elbow       sulfamethoxazole-trimethoprim 800-160 MG per tablet    BACTRIM DS/SEPTRA DS    14 tablet    Take 1 tablet by mouth 2 times daily    Abdominal pain, left lower quadrant       valACYclovir 1000 mg tablet    VALTREX    8 tablet    Take 2000 mg twice daily for one day, for Herpes simplex outbreaks.    Herpes simplex virus (HSV) infection

## 2018-01-04 NOTE — PROGRESS NOTES
"  SUBJECTIVE:   Abhijit Garcia is a 42 year old male who presents to clinic today for the following health issues:      ED/UC Followup:    Facility:  [facility]  Date of visit: 12/28/2017  Reason for visit: Laceration on L pinky finger   Current Status: stable            ABDOMINAL   PAIN     Onset: 24 hours    Description:   Character: Sharp, stabbing  Location: left lower quadrant, very focal area.     Radiation: None    Intensity: moderate    Progression of Symptoms:  same    Accompanying Signs & Symptoms:  Fever/Chills?: no   Gas/Bloating: YES  Nausea: YES  Vomitting: no   Diarrhea?: no   Constipation:no   Dysuria or Hematuria: no    History:   Trauma: no   Previous similar pain: no    Previous tests done: none    Precipitating factors:   Does the pain change with:     Food: YES     BM: YES    Urination: no     Alleviating factors:nothing       Therapies Tried and outcome: nothing   BM a bit firmer, now normal       Reviewed and updated as needed this visit by clinical staff:  Medications  Allergies  Soc Hx   Additional history: as documented    Additional issues to address:  1.  Noted bump sole of L foot 5 days ago, tender.  Wife lanced area, as looked like something was in lesion.   Wonders about foreign body injury.   No drainage, just hurts off and on.        ROS:    Constitutional, HEENT, cardiovascular, pulmonary, gi and gu systems are negative, except as otherwise noted.    OBJECTIVE:                                                      /86  Pulse 97  Temp 99.5  F (37.5  C) (Oral)  Ht 6' 2\" (1.88 m)  Wt (!) 304 lb 14.4 oz (138.3 kg)  SpO2 96%  BMI 39.15 kg/m2  Body mass index is 39.15 kg/(m^2).   No apparent distress  HENT: nose and mouth without ulcers or lesions  NECK: no adenopathy and thyroid normal to palpation  RESP: lungs clear to auscultation - no rales, rhonchi or wheezes  CV: regular rates and rhythm, normal S1 S2, no S3 or S4 and no murmur, click or rub  LYMPHATICS: anterior " cervical: no adenopathy  ABDOMEN:  Focal tenderness LLQ without peritoneal signsbowel sounds normal and no hepatosplenomegaly or masses   Small ~ 1 mm skin irritation mid sole left foot, very small open are with magnification.   No drainage, redness, lump, mass, tenderness appreciated.  5 suturse removed from L 5th finger.  Still some non-approximated skin.  No signs of infection.       ASSESSMENT:                                                      LLQ pain, very focal.  Rule out early diverticulosis, other cause.    L foot pain, rule out minor trauma, foreign body when cannot be appreciated, etc.    L 5th finger laceration, early healing    PLAN:                                                      1.  MEDICATIONS:        - take septra and flagyl        - Continue other medications without change  2.  Notify MD if symptoms worsen  3.  Follow-up if lack of resolution of abdominal symptoms   4.  Cover finger until well healed    Samir De La O MD  [facility]

## 2018-01-04 NOTE — LETTER
January 4, 2018      Abhijit Garcia  [address] [facility] [address]        To Whom It May Concern:    Abhijit Garcia was seen in our clinic. He may return to work now with the following restriction:   Avoid using L 5th finger at all for the next week.    Sincerely,        Samir De La O MD

## 2018-02-02 ENCOUNTER — THERAPY VISIT (OUTPATIENT)
Dept: PHYSICAL THERAPY | Facility: CLINIC | Age: 43
End: 2018-02-02
Payer: OTHER MISCELLANEOUS

## 2018-02-02 DIAGNOSIS — M76.72 PERONEAL TENDONITIS, LEFT: Primary | ICD-10-CM

## 2018-02-02 PROCEDURE — 97110 THERAPEUTIC EXERCISES: CPT | Mod: GP | Performed by: PHYSICAL THERAPIST

## 2018-02-02 PROCEDURE — 97035 APP MDLTY 1+ULTRASOUND EA 15: CPT | Mod: GP | Performed by: PHYSICAL THERAPIST

## 2018-02-02 PROCEDURE — 97161 PT EVAL LOW COMPLEX 20 MIN: CPT | Mod: GP | Performed by: PHYSICAL THERAPIST

## 2018-02-02 NOTE — PROGRESS NOTES
[facility] Initial Evaluation  Subjective:  Patient is a 43 year old male presenting with rehab left ankle/foot hpi.           Pt describes intermittent left lateral ankle pain that began insidiously in late September 2017.  The pain was sharp and achey, and felt with walking.  Is now much less painful since wearing an ankle brace since the end of December.  Hx of left peroneal tendon tear from a work injury on 10/4/2016 that he underwent peroneal tendon repair surgery on 3/23/17.  He completed PT after surgery and his ankle was doing fine until the onset of pain in September.  An MRI in November shows damage to the peroneal tendon that was not surgically repaired, but not significant enough to require surgery..    Patient reports pain:  Lateral.    Pain is described as aching and sharp and is intermittent and reported as 2/10.   Pain is the same all the time.  Symptoms are exacerbated by activity and walking and relieved by bracing/immobilizing.  Since onset symptoms are gradually improving.  Special tests:  MRI.  Previous treatment includes surgery and physical therapy.  There was significant improvement following previous treatment.  General health as reported by patient is good.                  Barriers include:  None as reported by patient.    Red flags:  None as reported by patient.                        Objective:  System    Ankle/Foot Evaluation  ROM:  AROM is normal.PROM is normal.      Strength is normal.      PALPATION:   Left ankle tenderness present at:  peroneals    EDEMA: normal                                                              General     ROS    Assessment/Plan:    Patient is a 43 year old male with left side ankle complaints.    Patient has the following significant findings with corresponding treatment plan.                Diagnosis 1:  Left peroneal tendonitis  Pain -  hot/cold therapy, US and home program    Therapy Evaluation Codes:   1) History comprised  of:   Personal factors that impact the plan of care:      None.    Comorbidity factors that impact the plan of care are:      None.     Medications impacting care: None.  2) Examination of Body Systems comprised of:   Body structures and functions that impact the plan of care:      Ankle.   Activity limitations that impact the plan of care are:      Walking and Working.  3) Clinical presentation characteristics are:   Stable/Uncomplicated.  4) Decision-Making    Low complexity using standardized patient assessment instrument and/or measureable assessment of functional outcome.  Cumulative Therapy Evaluation is: Low complexity.    Previous and current functional limitations:  (See Goal Flow Sheet for this information)    Short term and Long term goals: (See Goal Flow Sheet for this information)     Communication ability:  Patient appears to be able to clearly communicate and understand verbal and written communication and follow directions correctly.  Treatment Explanation - The following has been discussed with the patient:   RX ordered/plan of care  Anticipated outcomes  Possible risks and side effects  This patient would benefit from PT intervention to resume normal activities.   Rehab potential is good.    Frequency:  1 X week, once daily  Duration:  for 6 weeks  Discharge Plan:  Achieve all LTG.  Independent in home treatment program.  Reach maximal therapeutic benefit.    Please refer to the daily flowsheet for treatment today, total treatment time and time spent performing 1:1 timed codes.

## 2018-02-02 NOTE — LETTER
[facility]  [address]  [facility] [address]  [phone]    2018    Re: Abhijit Garcia   :   1975  MRN:  [id number]   REFERRING PHYSICIAN:   Yakov Ohara    [facility]    Date of Initial Evaluation:  2018  Visits:  Rxs Used: 1  Reason for Referral:  Peroneal tendonitis, left    [facility]  Subjective:  Patient is a 43 year old male presenting with rehab left ankle/foot hpi.   Pt describes intermittent left lateral ankle pain that began insidiously in late 2017.  The pain was sharp and achey, and felt with walking.  Is now much less painful since wearing an ankle brace since the end of December.  Hx of left peroneal tendon tear from a work injury on 10/4/2016 that he underwent peroneal tendon repair surgery on 3/23/17.  He completed PT after surgery and his ankle was doing fine until the onset of pain in September.  An MRI in November shows damage to the peroneal tendon that was not surgically repaired, but not significant enough to require surgery..    Patient reports pain:  Lateral.    Pain is described as aching and sharp and is intermittent and reported as 2/10.   Pain is the same all the time.  Symptoms are exacerbated by activity and walking and relieved by bracing/immobilizing.  Since onset symptoms are gradually improving.  Special tests:  MRI.  Previous treatment includes surgery and physical therapy.  There was significant improvement following previous treatment.  General health as reported by patient is good.                Barriers include:  None as reported by patient.  Red flags:  None as reported by patient.    Objective:  System  Ankle/Foot Evaluation  ROM:  AROM is normal.PROM is normal.  Strength is normal.  PALPATION:   Left ankle tenderness present at:  peroneals  EDEMA: normal    Assessment/Plan:    Patient is a 43 year old male with left  side ankle complaints.    Patient has the following significant findings with corresponding treatment plan.                Diagnosis 1:  Left peroneal tendonitis  Pain -  hot/cold therapy, US and home program                    Re: Abhijit Garcia   :   1975    Therapy Evaluation Codes:   1) History comprised of:   Personal factors that impact the plan of care:      None.    Comorbidity factors that impact the plan of care are:      None.     Medications impacting care: None.  2) Examination of Body Systems comprised of:   Body structures and functions that impact the plan of care:      Ankle.   Activity limitations that impact the plan of care are:      Walking and Working.  3) Clinical presentation characteristics are:   Stable/Uncomplicated.  4) Decision-Making    Low complexity using standardized patient assessment instrument and/or measureable assessment of functional outcome.  Cumulative Therapy Evaluation is: Low complexity.    Previous and current functional limitations:  (See Goal Flow Sheet for this information)    Short term and Long term goals: (See Goal Flow Sheet for this information)     Communication ability:  Patient appears to be able to clearly communicate and understand verbal and written communication and follow directions correctly.  Treatment Explanation - The following has been discussed with the patient:   RX ordered/plan of care  Anticipated outcomes  Possible risks and side effects  This patient would benefit from PT intervention to resume normal activities.   Rehab potential is good.    Frequency:  1 X week, once daily  Duration:  for 6 weeks  Discharge Plan:  Achieve all LTG.  Independent in home treatment program.  Reach maximal therapeutic benefit.    Thank you for your referral.    INQUIRIES  Therapist: Maximino Meyrs, PT  [facility]  [address] [address] [address]  Phone: [phone]  Fax: [phone]

## 2018-02-07 ENCOUNTER — THERAPY VISIT (OUTPATIENT)
Dept: PHYSICAL THERAPY | Facility: CLINIC | Age: 43
End: 2018-02-07
Payer: OTHER MISCELLANEOUS

## 2018-02-07 DIAGNOSIS — M76.72 PERONEAL TENDONITIS, LEFT: ICD-10-CM

## 2018-02-07 PROCEDURE — 97110 THERAPEUTIC EXERCISES: CPT | Mod: GP | Performed by: PHYSICAL THERAPIST

## 2018-02-07 PROCEDURE — 97035 APP MDLTY 1+ULTRASOUND EA 15: CPT | Mod: GP | Performed by: PHYSICAL THERAPIST

## 2018-02-15 ENCOUNTER — THERAPY VISIT (OUTPATIENT)
Dept: PHYSICAL THERAPY | Facility: CLINIC | Age: 43
End: 2018-02-15
Payer: OTHER MISCELLANEOUS

## 2018-02-15 DIAGNOSIS — M76.72 PERONEAL TENDONITIS, LEFT: ICD-10-CM

## 2018-02-15 PROCEDURE — 97035 APP MDLTY 1+ULTRASOUND EA 15: CPT | Mod: GP | Performed by: PHYSICAL THERAPIST

## 2018-02-15 PROCEDURE — 97110 THERAPEUTIC EXERCISES: CPT | Mod: GP | Performed by: PHYSICAL THERAPIST

## 2018-02-26 ENCOUNTER — TRANSFERRED RECORDS (OUTPATIENT)
Dept: HEALTH INFORMATION MANAGEMENT | Facility: CLINIC | Age: 43
End: 2018-02-26

## 2018-02-28 PROBLEM — M25.522 LEFT ELBOW PAIN: Status: RESOLVED | Noted: 2017-08-24 | Resolved: 2018-02-28

## 2018-03-13 ENCOUNTER — OFFICE VISIT (OUTPATIENT)
Dept: DERMATOLOGY | Facility: CLINIC | Age: 43
End: 2018-03-13
Payer: COMMERCIAL

## 2018-03-13 VITALS — HEART RATE: 90 BPM | OXYGEN SATURATION: 96 % | DIASTOLIC BLOOD PRESSURE: 87 MMHG | SYSTOLIC BLOOD PRESSURE: 137 MMHG

## 2018-03-13 DIAGNOSIS — B07.8 VERRUCA PLANA: Primary | ICD-10-CM

## 2018-03-13 DIAGNOSIS — L85.3 XEROSIS CUTIS: ICD-10-CM

## 2018-03-13 PROCEDURE — 17110 DESTRUCTION B9 LES UP TO 14: CPT | Performed by: PHYSICIAN ASSISTANT

## 2018-03-13 PROCEDURE — 99212 OFFICE O/P EST SF 10 MIN: CPT | Mod: 25 | Performed by: PHYSICIAN ASSISTANT

## 2018-03-13 RX ORDER — IMIQUIMOD 12.5 MG/.25G
CREAM TOPICAL
Qty: 12 PACKET | Refills: 3 | Status: SHIPPED | OUTPATIENT
Start: 2018-03-13 | End: 2018-10-16

## 2018-03-13 NOTE — LETTER
3/13/2018         RE: Abhijit Garcia  [address] [facility] [address]        Dear Colleague,    Thank you for referring your patient, Abhijit Garcia, to the [facility]. Please see a copy of my visit note below.    HPI:   Abhijit Garcia is a 43 year old male who presents for recheck of warts on the right arm. Had previously resolved, but has noticed new spots recently.  chief complaint  Location: right forearm   Condition present for: months   Previous treatments include: none    Review Of Systems  Eyes: negative  Ears/Nose/Throat: negative  Respiratory: No shortness of breath, dyspnea on exertion, cough, or hemoptysis  Cardiovascular: negative  Gastrointestinal: negative  Genitourinary: negative  Musculoskeletal: negative  Neurologic: negative  Psychiatric: negative    [place]. Works for [employer]. Tore ankle tendon over the winter, needed surgery. Then developed a DVT and ingrown toenails from walking cast. On the mend now.     This document serves as a record of the services and decisions personally performed and made by Shanthi Red, MS, PA-C. It was created on her behalf by Nely Leach, a trained medical scribe. The creation of this document is based on the provider's statements to the medical scribe.  Nely Leach 2:28 PM March 13, 2018    PHYSICAL EXAM:    /87  Pulse 90  SpO2 96%   Skin exam performed as follows: Type 2 skin. Mood appropriate  Alert and Oriented X 3. Well developed, well nourished in no distress.  General appearance: Normal  Head including face: Normal  Eyes: conjunctiva and lids: Normal  Mouth: Lips, teeth, gums: Normal  Neck: Normal  Chest-breast/axillae: Normal  Back: Normal  Spleen and liver: Normal  Cardiovascular: Exam of peripheral vascular system by observation for swelling, varicosities, edema: Normal  Genitalia: groin, buttocks: Normal  Extremities: digits/nails  (clubbing): Normal  Eccrine and Apocrine glands: Normal  Right upper extremity: Normal  Left upper extremity: Normal  Right lower extremity: Normal  Left lower extremity: Normal  Skin: Scalp and body hair: See below    1. Flat toped small verrucous papules on right forearm x 2    ASSESSMENT/PLAN:     1. Verruca plana on right forearm - advised. As bothersome to pt cryosurgery performed. Advised on blistering and post op care.   --Continue imiquimod QHS as tolerated for any residual or new areas  2. Xerosis cutis - advised daily use of emollients.       Follow-up: PRN/yearly FSE/PRN sooner  CC:   Scribed By: Nely Leach, Medical Scribe    The information in this document, created by the medical scribe for me, accurately reflects the services I personally performed and the decisions made by me. I have reviewed and approved this document for accuracy prior to leaving the patient care area.  March 13, 2018 2:36 PM    Shanthi Red MS, PA-C      Again, thank you for allowing me to participate in the care of your patient.        Sincerely,        Shanthi Red PA-C

## 2018-03-13 NOTE — NURSING NOTE
"Chief Complaint   Patient presents with     Derm Problem     right arm        Initial /87  Pulse 90  SpO2 96% Estimated body mass index is 39.15 kg/(m^2) as calculated from the following:    Height as of 1/4/18: 1.88 m (6' 2\").    Weight as of 1/4/18: 138.3 kg (304 lb 14.4 oz).  Medication Reconciliation: complete    "

## 2018-03-13 NOTE — PROGRESS NOTES
HPI:   Abhijit Garcia is a 43 year old male who presents for recheck of warts on the right arm. Had previously resolved, but has noticed new spots recently.  chief complaint  Location: right forearm   Condition present for: months   Previous treatments include: none    Review Of Systems  Eyes: negative  Ears/Nose/Throat: negative  Respiratory: No shortness of breath, dyspnea on exertion, cough, or hemoptysis  Cardiovascular: negative  Gastrointestinal: negative  Genitourinary: negative  Musculoskeletal: negative  Neurologic: negative  Psychiatric: negative    [place]. Works for [employer]. Tore ankle tendon over the winter, needed surgery. Then developed a DVT and ingrown toenails from walking cast. On the mend now.     This document serves as a record of the services and decisions personally performed and made by Shanthi Red, MS, PA-C. It was created on her behalf by Nely Leach, a trained medical scribe. The creation of this document is based on the provider's statements to the medical scribe.  Nely Leach 2:28 PM March 13, 2018    PHYSICAL EXAM:    /87  Pulse 90  SpO2 96%   Skin exam performed as follows: Type 2 skin. Mood appropriate  Alert and Oriented X 3. Well developed, well nourished in no distress.  General appearance: Normal  Head including face: Normal  Eyes: conjunctiva and lids: Normal  Mouth: Lips, teeth, gums: Normal  Neck: Normal  Chest-breast/axillae: Normal  Back: Normal  Spleen and liver: Normal  Cardiovascular: Exam of peripheral vascular system by observation for swelling, varicosities, edema: Normal  Genitalia: groin, buttocks: Normal  Extremities: digits/nails (clubbing): Normal  Eccrine and Apocrine glands: Normal  Right upper extremity: Normal  Left upper extremity: Normal  Right lower extremity: Normal  Left lower extremity: Normal  Skin: Scalp and body hair: See below    1. Flat toped small verrucous papules on right  forearm x 2    ASSESSMENT/PLAN:     1. Verruca plana on right forearm - advised. As bothersome to pt cryosurgery performed. Advised on blistering and post op care.   --Continue imiquimod QHS as tolerated for any residual or new areas  2. Xerosis cutis - advised daily use of emollients.       Follow-up: PRN/yearly FSE/PRN sooner  CC:   Scribed By: Nely Leach, Medical Scribe    The information in this document, created by the medical scribe for me, accurately reflects the services I personally performed and the decisions made by me. I have reviewed and approved this document for accuracy prior to leaving the patient care area.  March 13, 2018 2:36 PM    Shanthi Red MS, PA-C

## 2018-03-13 NOTE — MR AVS SNAPSHOT
After Visit Summary   3/13/2018    Abhijit Garcia    MRN: [id number]           Patient Information     Date Of Birth          1975        Visit Information        Provider Department      3/13/2018 2:30 PM Shanthi Red PA-C [facility]        Today's Diagnoses     Verruca plana    -  1    Xerosis cutis           Follow-ups after your visit        Your next 10 appointments already scheduled     Mar 21, 2018  2:00 PM CDT   Office Visit with Samir De La O MD   [facility] ([facility])    [address] [id number] [phone]           Bring a current list of meds and any records pertaining to this visit. For Physicals, please bring immunization records and any forms needing to be filled out. Please arrive 10 minutes early to complete paperwork.              Who to contact     If you have questions or need follow up information about today's clinic visit or your schedule please contact [facility] directly at [phone].  Normal or non-critical lab and imaging results will be communicated to you by [employer], letter or phone within 4 business days after the clinic has received the results. If you do not hear from us within 7 days, please contact the clinic through [employer] or phone. If you have a critical or abnormal lab result, we will notify you by phone as soon as possible.  Submit refill requests through [employer] or call your pharmacy and they will forward the refill request to us. Please allow 3 business days for your refill to be completed.          Additional Information About Your Visit        [employer] Information     [employer] gives you secure access to your electronic health record. If you see a primary care provider, you can also send messages to your care team and make appointments. If you have questions, please call your primary care clinic.  If you do not  have a primary care provider, please call [phone] and they will assist you.        Care EveryWhere ID     This is your Care EveryWhere ID. This could be used by other organizations to access your [place] medical records  [id number]        Your Vitals Were     Pulse Pulse Oximetry                90 96%           Blood Pressure from Last 3 Encounters:   03/13/18 137/87   01/04/18 112/86   12/28/17 124/82    Weight from Last 3 Encounters:   01/04/18 (!) 138.3 kg (304 lb 14.4 oz)   12/28/17 135.2 kg (298 lb)   10/31/17 135.2 kg (298 lb)              We Performed the Following     DESTRUCT BENIGN LESION, UP TO 14          Today's Medication Changes          These changes are accurate as of 3/13/18  2:42 PM.  If you have any questions, ask your nurse or doctor.               These medicines have changed or have updated prescriptions.        Dose/Directions    irbesartan 300 MG tablet   Commonly known as:  AVAPRO   This may have changed:    - when to take this  - reasons to take this   Used for:  Essential hypertension        Dose:  300 mg   Take 1 tablet (300 mg) by mouth daily   Quantity:  90 tablet   Refills:  prn            Where to get your medicines      These medications were sent to [facility] [address] [place] [address] AT [address] [address] [facility] [place] [address]     Phone:  [phone]     imiquimod 5 % cream                Primary Care Provider Office Phone # Fax #    Samir Jaison De La O [phone]       [address] [place] [address]        Equal Access to Services     JEANNINE HARRLEL AH: Hadii mir ku hadasho Sopark, waaxda luqadaha, qaybta kaalmada angie, pauly correa. So [facility] [phone].    ATENCIÓN: Si habla español, tiene a cruz disposición servicios gratuitos de asistencia lingüística. Llame al [phone].    We comply with applicable federal civil rights laws and [place] laws. We do not  discriminate on the basis of race, color, national origin, age, disability, sex, sexual orientation, or gender identity.            Thank you!     Thank you for choosing [facility]  for your care. Our goal is always to provide you with excellent care. Hearing back from our patients is one way we can continue to improve our services. Please take a few minutes to complete the written survey that you may receive in the mail after your visit with us. Thank you!             Your Updated Medication List - Protect others around you: Learn how to safely use, store and throw away your medicines at www.disposemymeds.org.          This list is accurate as of 3/13/18  2:42 PM.  Always use your most recent med list.                   Brand Name Dispense Instructions for use Diagnosis    colchicine 0.6 MG tablet     60 tablet    TAKE 1 TABLET(0.6 MG) BY MOUTH TWICE DAILY AS NEEDED FOR MODERATE PAIN    Acute gout, unspecified cause, unspecified site       imiquimod 5 % cream    ALDARA    12 packet    Apply a small sized amount to warts QHS at bedtime.   Wash off after 8 hours.    Verruca plana       irbesartan 300 MG tablet    AVAPRO    90 tablet    Take 1 tablet (300 mg) by mouth daily    Essential hypertension       meloxicam 15 MG tablet    MOBIC    30 tablet    Take 1 tablet (15 mg) by mouth daily as needed    Gout of multiple sites, unspecified cause, unspecified chronicity       nitroGLYcerin 0.1 MG/HR 24 hr patch    NITRODUR    10 patch    Place 1 patch onto the skin daily    Medial epicondylitis of left elbow       valACYclovir 1000 mg tablet    VALTREX    8 tablet    Take 2000 mg twice daily for one day, for Herpes simplex outbreaks.    Herpes simplex virus (HSV) infection

## 2018-03-14 ENCOUNTER — TRANSFERRED RECORDS (OUTPATIENT)
Dept: HEALTH INFORMATION MANAGEMENT | Facility: CLINIC | Age: 43
End: 2018-03-14

## 2018-03-21 ENCOUNTER — OFFICE VISIT (OUTPATIENT)
Dept: INTERNAL MEDICINE | Facility: CLINIC | Age: 43
End: 2018-03-21
Payer: COMMERCIAL

## 2018-03-21 VITALS
HEART RATE: 83 BPM | TEMPERATURE: 98.5 F | DIASTOLIC BLOOD PRESSURE: 64 MMHG | RESPIRATION RATE: 16 BRPM | OXYGEN SATURATION: 96 % | SYSTOLIC BLOOD PRESSURE: 116 MMHG | WEIGHT: 311.7 LBS | BODY MASS INDEX: 40.02 KG/M2

## 2018-03-21 DIAGNOSIS — H61.22 IMPACTED CERUMEN OF LEFT EAR: ICD-10-CM

## 2018-03-21 DIAGNOSIS — M10.9 GOUT OF MULTIPLE SITES, UNSPECIFIED CAUSE, UNSPECIFIED CHRONICITY: ICD-10-CM

## 2018-03-21 DIAGNOSIS — M10.9 ACUTE GOUT, UNSPECIFIED CAUSE, UNSPECIFIED SITE: ICD-10-CM

## 2018-03-21 DIAGNOSIS — I10 ESSENTIAL HYPERTENSION: ICD-10-CM

## 2018-03-21 DIAGNOSIS — E78.5 HYPERLIPIDEMIA LDL GOAL <130: ICD-10-CM

## 2018-03-21 DIAGNOSIS — E66.01 MORBID OBESITY (H): ICD-10-CM

## 2018-03-21 DIAGNOSIS — R60.9 DEPENDENT EDEMA: Primary | ICD-10-CM

## 2018-03-21 PROBLEM — I82.4Z2 LOWER LEG DVT (DEEP VENOUS THROMBOEMBOLISM), ACUTE, LEFT (H): Status: ACTIVE | Noted: 2017-04-05

## 2018-03-21 PROCEDURE — 99214 OFFICE O/P EST MOD 30 MIN: CPT | Mod: 25 | Performed by: INTERNAL MEDICINE

## 2018-03-21 PROCEDURE — 69210 REMOVE IMPACTED EAR WAX UNI: CPT | Mod: LT | Performed by: INTERNAL MEDICINE

## 2018-03-21 RX ORDER — COLCHICINE 0.6 MG/1
0.6 TABLET ORAL 2 TIMES DAILY PRN
Qty: 60 TABLET | Refills: 1 | Status: SHIPPED | OUTPATIENT
Start: 2018-03-21 | End: 2018-03-22

## 2018-03-21 RX ORDER — IRBESARTAN 300 MG/1
300 TABLET ORAL DAILY
Start: 2018-03-21 | End: 2019-03-19

## 2018-03-21 RX ORDER — MELOXICAM 15 MG/1
15 TABLET ORAL DAILY PRN
Qty: 90 TABLET | Refills: 0 | Status: SHIPPED | OUTPATIENT
Start: 2018-03-21 | End: 2019-12-02

## 2018-03-21 RX ORDER — CICLOPIROX 7.7 MG/G
GEL TOPICAL PRN
Refills: 0 | COMMUNITY
Start: 2018-03-16 | End: 2018-11-06

## 2018-03-21 NOTE — MR AVS SNAPSHOT
After Visit Summary   3/21/2018    Abhijit Garcia    MRN: [id number]           Patient Information     Date Of Birth          1975        Visit Information        Provider Department      3/21/2018 2:00 PM Samir De La O MD [facility]        Today's Diagnoses     Dependent edema    -  1    Essential hypertension        Obesity, BMI > 35 (H)        Acute gout, unspecified cause, unspecified site        Gout of multiple sites, unspecified cause, unspecified chronicity        Mixed Hyperlipidemia LDL goal <130        Impacted cerumen of left ear          Care Instructions    PLAN:                                                      1.  MEDICATIONS:        - Start taking calcium 500-600 mg each evening, increase to 1000+ if cramps not resolving       - Continue other medications without change  2.  Work hard to drop at least 15 lbs  3.  Continue low salt diet  4.  Elevate L leg, if able.  Otherwise, obtain knee high support stocking, to wear when less down a lot.  5.  Check labs between now and June, when fasting.            Follow-ups after your visit        Future tests that were ordered for you today     Open Future Orders        Priority Expected Expires Ordered    Lipid panel reflex to direct LDL Fasting Routine 3/21/2018 3/21/2019 3/21/2018    Basic metabolic panel Routine 3/21/2018 3/21/2019 3/21/2018    Uric acid Routine 3/21/2018 3/21/2019 3/21/2018            Who to contact     If you have questions or need follow up information about today's clinic visit or your schedule please contact [facility] directly at [phone].  Normal or non-critical lab and imaging results will be communicated to you by MyChart, letter or phone within 4 business days after the clinic has received the results. If you do not hear from us within 7 days, please contact the clinic through MyChart or phone. If you have a critical or abnormal lab result, we  will notify you by phone as soon as possible.  Submit refill requests through [employer] or call your pharmacy and they will forward the refill request to us. Please allow 3 business days for your refill to be completed.          Additional Information About Your Visit        [employer] Information     [employer] gives you secure access to your electronic health record. If you see a primary care provider, you can also send messages to your care team and make appointments. If you have questions, please call your primary care clinic.  If you do not have a primary care provider, please call [phone] and they will assist you.        Care EveryWhere ID     This is your Care EveryWhere ID. This could be used by other organizations to access your [place] medical records  [id number]        Your Vitals Were     Pulse Temperature Respirations Pulse Oximetry BMI (Body Mass Index)       83 98.5  F (36.9  C) (Oral) 16 96% 40.02 kg/m2        Blood Pressure from Last 3 Encounters:   03/21/18 116/64   03/13/18 137/87   01/04/18 112/86    Weight from Last 3 Encounters:   03/21/18 311 lb 11.2 oz (141.4 kg)   01/04/18 (!) 304 lb 14.4 oz (138.3 kg)   12/28/17 298 lb (135.2 kg)              We Performed the Following     REMOVE ALEXIS CERLAWRENCE          Today's Medication Changes          These changes are accurate as of 3/21/18  2:57 PM.  If you have any questions, ask your nurse or doctor.               These medicines have changed or have updated prescriptions.        Dose/Directions    colchicine 0.6 MG tablet   Commonly known as:  COLCRYS   This may have changed:  See the new instructions.   Used for:  Acute gout, unspecified cause, unspecified site   Changed by:  Samir De La O MD        Dose:  0.6 mg   Take 1 tablet (0.6 mg) by mouth 2 times daily as needed for moderate pain   Quantity:  60 tablet   Refills:  1       irbesartan 300 MG tablet   Commonly known as:  AVAPRO   This may have changed:    - when to take this  - reasons  to take this   Used for:  Essential hypertension        Dose:  300 mg   Take 1 tablet (300 mg) by mouth daily   Refills:  0            Where to get your medicines      These medications were sent to [facility] [id number] [place] [address] [facility] AT [address] [address] [facility] [address]     Phone:  [phone]     colchicine 0.6 MG tablet    meloxicam 15 MG tablet         Some of these will need a paper prescription and others can be bought over the counter.  Ask your nurse if you have questions.     You don't need a prescription for these medications     irbesartan 300 MG tablet                Primary Care Provider Office Phone # Fax #    Samir De La O [phone]       [address] [facility] [address]        Equal Access to Services     OLIVIA HARRELL : Hadii mir call hadasho Sopark, waaxda luqadaha, qaybta kaalmada adeabdoulyajennifer, pauly correa. So [facility] [phone].    ATENCIÓN: Si habla español, tiene a cruz disposición servicios gratuitos de asistencia lingüística. Mary al [phone].    We comply with applicable federal civil rights laws and [place] laws. We do not discriminate on the basis of race, color, national origin, age, disability, sex, sexual orientation, or gender identity.            Thank you!     Thank you for choosing [facility]  for your care. Our goal is always to provide you with excellent care. Hearing back from our patients is one way we can continue to improve our services. Please take a few minutes to complete the written survey that you may receive in the mail after your visit with us. Thank you!             Your Updated Medication List - Protect others around you: Learn how to safely use, store and throw away your medicines at www.disposemymeds.org.          This list is accurate as of 3/21/18  2:57 PM.  Always use your most recent med list.                   Brand  Name Dispense Instructions for use Diagnosis    ciclopirox 0.77 % Gel      2 times daily        colchicine 0.6 MG tablet    COLCRYS    60 tablet    Take 1 tablet (0.6 mg) by mouth 2 times daily as needed for moderate pain    Acute gout, unspecified cause, unspecified site       imiquimod 5 % cream    ALDARA    12 packet    Apply a small sized amount to warts QHS at bedtime.   Wash off after 8 hours.    Verruca plana       irbesartan 300 MG tablet    AVAPRO     Take 1 tablet (300 mg) by mouth daily    Essential hypertension       meloxicam 15 MG tablet    MOBIC    90 tablet    Take 1 tablet (15 mg) by mouth daily as needed    Gout of multiple sites, unspecified cause, unspecified chronicity       nitroGLYcerin 0.1 MG/HR 24 hr patch    NITRODUR    10 patch    Place 1 patch onto the skin daily    Medial epicondylitis of left elbow       valACYclovir 1000 mg tablet    VALTREX    8 tablet    Take 2000 mg twice daily for one day, for Herpes simplex outbreaks.    Herpes simplex virus (HSV) infection

## 2018-03-21 NOTE — PATIENT INSTRUCTIONS
PLAN:                                                      1.  MEDICATIONS:        - Start taking calcium 500-600 mg each evening, increase to 1000+ if cramps not resolving       - Continue other medications without change  2.  Work hard to drop at least 15 lbs  3.  Continue low salt diet  4.  Elevate L leg, if able.  Otherwise, obtain knee high support stocking, to wear when less down a lot.  5.  Check labs between now and June, when fasting.

## 2018-03-21 NOTE — PROGRESS NOTES
SUBJECTIVE:   Abhijit Garcia is a 43 year old male who presents to clinic today for the following health issues:      Edema      Duration: 2 weeks    Description (location/character/radiation): lower Left Leg, increased from usual.   Has some trace edema normally both ankles, when sits a lot.       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   Last used colchicine for 2 days a month ago.          Reviewed and updated as needed this visit by clinical staff:  Medications  Allergies  Soc Hx   Additional history: as documented    Additional issues to address:  1.  Wondering if any labs are due  2.  He has noticed muffled hearing in L ear for about one week. 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 Denies cold/URI sx and pain, feels full.   Some popping.     3.  Follow-up HTN.  Patient is not checking outpatient blood pressures.  He reports no clear side effects from BP medications.   Taking irbesartan daily.       ROS:  Cramps in L toes, since surgery last year.  More at noc.   Rare caryl horse L calf.  Constitutional, HEENT, cardiovascular, pulmonary, gi and gu systems are negative, except as otherwise noted.    OBJECTIVE:                                                      /82  Pulse 83  Temp 98.5  F (36.9  C) (Oral)  Resp 16  Wt 311 lb 11.2 oz (141.4 kg)  SpO2 96%  BMI 40.02 kg/m2  Body mass index is 40.02 kg/(m^2).  No apparent distress  116/64 on recheck L arm  Weight up 7 lbs, up #22 since low last fall.    Trace tenderness L calf squeeze, negative Dhara's  Trace edema bilat ankles  Large amount of wax is  seen in the left ear canal.  At patient request, wax extraction done by MD through use of a curette.  Due to incomplete results, irrigation done by nursing staff   R TM and canal normal    ASSESSMENT:                                                      Dependent edema, L > R ankle.  Venous insufficiency  HTN, controlled   Gout, last flare early this winter, managed by colchicine  Morbid Obesity, worse.  Toe > calf cramps.   Milk intake low, not taking calcium.  May well be related to dietary calcium deficiency.   Ear wax, left    PLAN:                                                      1.  MEDICATIONS:        - Start taking calcium 500-600 mg each evening, increase to 1000+ if cramps not resolving       - Continue other medications without change  2.  Work hard to drop at least 15 lbs  3.  Continue low salt diet  4.  Elevate L leg, if able.  Otherwise, obtain knee high support stocking, to wear when less down a lot.  5.  Check labs between now and June, when fasting.      Samir De La O MD  [facility]         Addendum 3/23/2018:   patient having some ear pain, unsure if from URI.   Came back for recheck and TM and canals look normal, aside from red area where wax was removed in posterior canal.  No signs of infection and no tenderness in canal or externally.  He will treat symptomatically for cold symptoms.

## 2018-03-22 ENCOUNTER — TELEPHONE (OUTPATIENT)
Dept: INTERNAL MEDICINE | Facility: CLINIC | Age: 43
End: 2018-03-22

## 2018-03-22 DIAGNOSIS — M10.9 ACUTE GOUT, UNSPECIFIED CAUSE, UNSPECIFIED SITE: ICD-10-CM

## 2018-03-22 RX ORDER — COLCHICINE 0.6 MG/1
1 CAPSULE ORAL 2 TIMES DAILY PRN
Qty: 60 CAPSULE | Refills: 1 | Status: SHIPPED | OUTPATIENT
Start: 2018-03-22 | End: 2022-03-08

## 2018-03-22 NOTE — TELEPHONE ENCOUNTER
colchicine (COLCRYS) 0.6 MG tablet is not covered by patients plan. The preferred alternative is COLCRYS, MITIGARE. Per pharmacy please call/fax to change medication along with strength, directions, quantity and refills.

## 2018-03-23 ENCOUNTER — TELEPHONE (OUTPATIENT)
Dept: INTERNAL MEDICINE | Facility: CLINIC | Age: 43
End: 2018-03-23

## 2018-03-23 NOTE — TELEPHONE ENCOUNTER
Ear pain Wed and seen by PCP removed a large blockage of wax . Skin attached . Pt having pain in ear past couple days . Started Thursday woke up with sore throat and left ear a little pain could be tied to oncoming cold. No bleeding or discharge . Not constant pain . FYI - call pt before end of day .to advise of PCP recommendations . PT is ok with monitoring and going into walk in clinic over weekend if necessary but was told by PCP to report any pain or discharge to him .Mary Kim RN

## 2018-03-23 NOTE — TELEPHONE ENCOUNTER
Pt will come this afternoon .Mary Kim RN, MD aware .Mary Kim RN     Dressing clean and dry/Patient baseline mental status

## 2018-03-23 NOTE — TELEPHONE ENCOUNTER
Reason for call:  Symptom   Symptom or request: ear pain    Have you been treated for this before?     Additional comments: Dr. De La O told patient if he had any pain to give him a call back. Please call.    Phone number to reach patient:  Home number on file [phone] (home)    Best Time:  anytime    Can we leave a detailed message on this number?  YES

## 2018-03-23 NOTE — TELEPHONE ENCOUNTER
I would suggest that he come over for quick check, no charge.   Can he make it over this afternoon?

## 2018-03-28 ENCOUNTER — THERAPY VISIT (OUTPATIENT)
Dept: PHYSICAL THERAPY | Facility: CLINIC | Age: 43
End: 2018-03-28
Payer: OTHER MISCELLANEOUS

## 2018-03-28 DIAGNOSIS — M76.72 PERONEAL TENDONITIS, LEFT: ICD-10-CM

## 2018-03-28 PROCEDURE — 97110 THERAPEUTIC EXERCISES: CPT | Mod: GP | Performed by: PHYSICAL THERAPIST

## 2018-03-28 PROCEDURE — 97035 APP MDLTY 1+ULTRASOUND EA 15: CPT | Mod: GP | Performed by: PHYSICAL THERAPIST

## 2018-04-06 ENCOUNTER — OFFICE VISIT (OUTPATIENT)
Dept: DERMATOLOGY | Facility: CLINIC | Age: 43
End: 2018-04-06
Payer: COMMERCIAL

## 2018-04-06 VITALS — OXYGEN SATURATION: 96 % | HEART RATE: 90 BPM | SYSTOLIC BLOOD PRESSURE: 128 MMHG | DIASTOLIC BLOOD PRESSURE: 87 MMHG

## 2018-04-06 DIAGNOSIS — B35.3 TINEA PEDIS OF BOTH FEET: Primary | ICD-10-CM

## 2018-04-06 PROCEDURE — 99213 OFFICE O/P EST LOW 20 MIN: CPT | Performed by: PHYSICIAN ASSISTANT

## 2018-04-06 RX ORDER — KETOCONAZOLE 20 MG/G
CREAM TOPICAL DAILY
Qty: 60 G | Refills: 2 | Status: SHIPPED | OUTPATIENT
Start: 2018-04-06 | End: 2022-03-08

## 2018-04-06 NOTE — NURSING NOTE
"Chief Complaint   Patient presents with     Derm Problem     fungus on both feeet but more on right        Initial /87  Pulse 90  SpO2 96% Estimated body mass index is 40.02 kg/(m^2) as calculated from the following:    Height as of 1/4/18: 1.88 m (6' 2\").    Weight as of 3/21/18: 141.4 kg (311 lb 11.2 oz).  Medication Reconciliation: complete    "

## 2018-04-06 NOTE — PATIENT INSTRUCTIONS
Apply ketoconazole cream to affected areas on feet once daily x 1-2 weeks     Let me know if it is not improving and I will prescribe a different cream instead

## 2018-04-06 NOTE — PROGRESS NOTES
HPI:   Abhijit Garcia is a 43 year old male who presents for evaluation of rash on both feet. It is intermittently itchy.   chief complaint  Location: both feet, right worse than left    Condition present for:  3.5 weeks.   Previous treatments include: Ciclopirox - some areas are responding well, but others are persistent     Review Of Systems  Eyes: negative  Ears/Nose/Throat: negative  Respiratory: No shortness of breath, dyspnea on exertion, cough, or hemoptysis  Cardiovascular: negative  Gastrointestinal: negative  Genitourinary: negative  Musculoskeletal: negative  Neurologic: negative  Psychiatric: negative    This document serves as a record of the services and decisions personally performed and made by Shanthi Red, MS, PA-C. It was created on her behalf by Nely Leach, a trained medical scribe. The creation of this document is based on the provider's statements to the medical scribe.  Nely Leach 10:07 AM April 6, 2018    PHYSICAL EXAM:   /87  Pulse 90  SpO2 96%    Skin exam performed as follows: Type 2 skin. Mood appropriate  Alert and Oriented X 3. Well developed, well nourished in no distress.  General appearance: Normal  Head including face: Normal  Eyes: conjunctiva and lids: Normal  Mouth: Lips, teeth, gums: Normal  Neck: Normal  Chest-breast/axillae: Normal  Back: Normal  Spleen and liver: Normal  Cardiovascular: Exam of peripheral vascular system by observation for swelling, varicosities, edema: Normal  Genitalia: groin, buttocks: Normal  Extremities: digits/nails (clubbing): Normal  Eccrine and Apocrine glands: Normal  Right upper extremity: Normal  Left upper extremity: Normal  Right lower extremity: Normal  Left lower extremity: Normal  Skin: Scalp and body hair: See below    1. Maceration in bilateral 4th web spaces with annular plaque on the dorsum of the right foot     ASSESSMENT/PLAN:     1. Candi pedis - advised of diagnosis and treatment options. Has tried  ciclopirox with some improvement, but has not resolved.   --Start ketoconazole cream QD x 1-2 weeks     Follow-up: PRN  CC:   Scribed By: Nely Leach Medical Scribe    The information in this document, created by the medical scribe for me, accurately reflects the services I personally performed and the decisions made by me. I have reviewed and approved this document for accuracy prior to leaving the patient care area.  April 6, 2018 10:16 AM    Shanthi Red MS, PA-C

## 2018-04-06 NOTE — MR AVS SNAPSHOT
After Visit Summary   4/6/2018    Abhijit Garcia    MRN: [id number]           Patient Information     Date Of Birth          1975        Visit Information        Provider Department      4/6/2018 10:00 AM Shanthi Red PA-C [facility]        Today's Diagnoses     Tinea pedis of both feet    -  1      Care Instructions    Apply ketoconazole cream to affected areas on feet once daily x 1-2 weeks     Let me know if it is not improving and I will prescribe a different cream instead           Follow-ups after your visit        Follow-up notes from your care team     Return if symptoms worsen or fail to improve.      Your next 10 appointments already scheduled     Apr 11, 2018 12:50 PM CDT   PEACE Extremity with Maximino Myers PT   [facility] [facility]    [address] [facility] [address] [address] [phone]              Who to contact     If you have questions or need follow up information about today's clinic visit or your schedule please contact [facility] directly at [phone].  Normal or non-critical lab and imaging results will be communicated to you by [employer], letter or phone within 4 business days after the clinic has received the results. If you do not hear from us within 7 days, please contact the clinic through [employer] or phone. If you have a critical or abnormal lab result, we will notify you by phone as soon as possible.  Submit refill requests through [employer] or call your pharmacy and they will forward the refill request to us. Please allow 3 business days for your refill to be completed.          Additional Information About Your Visit        MyChart Information     [employer] gives you secure access to your electronic health record. If you see a primary care provider, you can also send messages to your care team and make appointments. If you have questions, please call your primary care  clinic.  If you do not have a primary care provider, please call [phone] and they will assist you.        Care EveryWhere ID     This is your Care EveryWhere ID. This could be used by other organizations to access your [facility] medical records  [id number]        Your Vitals Were     Pulse Pulse Oximetry                90 96%           Blood Pressure from Last 3 Encounters:   04/06/18 128/87   03/21/18 116/64   03/13/18 137/87    Weight from Last 3 Encounters:   03/21/18 141.4 kg (311 lb 11.2 oz)   01/04/18 (!) 138.3 kg (304 lb 14.4 oz)   12/28/17 135.2 kg (298 lb)              Today, you had the following     No orders found for display       Primary Care Provider Office Phone # Fax #    Samir De La O [phone]       [address] [facility] [address]        Equal Access to Services     OLIVIA HARRELL : Hadii aad ku hadasho Soomaali, waaxda luqadaha, qaybta kaalmada adeegyada, waxay idiin hayaan enrike khxiomara rodriguez . So [facility] [phone].    ATENCIÓN: Si habla español, tiene a cruz disposición servicios gratuitos de asistencia lingüística. Llame al [phone].    We comply with applicable federal civil rights laws and [place] laws. We do not discriminate on the basis of race, color, national origin, age, disability, sex, sexual orientation, or gender identity.            Thank you!     Thank you for choosing [facility]  for your care. Our goal is always to provide you with excellent care. Hearing back from our patients is one way we can continue to improve our services. Please take a few minutes to complete the written survey that you may receive in the mail after your visit with us. Thank you!             Your Updated Medication List - Protect others around you: Learn how to safely use, store and throw away your medicines at www.disposemymeds.org.          This list is accurate as of 4/6/18 10:19 AM.  Always use your most recent med list.                    Brand Name Dispense Instructions for use Diagnosis    ciclopirox 0.77 % Gel      2 times daily        Colchicine 0.6 MG Caps    MITIGARE    60 capsule    Take 0.6 mg by mouth 2 times daily as needed    Acute gout, unspecified cause, unspecified site       imiquimod 5 % cream    ALDARA    12 packet    Apply a small sized amount to warts QHS at bedtime.   Wash off after 8 hours.    Verruca plana       irbesartan 300 MG tablet    AVAPRO     Take 1 tablet (300 mg) by mouth daily    Essential hypertension       meloxicam 15 MG tablet    MOBIC    90 tablet    Take 1 tablet (15 mg) by mouth daily as needed    Gout of multiple sites, unspecified cause, unspecified chronicity       nitroGLYcerin 0.1 MG/HR 24 hr patch    NITRODUR    10 patch    Place 1 patch onto the skin daily    Medial epicondylitis of left elbow       order for DME     1 Device    Equipment being ordered:  Knee high support stockings, 20-30 mm mercury One pain, wear daily as needed    Dependent edema       valACYclovir 1000 mg tablet    VALTREX    8 tablet    Take 2000 mg twice daily for one day, for Herpes simplex outbreaks.    Herpes simplex virus (HSV) infection

## 2018-04-06 NOTE — LETTER
4/6/2018         RE: Abhijit Garcia  [address] [address] [address]        Dear Colleague,    Thank you for referring your patient, Abhijit Garcia, to the [facility]. Please see a copy of my visit note below.    HPI:   Abhijit Garcia is a 43 year old male who presents for evaluation of rash on both feet. It is intermittently itchy.   chief complaint  Location: both feet, right worse than left    Condition present for:  3.5 weeks.   Previous treatments include: Ciclopirox - some areas are responding well, but others are persistent     Review Of Systems  Eyes: negative  Ears/Nose/Throat: negative  Respiratory: No shortness of breath, dyspnea on exertion, cough, or hemoptysis  Cardiovascular: negative  Gastrointestinal: negative  Genitourinary: negative  Musculoskeletal: negative  Neurologic: negative  Psychiatric: negative    This document serves as a record of the services and decisions personally performed and made by Shanthi Red, MS, PA-C. It was created on her behalf by Nely Leach, a trained medical scribe. The creation of this document is based on the provider's statements to the medical scribe.  Nely Leach 10:07 AM April 6, 2018    PHYSICAL EXAM:   /87  Pulse 90  SpO2 96%    Skin exam performed as follows: Type 2 skin. Mood appropriate  Alert and Oriented X 3. Well developed, well nourished in no distress.  General appearance: Normal  Head including face: Normal  Eyes: conjunctiva and lids: Normal  Mouth: Lips, teeth, gums: Normal  Neck: Normal  Chest-breast/axillae: Normal  Back: Normal  Spleen and liver: Normal  Cardiovascular: Exam of peripheral vascular system by observation for swelling, varicosities, edema: Normal  Genitalia: groin, buttocks: Normal  Extremities: digits/nails (clubbing): Normal  Eccrine and Apocrine glands: Normal  Right upper extremity: Normal  Left upper extremity: Normal  Right lower extremity:  Normal  Left lower extremity: Normal  Skin: Scalp and body hair: See below    1. Maceration in bilateral 4th web spaces with annular plaque on the dorsum of the right foot     ASSESSMENT/PLAN:     1. Candi pedis - advised of diagnosis and treatment options. Has tried ciclopirox with some improvement, but has not resolved.   --Start ketoconazole cream QD x 1-2 weeks     Follow-up: PRN  CC:   Scribed By: Nely Leach, Medical Scribe    The information in this document, created by the medical scribe for me, accurately reflects the services I personally performed and the decisions made by me. I have reviewed and approved this document for accuracy prior to leaving the patient care area.  April 6, 2018 10:16 AM    Shanthi Red MS, PACaroC      Again, thank you for allowing me to participate in the care of your patient.        Sincerely,        Shanthi Red PA-C

## 2018-04-11 ENCOUNTER — THERAPY VISIT (OUTPATIENT)
Dept: PHYSICAL THERAPY | Facility: CLINIC | Age: 43
End: 2018-04-11
Payer: OTHER MISCELLANEOUS

## 2018-04-11 DIAGNOSIS — M76.72 PERONEAL TENDONITIS, LEFT: ICD-10-CM

## 2018-04-11 PROCEDURE — 97035 APP MDLTY 1+ULTRASOUND EA 15: CPT | Mod: GP | Performed by: PHYSICAL THERAPIST

## 2018-04-11 PROCEDURE — 97110 THERAPEUTIC EXERCISES: CPT | Mod: GP | Performed by: PHYSICAL THERAPIST

## 2018-04-27 ENCOUNTER — THERAPY VISIT (OUTPATIENT)
Dept: PHYSICAL THERAPY | Facility: CLINIC | Age: 43
End: 2018-04-27
Payer: OTHER MISCELLANEOUS

## 2018-04-27 DIAGNOSIS — M76.72 PERONEAL TENDONITIS, LEFT: ICD-10-CM

## 2018-04-27 PROCEDURE — 97110 THERAPEUTIC EXERCISES: CPT | Mod: GP | Performed by: PHYSICAL THERAPIST

## 2018-04-27 PROCEDURE — 97035 APP MDLTY 1+ULTRASOUND EA 15: CPT | Mod: GP | Performed by: PHYSICAL THERAPIST

## 2018-04-27 NOTE — PROGRESS NOTES
Subjective:  HPI                    Objective:  System    Physical Exam    General     ROS    Assessment/Plan:    DISCHARGE REPORT    Progress reporting period is from 2/22/18 to 4/27/18 (6 visits).       SUBJECTIVE  Subjective changes noted by patient:  Emerson continues to have intermittent lateral ankle pain.  He also reports intermittent n/t in the lateral foot.  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  His ankle is better than when he started PT in February, but not by much.       Current pain level is 4/10  .     Previous pain level was  NA  .   Changes in function:  Yes (See Goal flowsheet attached for changes in current functional level)  Adverse reaction to treatment or activity: None    OBJECTIVE  Changes noted in objective findings:  No pain to palpation today.  Resisted ankle eversion is painfree.  He did have lateral ankle pain after doing a low level balancing exercise today.        ASSESSMENT/PLAN  Updated problem list and treatment plan: Diagnosis 1:  Peroneal tendon partial tear    STG/LTGs have been met or progress has been made towards goals:  Yes (See Goal flow sheet completed today.)  Assessment of Progress: The patient's condition has potential to improve.  Self Management Plans:  Patient has been instructed in a home treatment program.    Abhijit continues to require the following intervention to meet STG and LTG's:  PT intervention is no longer required to meet STG/LTG.    Recommendations:  This patient is ready to be discharged from therapy and continue their home treatment program.    Please refer to the daily flowsheet for treatment today, total treatment time and time spent performing 1:1 timed codes.

## 2018-05-31 ENCOUNTER — MYC MEDICAL ADVICE (OUTPATIENT)
Dept: INTERNAL MEDICINE | Facility: CLINIC | Age: 43
End: 2018-05-31

## 2018-06-07 DIAGNOSIS — I10 ESSENTIAL HYPERTENSION: ICD-10-CM

## 2018-06-07 DIAGNOSIS — E78.5 HYPERLIPIDEMIA LDL GOAL <130: ICD-10-CM

## 2018-06-07 DIAGNOSIS — M10.9 GOUT OF MULTIPLE SITES, UNSPECIFIED CAUSE, UNSPECIFIED CHRONICITY: ICD-10-CM

## 2018-06-07 LAB
ANION GAP SERPL CALCULATED.3IONS-SCNC: 10 MMOL/L (ref 3–14)
BUN SERPL-MCNC: 13 MG/DL (ref 7–30)
CALCIUM SERPL-MCNC: 8.8 MG/DL (ref 8.5–10.1)
CHLORIDE SERPL-SCNC: 107 MMOL/L (ref 94–109)
CHOLEST SERPL-MCNC: 180 MG/DL
CO2 SERPL-SCNC: 24 MMOL/L (ref 20–32)
CREAT SERPL-MCNC: 1.08 MG/DL (ref 0.66–1.25)
GFR SERPL CREATININE-BSD FRML MDRD: 74 ML/MIN/1.7M2
GLUCOSE SERPL-MCNC: 101 MG/DL (ref 70–99)
HDLC SERPL-MCNC: 34 MG/DL
LDLC SERPL CALC-MCNC: 91 MG/DL
NONHDLC SERPL-MCNC: 146 MG/DL
POTASSIUM SERPL-SCNC: 4.2 MMOL/L (ref 3.4–5.3)
SODIUM SERPL-SCNC: 141 MMOL/L (ref 133–144)
TRIGL SERPL-MCNC: 277 MG/DL
URATE SERPL-MCNC: 8.1 MG/DL (ref 3.5–7.2)

## 2018-06-07 PROCEDURE — 80048 BASIC METABOLIC PNL TOTAL CA: CPT | Performed by: INTERNAL MEDICINE

## 2018-06-07 PROCEDURE — 84550 ASSAY OF BLOOD/URIC ACID: CPT | Performed by: INTERNAL MEDICINE

## 2018-06-07 PROCEDURE — 36415 COLL VENOUS BLD VENIPUNCTURE: CPT | Performed by: INTERNAL MEDICINE

## 2018-06-07 PROCEDURE — 80061 LIPID PANEL: CPT | Performed by: INTERNAL MEDICINE

## 2018-06-14 ENCOUNTER — MYC MEDICAL ADVICE (OUTPATIENT)
Dept: INTERNAL MEDICINE | Facility: CLINIC | Age: 43
End: 2018-06-14

## 2018-06-18 ENCOUNTER — TELEPHONE (OUTPATIENT)
Dept: INTERNAL MEDICINE | Facility: CLINIC | Age: 43
End: 2018-06-18

## 2018-06-18 NOTE — TELEPHONE ENCOUNTER
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

## 2018-06-20 NOTE — TELEPHONE ENCOUNTER
Hi Dr. De La O, what time would you like to see pt on either July 13,14 or 15th?  Please advise.  Thanks much  Carolina Elizabeth RN

## 2018-06-22 DIAGNOSIS — B00.9 HERPES SIMPLEX VIRUS (HSV) INFECTION: ICD-10-CM

## 2018-06-22 RX ORDER — VALACYCLOVIR HYDROCHLORIDE 1 G/1
TABLET, FILM COATED ORAL
Qty: 8 TABLET | Refills: 0 | Status: SHIPPED | OUTPATIENT
Start: 2018-06-22 | End: 2018-09-11

## 2018-07-10 ENCOUNTER — MYC MEDICAL ADVICE (OUTPATIENT)
Dept: INTERNAL MEDICINE | Facility: CLINIC | Age: 43
End: 2018-07-10

## 2018-07-11 ENCOUNTER — MYC MEDICAL ADVICE (OUTPATIENT)
Dept: INTERNAL MEDICINE | Facility: CLINIC | Age: 43
End: 2018-07-11

## 2018-07-11 ENCOUNTER — OFFICE VISIT (OUTPATIENT)
Dept: INTERNAL MEDICINE | Facility: CLINIC | Age: 43
End: 2018-07-11
Payer: COMMERCIAL

## 2018-07-11 VITALS
TEMPERATURE: 98.8 F | DIASTOLIC BLOOD PRESSURE: 74 MMHG | WEIGHT: 312 LBS | RESPIRATION RATE: 16 BRPM | HEART RATE: 72 BPM | SYSTOLIC BLOOD PRESSURE: 112 MMHG | BODY MASS INDEX: 40.06 KG/M2

## 2018-07-11 DIAGNOSIS — E78.5 HYPERLIPIDEMIA LDL GOAL <130: ICD-10-CM

## 2018-07-11 DIAGNOSIS — R25.2 CRAMP OF LIMB: ICD-10-CM

## 2018-07-11 DIAGNOSIS — L91.8 SKIN TAG: ICD-10-CM

## 2018-07-11 DIAGNOSIS — E66.01 MORBID OBESITY (H): ICD-10-CM

## 2018-07-11 DIAGNOSIS — I10 ESSENTIAL HYPERTENSION: Primary | ICD-10-CM

## 2018-07-11 DIAGNOSIS — M10.9 GOUT OF MULTIPLE SITES, UNSPECIFIED CAUSE, UNSPECIFIED CHRONICITY: ICD-10-CM

## 2018-07-11 PROCEDURE — 99214 OFFICE O/P EST MOD 30 MIN: CPT | Mod: 25 | Performed by: INTERNAL MEDICINE

## 2018-07-11 PROCEDURE — 11200 RMVL SKIN TAGS UP TO&INC 15: CPT | Performed by: INTERNAL MEDICINE

## 2018-07-11 RX ORDER — ALLOPURINOL 100 MG/1
100 TABLET ORAL DAILY
Qty: 90 TABLET | Status: SHIPPED | OUTPATIENT
Start: 2018-07-11 | End: 2019-10-03

## 2018-07-11 NOTE — MR AVS SNAPSHOT
After Visit Summary   7/11/2018    Abhijit Garcia    MRN: [id number]           Patient Information     Date Of Birth          1975        Visit Information        Provider Department      7/11/2018 11:00 AM Samir De La O MD [facility]        Today's Diagnoses     Essential hypertension    -  1    Mixed Hyperlipidemia LDL goal <130        Cramp of limb        Obesity, BMI > 35 (H)        Gout of multiple sites, unspecified cause, unspecified chronicity          Care Instructions    Treatment for leg (and other) cramps:    Possible effective non-medication treatments for leg cramps include straight leg raises, leg elevation, heat/cold, leg jiggling, or massage. Calf stretches, good hydration, and regular physical activity likely help some.    The following medications have also been used for nocturnal leg cramps with variable success:  Diphenhydramine (Benadryl) 12.5 to 50 mg nightly   Muscle relaxants, including orphenadrine or cyclobenzaprine  Verapamil, 120 to 180 mg at bedtime, or Diltiazem 30 mg nightly  Gabapentin (600 mg daily with increased dosage as necessary)  Vitamin B Complex (containing fursultiamine 50 mg, hydroxocobalamin 250 micrograms, pyridoxal phosphate (B6)30 mg, and riboflavin 5 mg) was helpful in one study, taken three times daily  Vitamin E (800 international units before bed) was helpful in some studies    Note that iron supplementation may be helpful in patients who have iron-deficient anemia  Magnesium supplementation may be of benefit in patients with pregnancy-related cramps. However, a large analysis of multiple drug trials found no evidence of significant benefit in the frequency or severity of cramping with magnesium therapy     Some prescription medications which have been reported to possibly cause leg cramps include diuretics, laxatives, statins, beta agonists (type of inhaler), steroids, evista, and tagamet.  Alcohol can also do  "this.      What are purines and in which foods are they found?  Introduction  Purines are natural substances found in all of the body's cells, and in virtually all foods. The reason for their widespread occurrence is simple: purines provide part of the chemical structure of our genes and the genes of plants and animals. A relatively small number of foods, however, contain concentrated amounts of purines. For the most part, these high-purine foods are also high-protein foods, and they include organ meats like kidney, fish like mackerel, herring, sardines and mussels, and also yeast.   Purines are metabolized into uric acid  When cells die and get recycled, the purines in their genetic material also get broken down. Uric acid is the chemical formed when purines have been broken down completely. It's normal and healthy for uric acid to be formed in the body from breakdown of purines. In our blood, for example, uric acid serves as an antioxidant and helps prevent damage to our blood vessel linings, so a continual supply of uric acid is important for protecting our blood vessels.   Uric acid levels in the blood and other parts of the body can become too high, however, under a variety of circumstances. Since our kidneys are responsible for helping keep blood levels of uric acid balanced, kidney problems can lead to excessive accumulation of uric acid in various parts of the body. Excessive breakdown of cells can also cause uric acid build-up. When uric acid accumulates, uric acid crystals (called monosodium urate crystals) can become deposited in our tendons, joints, kidneys, and other organs. This accumulation of uric acid crystals is called gouty arthritis, or simply \"gout.\"   Foods that contain purines  Because uric acid is formed from the breakdown of purines, low-purine diets are often used to help treat conditions like gout in which excessive uric acid is deposited in the tissues of the body. The average daily diet " for an adult in the [place] contains approximately 600-1,000 milligrams of purines.   Recent research by Reynaldo and others has shown that the impact of plant purines on gout risk is very different from the impact of animal purines, and that within the animal food family, purines from meat and fish act very differently than purines from dairy. Reynaldo's work has demonstrated that purines from meat and fish clearly increase our risk of gout, while purines from vegetables fail to change our risk. Dairy foods (which can contain purines) actually appear to lower our risk of gout. In summary, this epidemiological research (on tens of thousands of men and women) makes it clear that all purine-containing foods are not the same, and that plant purines are far safer than meat and fish purines in terms of gout risk.   In a case of severe or advanced gout, dietitians will often ask individuals to decrease their total daily purine intake to 100-150 milligrams. A 3.5 ounce serving of some foods, all by itself, can contain up to 1,000 milligrams of purines. These foods include anchovies, herring, kidney, liver, mackerel, meat extracts, mincemeat, mussels, sardines, and yeast. You'll notice that only of these foods - liver - is included amongst the World's Healthiest Foods. The table below lists other foods that contain higher-than-usual amounts of purines, though not nearly as much as the foods described above.   Foods with very high purine levels(up to 1,000 mg per 3.5 ounce serving): Anchovies, Brains, Gravies, Kidneys,Liver, Sardines, Sweetbreads   Foods with high and moderately high purine levels(5-100 mg per 3.5 ounch serving): Asparagus, Sanchez, Beef, Bluefish, Bouillon, Calf tongue, Carp, Cauliflower, Chicken, Chicken soup, Codfish, Crab, Duck, Goose, Halibut, Ham, Kidney beans, Lamb, Lentils, Lima beans, Lobster, Mushrooms, Mutton, Navy beans, Oatmeal, Oysters, Peas, Perch, Pork, Rabbit, [place], Sheep, Shellfish, Snapper, Spinach,  Tripe, Trout, Tuna, Turkey, Veal, Eric         PLAN:                                                      1.  MEDICATIONS:        - Trial of taking calcium for leg cramps, and staying hydrated       - Tentative plan to try allopurinol at dose of 100 mg daily       - Continue other medications without change  2.  If leg cramps persist, plan to change irbesartan to calcium channel blocker  3.  Work on weight  4.  Plan recheck lipids in 6-12 months, recheck kidney function after 4-6 weeks on allopurinol  5.  Keep a log of gout flares, for the next few years          Follow-ups after your visit        Future tests that were ordered for you today     Open Future Orders        Priority Expected Expires Ordered    Lipid panel reflex to direct LDL Fasting Routine 9/11/2018 7/11/2019 7/11/2018    Uric acid Routine 9/11/2018 7/11/2019 7/11/2018    Creatinine Routine 8/15/2018 7/11/2019 7/11/2018            Who to contact     If you have questions or need follow up information about today's clinic visit or your schedule please contact [facility] directly at [phone].  Normal or non-critical lab and imaging results will be communicated to you by Zappedyhart, letter or phone within 4 business days after the clinic has received the results. If you do not hear from us within 7 days, please contact the clinic through [employer] or phone. If you have a critical or abnormal lab result, we will notify you by phone as soon as possible.  Submit refill requests through [employer] or call your pharmacy and they will forward the refill request to us. Please allow 3 business days for your refill to be completed.          Additional Information About Your Visit        [employer] Information     [employer] gives you secure access to your electronic health record. If you see a primary care provider, you can also send messages to your care team and make appointments. If you have questions, please call your primary care clinic.   If you do not have a primary care provider, please call [phone] and they will assist you.        Care EveryWhere ID     This is your Care EveryWhere ID. This could be used by other organizations to access your [place] medical records  [id number]        Your Vitals Were     Pulse Temperature Respirations BMI (Body Mass Index)          72 98.8  F (37.1  C) (Oral) 16 40.06 kg/m2         Blood Pressure from Last 3 Encounters:   07/11/18 112/74   04/06/18 128/87   03/21/18 116/64    Weight from Last 3 Encounters:   07/11/18 312 lb (141.5 kg)   03/21/18 311 lb 11.2 oz (141.4 kg)   01/04/18 (!) 304 lb 14.4 oz (138.3 kg)                 Today's Medication Changes          These changes are accurate as of 7/11/18 12:53 PM.  If you have any questions, ask your nurse or doctor.               Start taking these medicines.        Dose/Directions    allopurinol 100 MG tablet   Commonly known as:  ZYLOPRIM   Used for:  Gout of multiple sites, unspecified cause, unspecified chronicity   Started by:  Samir De La O MD        Dose:  100 mg   Take 1 tablet (100 mg) by mouth daily   Quantity:  90 tablet   Refills:  prn         These medicines have changed or have updated prescriptions.        Dose/Directions    imiquimod 5 % cream   Commonly known as:  ALDARA   This may have changed:    - when to take this  - reasons to take this  - additional instructions   Used for:  Verruca plana        Apply a small sized amount to warts QHS at bedtime.   Wash off after 8 hours.   Quantity:  12 packet   Refills:  3       ketoconazole 2 % cream   Commonly known as:  NIZORAL   This may have changed:    - when to take this  - reasons to take this   Used for:  Tinea pedis of both feet        Apply topically daily   Quantity:  60 g   Refills:  2            Where to get your medicines      These medications were sent to [facility] [address] [facility] [address] [facility] [address]  [address] [facility]  [address]     Phone:  [phone]     allopurinol 100 MG tablet                Primary Care Provider Office Phone # Fax #    Samir De La O [phone] [address] [place] [address]        Equal Access to Services     OLIVIA HARRELL : Hadii aad ku hadasho Soomaali, waaxda luqadaha, qaybta kaalmada adeegyada, waxay idiin haykojon adeabdoul rosario jennifer correa. So [facility] [phone].    ATENCIÓN: Si habla español, tiene a cruz disposición servicios gratuitos de asistencia lingüística. [facility] [phone].    We comply with applicable federal civil rights laws and [place] laws. We do not discriminate on the basis of race, color, national origin, age, disability, sex, sexual orientation, or gender identity.            Thank you!     Thank you for choosing [facility]  for your care. Our goal is always to provide you with excellent care. Hearing back from our patients is one way we can continue to improve our services. Please take a few minutes to complete the written survey that you may receive in the mail after your visit with us. Thank you!             Your Updated Medication List - Protect others around you: Learn how to safely use, store and throw away your medicines at www.disposemymeds.org.          This list is accurate as of 7/11/18 12:53 PM.  Always use your most recent med list.                   Brand Name Dispense Instructions for use Diagnosis    allopurinol 100 MG tablet    ZYLOPRIM    90 tablet    Take 1 tablet (100 mg) by mouth daily    Gout of multiple sites, unspecified cause, unspecified chronicity       ciclopirox 0.77 % Gel      as needed        colchicine 0.6 MG capsule    MITIGARE    60 capsule    Take 0.6 mg by mouth 2 times daily as needed    Acute gout, unspecified cause, unspecified site       imiquimod 5 % cream    ALDARA    12 packet    Apply a small sized amount to warts QHS at bedtime.   Wash off after 8 hours.    Verruca plana        irbesartan 300 MG tablet    AVAPRO     Take 1 tablet (300 mg) by mouth daily    Essential hypertension       ketoconazole 2 % cream    NIZORAL    60 g    Apply topically daily    Tinea pedis of both feet       meloxicam 15 MG tablet    MOBIC    90 tablet    Take 1 tablet (15 mg) by mouth daily as needed    Gout of multiple sites, unspecified cause, unspecified chronicity       order for DME     1 Device    Equipment being ordered:  Knee high support stockings, 20-30 mm mercury One pain, wear daily as needed    Dependent edema       valACYclovir 1000 mg tablet    VALTREX    8 tablet    TAKE 2 TABLETS BY MOUTH TWICE DAILY FOR 1 DAY    Herpes simplex virus (HSV) infection

## 2018-07-11 NOTE — PROGRESS NOTES
SUBJECTIVE:   Abhijit Garcia is a 43 year old male who presents to clinic today for the following health issues:      Hypertension Follow-up      Outpatient blood pressures are not being checked.    Low Salt Diet: low salt      Amount of exercise or physical activity: None    Problems taking medications regularly: No    Medication side effects: none    Diet: low salt      Reviewed and updated as needed this visit by clinical staff:  Medications  Allergies  Soc Hx   Additional history: as documented    Additional issues to address:  1.  Leg cramps no longer in calves, now more in thigh/hamstring.   Also some in toes.     Hasn't tried calcium yet.  2.  Would like small lesion R neck removed     ROS:    Constitutional, HEENT, cardiovascular, pulmonary, gi and gu systems are negative, except as otherwise noted.    OBJECTIVE:                                                      /74  Pulse 72  Temp 98.8  F (37.1  C) (Oral)  Resp 16  Wt 312 lb (141.5 kg)  BMI 40.06 kg/m2  Body mass index is 40.06 kg/(m^2).  No apparent distress  Regular pulse  Legs normal, calves nontender   Foot noncontributory    Probable skin tag on neck, frozen at patient request    ASSESSMENT:                                                      1.  HTN, controlled   2.  Nocturnal leg cramps.  Full discussion.  3.  Mixed hyperlipidemia, improved  4.  History of gout, uric acid remains high.   - possible gout flare this week (versus pain in foot from injury/travel), previous a month ago.   Flares are frequent enough to warrant prophylaxis -  Both great toe and midfoot   - previously filled allopurinol in [place] a decade or more ago.   Unclear what dose he was on, and if he might be able to tolerate a lower dose now.   [facility] records only go back 6 years, so cannot determine this without getting records from previous MD.  Contacted prior clinic, who will try to find out dose.  5.  Small lump R neck, suspect skin tag.  Frozen.       The 10-year ASCVD risk score (Conconullynirmal MURO Jr, et al., 2013) is: 2.5%    Values used to calculate the score:      Age: 43 years      Sex: Male      Is Non- : No      Diabetic: No      Tobacco smoker: No      Systolic Blood Pressure: 112 mmHg      Is BP treated: Yes      HDL Cholesterol: 34 mg/dL      Total Cholesterol: 180 mg/dL     PLAN:                                                      1.  MEDICATIONS:        - Trial of taking calcium for leg cramps, and staying hydrated       - Tentative plan to try allopurinol at dose of 100 mg daily       - Continue other medications without change  2.  If leg cramps persist, plan to change irbesartan to calcium channel blocker  3.  Work on weight  4.  Plan recheck lipids in 6-12 months, recheck kidney function after 4-6 weeks on allopurinol  5.  Keep a log of gout flares, for the next few years.  Notify MD for side effects from allopurinol, and if gout flares are prevented.    Recheck uric acid level in future.    Samir De La O MD  [facility]  >25 minutes spent with patient, greater than 50% spent on discussion/education/planning - as outlined in assessment and plan

## 2018-07-11 NOTE — PATIENT INSTRUCTIONS
Treatment for leg (and other) cramps:    Possible effective non-medication treatments for leg cramps include straight leg raises, leg elevation, heat/cold, leg jiggling, or massage. Calf stretches, good hydration, and regular physical activity likely help some.    The following medications have also been used for nocturnal leg cramps with variable success:  Diphenhydramine (Benadryl) 12.5 to 50 mg nightly   Muscle relaxants, including orphenadrine or cyclobenzaprine  Verapamil, 120 to 180 mg at bedtime, or Diltiazem 30 mg nightly  Gabapentin (600 mg daily with increased dosage as necessary)  Vitamin B Complex (containing fursultiamine 50 mg, hydroxocobalamin 250 micrograms, pyridoxal phosphate (B6)30 mg, and riboflavin 5 mg) was helpful in one study, taken three times daily  Vitamin E (800 international units before bed) was helpful in some studies    Note that iron supplementation may be helpful in patients who have iron-deficient anemia  Magnesium supplementation may be of benefit in patients with pregnancy-related cramps. However, a large analysis of multiple drug trials found no evidence of significant benefit in the frequency or severity of cramping with magnesium therapy     Some prescription medications which have been reported to possibly cause leg cramps include diuretics, laxatives, statins, beta agonists (type of inhaler), steroids, evista, and tagamet.  Alcohol can also do this.      What are purines and in which foods are they found?  Introduction  Purines are natural substances found in all of the body's cells, and in virtually all foods. The reason for their widespread occurrence is simple: purines provide part of the chemical structure of our genes and the genes of plants and animals. A relatively small number of foods, however, contain concentrated amounts of purines. For the most part, these high-purine foods are also high-protein foods, and they include organ meats like kidney, fish like mackerel,  "herring, sardines and mussels, and also yeast.   Purines are metabolized into uric acid  When cells die and get recycled, the purines in their genetic material also get broken down. Uric acid is the chemical formed when purines have been broken down completely. It's normal and healthy for uric acid to be formed in the body from breakdown of purines. In our blood, for example, uric acid serves as an antioxidant and helps prevent damage to our blood vessel linings, so a continual supply of uric acid is important for protecting our blood vessels.   Uric acid levels in the blood and other parts of the body can become too high, however, under a variety of circumstances. Since our kidneys are responsible for helping keep blood levels of uric acid balanced, kidney problems can lead to excessive accumulation of uric acid in various parts of the body. Excessive breakdown of cells can also cause uric acid build-up. When uric acid accumulates, uric acid crystals (called monosodium urate crystals) can become deposited in our tendons, joints, kidneys, and other organs. This accumulation of uric acid crystals is called gouty arthritis, or simply \"gout.\"   Foods that contain purines  Because uric acid is formed from the breakdown of purines, low-purine diets are often used to help treat conditions like gout in which excessive uric acid is deposited in the tissues of the body. The average daily diet for an adult in the [place] contains approximately 600-1,000 milligrams of purines.   Recent research by Reynaldo and others has shown that the impact of plant purines on gout risk is very different from the impact of animal purines, and that within the animal food family, purines from meat and fish act very differently than purines from dairy. Reynaldo's work has demonstrated that purines from meat and fish clearly increase our risk of gout, while purines from vegetables fail to change our risk. Dairy foods (which can contain purines) actually " appear to lower our risk of gout. In summary, this epidemiological research (on tens of thousands of men and women) makes it clear that all purine-containing foods are not the same, and that plant purines are far safer than meat and fish purines in terms of gout risk.   In a case of severe or advanced gout, dietitians will often ask individuals to decrease their total daily purine intake to 100-150 milligrams. A 3.5 ounce serving of some foods, all by itself, can contain up to 1,000 milligrams of purines. These foods include anchovies, herring, kidney, liver, mackerel, meat extracts, mincemeat, mussels, sardines, and yeast. You'll notice that only of these foods - liver - is included amongst the World's Healthiest Foods. The table below lists other foods that contain higher-than-usual amounts of purines, though not nearly as much as the foods described above.   Foods with very high purine levels(up to 1,000 mg per 3.5 ounce serving): Anchovies, Brains, Gravies, Kidneys,Liver, Sardines, Sweetbreads   Foods with high and moderately high purine levels(5-100 mg per 3.5 ounch serving): Asparagus, Sanchez, Beef, Bluefish, Bouillon, Calf tongue, Carp, Cauliflower, Chicken, Chicken soup, Codfish, Crab, Duck, Goose, Halibut, Ham, Kidney beans, Lamb, Lentils, Lima beans, Lobster, Mushrooms, Mutton, Navy beans, Oatmeal, Oysters, Peas, Perch, Pork, Rabbit, [place], Sheep, Shellfish, Snapper, Spinach, Tripe, Trout, Tuna, Turkey, Veal, Venison         PLAN:                                                      1.  MEDICATIONS:        - Trial of taking calcium for leg cramps, and staying hydrated       - Tentative plan to try allopurinol at dose of 100 mg daily       - Continue other medications without change  2.  If leg cramps persist, plan to change irbesartan to calcium channel blocker  3.  Work on weight  4.  Plan recheck lipids in 6-12 months, recheck kidney function after 4-6 weeks on allopurinol  5.  Keep a log of gout flares,  for the next few years

## 2018-07-24 ENCOUNTER — MYC MEDICAL ADVICE (OUTPATIENT)
Dept: INTERNAL MEDICINE | Facility: CLINIC | Age: 43
End: 2018-07-24

## 2018-07-27 ENCOUNTER — TRANSFERRED RECORDS (OUTPATIENT)
Dept: HEALTH INFORMATION MANAGEMENT | Facility: CLINIC | Age: 43
End: 2018-07-27

## 2018-07-27 ENCOUNTER — OFFICE VISIT (OUTPATIENT)
Dept: FAMILY MEDICINE | Facility: CLINIC | Age: 43
End: 2018-07-27
Payer: COMMERCIAL

## 2018-07-27 VITALS — HEART RATE: 78 BPM | SYSTOLIC BLOOD PRESSURE: 126 MMHG | OXYGEN SATURATION: 97 % | DIASTOLIC BLOOD PRESSURE: 86 MMHG

## 2018-07-27 DIAGNOSIS — B07.8 COMMON WART: Primary | ICD-10-CM

## 2018-07-27 PROCEDURE — 17110 DESTRUCTION B9 LES UP TO 14: CPT | Performed by: PHYSICIAN ASSISTANT

## 2018-07-27 NOTE — PROGRESS NOTES
HPI:  Abhijit Garcia is a 43 year old male patient here today for wart on right neck   Duration: weeks-months?  Symptoms:  irritated     Previous treatments: cryo to warts on right UE and neck with complete resolution  Alleviating/aggravating factors: none    Associated symptoms: none  Additional findings:none  Patient has no other skin complaints today.  Remainder of the HPI, Meds, PMH, Allergies, FH, and SH was reviewed in chart.      Past Medical History:   Diagnosis Date     Dysuria      Hypertension        Past Surgical History:   Procedure Laterality Date     ANKLE SURGERY       APPENDECTOMY       ORTHOPEDIC SURGERY  03/23/2017    ankle     wisdom teeth          Family History   Problem Relation Age of Onset     Diabetes Mother      Obesity Mother      Cancer - colorectal Mother      Hypertension Mother      Arthritis Father      Respiratory Father      asbestos exposure     Chronic Obstructive Pulmonary Disease Father      Coronary Artery Disease Father 71     Skin Cancer Father      Allergies Brother      Respiratory Brother      Heart Defect Son        Social History     Social History     Marital status:      Spouse name: N/A     Number of children: 1     Years of education: N/A     Occupational History      [employer]     Social History Main Topics     Smoking status: Never Smoker     Smokeless tobacco: Never Used     Alcohol use 0.0 oz/week     0 Standard drinks or equivalent per week      Comment: 1-5 drinks per week     Drug use: No     Sexual activity: Yes     Partners: Female     Other Topics Concern      Service Yes     Blood Transfusions No     Caffeine Concern Yes     2 pots daily     Occupational Exposure No     Occ. all with in managible levels     Hobby Hazards No     Sleep Concern Yes     Stress Concern Yes     Weight Concern Yes     Special Diet No     Back Care Yes     L4 and L5 damaged     Exercise No     Seat Belt Yes     Self-Exams No     Parent/Sibling W/  Cabg, Mi Or Angioplasty Before 65f 55m? No     Social History Narrative       Outpatient Encounter Prescriptions as of 7/27/2018   Medication Sig Dispense Refill     allopurinol (ZYLOPRIM) 100 MG tablet Take 1 tablet (100 mg) by mouth daily 90 tablet prn     ciclopirox 0.77 % GEL as needed   0     Colchicine (MITIGARE) 0.6 MG CAPS Take 0.6 mg by mouth 2 times daily as needed 60 capsule 1     imiquimod (ALDARA) 5 % cream Apply a small sized amount to warts QHS at bedtime.   Wash off after 8 hours. (Patient taking differently: as needed Apply a small sized amount to warts QHS at bedtime.   Wash off after 8 hours.) 12 packet 3     irbesartan (AVAPRO) 300 MG tablet Take 1 tablet (300 mg) by mouth daily       ketoconazole (NIZORAL) 2 % cream Apply topically daily (Patient taking differently: Apply topically as needed ) 60 g 2     meloxicam (MOBIC) 15 MG tablet Take 1 tablet (15 mg) by mouth daily as needed 90 tablet 0     valACYclovir (VALTREX) 1000 mg tablet TAKE 2 TABLETS BY MOUTH TWICE DAILY FOR 1 DAY 8 tablet 0     order for DME Equipment being ordered:  Knee high support stockings, 20-30 mm mercury  One pain, wear daily as needed (Patient not taking: Reported on 7/11/2018) 1 Device 1     No facility-administered encounter medications on file as of 7/27/2018.        Review Of Systems:  Skin: As above  Eyes: negative  Ears/Nose/Throat: negative  Respiratory: No shortness of breath, dyspnea on exertion, cough, or hemoptysis  Cardiovascular: negative  Gastrointestinal: negative  Genitourinary: negative  Musculoskeletal: negative  Neurologic: negative  Psychiatric: negative  Hematologic/Lymphatic/Immunologic: negative  Endocrine: negative      Objective:     /86  Pulse 78  SpO2 97%  Eyes: Conjunctivae/lids: Normal   ENT: Lips:  Normal  MSK: Normal  Cardiovascular: Peripheral edema none  Pulm: Breathing Normal  Neuro/Psych: Orientation: Normal; Mood/Affect: Normal, NAD, WDWN  Pt accompanied by: self  Following  areas examined: face, neck, UEs  [place] skin type:l   Findings: irritated flesh-colored verrucous appearing papules x 2 on neck  Assessment and Plan:  1) common warts x 2  LN2: Treated with LN2 for 5s for 1-2 cycles. Warned risks of blistering, pain, pigment change, scarring, and incomplete resolution.  Advised patient to return if lesions do not completely resolve within 2-3 months.  Wound care sheet given.    Follow up in prn

## 2018-07-27 NOTE — PATIENT INSTRUCTIONS
WARTS  Warts are caused by the Human Papilloma Virus.They are commonly spread by direct contact or autoinoculation. That mean if the wart is picked at it could spread to another area of skin.   In children without treatment 50% of warts will disappear spontaneous in 6 months, 90% are gone in 2 years. In adults they can last for a longer period of time, but they can resolve without treatment.   No method of treatment is better than the other. You just have to be consistent with your treatments and return every 4-6 weeks.   In between treatments you should use either salicylic acid or mediplast tape:    Mediplast tape: Cut to size of wart, leave tape on for 1 week, (Put duct tape over it to secure it). In 1 week, pare skin down with a pumice stone and repeat. You want to pare down until you see some pinpoint bleeding. Do this for 6-8 weeks, if no improvement, make follow up appt.     Wart stick can be purchased online. (Twice a day, warts turn white, then pare down with a pumice stone and repeat) Do this for 6-8 weeks, if no improvement, make a follow up appt.       WART TREATMENTS    Wart(s), or verruca vulgaris  are a very common, benign skin condition caused by a virus.  Since warts are caused by a viral infection, your own immune system will typically clear the lesion on average from several months to 2 years. Although wart(s) do not easily spread to others, the virus may still pass through direct contact (picking or scratching) and/or indirect contact (locker rooms/public showers).     It is important to remember that there is no cure for the wart virus. This means that warts can return at the same site or appear in a new spot.    Sometimes, it seems that new warts appear as fast as old ones go away. This happens when the old warts shed virus cells into the skin before the warts are treated. This allows new warts to grow around the first warts. The best way to prevent this is to have your dermatologist treat new  warts as soon as they appear.    There are several technique/methods to making the wart(s) smaller in size or to help stimulate your immune system to clear the wart(s). The mainstay of treatment of wart(s) is to destroy the infected skin cells from the virus and to prevent recurrence.  The most important thing to remember is that regular consistent treatment is the most effective way to get rid of the wart.    Salicylic acid & Debridement   The first line treatment of warts is application of salicylic acid (with or without duct tape occlusion). Application of salicylic acid allows the wart(s) to stay flat and not callused. This allows other topical treatments to work better.      We recommend Wart Stick or Mediplast Tape over-the-counter   Directions: Apply the Wart Stick to the site twice daily or apply a piece of the Mediplast tape to the wart and cover tightly with tape to occlude the lesion.   Keep the medication on for 24 hours before removing/changing. Do this foe one week continuously.      After one week, when the salicylic acid is removed from the affected area the wart(s) area should turn the skin white/softened. Use an emery board/paring tool to rub off the softened tissue before changing a new salicylic acid application. Make sure you dispose the emery board and do NOT reuse on another site.     Cryotherapy or Freezing   Freezing wart(s) with a very cold substance (liquid nitrogen) is an effective treatment for common warts. The wart(s) are frozen off to kill the viral activity in and around the affected area. The treated areas will become sore and/or red for the next few hours. Some adverse reactions of this treatment include: pain, blisters, blood blisters, infection, and/or lightening or darkening of the skin.   At times, when the wart is too thick and/or callused, the clinician will shave/pare down the thickened skin prior to spraying the wart(s) with liquid nitrogen.     Aldara  Cream   Aldara   Cream is a topical medication that activates your own immune system to help attack the cells infected with the virus. The cream works for resistant or recurrent warts that do not respond to freezing and/or salicylic acid.   Directions: Open the medication packet & apply to the affected area. Cover the lesion with a band-aid. The next morning, wash off the area with soap & water.  If the cream is going to work the wart(s) should get red and irritated.      Cantharidin (Canthacur/Cantharone)   Cantharidin is a chemical compound derived from a blister beetle. This liquid medication is applied to wart(s) in order to cause blistering, thus destroying the affected areas. At your  visit, application of cantharidin to the wart(s) is usually painless and the applied areas dry clear. Three to four hours after cantharidin is applied to the warts, you must wash off the area(s) with soap and water. Adverse reactions such as redness, tenderness, blistering, itching and burning sensations may occur within 2-3 days. It is expected to take 2-4 weeks for the treated areas to heal. Please follow up your provider in 6 weeks to reassess the treated areas.     Electrosurgery and curettage  Electrosurgery (burning) and Curettage (scraping off) of the wart(s) are often are used together. The dermatologist may remove the wart by scraping it off before or after electrosurgery. Some adverse reactions of this treatment include: pain, scarring, infection, and/or lightening or darkening of the skin.    Excision  Cutting out the wart is another option for wart treatment.  Some adverse reactions of this treatment include: pain, scarring, infection, and/or lightening or darkening of the skin.    TREATMENT RESISTANT WARTS    If the warts are hard-to-treat, we may use one of the following treatments:     Laser treatment  Laser treatment is an option, mainly for warts that have not responded to other therapies. Some adverse reactions of this treatment  "include: pain, scarring, infection, and/or lightening or darkening of the skin.    Chemical peels  When flat warts appear, there are usually many warts. Because so many warts appear, dermatologists often prescribe \"peeling\" methods to treat these warts. This means, either the doctor or you will apply a peeling medicine every couple weeks or at home every day. Peeling medicines include salicylic acid (stronger than you can buy at the store), tretinoin, and glycolic acid.Some adverse reactions of this treatment include: pain, scarring, infection, and/or lightening or darkening of the skin.    Bleomycin  The wart may be injected with an anti-cancer medicine, bleomycin. The shots may hurt. Some adverse reactions of this treatment include: pain, scarring, infection, and/or lightening or darkening of the skin, or nail loss if given in the fingers.    Immunotherapy  This treatment uses the patient s own immune system to fight the warts. This treatment is used when the warts remain despite other treatments. There are two types:     i) one type of immunotherapy involves applying a chemical, such as diphencyprone (DCP), to the warts. A mild allergic reaction occurs around the treated warts. This reaction may cause the warts to go away.     ii) Another type of immunotherapy involves getting shots of Valentine antigen.  The shots can boost       WOUND CARE INSTRUCTIONS  FOR CRYOSURGERY        This area treated with liquid nitrogen will form a blister. You do not need to bandage the area until after the blister forms and breaks (which may be a few days).  When the blister breaks, begin daily dressing changes as follows:    1) Clean and dry the area with tap water using clean Q-tip or sterile gauze pad.    2) Apply Polysporin ointment or Bacitracin ointment over entire wound.  Do NOT use Neosporin ointment.    3) Cover the wound with a band-aid or sterile non-stick gauze pad and micropore paper tape.      REPEAT THESE INSTRUCTIONS " AT LEAST ONCE A DAY UNTIL THE WOUND HAS COMPLETELY HEALED.        It is an old wives tale that a wound heals better when it is exposed to air and allowed to dry out. The wound will heal faster with a better cosmetic result if it is kept moist with ointment and covered with a bandage.  Do not let the wound dry out.      IMPORTANT INFORMATION ON REVERSE SIDE    Supplies Needed:     *Cotton tipped applicators (Q-tips)   *Polysporin ointment or Bacitracin ointment (NOT NEOSPORIN)   *Band-aids, or non stick gauze pads and micropore paper tape                PATIENT INFORMATION    During the healing process you will notice a number of changes. All wounds develop a small halo of redness surrounding the wound.  This means healing is occurring. Severe itching with extensive redness usually indicates sensitivity to the ointment or bandage tape used to dress the wound.  You should call our office if this develops.      Swelling and/or discoloration around your surgical site is common, particularly when performed around the eye.    All wounds normally drain.  The larger the wound the more drainage there will be.  After 7-10 days, you will notice the wound beginning to shrink and new skin will begin to grow.  The wound is healed when you can see skin has formed over the entire area.  A healed wound has a healthy, shiny look to the surface and is red to dark pink in color to normalize.  Wounds may take approximately 4-6 weeks to heal.  Larger wounds may take 6-8 weeks.  After the wound is healed you may discontinue dressing changes.    You may experience a sensation of tightness as your wound heals. This is normal and will gradually subside.    Your healed wound may be sensitive to temperature changes. This sensitivity improves with time, but if you re having a lot of discomfort, try to avoid temperature extremes.    Patients frequently experience itching after their wound appears to have healed because of the continue healing  under the skin.  Plain Vaseline will help relieve the itching.

## 2018-07-27 NOTE — MR AVS SNAPSHOT
After Visit Summary   7/27/2018    Abhijit Garcia    MRN: [id number]           Patient Information     Date Of Birth          1975        Visit Information        Provider Department      7/27/2018 8:20 AM Madeleine Mccullough PA-C [facility] [place]        Care Instructions    WARTS  Warts are caused by the Human Papilloma Virus.They are commonly spread by direct contact or autoinoculation. That mean if the wart is picked at it could spread to another area of skin.   In children without treatment 50% of warts will disappear spontaneous in 6 months, 90% are gone in 2 years. In adults they can last for a longer period of time, but they can resolve without treatment.   No method of treatment is better than the other. You just have to be consistent with your treatments and return every 4-6 weeks.   In between treatments you should use either salicylic acid or mediplast tape:    Mediplast tape: Cut to size of wart, leave tape on for 1 week, (Put duct tape over it to secure it). In 1 week, pare skin down with a pumice stone and repeat. You want to pare down until you see some pinpoint bleeding. Do this for 6-8 weeks, if no improvement, make follow up appt.     Wart stick can be purchased online. (Twice a day, warts turn white, then pare down with a pumice stone and repeat) Do this for 6-8 weeks, if no improvement, make a follow up appt.       WART TREATMENTS    Wart(s), or verruca vulgaris  are a very common, benign skin condition caused by a virus.  Since warts are caused by a viral infection, your own immune system will typically clear the lesion on average from several months to 2 years. Although wart(s) do not easily spread to others, the virus may still pass through direct contact (picking or scratching) and/or indirect contact (locker rooms/public showers).     It is important to remember that there is no cure for the wart virus. This means that warts can return at the same  site or appear in a new spot.    Sometimes, it seems that new warts appear as fast as old ones go away. This happens when the old warts shed virus cells into the skin before the warts are treated. This allows new warts to grow around the first warts. The best way to prevent this is to have your dermatologist treat new warts as soon as they appear.    There are several technique/methods to making the wart(s) smaller in size or to help stimulate your immune system to clear the wart(s). The mainstay of treatment of wart(s) is to destroy the infected skin cells from the virus and to prevent recurrence.  The most important thing to remember is that regular consistent treatment is the most effective way to get rid of the wart.    Salicylic acid & Debridement   The first line treatment of warts is application of salicylic acid (with or without duct tape occlusion). Application of salicylic acid allows the wart(s) to stay flat and not callused. This allows other topical treatments to work better.      We recommend Wart Stick or Mediplast Tape over-the-counter   Directions: Apply the Wart Stick to the site twice daily or apply a piece of the Mediplast tape to the wart and cover tightly with tape to occlude the lesion.   Keep the medication on for 24 hours before removing/changing. Do this foe one week continuously.      After one week, when the salicylic acid is removed from the affected area the wart(s) area should turn the skin white/softened. Use an emery board/paring tool to rub off the softened tissue before changing a new salicylic acid application. Make sure you dispose the emery board and do NOT reuse on another site.     Cryotherapy or Freezing   Freezing wart(s) with a very cold substance (liquid nitrogen) is an effective treatment for common warts. The wart(s) are frozen off to kill the viral activity in and around the affected area. The treated areas will become sore and/or red for the next few hours. Some  adverse reactions of this treatment include: pain, blisters, blood blisters, infection, and/or lightening or darkening of the skin.   At times, when the wart is too thick and/or callused, the clinician will shave/pare down the thickened skin prior to spraying the wart(s) with liquid nitrogen.     Aldara  Cream   Aldara  Cream is a topical medication that activates your own immune system to help attack the cells infected with the virus. The cream works for resistant or recurrent warts that do not respond to freezing and/or salicylic acid.   Directions: Open the medication packet & apply to the affected area. Cover the lesion with a band-aid. The next morning, wash off the area with soap & water.  If the cream is going to work the wart(s) should get red and irritated.      Cantharidin (Canthacur/Cantharone)   Cantharidin is a chemical compound derived from a blister beetle. This liquid medication is applied to wart(s) in order to cause blistering, thus destroying the affected areas. At your  visit, application of cantharidin to the wart(s) is usually painless and the applied areas dry clear. Three to four hours after cantharidin is applied to the warts, you must wash off the area(s) with soap and water. Adverse reactions such as redness, tenderness, blistering, itching and burning sensations may occur within 2-3 days. It is expected to take 2-4 weeks for the treated areas to heal. Please follow up your provider in 6 weeks to reassess the treated areas.     Electrosurgery and curettage  Electrosurgery (burning) and Curettage (scraping off) of the wart(s) are often are used together. The dermatologist may remove the wart by scraping it off before or after electrosurgery. Some adverse reactions of this treatment include: pain, scarring, infection, and/or lightening or darkening of the skin.    Excision  Cutting out the wart is another option for wart treatment.  Some adverse reactions of this treatment include: pain,  "scarring, infection, and/or lightening or darkening of the skin.    TREATMENT RESISTANT WARTS    If the warts are hard-to-treat, we may use one of the following treatments:     Laser treatment  Laser treatment is an option, mainly for warts that have not responded to other therapies. Some adverse reactions of this treatment include: pain, scarring, infection, and/or lightening or darkening of the skin.    Chemical peels  When flat warts appear, there are usually many warts. Because so many warts appear, dermatologists often prescribe \"peeling\" methods to treat these warts. This means, either the doctor or you will apply a peeling medicine every couple weeks or at home every day. Peeling medicines include salicylic acid (stronger than you can buy at the store), tretinoin, and glycolic acid.Some adverse reactions of this treatment include: pain, scarring, infection, and/or lightening or darkening of the skin.    Bleomycin  The wart may be injected with an anti-cancer medicine, bleomycin. The shots may hurt. Some adverse reactions of this treatment include: pain, scarring, infection, and/or lightening or darkening of the skin, or nail loss if given in the fingers.    Immunotherapy  This treatment uses the patient s own immune system to fight the warts. This treatment is used when the warts remain despite other treatments. There are two types:     i) one type of immunotherapy involves applying a chemical, such as diphencyprone (DCP), to the warts. A mild allergic reaction occurs around the treated warts. This reaction may cause the warts to go away.     ii) Another type of immunotherapy involves getting shots of Valentine antigen.  The shots can boost       WOUND CARE INSTRUCTIONS  FOR CRYOSURGERY        This area treated with liquid nitrogen will form a blister. You do not need to bandage the area until after the blister forms and breaks (which may be a few days).  When the blister breaks, begin daily dressing changes as " follows:    1) Clean and dry the area with tap water using clean Q-tip or sterile gauze pad.    2) Apply Polysporin ointment or Bacitracin ointment over entire wound.  Do NOT use Neosporin ointment.    3) Cover the wound with a band-aid or sterile non-stick gauze pad and micropore paper tape.      REPEAT THESE INSTRUCTIONS AT LEAST ONCE A DAY UNTIL THE WOUND HAS COMPLETELY HEALED.        It is an old wives tale that a wound heals better when it is exposed to air and allowed to dry out. The wound will heal faster with a better cosmetic result if it is kept moist with ointment and covered with a bandage.  Do not let the wound dry out.      IMPORTANT INFORMATION ON REVERSE SIDE    Supplies Needed:     *Cotton tipped applicators (Q-tips)   *Polysporin ointment or Bacitracin ointment (NOT NEOSPORIN)   *Band-aids, or non stick gauze pads and micropore paper tape                PATIENT INFORMATION    During the healing process you will notice a number of changes. All wounds develop a small halo of redness surrounding the wound.  This means healing is occurring. Severe itching with extensive redness usually indicates sensitivity to the ointment or bandage tape used to dress the wound.  You should call our office if this develops.      Swelling and/or discoloration around your surgical site is common, particularly when performed around the eye.    All wounds normally drain.  The larger the wound the more drainage there will be.  After 7-10 days, you will notice the wound beginning to shrink and new skin will begin to grow.  The wound is healed when you can see skin has formed over the entire area.  A healed wound has a healthy, shiny look to the surface and is red to dark pink in color to normalize.  Wounds may take approximately 4-6 weeks to heal.  Larger wounds may take 6-8 weeks.  After the wound is healed you may discontinue dressing changes.    You may experience a sensation of tightness as your wound heals. This is  normal and will gradually subside.    Your healed wound may be sensitive to temperature changes. This sensitivity improves with time, but if you re having a lot of discomfort, try to avoid temperature extremes.    Patients frequently experience itching after their wound appears to have healed because of the continue healing under the skin.  Plain Vaseline will help relieve the itching.                   Follow-ups after your visit        Your next 10 appointments already scheduled     Jul 27, 2018  8:20 AM CDT   Office Visit with Madeleine Mccullough PA-C   [facility]    [address] [place] [id number] [phone]           Bring a current list of meds and any records pertaining to this visit. For Physicals, please bring immunization records and any forms needing to be filled out. Please arrive 10 minutes early to complete paperwork.              Who to contact     If you have questions or need follow up information about today's clinic visit or your schedule please contact [facility] directly at [phone].  Normal or non-critical lab and imaging results will be communicated to you by [employer], letter or phone within 4 business days after the clinic has received the results. If you do not hear from us within 7 days, please contact the clinic through [employer] or phone. If you have a critical or abnormal lab result, we will notify you by phone as soon as possible.  Submit refill requests through [facility] or call your pharmacy and they will forward the refill request to us. Please allow 3 business days for your refill to be completed.          Additional Information About Your Visit        [facility] Information     [facility] gives you secure access to your electronic health record. If you see a primary care provider, you can also send messages to your care team and make appointments. If you have questions, please call your  primary care clinic.  If you do not have a primary care provider, please call [phone] and they will assist you.        Care EveryWhere ID     This is your Care EveryWhere ID. This could be used by other organizations to access your [place] medical records  [id number]        Your Vitals Were     Pulse Pulse Oximetry                78 97%           Blood Pressure from Last 3 Encounters:   07/27/18 126/86   07/11/18 112/74   04/06/18 128/87    Weight from Last 3 Encounters:   07/11/18 312 lb (141.5 kg)   03/21/18 311 lb 11.2 oz (141.4 kg)   01/04/18 (!) 304 lb 14.4 oz (138.3 kg)              Today, you had the following     No orders found for display         Today's Medication Changes          These changes are accurate as of 7/27/18  8:03 AM.  If you have any questions, ask your nurse or doctor.               These medicines have changed or have updated prescriptions.        Dose/Directions    imiquimod 5 % cream   Commonly known as:  ALDARA   This may have changed:    - when to take this  - reasons to take this  - additional instructions   Used for:  Verruca plana        Apply a small sized amount to warts QHS at bedtime.   Wash off after 8 hours.   Quantity:  12 packet   Refills:  3       ketoconazole 2 % cream   Commonly known as:  NIZORAL   This may have changed:    - when to take this  - reasons to take this   Used for:  Tinea pedis of both feet        Apply topically daily   Quantity:  60 g   Refills:  2                Primary Care Provider Office Phone # Fax #    Samir Jaison De La O [phone]       [address] [place] [id number]        Equal Access to Services     [facility]: Hadchio Joshua, waaxda luqnina, qaybta kaalpauly cat. So [facility] [phone].    ATENCIÓN: Si habla español, tiene a cruz disposición servicios gratuitos de asistencia lingüística. Llame al [phone].    We comply with applicable federal  civil rights laws and [place] laws. We do not discriminate on the basis of race, color, national origin, age, disability, sex, sexual orientation, or gender identity.            Thank you!     Thank you for choosing [facility]  for your care. Our goal is always to provide you with excellent care. Hearing back from our patients is one way we can continue to improve our services. Please take a few minutes to complete the written survey that you may receive in the mail after your visit with us. Thank you!             Your Updated Medication List - Protect others around you: Learn how to safely use, store and throw away your medicines at www.disposemymeds.org.          This list is accurate as of 7/27/18  8:03 AM.  Always use your most recent med list.                   Brand Name Dispense Instructions for use Diagnosis    allopurinol 100 MG tablet    ZYLOPRIM    90 tablet    Take 1 tablet (100 mg) by mouth daily    Gout of multiple sites, unspecified cause, unspecified chronicity       ciclopirox 0.77 % Gel      as needed        colchicine 0.6 MG capsule    MITIGARE    60 capsule    Take 0.6 mg by mouth 2 times daily as needed    Acute gout, unspecified cause, unspecified site       imiquimod 5 % cream    ALDARA    12 packet    Apply a small sized amount to warts QHS at bedtime.   Wash off after 8 hours.    Verruca plana       irbesartan 300 MG tablet    AVAPRO     Take 1 tablet (300 mg) by mouth daily    Essential hypertension       ketoconazole 2 % cream    NIZORAL    60 g    Apply topically daily    Tinea pedis of both feet       meloxicam 15 MG tablet    MOBIC    90 tablet    Take 1 tablet (15 mg) by mouth daily as needed    Gout of multiple sites, unspecified cause, unspecified chronicity       order for DME     1 Device    Equipment being ordered:  Knee high support stockings, 20-30 mm mercury One pain, wear daily as needed    Dependent edema       valACYclovir 1000 mg tablet    VALTREX    8  tablet    TAKE 2 TABLETS BY MOUTH TWICE DAILY FOR 1 DAY    Herpes simplex virus (HSV) infection

## 2018-07-27 NOTE — LETTER
7/27/2018         RE: Abhijit Garcia  [address] [address] [facility] [address] [facility] [address]        Dear Colleague,    Thank you [facility] your [facility]. Please see a copy of my visit note below.    HPI:  Abhijit Garcia is a 43 year old male patient here today for wart on right neck   Duration: weeks-months?  Symptoms:  irritated     Previous treatments: cryo to warts on right UE and neck with complete resolution  Alleviating/aggravating factors: none    Associated symptoms: none  Additional findings:none  Patient has no other skin complaints today.  Remainder of the HPI, Meds, PMH, Allergies, FH, and SH was reviewed in chart.      Past Medical History:   Diagnosis Date     Dysuria      Hypertension        Past Surgical History:   Procedure Laterality Date     ANKLE SURGERY       APPENDECTOMY       ORTHOPEDIC SURGERY  03/23/2017    ankle     wisdom teeth          Family History   Problem Relation Age of Onset     Diabetes Mother      Obesity Mother      Cancer - colorectal Mother      Hypertension Mother      Arthritis Father      Respiratory Father      asbestos exposure     Chronic Obstructive Pulmonary Disease Father      Coronary Artery Disease Father 71     Skin Cancer Father      Allergies Brother      Respiratory Brother      Heart Defect Son        Social History     Social History     Marital status:      Spouse name: N/A     Number of children: 1     Years of education: N/A     Occupational History      [employer]     Social History Main Topics     Smoking status: Never Smoker     Smokeless tobacco: Never Used     Alcohol use 0.0 oz/week     0 Standard drinks or equivalent per week      Comment: 1-5 drinks per week     Drug use: No     Sexual activity: Yes     Partners: Female     Other Topics Concern      Service Yes     Blood Transfusions No     Caffeine Concern Yes     2 pots daily     Occupational Exposure No      Occ. all with in managible levels     Hobby Hazards No     Sleep Concern Yes     Stress Concern Yes     Weight Concern Yes     Special Diet No     Back Care Yes     L4 and L5 damaged     Exercise No     Seat Belt Yes     Self-Exams No     Parent/Sibling W/ Cabg, Mi Or Angioplasty Before 65f 55m? No     Social History Narrative       Outpatient Encounter Prescriptions as of 7/27/2018   Medication Sig Dispense Refill     allopurinol (ZYLOPRIM) 100 MG tablet Take 1 tablet (100 mg) by mouth daily 90 tablet prn     ciclopirox 0.77 % GEL as needed   0     Colchicine (MITIGARE) 0.6 MG CAPS Take 0.6 mg by mouth 2 times daily as needed 60 capsule 1     imiquimod (ALDARA) 5 % cream Apply a small sized amount to warts QHS at bedtime.   Wash off after 8 hours. (Patient taking differently: as needed Apply a small sized amount to warts QHS at bedtime.   Wash off after 8 hours.) 12 packet 3     irbesartan (AVAPRO) 300 MG tablet Take 1 tablet (300 mg) by mouth daily       ketoconazole (NIZORAL) 2 % cream Apply topically daily (Patient taking differently: Apply topically as needed ) 60 g 2     meloxicam (MOBIC) 15 MG tablet Take 1 tablet (15 mg) by mouth daily as needed 90 tablet 0     valACYclovir (VALTREX) 1000 mg tablet TAKE 2 TABLETS BY MOUTH TWICE DAILY FOR 1 DAY 8 tablet 0     order for DME Equipment being ordered:  Knee high support stockings, 20-30 mm mercury  One pain, wear daily as needed (Patient not taking: Reported on 7/11/2018) 1 Device 1     No facility-administered encounter medications on file as of 7/27/2018.        Review Of Systems:  Skin: As above  Eyes: negative  Ears/Nose/Throat: negative  Respiratory: No shortness of breath, dyspnea on exertion, cough, or hemoptysis  Cardiovascular: negative  Gastrointestinal: negative  Genitourinary: negative  Musculoskeletal: negative  Neurologic: negative  Psychiatric: negative  Hematologic/Lymphatic/Immunologic: negative  Endocrine: negative      Objective:     /86   Pulse 78  SpO2 97%  Eyes: Conjunctivae/lids: Normal   ENT: Lips:  Normal  MSK: Normal  Cardiovascular: Peripheral edema none  Pulm: Breathing Normal  Neuro/Psych: Orientation: Normal; Mood/Affect: Normal, NAD, WDWN  Pt accompanied by: self  Following areas examined: face, neck, UEs  [place] skin type:l   Findings: irritated flesh-colored verrucous appearing papules x 2 on neck  Assessment and Plan:  1) common warts x 2  LN2: Treated with LN2 for 5s for 1-2 cycles. Warned risks of blistering, pain, pigment change, scarring, and incomplete resolution.  Advised patient to return if lesions do not completely resolve within 2-3 months.  Wound care sheet given.    Follow up in prn      Again, thank you for allowing me to participate in the care of your patient.        Sincerely,        Madeleine Mccullough PA-C

## 2018-07-30 ENCOUNTER — MYC MEDICAL ADVICE (OUTPATIENT)
Dept: INTERNAL MEDICINE | Facility: CLINIC | Age: 43
End: 2018-07-30

## 2018-07-31 ENCOUNTER — OFFICE VISIT (OUTPATIENT)
Dept: INTERNAL MEDICINE | Facility: CLINIC | Age: 43
End: 2018-07-31
Payer: COMMERCIAL

## 2018-07-31 VITALS
DIASTOLIC BLOOD PRESSURE: 88 MMHG | OXYGEN SATURATION: 98 % | WEIGHT: 311.5 LBS | SYSTOLIC BLOOD PRESSURE: 126 MMHG | RESPIRATION RATE: 18 BRPM | HEART RATE: 80 BPM | TEMPERATURE: 98.8 F | BODY MASS INDEX: 39.99 KG/M2

## 2018-07-31 DIAGNOSIS — M79.662 PAIN OF LEFT LOWER LEG: Primary | ICD-10-CM

## 2018-07-31 PROCEDURE — 99213 OFFICE O/P EST LOW 20 MIN: CPT | Mod: 24 | Performed by: PHYSICIAN ASSISTANT

## 2018-07-31 NOTE — MR AVS SNAPSHOT
After Visit Summary   7/31/2018    Abhijit Garcia    MRN: [id number]           Patient Information     Date Of Birth          1975        Visit Information        Provider Department      7/31/2018 7:40 AM Mary Gonzalez PA-C [facility]        Today's Diagnoses     Pain of left lower leg    -  1       Follow-ups after your visit        Who to contact     If you have questions or need follow up information about today's clinic visit or your schedule please contact [facility] directly at [phone].  Normal or non-critical lab and imaging results will be communicated to you by Opp.iohart, letter or phone within 4 business days after the clinic has received the results. If you do not hear from us within 7 days, please contact the clinic through Opp.iohart or phone. If you have a critical or abnormal lab result, we will notify you by phone as soon as possible.  Submit refill requests through [employer] or call your pharmacy and they will forward the refill request to us. Please allow 3 business days for your refill to be completed.          Additional Information About Your Visit        MyChart Information     [employer] gives you secure access to your electronic health record. If you see a primary care provider, you can also send messages to your care team and make appointments. If you have questions, please call your primary care clinic.  If you do not have a primary care provider, please call [phone] and they will assist you.        Care EveryWhere ID     This is your Care EveryWhere ID. This could be used by other organizations to access your [place] medical records  [id number]        Your Vitals Were     Pulse Temperature Respirations Pulse Oximetry BMI (Body Mass Index)       80 98.8  F (37.1  C) (Oral) 18 98% 39.99 kg/m2        Blood Pressure from Last 3 Encounters:   07/31/18 126/88   07/27/18 126/86   07/11/18 112/74    Weight from  Last 3 Encounters:   07/31/18 311 lb 8 oz (141.3 kg)   07/11/18 312 lb (141.5 kg)   03/21/18 311 lb 11.2 oz (141.4 kg)              Today, you had the following     No orders found for display         Today's Medication Changes          These changes are accurate as of 7/31/18  8:19 AM.  If you have any questions, ask your nurse or doctor.               These medicines have changed or have updated prescriptions.        Dose/Directions    imiquimod 5 % cream   Commonly known as:  ALDARA   This may have changed:    - when to take this  - reasons to take this  - additional instructions   Used for:  Verruca plana        Apply a small sized amount to warts QHS at bedtime.   Wash off after 8 hours.   Quantity:  12 packet   Refills:  3       ketoconazole 2 % cream   Commonly known as:  NIZORAL   This may have changed:    - when to take this  - reasons to take this   Used for:  Tinea pedis of both feet        Apply topically daily   Quantity:  60 g   Refills:  2                Primary Care Provider Office Phone # Fax #    Samir Jaison De La O [phone]       [address] [place] [phone]        Equal Access to Services     JEANNINE HARRELL AH: Hadii mir call hadasho Sosumeetali, waaxda luqadaha, qaybta kaalmada adeegyada, pauly correa. So [facility] [phone].    ATENCIÓN: Si habla español, tiene a cruz disposición servicios gratuitos de asistencia lingüística. Llame al [phone].    We comply with applicable federal civil rights laws and [place] laws. We do not discriminate on the basis of race, color, national origin, age, disability, sex, sexual orientation, or gender identity.            Thank you!     Thank you for choosing [facility]  for your care. Our goal is always to provide you with excellent care. Hearing back from our patients is one way we can continue to improve our services. Please take a few minutes to complete the written survey that you  may receive in the mail after your visit with us. Thank you!             Your Updated Medication List - Protect others around you: Learn how to safely use, store and throw away your medicines at www.disposemymeds.org.          This list is accurate as of 7/31/18  8:19 AM.  Always use your most recent med list.                   Brand Name Dispense Instructions for use Diagnosis    allopurinol 100 MG tablet    ZYLOPRIM    90 tablet    Take 1 tablet (100 mg) by mouth daily    Gout of multiple sites, unspecified cause, unspecified chronicity       ciclopirox 0.77 % Gel      as needed        colchicine 0.6 MG capsule    MITIGARE    60 capsule    Take 0.6 mg by mouth 2 times daily as needed    Acute gout, unspecified cause, unspecified site       imiquimod 5 % cream    ALDARA    12 packet    Apply a small sized amount to warts QHS at bedtime.   Wash off after 8 hours.    Verruca plana       irbesartan 300 MG tablet    AVAPRO     Take 1 tablet (300 mg) by mouth daily    Essential hypertension       ketoconazole 2 % cream    NIZORAL    60 g    Apply topically daily    Tinea pedis of both feet       meloxicam 15 MG tablet    MOBIC    90 tablet    Take 1 tablet (15 mg) by mouth daily as needed    Gout of multiple sites, unspecified cause, unspecified chronicity       order for DME     1 Device    Equipment being ordered:  Knee high support stockings, 20-30 mm mercury One pain, wear daily as needed    Dependent edema       valACYclovir 1000 mg tablet    VALTREX    8 tablet    TAKE 2 TABLETS BY MOUTH TWICE DAILY FOR 1 DAY    Herpes simplex virus (HSV) infection

## 2018-07-31 NOTE — PROGRESS NOTES
SUBJECTIVE:   Abhijit Garcia is a 43 year old male who presents to clinic today for the following health issues:      Musculoskeletal problem/pain      Duration: several months     Description  Location: left lower leg     Intensity:  mild, moderate    Accompanying signs and symptoms:  No numbness, tingling, warmth, swelling, redness and     History  Previous similar problem: YES  Previous evaluation:  Patient with hx of DVT last year after prolonged immobilization in walking cast after surgery for tendon damage of left lower leg.  Has had US after stopped xareleto last summer. And then was seen again in 8/2017 with concerns about calf pain- US then again neg for DVT.     Precipitating or alleviating factors:  Trauma or overuse: no - no trauma , no prolonged immobilization in the past several months.   Patient with calf muscle atrophy and did have PT after, has been discharged from PT since 4/2018 to home exercises   Aggravating factors include: sitting, standing, walking, exercise patient with intermittent pain in various places of the lower left leg.       Therapies tried and outcome: stretching and exercises      -------------------------------------    Problem list and histories reviewed & adjusted, as indicated.  Additional history: as documented    Labs reviewed in EPIC    Reviewed and updated as needed this visit by clinical staff  Tobacco  Allergies  Meds       Reviewed and updated as needed this visit by Provider  Allergies  Meds         ROS:  Constitutional, HEENT, cardiovascular, pulmonary, gi MS systems are negative, except as otherwise noted.    OBJECTIVE:     /88  Pulse 80  Temp 98.8  F (37.1  C) (Oral)  Resp 18  Wt 311 lb 8 oz (141.3 kg)  SpO2 98%  BMI 39.99 kg/m2  Body mass index is 39.99 kg/(m^2).  GENERAL: healthy, alert and no distress  RESP: lungs clear to auscultation - no rales, rhonchi or wheezes  CV: regular rate and rhythm, normal S1 S2, no S3 or S4, no murmur, click  or rub, no peripheral edema and peripheral pulses strong  MS: muscle atrophy noted of the left calf vs the right calf  No edema or redness/warmth noted.   NO pain today with palpation.  Some pain noted after exam when he got up and walked a few steps, but mild.   NO palpable cord   SKIN: no suspicious lesions or rashes    Diagnostic Test Results:  none     ASSESSMENT/PLAN:             1. Pain of left lower leg  Suspect still recovery from injury, DVT last year. - noted muscle atrophy and recommend cool compress heat and PT stretching/exercises.   Discussed to be seen if there is a provoking incident- prolonged immobilization trauma etc.      25 minutes spent with patient > 50% of time on counseling and plan of care.    Mary Gonzalez PA-C  [facility]

## 2018-09-07 ENCOUNTER — OFFICE VISIT (OUTPATIENT)
Dept: DERMATOLOGY | Facility: CLINIC | Age: 43
End: 2018-09-07
Payer: COMMERCIAL

## 2018-09-07 VITALS — OXYGEN SATURATION: 97 % | SYSTOLIC BLOOD PRESSURE: 122 MMHG | HEART RATE: 66 BPM | DIASTOLIC BLOOD PRESSURE: 85 MMHG

## 2018-09-07 DIAGNOSIS — B07.8 VERRUCA PLANA: Primary | ICD-10-CM

## 2018-09-07 PROCEDURE — 17110 DESTRUCTION B9 LES UP TO 14: CPT | Performed by: PHYSICIAN ASSISTANT

## 2018-09-07 NOTE — MR AVS SNAPSHOT
After Visit Summary   9/7/2018    Abhijit Garcia    MRN: [id number]           Patient Information     Date Of Birth          1975        Visit Information        Provider Department      9/7/2018 11:00 AM Shanthi Red [place]-C [facility]        Today's Diagnoses     Verruca plana    -  1       Follow-ups after your visit        Who to contact     If you have questions or need follow up information about today's clinic visit or your schedule please contact [facility] directly at [phone].  Normal or non-critical lab and imaging results will be communicated to you by Camrivoxhart, letter or phone within 4 business days after the clinic has received the results. If you do not hear from us within 7 days, please contact the clinic through [employer] or phone. If you have a critical or abnormal lab result, we will notify you by phone as soon as possible.  Submit refill requests through [employer] or call your pharmacy and they will forward the refill request to us. Please allow 3 business days for your refill to be completed.          Additional Information About Your Visit        MyChart Information     [employer] gives you secure access to your electronic health record. If you see a primary care provider, you can also send messages to your care team and make appointments. If you have questions, please call your primary care clinic.  If you do not have a primary care provider, please call [phone] and they will assist you.        Care EveryWhere ID     This is your Care EveryWhere ID. This could be used by other organizations to access your [place] medical records  [id number]        Your Vitals Were     Pulse Pulse Oximetry                66 97%           Blood Pressure from Last 3 Encounters:   09/07/18 122/85   07/31/18 126/88   07/27/18 126/86    Weight from Last 3 Encounters:   07/31/18 141.3 kg (311 lb 8 oz)   07/11/18 141.5 kg (312 lb)    03/21/18 141.4 kg (311 lb 11.2 oz)              We Performed the Following     DESTRUCT BENIGN LESION, UP TO 14          Today's Medication Changes          These changes are accurate as of 9/7/18 12:29 PM.  If you have any questions, ask your nurse or doctor.               These medicines have changed or have updated prescriptions.        Dose/Directions    imiquimod 5 % cream   Commonly known as:  ALDARA   This may have changed:    - when to take this  - reasons to take this  - additional instructions   Used for:  Verruca plana        Apply a small sized amount to warts QHS at bedtime.   Wash off after 8 hours.   Quantity:  12 packet   Refills:  3       ketoconazole 2 % cream   Commonly known as:  NIZORAL   This may have changed:    - when to take this  - reasons to take this   Used for:  Tinea pedis of both feet        Apply topically daily   Quantity:  60 g   Refills:  2                Primary Care Provider Office Phone # Fax #    Samir Jaison De La O [phone]       [address] [place] [phone]        Equal Access to Services     JEANNINE HARRELL : Hadii mir ku hadasho Soomaali, waaxda luqadaha, qaybta kaalmada adeegyada, pauly rodriguez . So [facility] [phone].    ATENCIÓN: Si habla español, tiene a cruz disposición servicios gratuitos de asistencia lingüística. Llame al [phone].    We comply with applicable federal civil rights laws and [place] laws. We do not discriminate on the basis of race, color, national origin, age, disability, sex, sexual orientation, or gender identity.            Thank you!     Thank you for choosing [facility]  for your care. Our goal is always to provide you with excellent care. Hearing back from our patients is one way we can continue to improve our services. Please take a few minutes to complete the written survey that you may receive in the mail after your visit with us. Thank you!             Your  Updated Medication List - Protect others around you: Learn how to safely use, store and throw away your medicines at www.disposemymeds.org.          This list is accurate as of 9/7/18 12:29 PM.  Always use your most recent med list.                   Brand Name Dispense Instructions for use Diagnosis    allopurinol 100 MG tablet    ZYLOPRIM    90 tablet    Take 1 tablet (100 mg) by mouth daily    Gout of multiple sites, unspecified cause, unspecified chronicity       ciclopirox 0.77 % Gel      as needed        colchicine 0.6 MG capsule    MITIGARE    60 capsule    Take 0.6 mg by mouth 2 times daily as needed    Acute gout, unspecified cause, unspecified site       imiquimod 5 % cream    ALDARA    12 packet    Apply a small sized amount to warts QHS at bedtime.   Wash off after 8 hours.    Verruca plana       irbesartan 300 MG tablet    AVAPRO     Take 1 tablet (300 mg) by mouth daily    Essential hypertension       ketoconazole 2 % cream    NIZORAL    60 g    Apply topically daily    Tinea pedis of both feet       meloxicam 15 MG tablet    MOBIC    90 tablet    Take 1 tablet (15 mg) by mouth daily as needed    Gout of multiple sites, unspecified cause, unspecified chronicity       order for DME     1 Device    Equipment being ordered:  Knee high support stockings, 20-30 mm mercury One pain, wear daily as needed    Dependent edema       valACYclovir 1000 mg tablet    VALTREX    8 tablet    TAKE 2 TABLETS BY MOUTH TWICE DAILY FOR 1 DAY    Herpes simplex virus (HSV) infection

## 2018-09-07 NOTE — LETTER
9/7/2018         RE: Abhijit Garcia  [address] [facility] [address]        Dear Colleague,    Thank you for referring your patient, Abhijit Garcia, to the [facility]. Please see a copy of my visit note below.    HPI:   Abhijit Garcia is a 43 year old male who presents for evaluation of spot on left cheek   chief complaint  Location: left cheek    Condition present for:  recent.   Previous treatments include: none    Review Of Systems  Eyes: negative  Ears/Nose/Throat: negative  Respiratory: No shortness of breath, dyspnea on exertion, cough, or hemoptysis  Cardiovascular: negative  Gastrointestinal: negative  Genitourinary: negative  Musculoskeletal: negative  Neurologic: negative  Psychiatric: negative    This document serves as a record of the services and decisions personally performed and made by Shanthi Red, MS, PA-C. It was created on her behalf by Olimpia Bryson, a trained medical scribe. The creation of this document is based on the provider's statements to the medical scribe.  Olimpia Bryson 11:06 AM September 7, 2018    PHYSICAL EXAM:    /85  Pulse 66  SpO2 97%  Skin exam performed as follows: Type 2 skin. Mood appropriate  Alert and Oriented X 3. Well developed, well nourished in no distress.  General appearance: Normal  Head including face: Normal  Eyes: conjunctiva and lids: Normal  Mouth: Lips, teeth, gums: Normal  Neck: Normal  Chest-breast/axillae: Normal  Back: Normal  Spleen and liver: Normal  Cardiovascular: Exam of peripheral vascular system by observation for swelling, varicosities, edema: Normal  Genitalia: groin, buttocks: Normal  Extremities: digits/nails (clubbing): Normal  Eccrine and Apocrine glands: Normal  Right upper extremity: Normal  Left upper extremity: Normal  Right lower extremity: Normal  Left lower extremity: Normal  Skin: Scalp and body hair: See below    1. Flat topped small verrucous  papules located on left jawline x 3     ASSESSMENT/PLAN:     1. Verruca plana on left jawline x 3 - advised. As bothersome to pt cryosurgery performed. Advised on blistering and post op care.         Follow-up: PRN  CC:   Scribed By: Olimpia Bryson, Medical Scribe    The information in this document, created by the medical scribe for me, accurately reflects the services I personally performed and the decisions made by me. I have reviewed and approved this document for accuracy prior to leaving the patient care area.  September 7, 2018 11:11 AM    Shanthi Red MS, PAALESHA      Again, thank you for allowing me to participate in the care of your patient.        Sincerely,        Shanthi Red PA-C

## 2018-09-11 DIAGNOSIS — B00.9 HERPES SIMPLEX VIRUS (HSV) INFECTION: ICD-10-CM

## 2018-09-11 RX ORDER — VALACYCLOVIR HYDROCHLORIDE 1 G/1
TABLET, FILM COATED ORAL
Qty: 8 TABLET | Refills: 3 | Status: SHIPPED | OUTPATIENT
Start: 2018-09-11 | End: 2019-12-02

## 2018-09-11 NOTE — TELEPHONE ENCOUNTER
"Requested Prescriptions   Pending Prescriptions Disp Refills     valACYclovir (VALTREX) 1000 mg tablet [Pharmacy Med Name: VALACYCLOVIR 1GM TABLETS] 8 tablet 0     Sig: TAKE 2 TABLETS BY MOUTH TWICE DAILY FOR 1 DAY    Antivirals for Herpes Protocol Passed    9/11/2018  8:55 AM       Passed - Patient is age 12 or older       Passed - Recent (12 mo) or future (30 days) visit within the authorizing provider's specialty    Patient had office visit in the last 12 months or has a visit in the next 30 days with authorizing provider or within the authorizing provider's specialty.  See \"Patient Info\" tab in inbasket, or \"Choose Columns\" in Meds & Orders section of the refill encounter.           Passed - Normal serum creatinine on file in past 12 months    Recent Labs   Lab Test  06/07/18   0754   CR  1.08             Last Written Prescription Date:  6/22/18  Last Fill Quantity: 8,  # refills: 0   Last office visit: 7/31/2018 with prescribing provider:  7/31/18   Future Office Visit:      "

## 2018-09-14 ENCOUNTER — OFFICE VISIT (OUTPATIENT)
Dept: FAMILY MEDICINE | Facility: CLINIC | Age: 43
End: 2018-09-14
Payer: COMMERCIAL

## 2018-09-14 VITALS — DIASTOLIC BLOOD PRESSURE: 83 MMHG | HEART RATE: 73 BPM | SYSTOLIC BLOOD PRESSURE: 122 MMHG | OXYGEN SATURATION: 100 %

## 2018-09-14 DIAGNOSIS — B07.8 FLAT WART: Primary | ICD-10-CM

## 2018-09-14 DIAGNOSIS — Z80.8 FAMILY HISTORY OF SKIN CANCER: ICD-10-CM

## 2018-09-14 DIAGNOSIS — D18.01 CHERRY ANGIOMA: ICD-10-CM

## 2018-09-14 DIAGNOSIS — D22.9 MULTIPLE BENIGN NEVI: ICD-10-CM

## 2018-09-14 DIAGNOSIS — L81.4 LENTIGINES: ICD-10-CM

## 2018-09-14 PROCEDURE — 17110 DESTRUCTION B9 LES UP TO 14: CPT | Performed by: PHYSICIAN ASSISTANT

## 2018-09-14 PROCEDURE — 99213 OFFICE O/P EST LOW 20 MIN: CPT | Mod: 25 | Performed by: PHYSICIAN ASSISTANT

## 2018-09-14 NOTE — MR AVS SNAPSHOT
After Visit Summary   9/14/2018    Abhijit Garcia    MRN: [id number]           Patient Information     Date Of Birth          1975        Visit Information        Provider Department      9/14/2018 8:00 AM Madeleine Mccullough PA-C [facility] Instructions    Dr. Troy's freeze away wart remover or compound w-freeze off wart or others.      Proper skin care from [facility]:    -Eliminate harsh soaps as they strip the natural oils from the skin, often resulting in dry itchy skin ( i.e. Dial, Zest, Stateless Spring)  -Use mild soaps such as Cetaphil or Dove Sensitive Skin in the shower. You do not need to use soap on arms, legs, and trunk every time you shower unless visibly soiled.   -Avoid hot or cold showers.  -After showering, lightly dry off and apply moisturizing within 2-3 minutes. This will help trap moisture in the skin.   -Aggressive use of a moisturizer at least 1-2 times a day to the entire body (including -Vanicream, Cetaphil, Aquaphor or Cerave) and moisturize hands after every washing.  -We recommend using moisturizers that come in a tub that needs to be scooped out, not a pump. This has more of an oil base. It will hold moisture in your skin much better than a water base moisturizer. The above recommended are non-pore clogging.           Wear a sunscreen with at least SPF 30 on your face, ears, neck and V of the chest daily. Wear sunscreen on other areas of the body if those areas are exposed to the sun throughout the day. Sunscreens can contain physical and/or chemical blockers. Physical blockers are less likely to clog pores, these include zinc oxide and titanium dioxide. Reapply every two hour and after swimming. Sunscreen examples include Neutrogena, CeraVe, Blue Lizard, Elta MD and many others.    UV radiation  UVA radiation remains constant throughout the day and throughout the year. It is a longer wavelength than UVB and therefore  penetrates deeper into the skin leading to immediate and delayed tanning, photoaging, and skin cancer. 70-80% of UVA and UVB radiation occurs between the hours of 10am-2pm.  UVB radiation  UVB radiation causes the most harmful effects and is more significant during the summer months. However, snow and ice can reflect UVB radiation leading to skin damage during the winter months as well. UVB radiation is responsible for tanning, burning, inflammation, delayed erythema (pinkness), pigmentation (brown spots), and skin cancer.   Just because you do not burn or are not developing a tan does not mean that you are not damaging your skin. A 15 minute drive to and from work for 30 years an lead to chronic sun damage of the skin. It is important to wear a broad spectrum (both UVA and UVB) sunscreen EVERY day with at least 30 SPF. Apply to face, ears, neck and v of the chest as this is where most of our sun exposure is. Reapply sunscreen every two hours if you plan on being outside.   Karl Foster. Clinical Dermatology: A Color Guide to Diagnosis and Therapy. Elsevier, 2016.     WOUND CARE INSTRUCTIONS  FOR CRYOSURGERY        This area treated with liquid nitrogen will form a blister. You do not need to bandage the area until after the blister forms and breaks (which may be a few days).  When the blister breaks, begin daily dressing changes as follows:    1) Clean and dry the area with tap water using clean Q-tip or sterile gauze pad.    2) Apply Polysporin ointment or Bacitracin ointment over entire wound.  Do NOT use Neosporin ointment.    3) Cover the wound with a band-aid or sterile non-stick gauze pad and micropore paper tape.      REPEAT THESE INSTRUCTIONS AT LEAST ONCE A DAY UNTIL THE WOUND HAS COMPLETELY HEALED.        It is an old wives tale that a wound heals better when it is exposed to air and allowed to dry out. The wound will heal faster with a better cosmetic result if it is kept moist with ointment and  covered with a bandage.  Do not let the wound dry out.      IMPORTANT INFORMATION ON REVERSE SIDE    Supplies Needed:     *Cotton tipped applicators (Q-tips)   *Polysporin ointment or Bacitracin ointment (NOT NEOSPORIN)   *Band-aids, or non stick gauze pads and micropore paper tape                PATIENT INFORMATION    During the healing process you will notice a number of changes. All wounds develop a small halo of redness surrounding the wound.  This means healing is occurring. Severe itching with extensive redness usually indicates sensitivity to the ointment or bandage tape used to dress the wound.  You should call our office if this develops.      Swelling and/or discoloration around your surgical site is common, particularly when performed around the eye.    All wounds normally drain.  The larger the wound the more drainage there will be.  After 7-10 days, you will notice the wound beginning to shrink and new skin will begin to grow.  The wound is healed when you can see skin has formed over the entire area.  A healed wound has a healthy, shiny look to the surface and is red to dark pink in color to normalize.  Wounds may take approximately 4-6 weeks to heal.  Larger wounds may take 6-8 weeks.  After the wound is healed you may discontinue dressing changes.    You may experience a sensation of tightness as your wound heals. This is normal and will gradually subside.    Your healed wound may be sensitive to temperature changes. This sensitivity improves with time, but if you re having a lot of discomfort, try to avoid temperature extremes.    Patients frequently experience itching after their wound appears to have healed because of the continue healing under the skin.  Plain Vaseline will help relieve the itching.                   Follow-ups after your visit        Who to contact     If you have questions or need follow up information about today's clinic visit or your schedule please contact [facility]  [facility] directly at [phone].  Normal or non-critical lab and imaging results will be communicated to you by Kiwihart, letter or phone within 4 business days after the clinic has received the results. If you do not hear from us within 7 days, please contact the clinic through Kiwihart or phone. If you have a critical or abnormal lab result, we will notify you by phone as soon as possible.  Submit refill requests through InflowControl or call your pharmacy and they will forward the refill request to us. Please allow 3 business days for your refill to be completed.          Additional Information About Your Visit        KiwiharPulseOn Information     InflowControl gives you secure access to your electronic health record. If you see a primary care provider, you can also send messages to your care team and make appointments. If you have questions, please call your primary care clinic.  If you do not have a primary care provider, please call [phone] and they will assist you.        Care EveryWhere ID     This is your Care EveryWhere ID. This could be used by other organizations to access your [place] medical records  [id number]        Your Vitals Were     Pulse Pulse Oximetry                73 100%           Blood Pressure from Last 3 Encounters:   09/14/18 122/83   09/07/18 122/85   07/31/18 126/88    Weight from Last 3 Encounters:   07/31/18 311 lb 8 oz (141.3 kg)   07/11/18 312 lb (141.5 kg)   03/21/18 311 lb 11.2 oz (141.4 kg)              Today, you had the following     No orders found for display         Today's Medication Changes          These changes are accurate as of 9/14/18  8:15 AM.  If you have any questions, ask your nurse or doctor.               These medicines have changed or have updated prescriptions.        Dose/Directions    imiquimod 5 % cream   Commonly known as:  ALDARA   This may have changed:    - when to take this  - reasons to take this  - additional instructions   Used for:  Verjuan plana         Apply a small sized amount to warts QHS at bedtime.   Wash off after 8 hours.   Quantity:  12 packet   Refills:  3       ketoconazole 2 % cream   Commonly known as:  NIZORAL   This may have changed:    - when to take this  - reasons to take this   Used for:  Tinea pedis of both feet        Apply topically daily   Quantity:  60 g   Refills:  2                Primary Care Provider Office Phone # Fax #    Samir Jaison De La O [phone]       [address] [place] [phone]        Equal Access to Services     [facility]: Hadii aad ku hadasho Soomaali, waaxda luqadaha, qaybta kaalmada adeegyada, waxgregg rodriguez . So [facility] [phone].    ATENCIÓN: Si habla español, tiene a cruz disposición servicios gratuitos de asistencia lingüística. [facility] [phone].    We comply with applicable federal civil rights laws and [place] laws. We do not discriminate on the basis of race, color, national origin, age, disability, sex, sexual orientation, or gender identity.            Thank you!     Thank you for choosing [facility]  for your care. Our goal is always to provide you with excellent care. Hearing back from our patients is one way we can continue to improve our services. Please take a few minutes to complete the written survey that you may receive in the mail after your visit with us. Thank you!             Your Updated Medication List - Protect others around you: Learn how to safely use, store and throw away your medicines at www.disposemymeds.org.          This list is accurate as of 9/14/18  8:15 AM.  Always use your most recent med list.                   Brand Name Dispense Instructions for use Diagnosis    allopurinol 100 MG tablet    ZYLOPRIM    90 tablet    Take 1 tablet (100 mg) by mouth daily    Gout of multiple sites, unspecified cause, unspecified chronicity       ciclopirox 0.77 % Gel      as needed        colchicine 0.6 MG capsule     MITIGARE    60 capsule    Take 0.6 mg by mouth 2 times daily as needed    Acute gout, unspecified cause, unspecified site       imiquimod 5 % cream    ALDARA    12 packet    Apply a small sized amount to warts QHS at bedtime.   Wash off after 8 hours.    Verruca plana       irbesartan 300 MG tablet    AVAPRO     Take 1 tablet (300 mg) by mouth daily    Essential hypertension       ketoconazole 2 % cream    NIZORAL    60 g    Apply topically daily    Tinea pedis of both feet       meloxicam 15 MG tablet    MOBIC    90 tablet    Take 1 tablet (15 mg) by mouth daily as needed    Gout of multiple sites, unspecified cause, unspecified chronicity       order for DME     1 Device    Equipment being ordered:  Knee high support stockings, 20-30 mm mercury One pain, wear daily as needed    Dependent edema       valACYclovir 1000 mg tablet    VALTREX    8 tablet    TAKE 2 TABLETS BY MOUTH TWICE DAILY FOR 1 DAY    Herpes simplex virus (HSV) infection

## 2018-09-14 NOTE — PROGRESS NOTES
HPI:  Abhijit Garcia is a 43 year old male patient here today for new flatwarts on forearms and forehead .  Patient states this has been present for a few days.  Patient reports the following symptoms: itch and spreading. He has been treated for these many times .  Patient reports the following previous treatments: cryo with great improvement.  Patient reports the following modifying factors none.  Associated symptoms: none.  Patient has no other skin complaints today.  Remainder of the HPI, Meds, PMH, Allergies, FH, and SH was reviewed in chart.    Pertinent Hx:   Father had many skin cancers, unsure of type. No personal history of skin cancer.  Past Medical History:   Diagnosis Date     Dysuria      Hypertension        Past Surgical History:   Procedure Laterality Date     ANKLE SURGERY       APPENDECTOMY       ORTHOPEDIC SURGERY  03/23/2017    ankle     wisdom teeth          Family History   Problem Relation Age of Onset     Diabetes Mother      Obesity Mother      Cancer - colorectal Mother      Hypertension Mother      Arthritis Father      Respiratory Father      asbestos exposure     Chronic Obstructive Pulmonary Disease Father      Coronary Artery Disease Father 71     Skin Cancer Father      Allergies Brother      Respiratory Brother      Heart Defect Son        Social History     Social History     Marital status:      Spouse name: N/A     Number of children: 1     Years of education: N/A     Occupational History      [employer]     Social History Main Topics     Smoking status: Never Smoker     Smokeless tobacco: Never Used     Alcohol use 0.0 oz/week     0 Standard drinks or equivalent per week      Comment: 1-5 drinks per week     Drug use: No     Sexual activity: Yes     Partners: Female     Other Topics Concern      Service Yes     Blood Transfusions No     Caffeine Concern Yes     2 pots daily     Occupational Exposure No     Occ. all with in managible levels     Hobby  Hazards No     Sleep Concern Yes     Stress Concern Yes     Weight Concern Yes     Special Diet No     Back Care Yes     L4 and L5 damaged     Exercise No     Seat Belt Yes     Self-Exams No     Parent/Sibling W/ Cabg, Mi Or Angioplasty Before 65f 55m? No     Social History Narrative       Outpatient Encounter Prescriptions as of 9/14/2018   Medication Sig Dispense Refill     allopurinol (ZYLOPRIM) 100 MG tablet Take 1 tablet (100 mg) by mouth daily 90 tablet prn     ciclopirox 0.77 % GEL as needed   0     Colchicine (MITIGARE) 0.6 MG CAPS Take 0.6 mg by mouth 2 times daily as needed 60 capsule 1     imiquimod (ALDARA) 5 % cream Apply a small sized amount to warts QHS at bedtime.   Wash off after 8 hours. (Patient taking differently: as needed Apply a small sized amount to warts QHS at bedtime.   Wash off after 8 hours.) 12 packet 3     irbesartan (AVAPRO) 300 MG tablet Take 1 tablet (300 mg) by mouth daily       ketoconazole (NIZORAL) 2 % cream Apply topically daily (Patient taking differently: Apply topically as needed ) 60 g 2     meloxicam (MOBIC) 15 MG tablet Take 1 tablet (15 mg) by mouth daily as needed 90 tablet 0     order for DME Equipment being ordered:  Knee high support stockings, 20-30 mm mercury  One pain, wear daily as needed 1 Device 1     valACYclovir (VALTREX) 1000 mg tablet TAKE 2 TABLETS BY MOUTH TWICE DAILY FOR 1 DAY 8 tablet 3     No facility-administered encounter medications on file as of 9/14/2018.        Review Of Systems:  Skin: As above  Eyes: negative  Ears/Nose/Throat: negative  Respiratory: No shortness of breath, dyspnea on exertion, cough, or hemoptysis  Cardiovascular: negative  Gastrointestinal: negative  Genitourinary: negative  Musculoskeletal: negative  Neurologic: negative  Psychiatric: negative  Hematologic/Lymphatic/Immunologic: negative  Endocrine: negative      Objective:     /83  Pulse 73  SpO2 100%  Eyes: Conjunctivae/lids: Normal   ENT: Lips:  Normal  MSK:  Normal  Cardiovascular: Peripheral edema none  Pulm: Breathing Normal  Neuro/Psych: Orientation: Normal; Mood/Affect: Normal, NAD, WDWN  Pt accompanied by: self  Following areas examined:   Scalp, face, eyelids, lips, neck, chest, abdomen, back, buttocks, and R&L upper and lower extremities.  Latham skin type:i   Findings:  1) Red smooth well-defined macule on back  2)Waddell WD smooth macules on face, neck, trunk, and extremities.  3)Well circumscribed macules with symmetric color distribution on trunk and extremities 2-4mm  4) inflamed flesh-colored verrucous appearing papules on forehead and forearms x 6      Assessment and Plan:  1-3) cherry angioma, lentigines, multiple benign nevi  Treatment of these lesions would be purely cosmetic and not medically neccessary.  Lesion may recur and/or may not completely resolve. May need additional treatment.  Different removal options including excision, cryotherapy, cautery and /or laser.      4) inflamed flat warts x 6  LN2: Treated with LN2 for 5s for 1-2 cycles. Warned risks of blistering, pain, pigment change, scarring, and incomplete resolution.  Advised patient to return if lesions do not completely resolve within 2-3 months.  Wound care sheet given.  Disc OTC freeze-away wart products. Can try. May or may not be effective.    5) family history of skin cancer  Signs and Symptoms of non-melanoma skin cancer and ABCDEs of melanoma reviewed with patient. Patient encouraged to perform monthly self skin exams and educated on how to perform them. UV precautions reviewed with patient. Patient was asked about new or changing moles/lesions on body.     Follow up in 1-2 years for FBE

## 2018-09-14 NOTE — LETTER
9/14/2018         RE: Abhijit Garcia  [id number] [facility] [address]        Dear Colleague,    Thank you for referring your patient, [place]hijit Garcia[place] [facility] [place] see a copy of my visit note below.    HPI:  Abhijit Garcia is a 43 year old male patient here today for new flatwarts on forearms and forehead .  Patient states this has been present for a few days.  Patient reports the following symptoms: itch and spreading. He has been treated for these many times .  Patient reports the following previous treatments: cryo with great improvement.  Patient reports the following modifying factors none.  Associated symptoms: none.  Patient has no other skin complaints today.  Remainder of the HPI, Meds, PMH, Allergies, FH, and SH was reviewed in chart.    Pertinent Hx:   Father had many skin cancers, unsure of type. No personal history of skin cancer.  Past Medical History:   Diagnosis Date     Dysuria      Hypertension        Past Surgical History:   Procedure Laterality Date     ANKLE SURGERY       APPENDECTOMY       ORTHOPEDIC SURGERY  03/23/2017    ankle     wisdom teeth          Family History   Problem Relation Age of Onset     Diabetes Mother      Obesity Mother      Cancer - colorectal Mother      Hypertension Mother      Arthritis Father      Respiratory Father      asbestos exposure     Chronic Obstructive Pulmonary Disease Father      Coronary Artery Disease Father 71     Skin Cancer Father      Allergies Brother      Respiratory Brother      Heart Defect Son        Social History     Social History     Marital status:      Spouse name: N/A     Number of children: 1     Years of education: N/A     Occupational History      [employer]     Social History Main Topics     Smoking status: Never Smoker     Smokeless tobacco: Never Used     Alcohol use 0.0 oz/week     0 Standard drinks or equivalent per week      Comment: 1-5 drinks per week      Drug use: No     Sexual activity: Yes     Partners: Female     Other Topics Concern      Service Yes     Blood Transfusions No     Caffeine Concern Yes     2 pots daily     Occupational Exposure No     Occ. all with in managible levels     Hobby Hazards No     Sleep Concern Yes     Stress Concern Yes     Weight Concern Yes     Special Diet No     Back Care Yes     L4 and L5 damaged     Exercise No     Seat Belt Yes     Self-Exams No     Parent/Sibling W/ Cabg, Mi Or Angioplasty Before 65f 55m? No     Social History Narrative       Outpatient Encounter Prescriptions as of 9/14/2018   Medication Sig Dispense Refill     allopurinol (ZYLOPRIM) 100 MG tablet Take 1 tablet (100 mg) by mouth daily 90 tablet prn     ciclopirox 0.77 % GEL as needed   0     Colchicine (MITIGARE) 0.6 MG CAPS Take 0.6 mg by mouth 2 times daily as needed 60 capsule 1     imiquimod (ALDARA) 5 % cream Apply a small sized amount to warts QHS at bedtime.   Wash off after 8 hours. (Patient taking differently: as needed Apply a small sized amount to warts QHS at bedtime.   Wash off after 8 hours.) 12 packet 3     irbesartan (AVAPRO) 300 MG tablet Take 1 tablet (300 mg) by mouth daily       ketoconazole (NIZORAL) 2 % cream Apply topically daily (Patient taking differently: Apply topically as needed ) 60 g 2     meloxicam (MOBIC) 15 MG tablet Take 1 tablet (15 mg) by mouth daily as needed 90 tablet 0     order for DME Equipment being ordered:  Knee high support stockings, 20-30 mm mercury  One pain, wear daily as needed 1 Device 1     valACYclovir (VALTREX) 1000 mg tablet TAKE 2 TABLETS BY MOUTH TWICE DAILY FOR 1 DAY 8 tablet 3     No facility-administered encounter medications on file as of 9/14/2018.        Review Of Systems:  Skin: As above  Eyes: negative  Ears/Nose/Throat: negative  Respiratory: No shortness of breath, dyspnea on exertion, cough, or hemoptysis  Cardiovascular: negative  Gastrointestinal: negative  Genitourinary:  negative  Musculoskeletal: negative  Neurologic: negative  Psychiatric: negative  Hematologic/Lymphatic/Immunologic: negative  Endocrine: negative      Objective:     /83  Pulse 73  SpO2 100%  Eyes: Conjunctivae/lids: Normal   ENT: Lips:  Normal  MSK: Normal  Cardiovascular: Peripheral edema none  Pulm: Breathing Normal  Neuro/Psych: Orientation: Normal; Mood/Affect: Normal, NAD, WDWN  Pt accompanied by: self  Following areas examined:   Scalp, face, eyelids, lips, neck, chest, abdomen, back, buttocks, and R&L upper and lower extremities.  Latham skin type:i   Findings:  1) Red smooth well-defined macule on back  2)Waddell WD smooth macules on face, neck, trunk, and extremities.  3)Well circumscribed macules with symmetric color distribution on trunk and extremities 2-4mm  4) inflamed flesh-colored verrucous appearing papules on forehead and forearms x 6      Assessment and Plan:  1-3) cherry angioma, lentigines, multiple benign nevi  Treatment of these lesions would be purely cosmetic and not medically neccessary.  Lesion may recur and/or may not completely resolve. May need additional treatment.  Different removal options including excision, cryotherapy, cautery and /or laser.      4) inflamed flat warts x 6  LN2: Treated with LN2 for 5s for 1-2 cycles. Warned risks of blistering, pain, pigment change, scarring, and incomplete resolution.  Advised patient to return if lesions do not completely resolve within 2-3 months.  Wound care sheet given.  Disc OTC freeze-away wart products. Can try. May or may not be effective.    5) family history of skin cancer  Signs and Symptoms of non-melanoma skin cancer and ABCDEs of melanoma reviewed with patient. Patient encouraged to perform monthly self skin exams and educated on how to perform them. UV precautions reviewed with patient. Patient was asked about new or changing moles/lesions on body.     Follow up in 1-2 years for FBE      Again, thank you for allowing me  to participate in the care of your patient.        Sincerely,        Madeleine Mccullough PA-C

## 2018-09-14 NOTE — PATIENT INSTRUCTIONS
Dr. Troy's freeze away wart remover or compound w-freeze off wart or others.      Proper skin care from [facility]:    -Eliminate harsh soaps as they strip the natural oils from the skin, often resulting in dry itchy skin ( i.e. Dial, Zest, Shantel Spring)  -Use mild soaps such as Cetaphil or Dove Sensitive Skin in the shower. You do not need to use soap on arms, legs, and trunk every time you shower unless visibly soiled.   -Avoid hot or cold showers.  -After showering, lightly dry off and apply moisturizing within 2-3 minutes. This will help trap moisture in the skin.   -Aggressive use of a moisturizer at least 1-2 times a day to the entire body (including -Vanicream, Cetaphil, Aquaphor or Cerave) and moisturize hands after every washing.  -We recommend using moisturizers that come in a tub that needs to be scooped out, not a pump. This has more of an oil base. It will hold moisture in your skin much better than a water base moisturizer. The above recommended are non-pore clogging.           Wear a sunscreen with at least SPF 30 on your face, ears, neck and V of the chest daily. Wear sunscreen on other areas of the body if those areas are exposed to the sun throughout the day. Sunscreens can contain physical and/or chemical blockers. Physical blockers are less likely to clog pores, these include zinc oxide and titanium dioxide. Reapply every two hour and after swimming. Sunscreen examples include Neutrogena, CeraVe, Blue Lizard, Elta MD and many others.    UV radiation  UVA radiation remains constant throughout the day and throughout the year. It is a longer wavelength than UVB and therefore penetrates deeper into the skin leading to immediate and delayed tanning, photoaging, and skin cancer. 70-80% of UVA and UVB radiation occurs between the hours of 10am-2pm.  UVB radiation  UVB radiation causes the most harmful effects and is more significant during the summer months. However, snow and ice can reflect  UVB radiation leading to skin damage during the winter months as well. UVB radiation is responsible for tanning, burning, inflammation, delayed erythema (pinkness), pigmentation (brown spots), and skin cancer.   Just because you do not burn or are not developing a tan does not mean that you are not damaging your skin. A 15 minute drive to and from work for 30 years an lead to chronic sun damage of the skin. It is important to wear a broad spectrum (both UVA and UVB) sunscreen EVERY day with at least 30 SPF. Apply to face, ears, neck and v of the chest as this is where most of our sun exposure is. Reapply sunscreen every two hours if you plan on being outside.   Karl Foster. Clinical Dermatology: A Color Guide to Diagnosis and Therapy. Elsevier, 2016.     WOUND CARE INSTRUCTIONS  FOR CRYOSURGERY        This area treated with liquid nitrogen will form a blister. You do not need to bandage the area until after the blister forms and breaks (which may be a few days).  When the blister breaks, begin daily dressing changes as follows:    1) Clean and dry the area with tap water using clean Q-tip or sterile gauze pad.    2) Apply Polysporin ointment or Bacitracin ointment over entire wound.  Do NOT use Neosporin ointment.    3) Cover the wound with a band-aid or sterile non-stick gauze pad and micropore paper tape.      REPEAT THESE INSTRUCTIONS AT LEAST ONCE A DAY UNTIL THE WOUND HAS COMPLETELY HEALED.        It is an old wives tale that a wound heals better when it is exposed to air and allowed to dry out. The wound will heal faster with a better cosmetic result if it is kept moist with ointment and covered with a bandage.  Do not let the wound dry out.      IMPORTANT INFORMATION ON REVERSE SIDE    Supplies Needed:     *Cotton tipped applicators (Q-tips)   *Polysporin ointment or Bacitracin ointment (NOT NEOSPORIN)   *Band-aids, or non stick gauze pads and micropore paper tape                PATIENT  INFORMATION    During the healing process you will notice a number of changes. All wounds develop a small halo of redness surrounding the wound.  This means healing is occurring. Severe itching with extensive redness usually indicates sensitivity to the ointment or bandage tape used to dress the wound.  You should call our office if this develops.      Swelling and/or discoloration around your surgical site is common, particularly when performed around the eye.    All wounds normally drain.  The larger the wound the more drainage there will be.  After 7-10 days, you will notice the wound beginning to shrink and new skin will begin to grow.  The wound is healed when you can see skin has formed over the entire area.  A healed wound has a healthy, shiny look to the surface and is red to dark pink in color to normalize.  Wounds may take approximately 4-6 weeks to heal.  Larger wounds may take 6-8 weeks.  After the wound is healed you may discontinue dressing changes.    You may experience a sensation of tightness as your wound heals. This is normal and will gradually subside.    Your healed wound may be sensitive to temperature changes. This sensitivity improves with time, but if you re having a lot of discomfort, try to avoid temperature extremes.    Patients frequently experience itching after their wound appears to have healed because of the continue healing under the skin.  Plain Vaseline will help relieve the itching.

## 2018-10-03 ENCOUNTER — OFFICE VISIT (OUTPATIENT)
Dept: URGENT CARE | Facility: URGENT CARE | Age: 43
End: 2018-10-03
Payer: COMMERCIAL

## 2018-10-03 VITALS
DIASTOLIC BLOOD PRESSURE: 68 MMHG | TEMPERATURE: 98.6 F | HEART RATE: 85 BPM | SYSTOLIC BLOOD PRESSURE: 124 MMHG | OXYGEN SATURATION: 99 % | BODY MASS INDEX: 39.93 KG/M2 | WEIGHT: 311 LBS

## 2018-10-03 DIAGNOSIS — L08.9 LOCAL INFECTION OF SKIN AND SUBCUTANEOUS TISSUE: Primary | ICD-10-CM

## 2018-10-03 PROCEDURE — 99213 OFFICE O/P EST LOW 20 MIN: CPT | Performed by: FAMILY MEDICINE

## 2018-10-03 RX ORDER — CEPHALEXIN 500 MG/1
500 CAPSULE ORAL 3 TIMES DAILY
Qty: 30 CAPSULE | Refills: 0 | Status: SHIPPED | OUTPATIENT
Start: 2018-10-03 | End: 2018-10-16

## 2018-10-03 NOTE — PROGRESS NOTES
Subjective:   Abhijit Garcia is a 43 year old male who presents for   Chief Complaint   Patient presents with     Urgent Care     Insect Bites     sore on right lower stomach, noticed today, picked scab off today      Last night while working noticed a sore feeling in left lower abdomen area. Over last couple days has soreness. Denies any fevers. Uncertain about bug bites. It is discomforting but not itchy.      PMHX/PSHX/MEDS/ALLERGIES/SHX/FHX reviewed in Epic.    Patient Active Problem List    Diagnosis Date Noted     Obesity, BMI > 35 (H) 07/20/2017     Priority: Medium     DVT left leg. postop (H) 04/05/2017     Priority: Medium     Treated with 3 months of anticoagulation, follow-up ultrasound negative        Lumbar radiculitis 11/15/2015     Priority: Medium     Gout 01/07/2015     Priority: Medium     Onset age 33.  High uric acid.  Has had flares of great toe, midfoot, and ankles.  Uses meloxicam and cholchicine for acute flares.  Used allopurinol, developed low back pain after a few months.  Ultrasound and CT fine.  Eye problems.  Symptoms all resolved with stopping med       Essential hypertension 04/18/2007     Priority: Medium     Onset age 17, medications started in 2015.       Herpes simplex virus (HSV) infection 02/06/2007     Priority: Medium     Gets on lips.  Valtrex and Abreva work.  Episodes x 2 tip of nose, including 2/2016.  No fluid collected.         Mixed Hyperlipidemia LDL goal <130 02/06/2007     Priority: Medium       Current Outpatient Prescriptions   Medication     cephALEXin (KEFLEX) 500 MG capsule     allopurinol (ZYLOPRIM) 100 MG tablet     ciclopirox 0.77 % GEL     Colchicine (MITIGARE) 0.6 MG CAPS     imiquimod (ALDARA) 5 % cream     irbesartan (AVAPRO) 300 MG tablet     ketoconazole (NIZORAL) 2 % cream     meloxicam (MOBIC) 15 MG tablet     order for DME     valACYclovir (VALTREX) 1000 mg tablet     No current facility-administered medications for this visit.        ROS:  As  above per HPI    Objective:   /68  Pulse 85  Temp 98.6  F (37  C) (Oral)  Wt 311 lb (141.1 kg)  SpO2 99%  BMI 39.93 kg/m2, Body mass index is 39.93 kg/(m^2).  Gen:  NAD, well-nourished, sitting in chair comfortably  HEENT: EOMI, sclera anicteric, Head normocephalic, ; nares patent; moist mucous membranes  Neck: trachea midline, no thyromegaly  CV:  Hemodynamically stable  Pulm:  no increased work of breathing   ABD: soft, non-distended  Extrem: no cyanosis, edema or clubbing  Skin: small lesion on the abdomen that is erythematous and a small depth/ulcer.         Assessment & Plan:   Abhijit TONIA Garcia, 43 year old male who presents with:    Local infection of skin and subcutaneous tissue  Will recommend topical antibiotic and covering with bandage. Early signs of skin infection so will do cephalexin TID for 10 days.   - cephALEXin (KEFLEX) 500 MG capsule  Dispense: 30 capsule; Refill: 0    Royce Sevilla MD   [facility]     Options for treatment and/or follow-up care were reviewed with the patient. Abhijit TONIA Garcia and/or legal guardian was engaged and actively involved in the decision making process. Patient/guardian verbalized understanding of the options discussed and was satisfied with the final plan.

## 2018-10-03 NOTE — MR AVS SNAPSHOT
After Visit Summary   10/3/2018    Abhijit Garcia    MRN: [id number]           Patient Information     Date Of Birth          1975        Visit Information        Provider Department      10/3/2018 5:35 PM Royce Sevilla MD [facility]        Today's Diagnoses     Local infection of skin and subcutaneous tissue    -  1      Care Instructions    Take antibiotic three times a day for 7-10 days     Topical antibiotic and cover with bandage              Follow-ups after your visit        Your next 10 appointments already scheduled     Oct 16, 2018  3:15 PM CDT   [facility] with Shanthi Red PA-C   [facility] ([facility]    [address] [address] [phone]              Who to contact     If you have questions or need follow up information about today's clinic visit or your schedule please contact [facility] directly at [phone].  Normal or non-critical lab and imaging results will be communicated to you by [facility], letter or phone within 4 business days after the clinic has received the results. If you do not hear from us within 7 days, please contact the clinic through [facility] or phone. If you have a critical or abnormal lab result, we will notify you by phone as soon as possible.  Submit refill requests through [email or web address] or call your pharmacy and they will forward the refill request to us. Please allow 3 business days for your refill to be completed.          Additional Information About Your Visit        [facility] Information     [email or web address] gives you secure access to your electronic health record. If you see a primary care provider, you can also send messages to your care team and make appointments. If you have questions, please call your primary care clinic.  If you do not have a primary care provider, please call [phone] and they will assist  you.        Care EveryWhere ID     This is your Care EveryWhere ID. This could be used by other organizations to access your [facility] medical records  [id number]        Your Vitals Were     Pulse Temperature Pulse Oximetry BMI (Body Mass Index)          85 98.6  F (37  C) (Oral) 99% 39.93 kg/m2         Blood Pressure from Last 3 Encounters:   10/03/18 124/68   09/14/18 122/83   09/07/18 122/85    Weight from Last 3 Encounters:   10/03/18 311 lb (141.1 kg)   07/31/18 311 lb 8 oz (141.3 kg)   07/11/18 312 lb (141.5 kg)              Today, you had the following     No orders found for display         Today's Medication Changes          These changes are accurate as of 10/3/18  6:31 PM.  If you have any questions, ask your nurse or doctor.               Start taking these medicines.        Dose/Directions    cephALEXin 500 MG capsule   Commonly known as:  KEFLEX   Used for:  Local infection of skin and subcutaneous tissue   Started by:  Royce Sevilla MD        Dose:  500 mg   Take 1 capsule (500 mg) by mouth 3 times daily   Quantity:  30 capsule   Refills:  0         These medicines have changed or have updated prescriptions.        Dose/Directions    imiquimod 5 % cream   Commonly known as:  ALDARA   This may have changed:    - when to take this  - reasons to take this  - additional instructions   Used for:  Verruca plana        Apply a small sized amount to warts QHS at bedtime.   Wash off after 8 hours.   Quantity:  12 packet   Refills:  3       ketoconazole 2 % cream   Commonly known as:  NIZORAL   This may have changed:    - when to take this  - reasons to take this   Used for:  Tinea pedis of both feet        Apply topically daily   Quantity:  60 g   Refills:  2            Where to get your medicines      These medications were sent to [facility] [address] [facility] [address] [facility] [address]  [address] [facility] [address]     Phone:  [phone]      cephALEXin 500 MG capsule                Primary Care Provider Office Phone # Fax #    Samir Jaison De La O [phone]       [address] [place] [phone]        Equal Access to Services     OLIVIA HARRELL : Hadii mir ku marko Sosumeetali, waaxda luqadaha, qaybta kaalmada adejustinoda, pauly daltonn enrike rosario laSiobhanaguilar correa. So [facility] [phone].    ATENCIÓN: Si habla español, tiene a cruz disposición servicios gratuitos de asistencia lingüística. Llame al [phone].    We comply with applicable federal civil rights laws and [place] laws. We do not discriminate on the basis of race, color, national origin, age, disability, sex, sexual orientation, or gender identity.            Thank you!     Thank you for choosing [facility]  for your care. Our goal is always to provide you with excellent care. Hearing back from our patients is one way we can continue to improve our services. Please take a few minutes to complete the written survey that you may receive in the mail after your visit with us. Thank you!             Your Updated Medication List - Protect others around you: Learn how to safely use, store and throw away your medicines at www.disposemymeds.org.          This list is accurate as of 10/3/18  6:31 PM.  Always use your most recent med list.                   Brand Name Dispense Instructions for use Diagnosis    allopurinol 100 MG tablet    ZYLOPRIM    90 tablet    Take 1 tablet (100 mg) by mouth daily    Gout of multiple sites, unspecified cause, unspecified chronicity       cephALEXin 500 MG capsule    KEFLEX    30 capsule    Take 1 capsule (500 mg) by mouth 3 times daily    Local infection of skin and subcutaneous tissue       ciclopirox 0.77 % Gel      as needed        colchicine 0.6 MG capsule    MITIGARE    60 capsule    Take 0.6 mg by mouth 2 times daily as needed    Acute gout, unspecified cause, unspecified site       imiquimod 5 % cream    ALDARA    12  packet    Apply a small sized amount to warts QHS at bedtime.   Wash off after 8 hours.    Verruca plana       irbesartan 300 MG tablet    AVAPRO     Take 1 tablet (300 mg) by mouth daily    Essential hypertension       ketoconazole 2 % cream    NIZORAL    60 g    Apply topically daily    Tinea pedis of both feet       meloxicam 15 MG tablet    MOBIC    90 tablet    Take 1 tablet (15 mg) by mouth daily as needed    Gout of multiple sites, unspecified cause, unspecified chronicity       order for DME     1 Device    Equipment being ordered:  Knee high support stockings, 20-30 mm mercury One pain, wear daily as needed    Dependent edema       valACYclovir 1000 mg tablet    VALTREX    8 tablet    TAKE 2 TABLETS BY MOUTH TWICE DAILY FOR 1 DAY    Herpes simplex virus (HSV) infection

## 2018-10-12 ENCOUNTER — HOSPITAL ENCOUNTER (EMERGENCY)
Facility: CLINIC | Age: 43
Discharge: HOME | End: 2018-10-12
Attending: EMERGENCY MEDICINE | Admitting: EMERGENCY MEDICINE
Payer: COMMERCIAL

## 2018-10-12 ENCOUNTER — APPOINTMENT (OUTPATIENT)
Dept: GENERAL RADIOLOGY | Facility: CLINIC | Age: 43
End: 2018-10-12
Attending: EMERGENCY MEDICINE
Payer: COMMERCIAL

## 2018-10-12 VITALS
RESPIRATION RATE: 18 BRPM | TEMPERATURE: 98.5 F | DIASTOLIC BLOOD PRESSURE: 87 MMHG | SYSTOLIC BLOOD PRESSURE: 115 MMHG | OXYGEN SATURATION: 96 % | HEART RATE: 79 BPM

## 2018-10-12 DIAGNOSIS — R07.9 ACUTE CHEST PAIN: ICD-10-CM

## 2018-10-12 LAB
ANION GAP SERPL CALCULATED.3IONS-SCNC: 8 MMOL/L (ref 3–14)
BASOPHILS # BLD AUTO: 0.1 10E9/L (ref 0–0.2)
BASOPHILS NFR BLD AUTO: 0.7 %
BUN SERPL-MCNC: 17 MG/DL (ref 7–30)
CALCIUM SERPL-MCNC: 8.3 MG/DL (ref 8.5–10.1)
CHLORIDE SERPL-SCNC: 106 MMOL/L (ref 94–109)
CO2 SERPL-SCNC: 26 MMOL/L (ref 20–32)
CREAT SERPL-MCNC: 1.1 MG/DL (ref 0.66–1.25)
DIFFERENTIAL METHOD BLD: NORMAL
EOSINOPHIL # BLD AUTO: 0.3 10E9/L (ref 0–0.7)
EOSINOPHIL NFR BLD AUTO: 3 %
ERYTHROCYTE [DISTWIDTH] IN BLOOD BY AUTOMATED COUNT: 11.9 % (ref 10–15)
GFR SERPL CREATININE-BSD FRML MDRD: 72 ML/MIN/1.7M2
GLUCOSE SERPL-MCNC: 98 MG/DL (ref 70–99)
HCT VFR BLD AUTO: 43.7 % (ref 40–53)
HGB BLD-MCNC: 15.1 G/DL (ref 13.3–17.7)
IMM GRANULOCYTES # BLD: 0 10E9/L (ref 0–0.4)
IMM GRANULOCYTES NFR BLD: 0.5 %
LYMPHOCYTES # BLD AUTO: 2 10E9/L (ref 0.8–5.3)
LYMPHOCYTES NFR BLD AUTO: 23.8 %
MCH RBC QN AUTO: 30.8 PG (ref 26.5–33)
MCHC RBC AUTO-ENTMCNC: 34.6 G/DL (ref 31.5–36.5)
MCV RBC AUTO: 89 FL (ref 78–100)
MONOCYTES # BLD AUTO: 0.8 10E9/L (ref 0–1.3)
MONOCYTES NFR BLD AUTO: 9.9 %
NEUTROPHILS # BLD AUTO: 5.2 10E9/L (ref 1.6–8.3)
NEUTROPHILS NFR BLD AUTO: 62.1 %
NRBC # BLD AUTO: 0 10*3/UL
NRBC BLD AUTO-RTO: 0 /100
PLATELET # BLD AUTO: 230 10E9/L (ref 150–450)
POTASSIUM SERPL-SCNC: 4.2 MMOL/L (ref 3.4–5.3)
RBC # BLD AUTO: 4.9 10E12/L (ref 4.4–5.9)
SODIUM SERPL-SCNC: 140 MMOL/L (ref 133–144)
TROPONIN I SERPL-MCNC: <0.015 UG/L (ref 0–0.04)
TROPONIN I SERPL-MCNC: <0.015 UG/L (ref 0–0.04)
WBC # BLD AUTO: 8.3 10E9/L (ref 4–11)

## 2018-10-12 PROCEDURE — 99285 EMERGENCY DEPT VISIT HI MDM: CPT | Mod: 25

## 2018-10-12 PROCEDURE — 80048 BASIC METABOLIC PNL TOTAL CA: CPT | Performed by: EMERGENCY MEDICINE

## 2018-10-12 PROCEDURE — 84484 ASSAY OF TROPONIN QUANT: CPT | Performed by: EMERGENCY MEDICINE

## 2018-10-12 PROCEDURE — 25000132 ZZH RX MED GY IP 250 OP 250 PS 637: Performed by: EMERGENCY MEDICINE

## 2018-10-12 PROCEDURE — 85025 COMPLETE CBC W/AUTO DIFF WBC: CPT | Performed by: EMERGENCY MEDICINE

## 2018-10-12 PROCEDURE — 93005 ELECTROCARDIOGRAM TRACING: CPT

## 2018-10-12 PROCEDURE — 71046 X-RAY EXAM CHEST 2 VIEWS: CPT

## 2018-10-12 RX ORDER — ASPIRIN 81 MG/1
324 TABLET, CHEWABLE ORAL ONCE
Status: COMPLETED | OUTPATIENT
Start: 2018-10-12 | End: 2018-10-12

## 2018-10-12 RX ADMIN — ASPIRIN 81 MG 324 MG: 81 TABLET ORAL at 18:53

## 2018-10-12 NOTE — ED TRIAGE NOTES
"Patient got himself \"all worked up over lots of things\" this AM - starting having left chest discomfort and started burping a lot. Calmed down a little bit and then came back about 3 hours later. Denies nausea/vomiting/sweating/dizziness.   "

## 2018-10-12 NOTE — ED PROVIDER NOTES
History     Chief Complaint:  Chest Pain    HPI   Abhijit Garcia is a 43 year old male who presents to the emergency department today for evaluation of chest pain.  Patient had the onset of chest pain today while driving after a stressful incident.  He notes that burping seems to make this pain better.  He has no diaphoresis, nausea, shortness of breath, leg swelling, radiation to his arms or jaw.  No personal history of heart disease.  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  He does have cardiac risk factors including hyperlipidemia and hypertension.  His dad did die of heart issues but he was a heavy smoker for many years.      Allergies:  Amlodipine  Contrast Dye  Indomethacin  Levaquin [Levofloxacin]  Lisinopril  Losartan  Seasonal Allergies     Medications:    Zyloprim  Keflex  Ciclopirox  Mitigare  Aldara  Avapro  Nizoral  Mobic  Vatrec    Past Medical History:    Dysuria  Hypertension     Past Surgical History:    Ankle surgery  Appendectomy  [place] teeth extraction    Family History:    Mother: Diabetes, Obesity, Colorectal Cancer, Hypertension   Father: Arthritis, Respiratory, COPD, CAD, Skin Cancer  Brother: Allergies, Respiratory    Social History:  Smoking Status: Never Smoker  Smokeless Tobacco: Never Used  Alcohol Use: Positive  Marital Status:      Review of Systems  See HPI, a 10 point ROS is done and otherwise negative.    Physical Exam     Patient Vitals for the past 24 hrs:   BP Temp Temp src Pulse Heart Rate Resp SpO2   10/12/18 1930 116/88 - - - 84 - 96 %   10/12/18 1915 119/81 - - - 80 - 96 %   10/12/18 1901 - - - - 74 - 95 %   10/12/18 1900 115/82 - - - - - -   10/12/18 1845 119/89 - - - - - -   10/12/18 1830 115/83 - - - - - -   10/12/18 1742 122/84 - - - - - -   10/12/18 1739 - 98.5  F (36.9  C) Oral 79 79 18 99 %       Physical Exam  General: Patient is alert and interactive when I enter the room  Head:  The scalp, face, and head  appear normal  Eyes:  The pupils are equal, round, and reactive to light    Conjunctivae and sclerae are normal  ENT:    External acoustic canals are normal    The oropharynx is normal without erythema.     Uvula is in the midline  Neck:  Normal range of motion  CV:  Regular rate. S1/S2. No murmurs.  No chest wall tenderness to palpation.  Resp:  Lungs are clear without wheezes or rales. No distress  GI:  Abdomen is soft, no rigidity, guarding, or rebound    No distension. No tenderness to palpation in any quadrant.     MS:  Normal tone. Joints grossly normal without effusions.     No asymmetric leg swelling, calf or thigh tenderness.      Normal motor assessment of all extremities.  Skin:  No rash or lesions noted. Normal capillary refill noted  Neuro: Speech is normal and fluent. Face is symmetric.     Moving all extremities well.   Psych:  Awake. Alert.  Normal affect.  Appropriate interactions.  Lymph: No anterior cervical lymphadenopathy noted      Emergency Department Course     ECG:  ECG taken at 1739, ECG read at 1740  Normal sinus rhythm  Normal ECG  Rate 77 bpm. IA interval 162 ms. QRS duration 92 ms. QT/QTc 384/434 ms. P-R-T axes 29 18 17.    Imaging:  Radiology findings were communicated with the patient who voiced understanding of the findings.    XR Chest 2 Views  Lungs are well-inflated and clear. Mediastinal contours  are normal without evidence of widening. Heart size is normal.  Reading per radiology     Laboratory:  Laboratory findings were communicated with the patient who voiced understanding of the findings.    CBC: WBC 8.3, HGB 15.1,   BMP: Calcium 8.3 (L) o/w WNL (Creatinine 1.10)  Troponin (Collected 1747): <0.015  Troponin (Collected 1946): <0.015    Interventions:  1853 Aspirin 324 mg PO    Emergency Department Course:    1743 Nursing notes and vitals reviewed.    1746 I performed an exam of the patient as documented above.     1747 IV was inserted and blood was drawn for laboratory  testing, results above.    1935 The patient was sent for a XR Chest 2 Views while in the emergency department, results above.     2014 I personally reviewed the ECG, imaging, and laboratory results with the patient and answered all related questions prior to discharge.    Impression & Plan      Medical Decision Making:  Abhijit Garcia is a 43 year old male who presents to the emergency department today for evaluation of chest pain. This pain has lasted for 3 hours now. Initial laboratory and imaging tests have come back normal.  There has been no progression of symptoms or other new concerning symptoms.       A broad differential was of course considered.  Exceedingly low risk for unstable angina at this point and will therefore not admit formally and administer heparin.    Discussed HEART score with patient and shared decision making regarding disposition and further workup was done.       The patient agrees with the plan and all questions were answered.    Diagnosis:    ICD-10-CM    1. Acute chest pain R07.9      Disposition:   The patient is discharged to home.    Discharge Medications:  No discharge medications.    Scribe Disclosure:  I, Rowena Hernández, am serving as a scribe at 5:58 PM on 10/12/2018 to document services personally performed by Zaire Piedra MD based on my observations and the provider's statements to me.    [facility] EMERGENCY DEPARTMENT       Zaire Piedra MD  10/12/18 3532

## 2018-10-12 NOTE — ED AVS SNAPSHOT
[facility] Emergency Department    [address]    [facility] [address]    Phone:  [phone]    Fax:  [phone]                                       Abhijit Garcia   MRN: [id number]    Department:  [facility] Emergency Department   Date of Visit:  10/12/2018           After Visit Summary Signature Page     I have received my discharge instructions, and my questions have been answered. I have discussed any challenges I see with this plan with the nurse or doctor.    ..........................................................................................................................................  Patient/Patient Representative Signature      ..........................................................................................................................................  Patient Representative Print Name and Relationship to Patient    ..................................................               ................................................  Date                                   Time    ..........................................................................................................................................  Reviewed by Signature/Title    ...................................................              ..............................................  Date                                               Time          22EPIC Rev 08/18

## 2018-10-12 NOTE — ED AVS SNAPSHOT
[facility] Emergency Department    [address]    [facility] [address]    Phone:  [phone]    Fax:  [phone]                                       Abhijit Garcia   MRN: [id number]    Department:  [facility] Emergency Department   Date of Visit:  10/12/2018           Patient Information     Date Of Birth          1975        Your diagnoses for this visit were:     Acute chest pain        You were seen by Zaire Piedra MD.      Follow-up Information     Follow up with Samir De La O MD In 3 days.    Specialty:  Internal Medicine    Why:  earlier if symptoms worsen    Contact information:    [address] [facility] [address] [phone]          Discharge Instructions       Discharge Instructions  Chest Pain    You have been seen today for chest pain or discomfort.  At this time, your provider has found no signs that your chest pain is due to a serious or life-threatening condition, (or you have declined more testing and/or admission to the hospital). However, sometimes there is a serious problem that does not show up right away. Your evaluation today may not be complete and you may need further testing and evaluation.     Generally, every Emergency Department visit should have a follow-up clinic visit with either a primary or a specialty clinic/provider. Please follow-up as instructed by your emergency provider today.  Return to the Emergency Department if:    Your chest pain changes, gets worse, starts to happen more often, or comes with less activity.    You are newly short of breath.    You get very weak or tired.    You pass out or faint.    You have any new symptoms, like fever, cough, numb legs, or you cough up blood.    You have anything else that worries you.    Until you follow-up with your regular provider, please do the following:    Take one aspirin daily unless you have an allergy or are told not to by your provider.    If a  stress test appointment has been made, go to the appointment.    If you have questions, contact your regular provider.    Follow-up with your regular provider/clinic as directed; this is very important.    If you were given a prescription for medicine here today, be sure to read all of the information (including the package insert) that comes with your prescription.  This will include important information about the medicine, its side effects, and any warnings that you need to know about.  The pharmacist who fills the prescription can provide more information and answer questions you may have about the medicine.  If you have questions or concerns that the pharmacist cannot address, please call or return to the Emergency Department.       Remember that you can always come back to the Emergency Department if you are not able to see your regular provider in the amount of time listed above, if you get any new symptoms, or if there is anything that worries you.      Your next 10 appointments already scheduled     Oct 16, 2018  3:15 PM CDT   MyChart Dermatology Return with Shanthi Red PA-C   [facility] ([facility])    [address] [place] [address] [phone]              24 Hour Appointment Hotline       To make an appointment at any [facility] (formerly [facility]), call [phone] ([phone]). If you don't have a family doctor or clinic, we will help you find one. [facility] are conveniently located to serve the needs of you and your family.             Review of your medicines      Our records show that you are taking the medicines listed below. If these are incorrect, please call your family doctor or clinic.        Dose / Directions Last dose taken    allopurinol 100 MG tablet   Commonly known as:  ZYLOPRIM   Dose:  100 mg   Quantity:  90 tablet        Take 1 tablet (100 mg) by mouth daily   Refills:  prn        cephALEXin 500 MG capsule    Commonly known as:  KEFLEX   Dose:  500 mg   Quantity:  30 capsule        Take 1 capsule (500 mg) by mouth 3 times daily   Refills:  0        ciclopirox 0.77 % Gel        as needed   Refills:  0        colchicine 0.6 MG capsule   Commonly known as:  MITIGARE   Dose:  1 capsule   Quantity:  60 capsule        Take 0.6 mg by mouth 2 times daily as needed   Refills:  1        imiquimod 5 % cream   Commonly known as:  ALDARA   Quantity:  12 packet        Apply a small sized amount to warts QHS at bedtime.   Wash off after 8 hours.   Refills:  3        irbesartan 300 MG tablet   Commonly known as:  AVAPRO   Dose:  300 mg        Take 1 tablet (300 mg) by mouth daily   Refills:  0        ketoconazole 2 % cream   Commonly known as:  NIZORAL   Quantity:  60 g        Apply topically daily   Refills:  2        meloxicam 15 MG tablet   Commonly known as:  MOBIC   Dose:  15 mg   Quantity:  90 tablet        Take 1 tablet (15 mg) by mouth daily as needed   Refills:  0        order for DME   Quantity:  1 Device        Equipment being ordered:  Knee high support stockings, 20-30 mm mercury One pain, wear daily as needed   Refills:  1        valACYclovir 1000 mg tablet   Commonly known as:  VALTREX   Quantity:  8 tablet        TAKE 2 TABLETS BY MOUTH TWICE DAILY FOR 1 DAY   Refills:  3                Procedures and tests performed during your visit     Procedure/Test Number of Times Performed    Basic metabolic panel 1    CBC with platelets differential 1    Troponin I 2    XR Chest 2 Views 1      Orders Needing Specimen Collection     None      Pending Results     No orders found from 10/10/2018 to 10/13/2018.            Pending Culture Results     No orders found from 10/10/2018 to 10/13/2018.            Pending Results Instructions     If you had any lab results that were not finalized at the time of your Discharge, you can call the ED Lab Result RN at [phone]. You will be contacted by this team for any positive Lab results  or changes in treatment. The nurses are available 7 days a week from 10A to 6:30P.  You can leave a message 24 hours per day and they will return your call.        Test Results From Your Hospital Stay        10/12/2018  5:55 PM      Component Results     Component Value Ref Range & Units Status    WBC 8.3 4.0 - 11.0 10e9/L Final    RBC Count 4.90 4.4 - 5.9 10e12/L Final    Hemoglobin 15.1 13.3 - 17.7 g/dL Final    Hematocrit 43.7 40.0 - 53.0 % Final    MCV 89 78 - 100 fl Final    MCH 30.8 26.5 - 33.0 pg Final    MCHC 34.6 31.5 - 36.5 g/dL Final    RDW 11.9 10.0 - 15.0 % Final    Platelet Count 230 150 - 450 10e9/L Final    Diff Method Automated Method  Final    % Neutrophils 62.1 % Final    % Lymphocytes 23.8 % Final    % Monocytes 9.9 % Final    % Eosinophils 3.0 % Final    % Basophils 0.7 % Final    % Immature Granulocytes 0.5 % Final    Nucleated RBCs 0 0 /100 Final    Absolute Neutrophil 5.2 1.6 - 8.3 10e9/L Final    Absolute Lymphocytes 2.0 0.8 - 5.3 10e9/L Final    Absolute Monocytes 0.8 0.0 - 1.3 10e9/L Final    Absolute Eosinophils 0.3 0.0 - 0.7 10e9/L Final    Absolute Basophils 0.1 0.0 - 0.2 10e9/L Final    Abs Immature Granulocytes 0.0 0 - 0.4 10e9/L Final    Absolute Nucleated RBC 0.0  Final         10/12/2018  6:15 PM      Component Results     Component Value Ref Range & Units Status    Sodium 140 133 - 144 mmol/L Final    Potassium 4.2 3.4 - 5.3 mmol/L Final    Chloride 106 94 - 109 mmol/L Final    Carbon Dioxide 26 20 - 32 mmol/L Final    Anion Gap 8 3 - 14 mmol/L Final    Glucose 98 70 - 99 mg/dL Final    Urea Nitrogen 17 7 - 30 mg/dL Final    Creatinine 1.10 0.66 - 1.25 mg/dL Final    GFR Estimate 72 >60 mL/min/1.7m2 Final    Non  GFR Calc    GFR Estimate If Black 87 >60 mL/min/1.7m2 Final    African American GFR Calc    Calcium 8.3 (L) 8.5 - 10.1 mg/dL Final         10/12/2018  6:15 PM      Component Results     Component Value Ref Range & Units Status    Troponin I ES <0.015 0.000  - 0.045 ug/L Final    The 99th percentile for upper reference range is 0.045 ug/L.  Troponin values   in the range of 0.045 - 0.120 ug/L may be associated with risks of adverse   clinical events.           10/12/2018  7:51 PM      Narrative     CHEST TWO VIEWS  10/12/2018 7:38 PM     HISTORY: Chest pain. Evaluate for mediastinal widening, infiltrate.    COMPARISON: 1/24/2017        Impression     IMPRESSION: Lungs are well-inflated and clear. Mediastinal contours  are normal without evidence of widening. Heart size is normal.    FRED GALINDO MD         10/12/2018  8:13 PM      Component Results     Component Value Ref Range & Units Status    Troponin I ES <0.015 0.000 - 0.045 ug/L Final    The 99th percentile for upper reference range is 0.045 ug/L.  Troponin values   in the range of 0.045 - 0.120 ug/L may be associated with risks of adverse   clinical events.                  Clinical Quality Measure: Blood Pressure Screening     Your blood pressure was checked while you were in the emergency department today. The last reading we obtained was  BP: 115/87 . Please read the guidelines below about what these numbers mean and what you should do about them.  If your systolic blood pressure (the top number) is less than 120 and your diastolic blood pressure (the bottom number) is less than 80, then your blood pressure is normal. There is nothing more that you need to do about it.  If your systolic blood pressure (the top number) is 120-139 or your diastolic blood pressure (the bottom number) is 80-89, your blood pressure may be higher than it should be. You should have your blood pressure rechecked within a year by a primary care provider.  If your systolic blood pressure (the top number) is 140 or greater or your diastolic blood pressure (the bottom number) is 90 or greater, you may have high blood pressure. High blood pressure is treatable, but if left untreated over time it can put you at risk for heart attack,  stroke, or kidney failure. You should have your blood pressure rechecked by a primary care provider within the next 4 weeks.  If your provider in the emergency department today gave you specific instructions to follow-up with your doctor or provider even sooner than that, you should follow that instruction and not wait for up to 4 weeks for your follow-up visit.        Thank you for choosing [facility]       Thank you for choosing [facility] for your care. Our goal is always to provide you with excellent care. Hearing back from our patients is one way we can continue to improve our services. Please take a few minutes to complete the written survey that you may receive in the mail after you visit with us. Thank you!        [employer] Information     [employer] gives you secure access to your electronic health record. If you see a primary care provider, you can also send messages to your care team and make appointments. If you have questions, please call your primary care clinic.  If you do not have a primary care provider, please call [phone] and they will assist you.        Care EveryWhere ID     This is your Care EveryWhere ID. This could be used by other organizations to access your [facility] medical records  [id number]        Equal Access to Services     OLIVIA HARRELL : Bhavani Joshua, pineda shetty, pauly araya. So [facility] [phone].    ATENCIÓN: Si habla español, tiene a cruz disposición servicios gratuitos de asistencia lingüística. Mary al [phone].    We comply with applicable federal civil rights laws and [place] laws. We do not discriminate on the basis of race, color, national origin, age, disability, sex, sexual orientation, or gender identity.            After Visit Summary       This is your record. Keep this with you and show to your community pharmacist(s) and doctor(s) at your next visit.

## 2018-10-14 LAB — INTERPRETATION ECG - MUSE: NORMAL

## 2018-10-16 ENCOUNTER — OFFICE VISIT (OUTPATIENT)
Dept: DERMATOLOGY | Facility: CLINIC | Age: 43
End: 2018-10-16
Payer: COMMERCIAL

## 2018-10-16 VITALS — DIASTOLIC BLOOD PRESSURE: 82 MMHG | HEART RATE: 88 BPM | OXYGEN SATURATION: 94 % | SYSTOLIC BLOOD PRESSURE: 122 MMHG

## 2018-10-16 DIAGNOSIS — B07.8 VERRUCA PLANA: Primary | ICD-10-CM

## 2018-10-16 PROCEDURE — 17110 DESTRUCTION B9 LES UP TO 14: CPT | Performed by: PHYSICIAN ASSISTANT

## 2018-10-16 RX ORDER — IMIQUIMOD 12.5 MG/.25G
CREAM TOPICAL
Qty: 15 PACKET | Refills: 3 | Status: SHIPPED | OUTPATIENT
Start: 2018-10-16 | End: 2019-08-23

## 2018-10-16 NOTE — PROGRESS NOTES
HPI:   Abhijit Garcia is a 43 year old male who presents for recheck for flat warts on arms and face  chief complaint  Location: jaw line     Condition present for:  recent.   Previous treatments include: imiquimod with no improvement    Review Of Systems  Eyes: negative  Ears/Nose/Throat: negative  Respiratory: No shortness of breath, dyspnea on exertion, cough, or hemoptysis  Cardiovascular: negative  Gastrointestinal: negative  Genitourinary: negative  Musculoskeletal: negative  Neurologic: negative  Psychiatric: negative    This document serves as a record of the services and decisions personally performed and made by Shanthi Red, MS, PA-C. It was created on her behalf by Olimpia Bryson, a trained medical scribe. The creation of this document is based on the provider's statements to the medical scribe.  Olimpia Bryson 3:16 PM October 16, 2018    PHYSICAL EXAM:    /82  Pulse 88  SpO2 94%  Skin exam performed as follows: Type 2 skin. Mood appropriate  Alert and Oriented X 3. Well developed, well nourished in no distress.  General appearance: Normal  Head including face: Normal  Eyes: conjunctiva and lids: Normal  Mouth: Lips, teeth, gums: Normal  Neck: Normal  Chest-breast/axillae: Normal  Back: Normal  Spleen and liver: Normal  Cardiovascular: Exam of peripheral vascular system by observation for swelling, varicosities, edema: Normal  Genitalia: groin, buttocks: Normal  Extremities: digits/nails (clubbing): Normal  Eccrine and Apocrine glands: Normal  Right upper extremity: Normal  Left upper extremity: Normal  Right lower extremity: Normal  Left lower extremity: Normal  Skin: Scalp and body hair: See below    1. Flat topped small verrucous papules located on left jawline x 1    ASSESSMENT/PLAN:     1. Verruca plana on left jawline x 1 - advised. As bothersome to pt cryosurgery performed. Advised on blistering and post op care.   2. Forearms, face all clear  flower        Follow-up: PRN  CC:   Scribed By: Olimpia Bryson, Medical Scribe    The information in this document, created by the medical scribe for me, accurately reflects the services I personally performed and the decisions made by me. I have reviewed and approved this document for accuracy prior to leaving the patient care area.  October 16, 2018 3:21 PM    Shanthi Red MS, PA-C

## 2018-10-16 NOTE — MR AVS SNAPSHOT
After Visit Summary   10/16/2018    Abhijit Garcia    MRN: [id number]           Patient Information     Date Of Birth          1975        Visit Information        Provider Department      10/16/2018 3:15 PM Shanthi Red PA-C [facility]        Today's Diagnoses     Verruca plana    -  1       Follow-ups after your visit        Who to contact     If you have questions or need follow up information about today's clinic visit or your schedule please contact [facility] directly at [phone].  Normal or non-critical lab and imaging results will be communicated to you by [employer], letter or phone within 4 business days after the clinic has received the results. If you do not hear from us within 7 days, please contact the clinic through [employer] or phone. If you have a critical or abnormal lab result, we will notify you by phone as soon as possible.  Submit refill requests through [facility] or call your pharmacy and they will forward the refill request to us. Please allow 3 business days for your refill to be completed.          Additional Information About Your Visit        MyChart Information     [facility] gives you secure access to your electronic health record. If you see a primary care provider, you can also send messages to your care team and make appointments. If you have questions, please call your primary care clinic.  If you do not have a primary care provider, please call [phone] and they will assist you.        Care EveryWhere ID     This is your Care EveryWhere ID. This could be used by other organizations to access your [place] medical records  [id number]        Your Vitals Were     Pulse Pulse Oximetry                88 94%           Blood Pressure from Last 3 Encounters:   10/16/18 122/82   10/12/18 115/87   10/03/18 124/68    Weight from Last 3 Encounters:   10/03/18 141.1 kg (311 lb)   07/31/18 141.3 kg (311 lb 8 oz)    07/11/18 141.5 kg (312 lb)              We Performed the Following     DESTRUCT BENIGN LESION, UP TO 14          Today's Medication Changes          These changes are accurate as of 10/16/18  4:07 PM.  If you have any questions, ask your nurse or doctor.               These medicines have changed or have updated prescriptions.        Dose/Directions    imiquimod 5 % cream   Commonly known as:  ALDARA   This may have changed:    - when to take this  - reasons to take this  - additional instructions   Used for:  Verruca plana        Apply a small sized amount to warts QHS at bedtime.   Wash off after 8 hours.   Quantity:  15 packet   Refills:  3       ketoconazole 2 % cream   Commonly known as:  NIZORAL   This may have changed:    - when to take this  - reasons to take this   Used for:  Tinea pedis of both feet        Apply topically daily   Quantity:  60 g   Refills:  2            Where to get your medicines      These medications were sent to [facility] [address] [place] [address] [facility] [address] [address] [facility] [address] [facility] [address]     Phone:  [phone]     imiquimod 5 % cream                Primary Care Provider Office Phone # Fax #    Samir Jaison De La O [phone]       [address] [place] [address]        Equal Access to Services     OLIVIA HARRELL AH: Hadii mir ku hadasho Soomaali, waaxda luqadaha, qaybta kaalmada adeegyada, waxay idiin hayaan enrike correa. So [facility] [phone].    ATENCIÓN: Si habla español, tiene a cruz disposición servicios gratuitos de asistencia lingüística. Llame al [phone].    We comply with applicable federal civil rights laws and [place] laws. We do not discriminate on the basis of race, color, national origin, age, disability, sex, sexual orientation, or gender identity.            Thank you!     Thank you for choosing [facility]  for your care. Our goal is always to provide  you with excellent care. Hearing back from our patients is one way we can continue to improve our services. Please take a few minutes to complete the written survey that you may receive in the mail after your visit with us. Thank you!             Your Updated Medication List - Protect others around you: Learn how to safely use, store and throw away your medicines at www.disposemymeds.org.          This list is accurate as of 10/16/18  4:07 PM.  Always use your most recent med list.                   Brand Name Dispense Instructions for use Diagnosis    allopurinol 100 MG tablet    ZYLOPRIM    90 tablet    Take 1 tablet (100 mg) by mouth daily    Gout of multiple sites, unspecified cause, unspecified chronicity       ciclopirox 0.77 % Gel      as needed        colchicine 0.6 MG capsule    MITIGARE    60 capsule    Take 0.6 mg by mouth 2 times daily as needed    Acute gout, unspecified cause, unspecified site       imiquimod 5 % cream    ALDARA    15 packet    Apply a small sized amount to warts QHS at bedtime.   Wash off after 8 hours.    Verruca plana       irbesartan 300 MG tablet    AVAPRO     Take 1 tablet (300 mg) by mouth daily    Essential hypertension       ketoconazole 2 % cream    NIZORAL    60 g    Apply topically daily    Tinea pedis of both feet       meloxicam 15 MG tablet    MOBIC    90 tablet    Take 1 tablet (15 mg) by mouth daily as needed    Gout of multiple sites, unspecified cause, unspecified chronicity       order for DME     1 Device    Equipment being ordered:  Knee high support stockings, 20-30 mm mercury One pain, wear daily as needed    Dependent edema       valACYclovir 1000 mg tablet    VALTREX    8 tablet    TAKE 2 TABLETS BY MOUTH TWICE DAILY FOR 1 DAY    Herpes simplex virus (HSV) infection

## 2018-10-16 NOTE — LETTER
10/16/2018         RE: Abhijit Garcia  [address] [facility] [address]        Dear Colleague,    Thank you for referring your patient, Abhijit Garcia, to the [facility]. Please see a copy of my visit note below.    HPI:   Abhijit Garcia is a 43 year old male who presents for recheck for flat warts on arms and face  chief complaint  Location: jaw line     Condition present for:  recent.   Previous treatments include: imiquimod with no improvement    Review Of Systems  Eyes: negative  Ears/Nose/Throat: negative  Respiratory: No shortness of breath, dyspnea on exertion, cough, or hemoptysis  Cardiovascular: negative  Gastrointestinal: negative  Genitourinary: negative  Musculoskeletal: negative  Neurologic: negative  Psychiatric: negative    This document serves as a record of the services and decisions personally performed and made by Shanthi Red, MS, PA-C. It was created on her behalf by Olimpia Bryson, a trained medical scribe. The creation of this document is based on the provider's statements to the medical scribe.  Olimpia Bryson 3:16 PM October 16, 2018    PHYSICAL EXAM:    /82  Pulse 88  SpO2 94%  Skin exam performed as follows: Type 2 skin. Mood appropriate  Alert and Oriented X 3. Well developed, well nourished in no distress.  General appearance: Normal  Head including face: Normal  Eyes: conjunctiva and lids: Normal  Mouth: Lips, teeth, gums: Normal  Neck: Normal  Chest-breast/axillae: Normal  Back: Normal  Spleen and liver: Normal  Cardiovascular: Exam of peripheral vascular system by observation for swelling, varicosities, edema: Normal  Genitalia: groin, buttocks: Normal  Extremities: digits/nails (clubbing): Normal  Eccrine and Apocrine glands: Normal  Right upper extremity: Normal  Left upper extremity: Normal  Right lower extremity: Normal  Left lower extremity: Normal  Skin: Scalp and body hair: See  below    1. Flat topped small verrucous papules located on left jawline x 1    ASSESSMENT/PLAN:     1. Verruca plana on left jawline x 1 - advised. As bothersome to pt cryosurgery performed. Advised on blistering and post op care.   2. Forearms, face all clear toay        Follow-up: PRN  CC:   Scribed By: Olimpia Bryson, Medical Scribe    The information in this document, created by the medical scribe for me, accurately reflects the services I personally performed and the decisions made by me. I have reviewed and approved this document for accuracy prior to leaving the patient care area.  October 16, 2018 3:21 PM    Shanthi Red MS, PACaroC      Again, thank you for allowing me to participate in the care of your patient.        Sincerely,        Shanthi Red PA-C

## 2018-10-30 ENCOUNTER — TRANSFERRED RECORDS (OUTPATIENT)
Dept: HEALTH INFORMATION MANAGEMENT | Facility: CLINIC | Age: 43
End: 2018-10-30

## 2018-11-02 DIAGNOSIS — I10 ESSENTIAL HYPERTENSION: ICD-10-CM

## 2018-11-02 NOTE — TELEPHONE ENCOUNTER
"Requested Prescriptions   Pending Prescriptions Disp Refills     irbesartan (AVAPRO) 300 MG tablet [Pharmacy Med Name: IRBESARTAN 300MG TABLETS] 90 tablet 0     Sig: TAKE 1 TABLET(300 MG) BY MOUTH DAILY    Angiotensin-II Receptors Passed    11/2/2018 10:21 AM       Passed - Blood pressure under 140/90 in past 12 months    BP Readings from Last 3 Encounters:   10/16/18 122/82   10/12/18 115/87   10/03/18 124/68                Passed - Recent (12 mo) or future (30 days) visit within the authorizing provider's specialty    Patient had office visit in the last 12 months or has a visit in the next 30 days with authorizing provider or within the authorizing provider's specialty.  See \"Patient Info\" tab in inbasket, or \"Choose Columns\" in Meds & Orders section of the refill encounter.             Passed - Patient is age 18 or older       Passed - Normal serum creatinine on file in past 12 months    Recent Labs   Lab Test  10/12/18   1747   CR  1.10            Passed - Normal serum potassium on file in past 12 months    Recent Labs   Lab Test  10/12/18   1747   POTASSIUM  4.2                    Last Written Prescription Date:  3/21/18  Last Fill Quantity: ,  # refills:    Last office visit: 7/31/2018 with prescribing provider:  7/31/18   Future Office Visit:      "

## 2018-11-05 NOTE — TELEPHONE ENCOUNTER
Routing refill request to provider for review/approval because:  Medication is reported/historical    Andreea ADAMS RN, BSN, PHN

## 2018-11-06 ENCOUNTER — OFFICE VISIT (OUTPATIENT)
Dept: INTERNAL MEDICINE | Facility: CLINIC | Age: 43
End: 2018-11-06
Payer: COMMERCIAL

## 2018-11-06 VITALS
HEART RATE: 88 BPM | RESPIRATION RATE: 18 BRPM | BODY MASS INDEX: 40.06 KG/M2 | WEIGHT: 312 LBS | DIASTOLIC BLOOD PRESSURE: 84 MMHG | TEMPERATURE: 98.7 F | SYSTOLIC BLOOD PRESSURE: 132 MMHG | OXYGEN SATURATION: 94 %

## 2018-11-06 DIAGNOSIS — M10.9 GOUT OF MULTIPLE SITES, UNSPECIFIED CAUSE, UNSPECIFIED CHRONICITY: ICD-10-CM

## 2018-11-06 DIAGNOSIS — R25.2 LEG CRAMPS: Primary | ICD-10-CM

## 2018-11-06 DIAGNOSIS — E66.01 MORBID OBESITY (H): ICD-10-CM

## 2018-11-06 DIAGNOSIS — R07.89 ATYPICAL CHEST PAIN: ICD-10-CM

## 2018-11-06 DIAGNOSIS — I10 ESSENTIAL HYPERTENSION: ICD-10-CM

## 2018-11-06 DIAGNOSIS — Z13.1 SCREENING FOR DIABETES MELLITUS: ICD-10-CM

## 2018-11-06 LAB
CREAT SERPL-MCNC: 1.21 MG/DL (ref 0.66–1.25)
GFR SERPL CREATININE-BSD FRML MDRD: 65 ML/MIN/1.7M2
URATE SERPL-MCNC: 7.5 MG/DL (ref 3.5–7.2)

## 2018-11-06 PROCEDURE — 99214 OFFICE O/P EST MOD 30 MIN: CPT | Performed by: INTERNAL MEDICINE

## 2018-11-06 PROCEDURE — 82565 ASSAY OF CREATININE: CPT | Performed by: INTERNAL MEDICINE

## 2018-11-06 PROCEDURE — 36415 COLL VENOUS BLD VENIPUNCTURE: CPT | Performed by: INTERNAL MEDICINE

## 2018-11-06 PROCEDURE — 84550 ASSAY OF BLOOD/URIC ACID: CPT | Performed by: INTERNAL MEDICINE

## 2018-11-06 NOTE — PROGRESS NOTES
SUBJECTIVE:   Abhijit Garcia is a 43 year old male who presents to clinic today for the following health issues:    Continued issues with leg cramps in left leg., which wake him up.    Sunday pt had another calf cramp - had wife massage calf in the area of the blood clot (from the ankle surgery) and that caused great pain but cramping relaxed.  Came back and this was repeated a few times.       Cramping issues began following ankle surgery 2 years ago and nerve block on left ankle.   Patient had gotting out of the habit of taking calcium past few weeks.   Has resumed and no cramps since.          Reviewed and updated as needed this visit by clinical staff:  Medications  Allergies  Soc Hx   Additional history: as documented    Additional issues to address:  1.  Follow-up HTN.  Patient is checking outpatient blood pressures, at home and at office visits.  Results are good.  He reports no side effects from BP medications.   Not using meloxicam regularly  2.  Follow-up gout.   Has record of flares:     - allopurinol, developed some headache and eye pain this summer, perhaps in July.   Stopped.  - retried allopurinol 10/8 and tolerating now  8/29 x 4 days, R 2nd foot after vacation, was dehydrated.   NSAID and colchicine  3.  Follow-up ED visit for chest pain, was under much stress.    ROS:  Some LLQ pain, seems to resolve when takes metamucil regularly.  BM normal.  Constitutional, HEENT, cardiovascular, pulmonary, gi and gu systems are negative, except as otherwise noted.    OBJECTIVE:                                                      /84  Pulse 88  Temp 98.7  F (37.1  C) (Oral)  Resp 18  Wt 312 lb (141.5 kg)  SpO2 94%  BMI 40.06 kg/m2  Body mass index is 40.06 kg/(m^2).  No apparent distress  CV: regular rates and rhythm, normal S1 S2, no S3 or S4 and no murmur, click or rub  ABDOMEN: soft, nontender, without hepatosplenomegaly or masses  MS: extremities normal- no gross deformities noted.  No  focal tenderness or masses, enlargement of the left calf or elsewhere      ASSESSMENT:                                                      Leg cramps, which appear controlled when takes calcium  History of DVT, no suggestion of recurrence  Morbid Obesity, no change  HTN, controlled though I would like to see diastolic lower  Follow-up gout, tolerating allopurinol at this time  LLQ pain, consider IBS, other  Episode of chest pain leading to ED visit recently, probably noncardiac    PLAN:                                                      1.  MEDICATIONS:  Continue current medications without change  2.  Check uric acid level, kidney function --> creat normal, uric acid improved.   We will only increase dose for further flares.  3.  Work on weight, as much as able  4.  Perfusion stress test  5.  Check fasting labs in 6 months, then make an office appointment with MD to review the results.     Samir De La O MD  [facility]    >25 minutes spent with patient, greater than 50% spent on discussion/education/planning - as outlined in assessment and plan

## 2018-11-06 NOTE — MR AVS SNAPSHOT
After Visit Summary   11/6/2018    Abhijit Garcia    MRN: [id number]           Patient Information     Date Of Birth          1975        Visit Information        Provider Department      11/6/2018 2:30 PM Samir De La O MD [facility]        Today's Diagnoses     Leg cramps    -  1    Gout of multiple sites, unspecified cause, unspecified chronicity        Obesity, BMI > 35 (H)        Essential hypertension        Atypical chest pain        Screening for diabetes mellitus          Care Instructions    PLAN:                                                      1.  MEDICATIONS:  Continue current medications without change  2.  Check uric acid level, kidney function.   We will only increase dose for further flares.  3.  Work on weight, as much as able  4.  Perfusion stress test  5.  Check fasting labs in 6 months, then make an office appointment with MD to review the results.           Follow-ups after your visit        Follow-up notes from your care team     Return in about 6 months (around 5/6/2019) for BP recheck, , with a pre-visit fasting lab draw.      Future tests that were ordered for you today     Open Future Orders        Priority Expected Expires Ordered    Basic metabolic panel Routine 5/5/2019 11/6/2019 11/6/2018    NM Lexiscan stress test Routine  11/6/2019 11/6/2018            Who to contact     If you have questions or need follow up information about today's clinic visit or your schedule please contact [facility] directly at [phone].  Normal or non-critical lab and imaging results will be communicated to you by MyChart, letter or phone within 4 business days after the clinic has received the results. If you do not hear from us within 7 days, please contact the clinic through MyChart or phone. If you have a critical or abnormal lab result, we will notify you by phone as soon as possible.  Submit refill requests through  [employer] or call your pharmacy and they will forward the refill request to us. Please allow 3 business days for your refill to be completed.          Additional Information About Your Visit        "[employer]hart Information     [employer] gives you secure access to your electronic health record. If you see a primary care provider, you can also send messages to your care team and make appointments. If you have questions, please call your primary care clinic.  If you do not have a primary care provider, please call [phone] and they will assist you.        Care EveryWhere ID     This is your Care EveryWhere ID. This could be used by other organizations to access your [place] medical records  [id number]        Your Vitals Were     Pulse Temperature Respirations Pulse Oximetry BMI (Body Mass Index)       88 98.7  F (37.1  C) (Oral) 18 94% 40.06 kg/m2        Blood Pressure from Last 3 Encounters:   11/06/18 132/84   10/16/18 122/82   10/12/18 115/87    Weight from Last 3 Encounters:   11/06/18 312 lb (141.5 kg)   10/03/18 311 lb (141.1 kg)   07/31/18 311 lb 8 oz (141.3 kg)              We Performed the Following     Creatinine     Uric acid          Today's Medication Changes          These changes are accurate as of 11/6/18  3:39 PM.  If you have any questions, ask your nurse or doctor.               These medicines have changed or have updated prescriptions.        Dose/Directions    ketoconazole 2 % cream   Commonly known as:  NIZORAL   This may have changed:    - when to take this  - reasons to take this   Used for:  Tinea pedis of both feet        Apply topically daily   Quantity:  60 g   Refills:  2                Primary Care Provider Office Phone # Fax #    Samir Jaison De La O [phone]       [address] [place] [id number]        Equal Access to Services     OLIVIA HARRELL : Bhavani Joshua, pineda shetty, pauly araya.  [facility] [phone].    ATENCIÓN: Si wei grossman, tiene a cruz disposición servicios gratuitos de asistencia lingüística. Mary hayden [phone].    We comply with applicable federal civil rights laws and [place] laws. We do not discriminate on the basis of race, color, national origin, age, disability, sex, sexual orientation, or gender identity.            Thank you!     Thank you for choosing [facility]  for your care. Our goal is always to provide you with excellent care. Hearing back from our patients is one way we can continue to improve our services. Please take a few minutes to complete the written survey that you may receive in the mail after your visit with us. Thank you!             Your Updated Medication List - Protect others around you: Learn how to safely use, store and throw away your medicines at www.disposemymeds.org.          This list is accurate as of 11/6/18  3:39 PM.  Always use your most recent med list.                   Brand Name Dispense Instructions for use Diagnosis    allopurinol 100 MG tablet    ZYLOPRIM    90 tablet    Take 1 tablet (100 mg) by mouth daily    Gout of multiple sites, unspecified cause, unspecified chronicity       colchicine 0.6 MG capsule    MITIGARE    60 capsule    Take 0.6 mg by mouth 2 times daily as needed    Acute gout, unspecified cause, unspecified site       imiquimod 5 % cream    ALDARA    15 packet    Apply a small sized amount to warts QHS at bedtime.   Wash off after 8 hours.    Verruca plana       irbesartan 300 MG tablet    AVAPRO     Take 1 tablet (300 mg) by mouth daily    Essential hypertension       ketoconazole 2 % cream    NIZORAL    60 g    Apply topically daily    Tinea pedis of both feet       meloxicam 15 MG tablet    MOBIC    90 tablet    Take 1 tablet (15 mg) by mouth daily as needed    Gout of multiple sites, unspecified cause, unspecified chronicity       order for DME     1 Device    Equipment being ordered:   Knee high support stockings, 20-30 mm mercury One pain, wear daily as needed    Dependent edema       valACYclovir 1000 mg tablet    VALTREX    8 tablet    TAKE 2 TABLETS BY MOUTH TWICE DAILY FOR 1 DAY    Herpes simplex virus (HSV) infection

## 2018-11-06 NOTE — PATIENT INSTRUCTIONS
PLAN:                                                      1.  MEDICATIONS:  Continue current medications without change  2.  Check uric acid level, kidney function.   We will only increase dose for further flares.  3.  Work on weight, as much as able  4.  Perfusion stress test  5.  Check fasting labs in 6 months, then make an office appointment with MD to review the results.

## 2018-11-08 RX ORDER — IRBESARTAN 300 MG/1
TABLET ORAL
Qty: 90 TABLET | Refills: 3 | Status: SHIPPED | OUTPATIENT
Start: 2018-11-08 | End: 2019-10-24

## 2018-11-19 ENCOUNTER — MYC MEDICAL ADVICE (OUTPATIENT)
Dept: INTERNAL MEDICINE | Facility: CLINIC | Age: 43
End: 2018-11-19

## 2018-11-19 DIAGNOSIS — Z12.11 COLON CANCER SCREENING: ICD-10-CM

## 2018-11-19 DIAGNOSIS — Z80.0 FAMILY HISTORY OF COLON CANCER: Primary | ICD-10-CM

## 2018-11-19 DIAGNOSIS — R10.32 LLQ ABDOMINAL PAIN: ICD-10-CM

## 2018-11-19 NOTE — TELEPHONE ENCOUNTER
Please see below note and advise.  Should patient have colonoscopy performed soon due to family history and diagnosis?

## 2018-11-27 ENCOUNTER — MYC MEDICAL ADVICE (OUTPATIENT)
Dept: INTERNAL MEDICINE | Facility: CLINIC | Age: 43
End: 2018-11-27

## 2018-11-27 DIAGNOSIS — M25.512 LEFT SHOULDER PAIN: Primary | ICD-10-CM

## 2018-11-30 ENCOUNTER — HOSPITAL ENCOUNTER (OUTPATIENT)
Dept: CARDIOLOGY | Facility: CLINIC | Age: 43
Discharge: HOME | End: 2018-11-30
Attending: INTERNAL MEDICINE | Admitting: INTERNAL MEDICINE
Payer: COMMERCIAL

## 2018-11-30 DIAGNOSIS — R07.89 ATYPICAL CHEST PAIN: ICD-10-CM

## 2018-11-30 PROCEDURE — 78452 HT MUSCLE IMAGE SPECT MULT: CPT

## 2018-11-30 PROCEDURE — 34300033 ZZH RX 343: Performed by: INTERNAL MEDICINE

## 2018-11-30 PROCEDURE — 93016 CV STRESS TEST SUPVJ ONLY: CPT | Performed by: INTERNAL MEDICINE

## 2018-11-30 PROCEDURE — 78452 HT MUSCLE IMAGE SPECT MULT: CPT | Mod: 26 | Performed by: INTERNAL MEDICINE

## 2018-11-30 PROCEDURE — 93018 CV STRESS TEST I&R ONLY: CPT | Performed by: INTERNAL MEDICINE

## 2018-11-30 PROCEDURE — A9502 TC99M TETROFOSMIN: HCPCS | Performed by: INTERNAL MEDICINE

## 2018-11-30 RX ADMIN — TETROFOSMIN 31.7 MCI.: 1.38 INJECTION, POWDER, LYOPHILIZED, FOR SOLUTION INTRAVENOUS at 14:44

## 2018-11-30 RX ADMIN — TETROFOSMIN 9.93 MCI.: 1.38 INJECTION, POWDER, LYOPHILIZED, FOR SOLUTION INTRAVENOUS at 13:18

## 2018-12-03 ENCOUNTER — MYC MEDICAL ADVICE (OUTPATIENT)
Dept: INTERNAL MEDICINE | Facility: CLINIC | Age: 43
End: 2018-12-03

## 2018-12-14 ENCOUNTER — MYC MEDICAL ADVICE (OUTPATIENT)
Dept: INTERNAL MEDICINE | Facility: CLINIC | Age: 43
End: 2018-12-14

## 2018-12-21 ENCOUNTER — HOSPITAL ENCOUNTER (OUTPATIENT)
Facility: CLINIC | Age: 43
Discharge: HOME | End: 2018-12-21
Attending: INTERNAL MEDICINE | Admitting: INTERNAL MEDICINE
Payer: COMMERCIAL

## 2018-12-21 VITALS
RESPIRATION RATE: 16 BRPM | SYSTOLIC BLOOD PRESSURE: 120 MMHG | OXYGEN SATURATION: 93 % | DIASTOLIC BLOOD PRESSURE: 80 MMHG | HEART RATE: 77 BPM

## 2018-12-21 LAB — COLONOSCOPY: NORMAL

## 2018-12-21 PROCEDURE — 25000128 H RX IP 250 OP 636: Performed by: INTERNAL MEDICINE

## 2018-12-21 PROCEDURE — 88305 TISSUE EXAM BY PATHOLOGIST: CPT | Performed by: INTERNAL MEDICINE

## 2018-12-21 PROCEDURE — G0500 MOD SEDAT ENDO SERVICE >5YRS: HCPCS | Performed by: INTERNAL MEDICINE

## 2018-12-21 PROCEDURE — 45385 COLONOSCOPY W/LESION REMOVAL: CPT | Mod: PT | Performed by: INTERNAL MEDICINE

## 2018-12-21 PROCEDURE — 44394 COLONOSCOPY W/SNARE: CPT | Performed by: INTERNAL MEDICINE

## 2018-12-21 PROCEDURE — 88305 TISSUE EXAM BY PATHOLOGIST: CPT | Mod: 26 | Performed by: INTERNAL MEDICINE

## 2018-12-21 RX ORDER — FENTANYL CITRATE 50 UG/ML
INJECTION, SOLUTION INTRAMUSCULAR; INTRAVENOUS PRN
Status: DISCONTINUED | OUTPATIENT
Start: 2018-12-21 | End: 2018-12-21 | Stop reason: HOSPADM

## 2018-12-21 RX ORDER — NALOXONE HYDROCHLORIDE 0.4 MG/ML
.1-.4 INJECTION, SOLUTION INTRAMUSCULAR; INTRAVENOUS; SUBCUTANEOUS
Status: DISCONTINUED | OUTPATIENT
Start: 2018-12-21 | End: 2018-12-21 | Stop reason: HOSPADM

## 2018-12-21 RX ORDER — LIDOCAINE 40 MG/G
CREAM TOPICAL
Status: DISCONTINUED | OUTPATIENT
Start: 2018-12-21 | End: 2018-12-21 | Stop reason: HOSPADM

## 2018-12-21 RX ORDER — ONDANSETRON 2 MG/ML
4 INJECTION INTRAMUSCULAR; INTRAVENOUS EVERY 6 HOURS PRN
Status: DISCONTINUED | OUTPATIENT
Start: 2018-12-21 | End: 2018-12-21 | Stop reason: HOSPADM

## 2018-12-21 RX ORDER — FLUMAZENIL 0.1 MG/ML
0.2 INJECTION, SOLUTION INTRAVENOUS
Status: DISCONTINUED | OUTPATIENT
Start: 2018-12-21 | End: 2018-12-21 | Stop reason: HOSPADM

## 2018-12-21 RX ORDER — ONDANSETRON 2 MG/ML
4 INJECTION INTRAMUSCULAR; INTRAVENOUS
Status: DISCONTINUED | OUTPATIENT
Start: 2018-12-21 | End: 2018-12-21 | Stop reason: HOSPADM

## 2018-12-21 RX ORDER — ONDANSETRON 4 MG/1
4 TABLET, ORALLY DISINTEGRATING ORAL EVERY 6 HOURS PRN
Status: DISCONTINUED | OUTPATIENT
Start: 2018-12-21 | End: 2018-12-21 | Stop reason: HOSPADM

## 2018-12-21 NOTE — DISCHARGE INSTRUCTIONS
Eating a High-Fiber Diet  Fiber is what gives strength and structure to plants. Most grains, beans, vegetables, and fruits contain fiber. Foods rich in fiber are often low in calories and fat, and they fill you up more. They may also reduce your risks for certain health problems. To find out the amount of fiber in canned, packaged, or frozen foods, read the  Nutrition Facts  label. It tells you how much fiber is in a serving.      Types of Fiber and Their Benefits  There are two types of fiber: insoluble and soluble. They both aid digestion and help you maintain a healthy weight.  Insoluble fiber: This is found in whole grains, cereals, certain fruits and vegetables (such as apple skin, corn, and carrots). Insoluble fiber may prevent constipation and reduce the risk of certain types of cancer.   Soluble fiber: This type of fiber is in oats, beans, and certain fruits and vegetables (such as strawberries and peas). Soluble fiber can reduce cholesterol (which may help lower the risk of heart disease), and helps control blood sugar levels.  Look for High-Fiber Foods  Whole-grain breads and cereals: Try to eat 6-8 ounces a day. Include wheat and oat bran cereals, whole-wheat muffins or toast, and corn tortillas in your meals.  Fruits: Try to eat 2 cups a day. Apples, oranges, strawberries, pears, and bananas are good sources. (Note: Fruit juice is low in fiber.)  Vegetables: Try to eat 3 cups a day. Add asparagus, carrots, broccoli, peas, and corn to your meals.  Legumes (beans): One cup of cooked lentils gives you over 15 grams of fiber. Try navy beans, lentils, and chickpeas.  Seeds:  A small handful of seeds gives you about 3 grams of fiber. Try sunflower seeds.    Keep Track of Your Fiber  A healthy diet includes 31 grams of fiber a day if you have a 2,000-calorie diet. Keep track of how much fiber you eat. Start by reading food labels. Then eat a variety of foods high in fiber. Ask your doctor about supplemental  fiber products.            7933-6247 [facility] [facility], [address] [place] [address], [place] [address]. All rights reserved. This information is not intended as a substitute for professional medical care. Always follow your healthcare professional's instructions.    Understanding Diverticulosis and Diverticulitis     Pouches or diverticula usually occur in the lower part of the colon called the sigmoid.      Diverticulitis occurs when the pouches become inflamed.     The colon (large intestine) is the last part of the digestive tract. It absorbs water from stool and changes it from a liquid to a solid. In certain cases, small pouches called diverticula can form in the colon wall. This condition is called diverticulosis. The pouches can become infected. If this happens, it becomes a more serious problem called diverticulitis. These problems can be painful. But they can be managed.   Managing Your Condition  Diet changes or taking medications are often tried first. These may be enough to bring relief. If the case is bad, surgery may be done. You and your doctor can discuss the plan that is best for you.  If You Have Diverticulosis  Diet changes are often enough to control symptoms. The main changes are adding fiber (roughage) and drinking more water. Fiber absorbs water as it travels through your colon. This helps your stool stay soft and move smoothly. Water helps this process. If needed, you may be told to take over-the-counter stool softeners. To help relieve pain, antispasmodic medications may be prescribed.  If You Have Diverticulitis  Treatment depends on how bad your symptoms are.  For mild symptoms: You may be put on a liquid diet for a short time. You may also be prescribed antibiotics. If these two steps relieve your symptoms, you may then be prescribed a high-fiber diet. If you still have symptoms, your doctor will discuss further treatment options with you.  For severe symptoms: You may need to be admitted to the  hospital. There, you can be given IV antibiotics and fluids. Once symptoms are under control, the above treatments may be tried. If these don t control your condition, your doctor may discuss the option of having surgery with you.  [place] to Colon Health  Help keep your colon healthy with a diet that includes plenty of high-fiber fruits, vegetables, and whole grains. Drink plenty of liquids like water and juice. Your doctor may also recommend avoiding seeds and nuts.          [id number] [facility], [address] [place] [address], [place] [address]. All rights reserved. This information is not intended as a substitute for professional medical care. Always follow your healthcare professional's instructions.      Understanding Colon and Rectal Polyps     The colon has a smooth lining composed of millions of cells.     The colon (also called the large intestine) is a muscular tube that forms the last part of the digestive tract. It absorbs water and stores food waste. The colon is about 4 to 6 feet long. The rectum is the last 6 inches of the colon. The colon and rectum have a smooth lining composed of millions of cells. Changes in these cells can lead to growths in the colon that can become cancerous and should be removed.     When the Colon Lining Changes  Changes that occur in the cells that line the colon or rectum can lead to growths called polyps. Over a period of years, polyps can turn cancerous. Removing polyps early may prevent cancer from ever forming.      Polyps  Polyps are fleshy clumps of tissue that form on the lining of the colon or rectum. Small polyps are usually benign (not cancerous). However, over time, cells in a polyp can change and become cancerous. The larger a polyp grows, the more likely this is to happen. Also, certain types of polyps known as adenomatous polyps are considered premalignant. This means that they will almost always become cancerous if they re not removed.          Cancer  Almost all  colorectal cancers start when polyp cells begin growing abnormally. As a cancerous tumor grows, it may involve more and more of the colon or rectum. In time, cancer can also grow beyond the colon or rectum and spread to nearby organs or to glands called lymph nodes. The cells can also travel to other parts of the body. This is known as metastasis. The earlier a cancerous tumor is removed, the better the chance of preventing its spread.        [id number] [facility], [address] [place] [address], [place] [address]. All rights reserved. This information is not intended as a substitute for professional medical care. Always follow your healthcare professional's instructions.

## 2018-12-21 NOTE — H&P
"Pre-Endoscopy History and Physical     Abhijit Garcia MRN# [id number]   YOB: 1975 Age: 43 year old     Date of Procedure: 12/21/2018  Primary care provider: Samir De La O  Type of Endoscopy: Colonoscopy with possible biopsy, possible polypectomy  Reason for Procedure: screen  Type of Anesthesia Anticipated: Conscious Sedation    HPI:    Abhijit is a 43 year old male who will be undergoing the above procedure.      A history and physical has been performed. The patient's medications and allergies have been reviewed. The risks and benefits of the procedure and the sedation options and risks were discussed with the patient.  All questions were answered and informed consent was obtained.      He denies a personal or family history of anesthesia complications or bleeding disorders.     Patient Active Problem List   Diagnosis     Herpes simplex virus (HSV) infection     Mixed Hyperlipidemia LDL goal <130     Essential hypertension     Gout     Lumbar radiculitis     DVT left leg. postop (H)     Obesity, BMI > 35 (H)        Past Medical History:   Diagnosis Date     Arthritis      Dysuria      Hypertension         Past Surgical History:   Procedure Laterality Date     ANKLE SURGERY       APPENDECTOMY       BIOPSY  2011    biopsy from foreskin     COLONOSCOPY  12/21/2018    Dr. Dc [facility]     ESOPHAGOSCOPY, GASTROSCOPY, DUODENOSCOPY (EGD), COMBINED  2012    for stomach pain done in [place]-acid reflux     ORTHOPEDIC SURGERY  03/23/2017    ankle left -repair of tendons     wisdom teeth         Social History     Tobacco Use     Smoking status: Never Smoker     Smokeless tobacco: Never Used   Substance Use Topics     Alcohol use: Yes     Alcohol/week: 0.0 oz     Comment: 1-5 drinks per month       Family History   Problem Relation Age of Onset     Diabetes Mother      Obesity Mother      Cancer - colorectal Mother      Hypertension Mother      Colon Cancer Mother         6\" of bowel removed in 2011     " Arthritis Father      Respiratory Father         asbestos exposure     Chronic Obstructive Pulmonary Disease Father      Coronary Artery Disease Father 71        Father b - 1933  d - 2004     Skin Cancer Father      Other Cancer Father         Skin cancer on face and ears     Allergies Brother      Respiratory Brother      Heart Defect Son      Asthma Brother         Primarily affected him as a child.       Prior to Admission medications    Medication Sig Start Date End Date Taking? Authorizing Provider   allopurinol (ZYLOPRIM) 100 MG tablet Take 1 tablet (100 mg) by mouth daily 7/11/18  Yes Samir De La O MD   irbesartan (AVAPRO) 300 MG tablet TAKE 1 TABLET(300 MG) BY MOUTH DAILY 11/8/18  Yes Samir De La O MD   irbesartan (AVAPRO) 300 MG tablet Take 1 tablet (300 mg) by mouth daily 3/21/18  Yes Samir De La O MD   meloxicam (MOBIC) 15 MG tablet Take 1 tablet (15 mg) by mouth daily as needed 3/21/18  Yes Samir De La O MD   valACYclovir (VALTREX) 1000 mg tablet TAKE 2 TABLETS BY MOUTH TWICE DAILY FOR 1 DAY 9/11/18  Yes Samir De La O MD   Colchicine (MITIGARE) 0.6 MG CAPS Take 0.6 mg by mouth 2 times daily as needed 3/22/18   Samir De La O MD   imiquimod (ALDARA) 5 % cream Apply a small sized amount to warts QHS at bedtime.   Wash off after 8 hours. 10/16/18   Shanthi Red PA-C   ketoconazole (NIZORAL) 2 % cream Apply topically daily  Patient taking differently: Apply topically as needed  4/6/18   Shanthi Red PA-C   order for DME Equipment being ordered:  Knee high support stockings, 20-30 mm mercury  One pain, wear daily as needed 3/21/18   Samir De La O MD       Allergies   Allergen Reactions     Amlodipine      Edema at 10 mg dose     Contrast Dye      Indomethacin GI Disturbance     Iodine      Iodine in IV contrast dye     Levaquin [Levofloxacin]      Gout flare in 2011     Lisinopril      cough     Losartan      Penile rash     Seasonal Allergies         REVIEW OF  "SYSTEMS:   5 point ROS negative except as noted above in HPI, including Gen., Resp., CV, GI &  system review.    PHYSICAL EXAM:   There were no vitals taken for this visit. Estimated body mass index is 40.06 kg/m  as calculated from the following:    Height as of 1/4/18: 1.88 m (6' 2\").    Weight as of 11/6/18: 141.5 kg (312 lb).   GENERAL APPEARANCE: alert, and oriented  MENTAL STATUS: alert  AIRWAY EXAM: Mallampatti Class I (visualization of the soft palate, fauces, uvula, anterior and posterior pillars)  RESP: lungs clear to auscultation - no rales, rhonchi or wheezes  CV: regular rates and rhythm  DIAGNOSTICS:    Not indicated    IMPRESSION   ASA Class 1 - Healthy patient, no medical problems    PLAN:   Plan for Colonoscopy with possible biopsy, possible polypectomy. We discussed the risks, benefits and alternatives and the patient wished to proceed.    The above has been forwarded to the consulting provider.      Signed Electronically by: Shamar Dc  December 21, 2018          "

## 2018-12-21 NOTE — LETTER
December 4, 2018      Abhijit Garcia  [address] [facility] [address]        Dear Abhijit,         Thank you for choosing [facility]. You are scheduled for the following service.     Date:  12-21-18             Procedure:  COLONOSCOPY  Doctor:        Vanda   Arrival Time:  0730  *Check in at Emergency/Endoscopy desk*  Procedure Time:  0800    Location:   [facility]        Endoscopy Department, First Floor (Enter through ER Doors) *        [address] [place], [place] [address]      [phone] or [phone] () to reschedule      MIRALAX -GATORADE  PREP  Colonoscopy is the most accurate test to detect colon polyps and colon cancer; and the only test where polyps can be removed. During this procedure, a doctor examines the lining of your large intestine and rectum through a flexible tube.           Transportation  Arrange for a ride for the day of your procedure with a responsible adult.  A taxi ride is not an option unless you are accompanied by a responsible adult. If you fail to arrange transportation with a responsible adult, your procedure will be cancelled and rescheduled.    Purchase the  following supplies at your local pharmacy:  - 2 (two) bisacodyl tablets: each tablet contains 5 mg.  (Dulcolax  laxative NOT Dulcolax  stool softener)   - 1 (one) 8.3 oz bottle of Polyethylene Glycol (PEG) 3350 Powder   (MiraLAX , Smooth LAX , ClearLAX  or equivalent)  - 64 oz Gatorade    Regular Gatorade, Gatorade G2 , Powerade , Powerade Zero  or Pedialyte  is acceptable. Red colored flavors are not allowed; all other colors (yellow, green, orange, purple and blue) are okay. It is also okay to buy two 2.12 oz packets of powdered Gatorade that can be mixed with water to a total volume of 64 oz of liquid.  - 1 (one) 10 oz bottle of Magnesium Citrate (Red colored flavors are not allowed)  It is also okay for you to use a 0.5 oz package of  powdered magnesium citrate (17 g) mixed with 10 oz of water.    PREPARATION FOR COLONOSCOPY    7 days before:    Discontinue fiber supplements and medications containing iron. This includes Metamucil  and Fibercon ; and multivitamins with iron.  3 days before:    Begin a low-fiber diet. A low-fiber diet helps making the cleanout more effective.     Examples of a low-fiber diet include (but are not limited to): white bread, white rice, pasta, crackers, fish, chicken, eggs, ground beef, creamy peanut butter, cooked/steamed/boiled vegetables, canned fruit, bananas, melons, milk, plain yogurt cheese, salad dressing and other condiments.     The following are not allowed on a low-fiber diet: seeds, nuts, popcorn, bran, whole wheat, corn, quinoa, raw fruits and vegetables, berries and dried fruit, beans and lentils.    For additional details on low-fiber diet, please refer to the table on the last page.  2 days before:    Continue the low-fiber diet.     Drink at least 8 glasses of water throughout the day.     Stop eating solid foods at 11:45 pm.  1 day before:    In the morning: begin a clear liquid diet (liquids you can see through).     Examples of a clear liquid diet include: water, clear broth or bouillon, Gatorade, Pedialyte or Powerade, carbonated and non-carbonated soft drinks (Sprite , 7-Up , ginger ale), strained fruit juices without pulp (apple, white grape, white cranberry), Jell-O  and popsicles.     The following are not allowed on a clear liquid diet: red liquids, alcoholic beverages, dairy products (milk, creamer, and yogurt), protein shakes, creamy broths, juice with pulp and chewing tobacco.    At noon: take 2 (two) bisacodyl tablets     At 4 (and no later than 6pm): start drinking the Miralax-Gatorade preparation (8.3 oz of Miralax mixed with 64 oz of Gatorade in a large pitcher). Drink 1(one) 8 oz glass every 15 minutes thereafter, until the mixture is gone.    COLON CLEANSING TIPS: drink adequate  amounts of fluids before and after your colon cleansing to prevent dehydration. Stay near a toilet because you will have diarrhea. Even if you are sitting on the toilet, continue to drink the cleansing solution every 15 minutes. If you feel nauseous or vomit, rinse your mouth with water, take a 15 to 30-minute-break and then continue drinking the solution. You will be uncomfortable until the stool has flushed from your colon (in about 2 to 4 hours). You may feel chilled.              Day of your procedure  You may take all of your morning medications including blood pressure medications, blood thinners (if you have not been instructed to stop these by our office), methadone, anti-seizure medications with sips of water 3 hours prior to your procedure or earlier. Do not take insulin or vitamins prior to your procedure. Continue the clear liquid diet.   4 hours prior: drink 10 oz of magnesium citrate. It may be easier to drink it with a straw.    STOP consuming all liquids after that.     Do not take anything by mouth during this time.     Allow extra time to travel to your procedure as you may need to stop and use a restroom along the way.  You are ready for the procedure, if you followed all instructions and your stool is no longer formed, but clear or yellow liquid. If you are unsure whether your colon is clean, please call our office at [phone] before you leave for your appointment.  Bring the following to your procedure:  - Insurance Card/Photo ID.   - List of current medications including over-the-counter medications and supplements.   - Your rescue inhaler if you currently use one to control asthma.      Canceling or rescheduling your appointment:   If you must cancel or reschedule your appointment, please call [phone] as soon as possible.      COLONOSCOPY PRE-PROCEDURE CHECKLIST  If you have diabetes, ask your regular doctor for diet and medication restrictions.  If you take an anticoagulant or  anti-platelet medication (such as Coumadin , Lovenox , Pradaxa , Xarelto , Eliquis , etc.), please call your primary doctor for advice on holding this medication.  If you take aspirin you may continue to do so.  If you are or may be pregnant, please discuss the risks and benefits of this procedure with your doctor.          What happens during a colonoscopy?    Plan to spend up to two hours, starting at registration time, at the endoscopy center the day of your procedure. The colonoscopy takes an average of 15 to 30 minutes. Recovery time is about 30 minutes.    Before the exam:    You will change into a gown.    Your medical history and medication list will be reviewed with you, unless that has been done over the phone prior to the procedure.     A nurse will insert an intravenous (IV) line into your hand or arm.    The doctor will meet with you and will give you a consent form to sign.    During the exam:     Medicine will be given through the IV line to help you relax.     Your heart rate and oxygen levels will be monitored. If your blood pressure is low, you may be given fluids through the IV line.     The doctor will insert a flexible hollow tube, called a colonoscope, into your rectum. The scope will be advanced slowly through the large intestine (colon).    You may have a feeling of fullness or pressure.     If an abnormal tissue or a polyp is found, the doctor may remove it through the endoscope for closer examination, or biopsy. Tissue removal is painless    After the exam:           Any tissue samples removed during the exam will be sent to a lab for evaluation. It may take 5-7 working days for you to be notified of the results.     A nurse will provide you with complete discharge instructions before you leave the endoscopy center. Be sure to ask the nurse for specific instructions if you take blood thinners such as Aspirin, Coumadin or Plavix.     The doctor will prepare a full report for you and for the  physician who referred you for the procedure.     Your doctor will talk with you about the initial results of your exam.      Medication given during the exam will prohibit you from driving for the rest of the day.     Following the exam, you may resume your normal diet. Your first meal should be light, no greasy foods. Avoid alcohol until the next day.     You may resume your regular activities the day after the procedure.     LOW-FIBER DIET    Foods RECOMMENDED Foods to AVOID   Breads, Cereal, Rice and Pasta:   White bread, rolls, biscuits, croissant and sudheer toast.   Waffles, Slovenian toast and pancakes.   White rice, noodles, pasta, macaroni and peeled cooked potatoes.   Plain crackers and saltines.   Cooked cereals: farina, cream of rice.   Cold cereals: Puffed Rice , Rice Krispies , Corn Flakes  and Special K    Breads, Cereal, Rice and Pasta:   Breads or rolls with nuts, seeds or fruit.   Whole wheat, pumpernickel, rye breads and cornbread.   Potatoes with skin, brown or wild rice, and kasha (buckwheat).     Vegetables:   Tender cooked and canned vegetables without seeds: carrots, asparagus tips, green or wax beans, pumpkin, spinach, lima beans. Vegetables:   Raw or steamed vegetables.   Vegetables with seeds.   Sauerkraut.   Winter squash, peas, broccoli, Brussel sprouts, cabbage, onions, cauliflower, baked beans, peas and corn.   Fruits:   Strained fruit juice.   Canned fruit, except pineapple.   Ripe bananas and melon. Fruits:   Prunes and prune juice.   Raw fruits.   Dried fruits: figs, dates and raisins.   Milk/Dairy:   Milk: plain or flavored.   Yogurt, custard and ice cream.   Cheese and cottage cheese Milk/Dairy:     Meat and other proteins:   ground, well-cooked tender beef, lamb, ham, veal, pork, fish, poultry and organ meats.   Eggs.   Peanut butter without nuts. Meat and other proteins:   Tough, fibrous meats with gristle.   Dry beans, peas and lentils.   Peanut butter with nuts.   Tofu.   Fats,  Snack, Sweets, Condiments and Beverages:   Margarine, butter, oils, mayonnaise, sour cream and salad dressing, plain gravy.   Sugar, hard candy, clear jelly, honey and syrup.   Spices, cooked herbs, bouillon, broth and soups made with allowed vegetable, ketchup and mustard.   Coffee, tea and carbonated drinks.   Plain cakes, cookies and pretzels.   Gelatin, plain puddings, custard, ice cream, sherbet and popsicles. Fats, Snack, Sweets, Condiments and Beverages:   Nuts, seeds and coconut.   Jam, marmalade and preserves.   Pickles, olives, relish and horseradish.   All desserts containing nuts, seeds, dried fruit and coconut; or made from whole grains or bran.   Candy made with nuts or seeds.   Popcorn.                     DIRECTIONS TO THE ENDOSCOPY DEPARTMENT     From the north ([facility])  Take [address] South, exit on [address] [address]. Get into the left hand cindy, turn left (east), go one-half mile to [address] and turn left. Go north to the first stoplight, take a right on [address] and follow it to the Emergency entrance.    From the south ([facility])  Take [address]N to the [address] split and exit on [address] [address]. On [address] , turn left (west) to [address]. Turn right (north) on [address]. Go north to the first stoplight, take a right on [address] and follow it to the Emergency entrance.    From the east via 35[address] ([facility])  Take [address] south to [address] [address] exit. Turn right on [address] . Go west to [address]. Turn right (north) on [address]. Go to the first stoplight, take a right and follow on [address] to the Emergency entrance.    From the east via [address] ([facility])  Take [address] to [address]. Turn left (south) on [address] to [address]. Turn left (east) on [address] and follow it to the Emergency entrance.    From the west via [address] ([place]  Take [address] east to  [address]. Turn right (south) on [address] to [facility]. Turn left (east) on [facility] and follow it to the Emergency entrance.

## 2018-12-24 LAB — COPATH REPORT: NORMAL

## 2019-01-22 ENCOUNTER — TRANSFERRED RECORDS (OUTPATIENT)
Dept: HEALTH INFORMATION MANAGEMENT | Facility: CLINIC | Age: 44
End: 2019-01-22

## 2019-01-23 ENCOUNTER — TELEPHONE (OUTPATIENT)
Dept: INTERNAL MEDICINE | Facility: CLINIC | Age: 44
End: 2019-01-23

## 2019-01-23 ENCOUNTER — MYC MEDICAL ADVICE (OUTPATIENT)
Dept: INTERNAL MEDICINE | Facility: CLINIC | Age: 44
End: 2019-01-23

## 2019-01-23 NOTE — TELEPHONE ENCOUNTER
Pharmacist stated she will get everything taken care of and will call patient herself.  He should be able to get 30 day supply.  Closing encounter.

## 2019-01-23 NOTE — TELEPHONE ENCOUNTER
"Spoke with patient regarding Irbesartan.  He states that [facility] him [facility] day supply, 60 of these tablets were contaminated and have since been recalled.  Because [facility] used two different manufacturers to fill patient's script they are unable to tell him which tablets are contaminated.  He has 30 tablets left and does not want to take them.  Patient asked that [facility] supply him with \"good\" Irbesartan.  Apparently [facility] does not have this drug in stock however [facility] pharmacy does.  Script for 30 tablets was transferred to  [facility].  Writer spoke with pharmacist regarding this and whether patient's insurance could override and pay for the 30 tablets.  Pharmacist stated that they would look into situation and asked that writer call back this afternoon for update.  Will check in with them later and call patient at that time.    "

## 2019-01-23 NOTE — TELEPHONE ENCOUNTER
Reason for Call:  Medication or medication refill:irbesartan    Do you use a [facility]?  Name of the pharmacy and phone number for the current request:   PHARMACY tele# [phone]    Name of the medication requested: irbesartan    Other request: pt has concerning questions about irbesartan refills that he presently has from [facility]    Can we leave a detailed message on this number? YES    Phone number patient can be reached at: pt tele. [phone]    Best Time: asap    Call taken on 1/23/2019 at 11:02 AM by Yennifer Pereira

## 2019-01-29 ENCOUNTER — MYC MEDICAL ADVICE (OUTPATIENT)
Dept: INTERNAL MEDICINE | Facility: CLINIC | Age: 44
End: 2019-01-29

## 2019-03-02 NOTE — TELEPHONE ENCOUNTER
"Abhijit Garcia is a 43 year old male with L) calf soreness.    NURSING ASSESSMENT:  Description:  intermittent L) calf soreness \"deep bruised\" feeling  Onset/duration:  Onset - few months   Precip. factors:  History of L) LE DVT   Associated symptoms:  Denies redness, swelling, numbness/tingling, warmth, CP, SOB, or discoloration.    Pain scale (0-10)   5/10    Last exam/Treatment:  7/11/18  Allergies:   Allergies   Allergen Reactions     Allopurinol      Kidney area pain, eye problems     Amlodipine      Edema at 10 mg dose     Contrast Dye      Indomethacin GI Disturbance     Levaquin [Levofloxacin]      Gout flare in 2011     Lisinopril      cough     Losartan      Penile rash     Seasonal Allergies        MEDICATIONS:   Taking medication(s) as prescribed? N/A  Taking over the counter medication(s?) No  Any medication side effects? Not Applicable    Any barriers to taking medication(s) as prescribed?  No  Medication(s) improving/managing symptoms?  N/A  Medication reconciliation completed: N/A      NURSING PLAN: Nursing advice to patient seek medical care within 24 hours    RECOMMENDED DISPOSITION:  See in 24 hours - appointment scheduled for tomorrow AM.    Will comply with recommendation: Yes  If further questions/concerns or if symptoms do not improve, worsen or new symptoms develop, call your PCP or [facility] as soon as possible.      Guideline used:  Telephone Triage Protocols for Nurses, Fifth Edition, Saranya Vaz RN    "
Home

## 2019-03-19 ENCOUNTER — OFFICE VISIT (OUTPATIENT)
Dept: INTERNAL MEDICINE | Facility: CLINIC | Age: 44
End: 2019-03-19
Payer: COMMERCIAL

## 2019-03-19 VITALS
HEIGHT: 74 IN | SYSTOLIC BLOOD PRESSURE: 122 MMHG | HEART RATE: 82 BPM | WEIGHT: 309 LBS | TEMPERATURE: 97.8 F | OXYGEN SATURATION: 96 % | BODY MASS INDEX: 39.66 KG/M2 | DIASTOLIC BLOOD PRESSURE: 78 MMHG

## 2019-03-19 DIAGNOSIS — K57.32 DIVERTICULITIS OF COLON: Primary | ICD-10-CM

## 2019-03-19 DIAGNOSIS — K57.30 DIVERTICULOSIS OF COLON: ICD-10-CM

## 2019-03-19 PROCEDURE — 99214 OFFICE O/P EST MOD 30 MIN: CPT | Performed by: INTERNAL MEDICINE

## 2019-03-19 ASSESSMENT — MIFFLIN-ST. JEOR: SCORE: 2351.36

## 2019-03-19 NOTE — PATIENT INSTRUCTIONS
"*  Most likely diverticulitis, (infection of a diverticulum in your sigmoid colon)    *  The symptoms should continue to improve as they have been doing.     *  If you develop any worsening of your pain or significant worsening in your symptoms, then start the antibiotic Augmentin 1 tablet twice per day for 10 days.      --If you just continue to get better, then no antibiotics needed.     --Main side effects from Augmentin is loose stools and diarrhea while you are taking it, it should resolve after taking.      *  If you develop any serious abdominal, fevers, etc, Then go to the hospital immediately.     *  Avoid large quantities of popcorn, nuts seeds to help prevent future episodes of diverticulitis    *  Maintain a \"low residue diet\" for the next 2-3 weeks to help allow for easier bowel movements to pass along the inflamed segment of intestine.   Avoid raw vegetables, fiber supplements, etc.  During this time.     *  In 2-3 weeks, you should increase the fiber in your diet to bulk the stools to prevent future episodes of diverticulosis and future diverticuli from forming                "

## 2019-03-19 NOTE — PROGRESS NOTES
"  SUBJECTIVE:   Abhijit Garcia is a 44 year old male who presents to clinic today for the following health issues:      Abdominal Pain      Duration: 6 weeks     Description (location/character/radiation): lower left quadrant pain, gas related. Passing urine has helped pain sometimes.         Associated flank pain: None    Intensity:  moderate    Accompanying signs and symptoms:        Fever/Chills: no        Gas/Bloating: YES       Nausea/vomitting: YES       Diarrhea: no        Dysuria or Hematuria: YES    History (previous similar pain/trauma/previous testing): has had a colonoscopy with some abnormal findings.      Precipitating or alleviating factors:       Pain worse with eating/BM/urination: Yes        Pain relieved by BM: YES    Therapies tried and outcome: None    LMP:  not applicable    No fevers, no chills.  No nausea no vomiting.  Reports having one soft stool per day.  No diarrhea, no melena, no hematochezia.  The pain has been present for the roughly the last 3 weeks.  He reached his peak about 1 week ago and has been steadily receding since that time.  He has had no pain for last 2-3 days.  The pain was crampy, \"gnawing\" in the left lower quadrant.  No change with food or defecation.  Also notes a little bit of urinary frequency when the pain is present.      Problem list and histories reviewed & adjusted, as indicated.  Additional history: as documented        Reviewed and updated as needed this visit by clinical staff       Reviewed and updated as needed this visit by Provider           Past Medical History:  ---------------------------  Past Medical History:   Diagnosis Date     Arthritis      Dysuria      Hyperplastic colon polyp 12/2018     Hypertension        Past Surgical History:  ---------------------------  Past Surgical History:   Procedure Laterality Date     ANKLE SURGERY       APPENDECTOMY       BIOPSY  2011    biopsy from foreskin     COLONOSCOPY  12/21/2018    Dr. Vanda BURNS     " COLONOSCOPY N/A 12/21/2018    Procedure: COMBINED COLONOSCOPY THRU STOMA, BIOPSY BY SNARE using exacto snare;  Surgeon: Shamar Dc MD;  Location:  GI     ESOPHAGOSCOPY, GASTROSCOPY, DUODENOSCOPY (EGD), COMBINED  2012    for stomach pain done in [place]-acid reflux     ORTHOPEDIC SURGERY  03/23/2017    ankle left -repair of tendons     wisdom teeth         Current Medications:  ---------------------------  Current Outpatient Medications   Medication Sig Dispense Refill     allopurinol (ZYLOPRIM) 100 MG tablet Take 1 tablet (100 mg) by mouth daily 90 tablet prn     Colchicine (MITIGARE) 0.6 MG CAPS Take 0.6 mg by mouth 2 times daily as needed 60 capsule 1     imiquimod (ALDARA) 5 % cream Apply a small sized amount to warts QHS at bedtime.   Wash off after 8 hours. 15 packet 3     irbesartan (AVAPRO) 300 MG tablet TAKE 1 TABLET(300 MG) BY MOUTH DAILY 90 tablet 3     ketoconazole (NIZORAL) 2 % cream Apply topically daily (Patient taking differently: Apply topically as needed ) 60 g 2     meloxicam (MOBIC) 15 MG tablet Take 1 tablet (15 mg) by mouth daily as needed 90 tablet 0     order for DME Equipment being ordered:  Knee high support stockings, 20-30 mm mercury  One pain, wear daily as needed 1 Device 1     valACYclovir (VALTREX) 1000 mg tablet TAKE 2 TABLETS BY MOUTH TWICE DAILY FOR 1 DAY 8 tablet 3       Allergies:  -------------  Allergies   Allergen Reactions     Amlodipine      Edema at 10 mg dose     Contrast Dye      Indomethacin GI Disturbance     Iodine      Iodine in IV contrast dye     Levaquin [Levofloxacin]      Gout flare in 2011     Lisinopril      cough     Losartan      Penile rash     Seasonal Allergies        Social History:  -------------------  Social History     Socioeconomic History     Marital status:      Spouse name: Not on file     Number of children: 1     Years of education: Not on file     Highest education level: Not on file   Occupational History     Employer: [employer]   Social Needs     Financial resource strain: Not on file     Food insecurity:     Worry: Not on file     Inability: Not on file     Transportation needs:     Medical: Not on file     Non-medical: Not on file   Tobacco Use     Smoking status: Never Smoker     Smokeless tobacco: Never Used   Substance and Sexual Activity     Alcohol use: Yes     Alcohol/week: 0.0 oz     Comment: 1-5 drinks per month     Drug use: No     Sexual activity: Yes     Partners: Female     Birth control/protection: Abstinence, Pull-out method, Condom   Lifestyle     Physical activity:     Days per week: Not on file     Minutes per session: Not on file     Stress: Not on file   Relationships     Social connections:     Talks on phone: Not on file     Gets together: Not on file     Attends Anabaptist service: Not on file     Active member of club or organization: Not on file     Attends meetings of clubs or organizations: Not on file     Relationship status: Not on file     Intimate partner violence:     Fear of current or ex partner: Not on file     Emotionally abused: Not on file     Physically abused: Not on file     Forced sexual activity: Not on file   Other Topics Concern      Service Yes     Blood Transfusions No     Caffeine Concern Yes     Comment: 2 pots daily     Occupational Exposure No     Comment: Occ. all with in managible levels     Hobby Hazards No     Sleep Concern Yes     Stress Concern Yes     Weight Concern Yes     Special Diet No     Back Care Yes     Comment: L4 and L5 damaged     Exercise No     Bike Helmet Not Asked     Seat Belt Yes     Self-Exams No     Parent/sibling w/ CABG, MI or angioplasty before 65F 55M? No   Social History Narrative     Not on file       Family Medical History:  ------------------------------  Family History   Problem Relation Age of Onset     Diabetes Mother      Obesity Mother      Cancer - colorectal Mother      Hypertension Mother      Colon Cancer Mother         6\" of " "bowel removed in 2011     Arthritis Father      Respiratory Father         asbestos exposure     Chronic Obstructive Pulmonary Disease Father      Coronary Artery Disease Father 71        Father b - 1933  d - 2004     Skin Cancer Father      Other Cancer Father         Skin cancer on face and ears     Allergies Brother      Respiratory Brother      Heart Defect Son      Asthma Brother         Primarily affected him as a child.         ROS:  REVIEW OF SYSTEMS:    RESP: negative for cough, dyspnea, wheezing, hemoptysis  CV: negative for chest pain, palpitations, PND, [place], orthopnea; reports no changes in their ability to perform physical activity (from cardiovascular standpoint)  GI: negative for dysphagia, N/V,  melena, diarrhea and constipation  NEURO: negative for numbness/tingling, paralysis, incoordination, or focal weakness     OBJECTIVE:                                                    /78   Pulse 82   Temp 97.8  F (36.6  C) (Oral)   Ht 1.88 m (6' 2\")   Wt 140.2 kg (309 lb)   SpO2 96%   BMI 39.67 kg/m       GENERAL alert and no distress  EYES:  Normal sclera,conjunctiva, EOMI  HENT: oral and posterior pharynx without lesions or erythema, facies symmetric  NECK: Neck supple. No LAD, without thyroidmegaly or JVD., Carotids without bruits.  RESP: Clear to ausculation bilaterally without wheezes or crackles. Normal BS in all fields.  CV: RRR normal S1S2 without murmurs, rubs or gallops. PMI normal  LYMPH: no cervical lymph adenopathy appreciated  MS: extremities- no gross deformities of the visible extremities noted, no edema  PSYCH: Alert and oriented times 3; speech- coherent  SKIN:  No obvious significant skin lesions on visible portions of face   ABD: mild pain with deep plaptio of left lower abdoemn, no rebound, n oguarding.          ASSESSMENT/PLAN:                                                      (K57.32) Diverticulitis of colon  (primary encounter diagnosis)  Comment: Patient patient's " history and exam, he most likely is finishing a resolving episode of diverticulitis.  He had a few scattered large and small mouth diverticuli present on his most recent colonoscopy last year.    At this point he does not likely need any specific therapy or CAT scan imaging.  However given the fact that he is going to be leaving town, I will give him a prescription for Augmentin that he can start if he has any return of his lower abdominal pain.  Asked him to seek medical attention if he develops any severe significant pain.  Recommended low residue diet for the next 2 or 3 weeks until he is completely over this.  After that point, increase the fiber in the diet.  Plan:     (K57.30) Diverticulosis of colon  Comment: Review the pathophysiology and anatomical/mechanical issues of diverticulosis and diverticulitis.   Discussed low residue, higher fiber diet to prevent occurances of diverticulitis and the formation of future diverticuli.    Discussed the typical presentations of diverticulitis and what to do if they appear.    Plan:       See Patient Instructions    GENNA WILKINS M.D., MD  [facility]    (Chart documentation may have been completed, in part, with [employer] voice-recognition software. Even though reviewed, some grammatical, spelling, and word errors may remain.)

## 2019-03-20 ENCOUNTER — OFFICE VISIT (OUTPATIENT)
Dept: URGENT CARE | Facility: URGENT CARE | Age: 44
End: 2019-03-20
Payer: COMMERCIAL

## 2019-03-20 VITALS
OXYGEN SATURATION: 96 % | SYSTOLIC BLOOD PRESSURE: 110 MMHG | TEMPERATURE: 98.4 F | HEART RATE: 83 BPM | DIASTOLIC BLOOD PRESSURE: 78 MMHG

## 2019-03-20 DIAGNOSIS — R30.0 DYSURIA: Primary | ICD-10-CM

## 2019-03-20 DIAGNOSIS — R10.9 LEFT FLANK PAIN: ICD-10-CM

## 2019-03-20 LAB
ALBUMIN UR-MCNC: NEGATIVE MG/DL
APPEARANCE UR: CLEAR
BILIRUB UR QL STRIP: NEGATIVE
COLOR UR AUTO: YELLOW
GLUCOSE UR STRIP-MCNC: NEGATIVE MG/DL
HGB UR QL STRIP: NEGATIVE
KETONES UR STRIP-MCNC: NEGATIVE MG/DL
LEUKOCYTE ESTERASE UR QL STRIP: NEGATIVE
NITRATE UR QL: NEGATIVE
PH UR STRIP: 7 PH (ref 5–7)
SOURCE: NORMAL
SP GR UR STRIP: <=1.005 (ref 1–1.03)
UROBILINOGEN UR STRIP-ACNC: 0.2 EU/DL (ref 0.2–1)

## 2019-03-20 PROCEDURE — 87086 URINE CULTURE/COLONY COUNT: CPT | Performed by: PHYSICIAN ASSISTANT

## 2019-03-20 PROCEDURE — 99213 OFFICE O/P EST LOW 20 MIN: CPT | Performed by: PHYSICIAN ASSISTANT

## 2019-03-20 PROCEDURE — 81003 URINALYSIS AUTO W/O SCOPE: CPT | Performed by: PHYSICIAN ASSISTANT

## 2019-03-20 ASSESSMENT — ENCOUNTER SYMPTOMS
VOMITING: 0
FEVER: 0
HEMATURIA: 0
FLANK PAIN: 1
DYSURIA: 1
DIARRHEA: 0
CHILLS: 0
NAUSEA: 0

## 2019-03-20 NOTE — PROGRESS NOTES
"SUBJECTIVE:   Abhijit Garcia is a 44 year old male presenting with a chief complaint of   Chief Complaint   Patient presents with     Urgent Care     Urinary Problem     Possible UTI x1 week, started as pain on Lt lower abdominal quadrant and now having Lt flank pain. Sx- dysuria, discomfort in penis, frequency, urgency, hesitancy. No Hx of kidney.       He is an established patient of [facility].    UTI    Onset of symptoms was 1 week(s).  Course of illness is waxing and waning  Severity moderate  Current and associated symptoms discomfort post voiding, left flank pain this morning.   Is traveling to the [facility] in the next 4 days. Would like to make sure he doesn't have a UTI   Treatment and measures tried None  Predisposing factors include: has had left lower quadrant abdominal pain for the past 6-8 weeks. Pain now subsiding. Was evaluated yesterday in clinic. Concerns for diverticulitis. Prescribed Augmentin due to upcoming travel.   Patient denies rigors, temperature > 101 degrees F, vomiting. Denies hematuria. Denies any history of kidney stones. Bowel movements have been normal.  Past abdominal surgery: Appendectomy.         Review of Systems   Constitutional: Negative for chills and fever.   Gastrointestinal: Negative for diarrhea, nausea and vomiting.   Genitourinary: Positive for dysuria and flank pain (left). Negative for discharge, hematuria and testicular pain.       Past Medical History:   Diagnosis Date     Arthritis      Dysuria      Hyperplastic colon polyp 12/2018     Hypertension      Family History   Problem Relation Age of Onset     Diabetes Mother      Obesity Mother      Cancer - colorectal Mother      Hypertension Mother      Colon Cancer Mother         6\" of bowel removed in 2011     Arthritis Father      Respiratory Father         asbestos exposure     Chronic Obstructive Pulmonary Disease Father      Coronary Artery Disease Father 71        Father b - 1933  d - 2004     Skin Cancer " Father      Other Cancer Father         Skin cancer on face and ears     Allergies Brother      Respiratory Brother      Heart Defect Son      Asthma Brother         Primarily affected him as a child.     Current Outpatient Medications   Medication Sig Dispense Refill     allopurinol (ZYLOPRIM) 100 MG tablet Take 1 tablet (100 mg) by mouth daily 90 tablet prn     irbesartan (AVAPRO) 300 MG tablet TAKE 1 TABLET(300 MG) BY MOUTH DAILY 90 tablet 3     amoxicillin-clavulanate (AUGMENTIN) 875-125 MG tablet Take 1 tablet by mouth 2 times daily for 10 days (Patient not taking: Reported on 3/20/2019) 20 tablet 0     Colchicine (MITIGARE) 0.6 MG CAPS Take 0.6 mg by mouth 2 times daily as needed (Patient not taking: Reported on 3/20/2019) 60 capsule 1     imiquimod (ALDARA) 5 % cream Apply a small sized amount to warts QHS at bedtime.   Wash off after 8 hours. (Patient not taking: Reported on 3/20/2019) 15 packet 3     ketoconazole (NIZORAL) 2 % cream Apply topically daily (Patient not taking: Reported on 3/20/2019) 60 g 2     meloxicam (MOBIC) 15 MG tablet Take 1 tablet (15 mg) by mouth daily as needed (Patient not taking: Reported on 3/20/2019) 90 tablet 0     order for DME Equipment being ordered:  Knee high support stockings, 20-30 mm mercury  One pain, wear daily as needed 1 Device 1     valACYclovir (VALTREX) 1000 mg tablet TAKE 2 TABLETS BY MOUTH TWICE DAILY FOR 1 DAY (Patient not taking: Reported on 3/20/2019) 8 tablet 3     Social History     Tobacco Use     Smoking status: Never Smoker     Smokeless tobacco: Never Used   Substance Use Topics     Alcohol use: Yes     Alcohol/week: 0.0 oz     Comment: 1-5 drinks per month       OBJECTIVE  /78 (BP Location: Right arm, Patient Position: Chair, Cuff Size: Adult Large)   Pulse 83   Temp 98.4  F (36.9  C) (Oral)   SpO2 96%     Physical Exam   Constitutional: He appears well-developed and well-nourished. No distress.   HENT:   Head: Normocephalic.   Eyes:  Conjunctivae are normal.   Neck: Normal range of motion.   Cardiovascular: Regular rhythm and normal heart sounds.   Pulmonary/Chest: Effort normal and breath sounds normal.   Abdominal: Soft. Bowel sounds are normal. He exhibits no distension and no mass. There is no tenderness. There is no guarding.   Left flank pain   Neurological: He is alert.   Skin: Skin is warm.   Psychiatric: He has a normal mood and affect.       Labs:  Results for orders placed or performed in visit on 03/20/19 (from the past 24 hour(s))   UA reflex to Microscopic and Culture   Result Value Ref Range    Color Urine Yellow     Appearance Urine Clear     Glucose Urine Negative NEG^Negative mg/dL    Bilirubin Urine Negative NEG^Negative    Ketones Urine Negative NEG^Negative mg/dL    Specific Gravity Urine <=1.005 1.003 - 1.035    Blood Urine Negative NEG^Negative    pH Urine 7.0 5.0 - 7.0 pH    Protein Albumin Urine Negative NEG^Negative mg/dL    Urobilinogen Urine 0.2 0.2 - 1.0 EU/dL    Nitrite Urine Negative NEG^Negative    Leukocyte Esterase Urine Negative NEG^Negative    Source Midstream Urine            ASSESSMENT:      ICD-10-CM    1. Dysuria R30.0 UA reflex to Microscopic and Culture     Urine Culture Aerobic Bacterial   2. Left flank pain R10.9           PLAN:    Dysuria: Urinalysis not suggestive of infection today.  Urine culture is however pending.  Push fluids.  Keep monitoring symptoms and follow-up if any worsening symptoms.  Patient agrees.  Left flank pain: Etiology unclear at this time.  Urinalysis is unremarkable today.  Keep monitoring symptoms and follow-up if any worsening symptoms patient agrees.    Followup:    If not improving or if condition worsens, follow up with your Primary Care Provider

## 2019-03-21 LAB
BACTERIA SPEC CULT: NO GROWTH
SPECIMEN SOURCE: NORMAL

## 2019-06-04 ENCOUNTER — TRANSFERRED RECORDS (OUTPATIENT)
Dept: HEALTH INFORMATION MANAGEMENT | Facility: CLINIC | Age: 44
End: 2019-06-04

## 2019-06-10 ENCOUNTER — OFFICE VISIT (OUTPATIENT)
Dept: INTERNAL MEDICINE | Facility: CLINIC | Age: 44
End: 2019-06-10
Payer: COMMERCIAL

## 2019-06-10 VITALS
RESPIRATION RATE: 16 BRPM | SYSTOLIC BLOOD PRESSURE: 116 MMHG | TEMPERATURE: 98.7 F | WEIGHT: 309.9 LBS | DIASTOLIC BLOOD PRESSURE: 78 MMHG | HEIGHT: 74 IN | HEART RATE: 79 BPM | BODY MASS INDEX: 39.77 KG/M2 | OXYGEN SATURATION: 95 %

## 2019-06-10 DIAGNOSIS — M10.9 GOUT OF MULTIPLE SITES, UNSPECIFIED CAUSE, UNSPECIFIED CHRONICITY: ICD-10-CM

## 2019-06-10 DIAGNOSIS — I82.4Z2 LOWER LEG DVT (DEEP VENOUS THROMBOEMBOLISM), ACUTE, LEFT (H): ICD-10-CM

## 2019-06-10 DIAGNOSIS — M67.40 GANGLION CYST: ICD-10-CM

## 2019-06-10 DIAGNOSIS — Z01.818 PREOP GENERAL PHYSICAL EXAM: Primary | ICD-10-CM

## 2019-06-10 DIAGNOSIS — I10 ESSENTIAL HYPERTENSION: ICD-10-CM

## 2019-06-10 DIAGNOSIS — E78.5 HYPERLIPIDEMIA LDL GOAL <130: ICD-10-CM

## 2019-06-10 LAB
ALBUMIN SERPL-MCNC: 3.8 G/DL (ref 3.4–5)
ALP SERPL-CCNC: 77 U/L (ref 40–150)
ALT SERPL W P-5'-P-CCNC: 37 U/L (ref 0–70)
ANION GAP SERPL CALCULATED.3IONS-SCNC: 5 MMOL/L (ref 3–14)
AST SERPL W P-5'-P-CCNC: 23 U/L (ref 0–45)
BILIRUB SERPL-MCNC: 0.5 MG/DL (ref 0.2–1.3)
BUN SERPL-MCNC: 15 MG/DL (ref 7–30)
CALCIUM SERPL-MCNC: 8.8 MG/DL (ref 8.5–10.1)
CHLORIDE SERPL-SCNC: 107 MMOL/L (ref 94–109)
CO2 SERPL-SCNC: 29 MMOL/L (ref 20–32)
CREAT SERPL-MCNC: 1.11 MG/DL (ref 0.66–1.25)
GFR SERPL CREATININE-BSD FRML MDRD: 79 ML/MIN/{1.73_M2}
GLUCOSE SERPL-MCNC: 93 MG/DL (ref 70–99)
POTASSIUM SERPL-SCNC: 4.3 MMOL/L (ref 3.4–5.3)
PROT SERPL-MCNC: 6.6 G/DL (ref 6.8–8.8)
SODIUM SERPL-SCNC: 141 MMOL/L (ref 133–144)

## 2019-06-10 PROCEDURE — 99215 OFFICE O/P EST HI 40 MIN: CPT | Performed by: PHYSICIAN ASSISTANT

## 2019-06-10 PROCEDURE — 36415 COLL VENOUS BLD VENIPUNCTURE: CPT | Performed by: PHYSICIAN ASSISTANT

## 2019-06-10 PROCEDURE — 80053 COMPREHEN METABOLIC PANEL: CPT | Performed by: PHYSICIAN ASSISTANT

## 2019-06-10 ASSESSMENT — MIFFLIN-ST. JEOR: SCORE: 2355.45

## 2019-06-10 NOTE — PROGRESS NOTES
[address] [id number] [phone]  Dept: [phone]    PRE-OP EVALUATION:  Today's date: 6/10/2019    Abhijit Garcia (: 1975) presents for pre-operative evaluation assessment as requested by Dr. Ruby.  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    Fax number for surgical facility: [facility] [place]   Primary Physician: Samir De La O  Type of Anesthesia Anticipated: General    Patient has a Health Care Directive or Living Will:  NO    Preop Questions 6/10/2019   Who is doing your surgery? dr joseph ruby   What are you having done? 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  Do you have a history of Heart attack, stroke, stent, coronary bypass surgery, or other heart surgery? No   2.  Do you ever have any pain or discomfort in your chest? YES - none currently, previous history, resolved now, previous cardiac test within normal limits    3.  Do you have a history of  Heart Failure? No   4.   Are you troubled by shortness of breath when:  walking on a level surface, or up a slight hill, or at night? No   5.  Do you currently have a cold, bronchitis or other respiratory infection? No   6.  Do you have a cough, shortness of breath, or wheezing? YES - mild cough with allergies, improving with claritin    7.  Do you sometimes get pains in the calves of your legs when you walk? No   8. Do you or anyone in your family have previous history of blood clots? YES - h/o DVT due to ankle surgery    9.  Do you or does anyone in your family have a serious bleeding problem such as prolonged bleeding following surgeries or cuts? No   10. Have you ever had problems with anemia or been told to  take iron pills? No   11. Have you had any abnormal blood loss such as black, tarry or bloody stools? No   12. Have you ever had a blood transfusion? No   13. Have you or any of your relatives ever had problems with anesthesia? YES - years ago during appendectomy issues with urinating following surgery, and father had anesthesia concerns    14. Do you have sleep apnea, excessive snoring or daytime drowsiness? UNKNOWN - occasional snoring, but no diagnosed RACHID    15. Do you have any prosthetic heart valves? No   16. Do you have prosthetic joints? 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      See problem list for active medical problems.  Problems all longstanding and stable, except as noted/documented.  See ROS for pertinent symptoms related to these conditions.      MEDICAL HISTORY:     Patient Active Problem List    Diagnosis Date Noted     Obesity, BMI > 35 (H) 07/20/2017     Priority: Medium     DVT left leg. postop (H) 04/05/2017     Priority: Medium     Treated with 3 months of anticoagulation, follow-up ultrasound negative        Lumbar radiculitis 11/15/2015     Priority: Medium     Gout 01/07/2015     Priority: Medium     Onset age 33.  High uric acid.  Has had flares of great toe, midfoot, and ankles.  Uses meloxicam and cholchicine for acute flares.  Used allopurinol, developed low back pain after a few months.  Ultrasound and CT fine.  Eye problems.  Symptoms all resolved with stopping med       Essential hypertension 04/18/2007     Priority: Medium     Onset age 17, medications started in 2015.       Herpes simplex virus (HSV) infection 02/06/2007     Priority: Medium     Gets on lips.  Valtrex and Abreva work.  Episodes x 2 tip of nose, including 2/2016.  No fluid collected.         Mixed Hyperlipidemia LDL goal <130 02/06/2007     Priority: Medium      Past Medical History:   Diagnosis Date     Arthritis      Dysuria      Hyperplastic colon  polyp 12/2018     Hypertension      Past Surgical History:   Procedure Laterality Date     ANKLE SURGERY       APPENDECTOMY       BIOPSY  2011    biopsy from foreskin     COLONOSCOPY  12/21/2018    Dr. Dc [facility]     COLONOSCOPY N/A 12/21/2018    Procedure: COMBINED COLONOSCOPY THRU STOMA, BIOPSY BY SNARE using exacto snare;  Surgeon: Shamar Dc MD;  Location:  GI     ESOPHAGOSCOPY, GASTROSCOPY, DUODENOSCOPY (EGD), COMBINED  2012    for stomach pain done in [place]-acid reflux     ORTHOPEDIC SURGERY  03/23/2017    ankle left -repair of tendons     wisdom teeth       Current Outpatient Medications   Medication Sig Dispense Refill     allopurinol (ZYLOPRIM) 100 MG tablet Take 1 tablet (100 mg) by mouth daily 90 tablet prn     Colchicine (MITIGARE) 0.6 MG CAPS Take 0.6 mg by mouth 2 times daily as needed 60 capsule 1     imiquimod (ALDARA) 5 % cream Apply a small sized amount to warts QHS at bedtime.   Wash off after 8 hours. 15 packet 3     irbesartan (AVAPRO) 300 MG tablet TAKE 1 TABLET(300 MG) BY MOUTH DAILY 90 tablet 3     ketoconazole (NIZORAL) 2 % cream Apply topically daily 60 g 2     Loratadine (CLARITIN PO)        meloxicam (MOBIC) 15 MG tablet Take 1 tablet (15 mg) by mouth daily as needed 90 tablet 0     order for DME Equipment being ordered:  Knee high support stockings, 20-30 mm mercury  One pain, wear daily as needed 1 Device 1     valACYclovir (VALTREX) 1000 mg tablet TAKE 2 TABLETS BY MOUTH TWICE DAILY FOR 1 DAY 8 tablet 3     OTC products: claritin     Allergies   Allergen Reactions     Amlodipine      Edema at 10 mg dose     Contrast Dye      Indomethacin GI Disturbance     Iodine      Iodine in IV contrast dye     Levaquin [Levofloxacin]      Gout flare in 2011     Lisinopril      cough     Losartan      Penile rash     Seasonal Allergies       Latex Allergy: NO    Social History     Tobacco Use     Smoking status: Never Smoker     Smokeless tobacco: Never Used   Substance Use Topics      "Alcohol use: Yes     Alcohol/week: 0.0 oz     Comment: 1-5 drinks per month     History   Drug Use No       REVIEW OF SYSTEMS:   CONSTITUTIONAL: NEGATIVE for fever, chills, change in weight  INTEGUMENTARY/SKIN: NEGATIVE for worrisome rashes, moles or lesions  EYES: NEGATIVE for vision changes or irritation  ENT/MOUTH: NEGATIVE for ear, mouth and throat problems  RESP: NEGATIVE for significant cough or SOB  CV: NEGATIVE for chest pain, palpitations or peripheral edema  GI: NEGATIVE for nausea, abdominal pain, heartburn, or change in bowel habits  : NEGATIVE for frequency, dysuria, or hematuria  MUSCULOSKELETAL: NEGATIVE for significant arthralgias or myalgia  NEURO: NEGATIVE for weakness, dizziness or paresthesias  ENDOCRINE: NEGATIVE for temperature intolerance, skin/hair changes  HEME: NEGATIVE for bleeding problems  PSYCHIATRIC: NEGATIVE for changes in mood or affect    EXAM:   /78   Pulse 79   Temp 98.7  F (37.1  C) (Oral)   Resp 16   Ht 1.88 m (6' 2\")   Wt 140.6 kg (309 lb 14.4 oz)   SpO2 95%   BMI 39.79 kg/m      GENERAL APPEARANCE: healthy, alert and no distress     EYES: EOMI,  PERRL     HENT: ear canals and TM's normal and nose and mouth without ulcers or lesions     NECK: no adenopathy, no asymmetry, masses, or scars and thyroid normal to palpation     RESP: lungs clear to auscultation - no rales, rhonchi or wheezes     CV: regular rates and rhythm, normal S1 S2, no S3 or S4 and no murmur, click or rub     ABDOMEN:  soft, nontender, no HSM or masses and bowel sounds normal     MS: extremities normal- no gross deformities noted, no evidence of inflammation in joints, FROM in all extremities.     SKIN: no suspicious lesions or rashes     NEURO: Normal strength and tone, sensory exam grossly normal, mentation intact and speech normal     PSYCH: mentation appears normal. and affect normal/bright     LYMPHATICS: No cervical adenopathy    DIAGNOSTICS:   EKG: Not indicated due to non-vascular " surgery and low risk of event (age <65 and without cardiac risk factors)    CMP is pending.     Recent Labs   Lab Test 11/06/18  1552 10/12/18  1747 06/07/18  0754  06/23/17 06/14/17 03/31/16  1943  08/01/14   HGB  --  15.1  --   --   --   --   --  14.7  --   --    PLT  --  230  --   --   --   --   --  237  --   --    INR  --   --   --   --  2.4* 1.4*   < >  --   --   --    NA  --  140 141   < >  --   --    < > 137   < >  --    POTASSIUM  --  4.2 4.2   < >  --   --    < > 3.8   < >  --    CR 1.21 1.10 1.08   < >  --   --    < > 1.03   < >  --    A1C  --   --   --   --   --   --   --   --   --  5.4    < > = values in this interval not displayed.        IMPRESSION:   Reason for surgery/procedure: ganglion cyst removal   Diagnosis/reason for consult: pre-surgical consult     The proposed surgical procedure is considered INTERMEDIATE risk.    REVISED CARDIAC RISK INDEX  The patient has the following serious cardiovascular risks for perioperative complications such as (MI, PE, VFib and 3  AV Block):  No serious cardiac risks  INTERPRETATION: 0 risks: Class I (very low risk - 0.4% complication rate)    The patient has the following additional risks for perioperative complications:      ICD-10-CM    1. Preop general physical exam Z01.818 Comprehensive metabolic panel   2. Ganglion cyst M67.40    3. Essential hypertension I10    4. Mixed Hyperlipidemia LDL goal <130 E78.5    5. Gout of multiple sites, unspecified cause, unspecified chronicity M10.9    6. DVT left leg. postop (H) I82.4Z2        RECOMMENDATIONS:     --Consult hospital rounder / IM to assist post-op medical management    --Patient is to take all scheduled medications on the day of surgery EXCEPT for modifications listed below.    Hypertension:  -- HOLD irbesartan medication     -- reviewed holding NSAIDs      APPROVAL GIVEN to proceed with proposed procedure, without further diagnostic evaluation       Signed Electronically by: Coby Nunez,  JENELLE    Copy of this evaluation report is provided to requesting physician.    [facility] Preop Guidelines    Revised Cardiac Risk Index

## 2019-06-10 NOTE — PATIENT INSTRUCTIONS
1. HOLD Irbesartan morning of surgery    2. One week prior, HOLD NSAIDS  Before Your Surgery      Call your surgeon if there is any change in your health. This includes signs of a cold or flu (such as a sore throat, runny nose, cough, rash or fever).    Do not smoke, drink alcohol or take over the counter medicine (unless your surgeon or primary care doctor tells you to) for the 24 hours before and after surgery.    If you take prescribed drugs: Follow your doctor s orders about which medicines to take and which to stop until after surgery.    Eating and drinking prior to surgery: follow the instructions from your surgeon    Take a shower or bath the night before surgery. Use the soap your surgeon gave you to gently clean your skin. If you do not have soap from your surgeon, use your regular soap. Do not shave or scrub the surgery site.  Wear clean pajamas and have clean sheets on your bed.

## 2019-07-01 ENCOUNTER — TRANSFERRED RECORDS (OUTPATIENT)
Dept: HEALTH INFORMATION MANAGEMENT | Facility: CLINIC | Age: 44
End: 2019-07-01

## 2019-07-09 ENCOUNTER — TRANSFERRED RECORDS (OUTPATIENT)
Dept: HEALTH INFORMATION MANAGEMENT | Facility: CLINIC | Age: 44
End: 2019-07-09

## 2019-08-21 DIAGNOSIS — B07.8 VERRUCA PLANA: ICD-10-CM

## 2019-08-22 NOTE — TELEPHONE ENCOUNTER
Requested Prescriptions   Pending Prescriptions Disp Refills     imiquimod (ALDARA) 5 % external cream  Last Written Prescription Date:  10/16/2018  Last Fill Quantity: 15,  # refills: 03   Last Office Visit: 6/10/2019   Future Office Visit:      15 packet 3     Sig: Apply a small sized amount to warts QHS at bedtime.   Wash off after 8 hours.       There is no refill protocol information for this order

## 2019-08-23 RX ORDER — IMIQUIMOD 12.5 MG/.25G
CREAM TOPICAL
Qty: 15 PACKET | Refills: 3 | Status: SHIPPED | OUTPATIENT
Start: 2019-08-23 | End: 2023-04-18

## 2019-09-04 ENCOUNTER — TRANSFERRED RECORDS (OUTPATIENT)
Dept: HEALTH INFORMATION MANAGEMENT | Facility: CLINIC | Age: 44
End: 2019-09-04

## 2019-10-03 DIAGNOSIS — M10.9 GOUT OF MULTIPLE SITES, UNSPECIFIED CAUSE, UNSPECIFIED CHRONICITY: ICD-10-CM

## 2019-10-03 RX ORDER — ALLOPURINOL 100 MG/1
TABLET ORAL
Qty: 90 TABLET | Refills: 3 | Status: SHIPPED | OUTPATIENT
Start: 2019-10-03 | End: 2020-10-06

## 2019-10-03 NOTE — TELEPHONE ENCOUNTER
"Requested Prescriptions   Pending Prescriptions Disp Refills     allopurinol (ZYLOPRIM) 100 MG tablet [Pharmacy Med Name: ALLOPURINOL 100MG TABLETS] 90 tablet 0     Sig: TAKE 1 TABLET(100 MG) BY MOUTH DAILY       Gout Agents Protocol Failed - 10/3/2019  8:04 AM        Failed - Has Uric Acid on file in past 12 months and value is less than 6     Recent Labs   Lab Test 11/06/18  1552   URIC 7.5*     If level is 6mg/dL or greater, ok to refill one time and refer to provider.           Passed - CBC on file in past 12 months     Recent Labs   Lab Test 10/12/18  1747   WBC 8.3   RBC 4.90   HGB 15.1   HCT 43.7                    Passed - ALT on file in past 12 months     Recent Labs   Lab Test 06/10/19  0833   ALT 37             Passed - Recent (12 mo) or future (30 days) visit within the authorizing provider's specialty     Patient has had an office visit with the authorizing provider or a provider within the authorizing providers department within the previous 12 mos or has a future within next 30 days. See \"Patient Info\" tab in inbasket, or \"Choose Columns\" in Meds & Orders section of the refill encounter.              Passed - Medication is active on med list        Passed - Patient is age 18 or older        Passed - Normal serum creatinine on file in the past 12 months     Recent Labs   Lab Test 06/10/19  0833   CR 1.11             Last Written Prescription Date:  7/11/18  Last Fill Quantity: 90,  # refills: prn   Last office visit: 6/10/2019 with prescribing provider:  6/10/19   Future Office Visit:      "

## 2019-10-03 NOTE — TELEPHONE ENCOUNTER
Last ov with PCP: 11/6/18    Routing refill request to provider for review/approval because:  Labs out of range:  Uric acid  Labs not current:  CBC

## 2019-10-24 DIAGNOSIS — I10 ESSENTIAL HYPERTENSION: ICD-10-CM

## 2019-10-24 RX ORDER — IRBESARTAN 300 MG/1
TABLET ORAL
Qty: 90 TABLET | Refills: 1 | Status: SHIPPED | OUTPATIENT
Start: 2019-10-24 | End: 2020-02-18

## 2019-10-24 RX ORDER — IRBESARTAN 300 MG/1
TABLET ORAL
Qty: 90 TABLET | Refills: 3 | Status: CANCELLED | OUTPATIENT
Start: 2019-10-24

## 2019-10-24 NOTE — TELEPHONE ENCOUNTER
"Requested Prescriptions   Pending Prescriptions Disp Refills     irbesartan (AVAPRO) 300 MG tablet 90 tablet 3     Sig: TAKE 1 TABLET(300 MG) BY MOUTH DAILY       Angiotensin-II Receptors Passed - 10/24/2019  2:04 PM        Passed - Last blood pressure under 140/90 in past 12 months     BP Readings from Last 3 Encounters:   06/10/19 116/78   03/20/19 110/78   03/19/19 122/78                 Passed - Recent (12 mo) or future (30 days) visit within the authorizing provider's specialty     Patient has had an office visit with the authorizing provider or a provider within the authorizing providers department within the previous 12 mos or has a future within next 30 days. See \"Patient Info\" tab in inbasket, or \"Choose Columns\" in Meds & Orders section of the refill encounter.              Passed - Medication is active on med list        Passed - Patient is age 18 or older        Passed - Normal serum creatinine on file in past 12 months     Recent Labs   Lab Test 06/10/19  0833   CR 1.11             Passed - Normal serum potassium on file in past 12 months     Recent Labs   Lab Test 06/10/19  0833   POTASSIUM 4.3                      Prescription approved per [facility] Refill Protocol.      Andreea ADAMS, RN, BSN, PHN      "

## 2019-11-07 ENCOUNTER — HEALTH MAINTENANCE LETTER (OUTPATIENT)
Age: 44
End: 2019-11-07

## 2019-11-12 ENCOUNTER — OFFICE VISIT (OUTPATIENT)
Dept: DERMATOLOGY | Facility: CLINIC | Age: 44
End: 2019-11-12
Payer: COMMERCIAL

## 2019-11-12 VITALS — DIASTOLIC BLOOD PRESSURE: 92 MMHG | HEART RATE: 76 BPM | OXYGEN SATURATION: 97 % | SYSTOLIC BLOOD PRESSURE: 139 MMHG

## 2019-11-12 DIAGNOSIS — L73.8 SEBACEOUS GLAND HYPERPLASIA: Primary | ICD-10-CM

## 2019-11-12 DIAGNOSIS — L72.0 MILIA: ICD-10-CM

## 2019-11-12 DIAGNOSIS — D23.9 SYRINGOMA: ICD-10-CM

## 2019-11-12 PROCEDURE — 99213 OFFICE O/P EST LOW 20 MIN: CPT | Performed by: PHYSICIAN ASSISTANT

## 2019-11-12 NOTE — PROGRESS NOTES
HPI:   Chief complaint: Abhijit Garcia is a 44 year old male who presents for evaluation of possible flat warts on face.  Condition present for:  recent.   Previous treatments include: none    Social: from [place] originally     Review Of Systems  Eyes: negative  Ears/Nose/Throat: negative  Respiratory: No shortness of breath, dyspnea on exertion, cough, or hemoptysis  Cardiovascular: negative  Gastrointestinal: negative  Genitourinary: negative  Musculoskeletal: negative  Neurologic: negative  Psychiatric: negative    This document serves as a record of the services and decisions personally performed and made by Shanthi Red, MS, PA-C. It was created on her behalf by Olimpia Bryson, a trained medical scribe. The creation of this document is based on the provider's statements to the medical scribe.  Olimpia Bryson 12:25 PM November 12, 2019    PHYSICAL EXAM:    BP (!) 139/92   Pulse 76   SpO2 97%   Skin exam performed as follows: Type 2 skin. Mood appropriate  Alert and Oriented X 3. Well developed, well nourished in no distress.  General appearance: Normal  Head including face: Normal  Eyes: conjunctiva and lids: Normal  Mouth: Lips, teeth, gums: Normal  Neck: Normal  Chest-breast/axillae: Normal  Back: Normal  Spleen and liver: Normal  Cardiovascular: Exam of peripheral vascular system by observation for swelling, varicosities, edema: Normal  Genitalia: groin, buttocks: Normal  Extremities: digits/nails (clubbing): Normal  Eccrine and Apocrine glands: Normal  Right upper extremity: Normal  Left upper extremity: Normal  Right lower extremity: Normal  Left lower extremity: Normal  Skin: Scalp and body hair: See below    1. Yellow waxy papules on the forehead  2. Pearly papules located on right eye x 1    ASSESSMENT/PLAN:     1. Sebaceous gland hyperplasia located on the forehead. Advised benign. Discussed cautery if desired; advised on approximate cost if not covered.    2. Milia on  right eye x 1. Advised benign; discussed cosmetic extraction if desired.    3. 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         Follow-up: PRN  CC:   Scribed By: Olimpia Bryson, Medical Scribe    The information in this document, created by the medical scribe for me, accurately reflects the services I personally performed and the decisions made by me. I have reviewed and approved this document for accuracy prior to leaving the patient care area.  November 12, 2019 12:28 PM    Shanthi Red MS, PA-C

## 2019-11-12 NOTE — LETTER
11/12/2019         RE: Abhijit Garcia  [address] [address] [facility] [address]        Dear Colleague,    Thank you for referring your patient, Abhijit Garcia, to the [facility]. Please see a copy of my visit note below.    HPI:   Chief complaint: Abhijit Garcia is a 44 year old male who presents for evaluation of possible flat warts on face.  Condition present for:  recent.   Previous treatments include: none    Social: from [place] originally     Review Of Systems  Eyes: negative  Ears/Nose/Throat: negative  Respiratory: No shortness of breath, dyspnea on exertion, cough, or hemoptysis  Cardiovascular: negative  Gastrointestinal: negative  Genitourinary: negative  Musculoskeletal: negative  Neurologic: negative  Psychiatric: negative    This document serves as a record of the services and decisions personally performed and made by Shanthi Red, MS, PA-C. It was created on her behalf by Olimpia Bryson, a trained medical scribe. The creation of this document is based on the provider's statements to the medical scribe.  Olimpia Bryson 12:25 PM November 12, 2019    PHYSICAL EXAM:    BP (!) 139/92   Pulse 76   SpO2 97%   Skin exam performed as follows: Type 2 skin. Mood appropriate  Alert and Oriented X 3. Well developed, well nourished in no distress.  General appearance: Normal  Head including face: Normal  Eyes: conjunctiva and lids: Normal  Mouth: Lips, teeth, gums: Normal  Neck: Normal  Chest-breast/axillae: Normal  Back: Normal  Spleen and liver: Normal  Cardiovascular: Exam of peripheral vascular system by observation for swelling, varicosities, edema: Normal  Genitalia: groin, buttocks: Normal  Extremities: digits/nails (clubbing): Normal  Eccrine and Apocrine glands: Normal  Right upper extremity: Normal  Left upper extremity: Normal  Right lower extremity: Normal  Left lower extremity: Normal  Skin: Scalp and body hair: See  below    1. Yellow waxy papules on the forehead  2. Pearly papules located on right eye x 1    ASSESSMENT/PLAN:     1. Sebaceous gland hyperplasia located on the forehead. Advised benign. Discussed cautery if desired; advised on approximate cost if not covered.    2. Milia on right eye x 1. Advised benign; discussed cosmetic extraction if desired.    3. 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         Follow-up: PRN  CC:   Scribed By: Olimpia Bryson, Medical Scribe    The information in this document, created by the medical scribe for me, accurately reflects the services I personally performed and the decisions made by me. I have reviewed and approved this document for accuracy prior to leaving the patient care area.  November 12, 2019 12:28 PM    Shanthi Red MS, PAALESHA      Again, thank you for allowing me to participate in the care of your patient.        Sincerely,        Shanthi Red PA-C     sudden onset

## 2019-11-25 ENCOUNTER — OFFICE VISIT (OUTPATIENT)
Dept: INTERNAL MEDICINE | Facility: CLINIC | Age: 44
End: 2019-11-25
Payer: COMMERCIAL

## 2019-11-25 VITALS
BODY MASS INDEX: 40.19 KG/M2 | WEIGHT: 313 LBS | HEART RATE: 75 BPM | RESPIRATION RATE: 16 BRPM | SYSTOLIC BLOOD PRESSURE: 132 MMHG | TEMPERATURE: 97.7 F | DIASTOLIC BLOOD PRESSURE: 80 MMHG | OXYGEN SATURATION: 97 %

## 2019-11-25 DIAGNOSIS — N34.1 URETHRITIS, NONSPECIFIC: Primary | ICD-10-CM

## 2019-11-25 DIAGNOSIS — R30.0 DYSURIA: ICD-10-CM

## 2019-11-25 LAB
ALBUMIN UR-MCNC: NEGATIVE MG/DL
APPEARANCE UR: CLEAR
BILIRUB UR QL STRIP: NEGATIVE
COLOR UR AUTO: YELLOW
GLUCOSE UR STRIP-MCNC: NEGATIVE MG/DL
HGB UR QL STRIP: NEGATIVE
KETONES UR STRIP-MCNC: NEGATIVE MG/DL
LEUKOCYTE ESTERASE UR QL STRIP: NEGATIVE
NITRATE UR QL: NEGATIVE
PH UR STRIP: 6.5 PH (ref 5–7)
SOURCE: NORMAL
SP GR UR STRIP: <=1.005 (ref 1–1.03)
UROBILINOGEN UR STRIP-ACNC: 0.2 EU/DL (ref 0.2–1)

## 2019-11-25 PROCEDURE — 81003 URINALYSIS AUTO W/O SCOPE: CPT | Performed by: PHYSICIAN ASSISTANT

## 2019-11-25 PROCEDURE — 99213 OFFICE O/P EST LOW 20 MIN: CPT | Performed by: PHYSICIAN ASSISTANT

## 2019-11-25 RX ORDER — DOXYCYCLINE 100 MG/1
100 CAPSULE ORAL 2 TIMES DAILY
Qty: 20 CAPSULE | Refills: 0 | Status: SHIPPED | OUTPATIENT
Start: 2019-11-25 | End: 2020-01-16

## 2019-11-25 NOTE — PROGRESS NOTES
Subjective     Abhijit Garcia is a 44 year old male who presents to clinic today for the following health issues:    HPI   Genitourinary - Male  Onset: 1 week    Description:   Dysuria (painful urination): YES  Hematuria (blood in urine): no   Frequency: no   Are you urinating at night : no   Hesitancy (delay in urine): no   Retention (unable to empty): no   Decrease in urinary flow: no   Incontinence: no     Progression of Symptoms:  worsening    Accompanying Signs & Symptoms:  Fever: no   Back/Flank pain: no   Urethral discharge: no   Testicle lumps/masses/pain: no   Nausea and/or vomiting: no   Abdominal pain: no     History:   History of frequent UTI's: no   History of kidney stones: no   History of hernias: no   Personal or Family history of Prostate problems: no  Sexually active: YES    Precipitating factors:   None    Alleviating factors:  None    Did have anal sex with wife before this started.  Hx of urethritis when this has occurred in the past.       -------------------------------------    BP Readings from Last 3 Encounters:   11/25/19 132/80   11/12/19 (!) 139/92   06/10/19 116/78    Wt Readings from Last 3 Encounters:   11/25/19 142 kg (313 lb)   06/10/19 140.6 kg (309 lb 14.4 oz)   03/19/19 140.2 kg (309 lb)                    -------------------------------------  Reviewed and updated as needed this visit by Provider         Review of Systems   ROS COMP: Constitutional, HEENT, cardiovascular, pulmonary, gi and gu systems are negative, except as otherwise noted.      Objective    /80   Pulse 75   Temp 97.7  F (36.5  C) (Oral)   Resp 16   Wt 142 kg (313 lb)   SpO2 97%   BMI 40.19 kg/m    Body mass index is 40.19 kg/m .  Physical Exam   GENERAL: healthy, alert and no distress  RESP: lungs clear to auscultation - no rales, rhonchi or wheezes  CV: regular rate and rhythm, normal S1 S2, no S3 or S4, no murmur, click or rub, no peripheral edema and peripheral pulses strong  SKIN: no  "suspicious lesions or rashes    Diagnostic Test Results:  Results for orders placed or performed in visit on 11/25/19 (from the past 24 hour(s))   UA reflex to Microscopic and Culture   Result Value Ref Range    Color Urine Yellow     Appearance Urine Clear     Glucose Urine Negative NEG^Negative mg/dL    Bilirubin Urine Negative NEG^Negative    Ketones Urine Negative NEG^Negative mg/dL    Specific Gravity Urine <=1.005 1.003 - 1.035    Blood Urine Negative NEG^Negative    pH Urine 6.5 5.0 - 7.0 pH    Protein Albumin Urine Negative NEG^Negative mg/dL    Urobilinogen Urine 0.2 0.2 - 1.0 EU/dL    Nitrite Urine Negative NEG^Negative    Leukocyte Esterase Urine Negative NEG^Negative    Source Midstream Urine            Assessment & Plan       ICD-10-CM    1. Urethritis, nonspecific N34.1 doxycycline hyclate (VIBRAMYCIN) 100 MG capsule   2. Dysuria R30.0 UA reflex to Microscopic and Culture        BMI:   Estimated body mass index is 40.19 kg/m  as calculated from the following:    Height as of 6/10/19: 1.88 m (6' 2\").    Weight as of this encounter: 142 kg (313 lb).   Weight management plan: Patient was referred to their PCP to discuss a diet and exercise plan.            Return in about 2 weeks (around 12/9/2019) for recheck if not improving, regular primary provider.    Mary Gonzalez PA-C  [facility]      "

## 2019-12-02 ENCOUNTER — MYC REFILL (OUTPATIENT)
Dept: INTERNAL MEDICINE | Facility: CLINIC | Age: 44
End: 2019-12-02

## 2019-12-02 DIAGNOSIS — M10.9 GOUT OF MULTIPLE SITES, UNSPECIFIED CAUSE, UNSPECIFIED CHRONICITY: ICD-10-CM

## 2019-12-02 DIAGNOSIS — B00.9 HERPES SIMPLEX VIRUS (HSV) INFECTION: ICD-10-CM

## 2019-12-02 NOTE — LETTER
[facility]  [address] [id number] [phone]            Abhijit Sanchezveland  [address] [address] [facility] [address]        December 3, 2019    Dear Emerson,    While refilling your prescription today, we noticed that you are due to have labs drawn.  We will refill your prescription for 30 days, but a follow-up appointment must be made before any additional refills can be approved.     Taking care of your health is important to us and we look forward to seeing you in the near future.  Please call us at [phone] or [phone] (or use [employer] to schedule an appointment.     Please disregard this notice if you have already made an appointment.    Sincerely,        [facility][1]

## 2019-12-03 ENCOUNTER — MYC MEDICAL ADVICE (OUTPATIENT)
Dept: INTERNAL MEDICINE | Facility: CLINIC | Age: 44
End: 2019-12-03

## 2019-12-03 RX ORDER — MELOXICAM 15 MG/1
15 TABLET ORAL DAILY PRN
Qty: 30 TABLET | Refills: 0 | Status: SHIPPED | OUTPATIENT
Start: 2019-12-03 | End: 2020-09-10

## 2019-12-03 RX ORDER — VALACYCLOVIR HYDROCHLORIDE 1 G/1
TABLET, FILM COATED ORAL
Qty: 8 TABLET | Refills: 0 | Status: SHIPPED | OUTPATIENT
Start: 2019-12-03 | End: 2020-03-15

## 2019-12-03 NOTE — TELEPHONE ENCOUNTER
"Requested Prescriptions   Pending Prescriptions Disp Refills     meloxicam (MOBIC) 15 MG tablet 90 tablet 0     Sig: Take 1 tablet (15 mg) by mouth daily as needed       NSAID Medications Failed - 12/2/2019  1:58 PM        Failed - Normal CBC on file in past 12 months     Recent Labs   Lab Test 10/12/18  1747   WBC 8.3   RBC 4.90   HGB 15.1   HCT 43.7                    Passed - Blood pressure under 140/90 in past 12 months     BP Readings from Last 3 Encounters:   11/25/19 132/80   11/12/19 (!) 139/92   06/10/19 116/78                 Passed - Normal ALT on file in past 12 months     Recent Labs   Lab Test 06/10/19  0833   ALT 37             Passed - Normal AST on file in past 12 months     Recent Labs   Lab Test 06/10/19  0833   AST 23             Passed - Recent (12 mo) or future (30 days) visit within the authorizing provider's specialty     Patient has had an office visit with the authorizing provider or a provider within the authorizing providers department within the previous 12 mos or has a future within next 30 days. See \"Patient Info\" tab in inbasket, or \"Choose Columns\" in Meds & Orders section of the refill encounter.              Passed - Patient is age 6-64 years        Passed - Medication is active on med list        Passed - Normal serum creatinine on file in past 12 months     Recent Labs   Lab Test 06/10/19  0833   CR 1.11             Medication is being filled for 1 time refill only due to:  Patient needs labs cbc.    "

## 2019-12-03 NOTE — TELEPHONE ENCOUNTER
"Requested Prescriptions   Pending Prescriptions Disp Refills     valACYclovir (VALTREX) 1000 mg tablet [Pharmacy Med Name: VALACYCLOVIR 1GM TABLETS] 8 tablet 0     Sig: TAKE 2 TABLETS BY MOUTH TWICE DAILY FOR 1 DAY       Antivirals for Herpes Protocol Passed - 12/2/2019  1:54 PM        Passed - Patient is age 12 or older        Passed - Recent (12 mo) or future (30 days) visit within the authorizing provider's specialty     Patient has had an office visit with the authorizing provider or a provider within the authorizing providers department within the previous 12 mos or has a future within next 30 days. See \"Patient Info\" tab in inbasket, or \"Choose Columns\" in Meds & Orders section of the refill encounter.              Passed - Medication is active on med list        Passed - Normal serum creatinine on file in past 12 months     Recent Labs   Lab Test 06/10/19  0833   CR 1.11             Last Written Prescription Date:  9/11/18  Last Fill Quantity: 8,  # refills: 3   Last office visit: 11/25/2019 with prescribing provider:  11/25/19   Future Office Visit:   Next 5 appointments (look out 90 days)    Jan 16, 2020  8:30 AM CST  PHYSICAL with Samir De La O MD  [facility] [address] [address] [phone]           "

## 2019-12-10 DIAGNOSIS — I10 ESSENTIAL HYPERTENSION: ICD-10-CM

## 2019-12-10 DIAGNOSIS — E78.5 HYPERLIPIDEMIA LDL GOAL <130: ICD-10-CM

## 2019-12-10 DIAGNOSIS — M10.9 GOUT OF MULTIPLE SITES, UNSPECIFIED CAUSE, UNSPECIFIED CHRONICITY: ICD-10-CM

## 2019-12-10 LAB
ANION GAP SERPL CALCULATED.3IONS-SCNC: 5 MMOL/L (ref 3–14)
BUN SERPL-MCNC: 15 MG/DL (ref 7–30)
CALCIUM SERPL-MCNC: 9.1 MG/DL (ref 8.5–10.1)
CHLORIDE SERPL-SCNC: 107 MMOL/L (ref 94–109)
CHOLEST SERPL-MCNC: 215 MG/DL
CO2 SERPL-SCNC: 27 MMOL/L (ref 20–32)
CREAT SERPL-MCNC: 1.12 MG/DL (ref 0.66–1.25)
ERYTHROCYTE [DISTWIDTH] IN BLOOD BY AUTOMATED COUNT: 12.3 % (ref 10–15)
GFR SERPL CREATININE-BSD FRML MDRD: 78 ML/MIN/{1.73_M2}
GLUCOSE SERPL-MCNC: 103 MG/DL (ref 70–99)
HCT VFR BLD AUTO: 45 % (ref 40–53)
HDLC SERPL-MCNC: 38 MG/DL
HGB BLD-MCNC: 15.3 G/DL (ref 13.3–17.7)
LDLC SERPL CALC-MCNC: 117 MG/DL
MCH RBC QN AUTO: 29.7 PG (ref 26.5–33)
MCHC RBC AUTO-ENTMCNC: 34 G/DL (ref 31.5–36.5)
MCV RBC AUTO: 87 FL (ref 78–100)
NONHDLC SERPL-MCNC: 177 MG/DL
PLATELET # BLD AUTO: 196 10E9/L (ref 150–450)
POTASSIUM SERPL-SCNC: 4.2 MMOL/L (ref 3.4–5.3)
RBC # BLD AUTO: 5.15 10E12/L (ref 4.4–5.9)
SODIUM SERPL-SCNC: 139 MMOL/L (ref 133–144)
TRIGL SERPL-MCNC: 298 MG/DL
WBC # BLD AUTO: 6.2 10E9/L (ref 4–11)

## 2019-12-10 PROCEDURE — 80048 BASIC METABOLIC PNL TOTAL CA: CPT | Performed by: INTERNAL MEDICINE

## 2019-12-10 PROCEDURE — 85027 COMPLETE CBC AUTOMATED: CPT | Performed by: INTERNAL MEDICINE

## 2019-12-10 PROCEDURE — 80061 LIPID PANEL: CPT | Performed by: INTERNAL MEDICINE

## 2019-12-10 PROCEDURE — 36415 COLL VENOUS BLD VENIPUNCTURE: CPT | Performed by: INTERNAL MEDICINE

## 2020-01-08 ENCOUNTER — E-VISIT (OUTPATIENT)
Dept: INTERNAL MEDICINE | Facility: CLINIC | Age: 45
End: 2020-01-08
Payer: COMMERCIAL

## 2020-01-08 ENCOUNTER — MYC MEDICAL ADVICE (OUTPATIENT)
Dept: INTERNAL MEDICINE | Facility: CLINIC | Age: 45
End: 2020-01-08

## 2020-01-08 DIAGNOSIS — M54.41 ACUTE MIDLINE LOW BACK PAIN WITH RIGHT-SIDED SCIATICA: Primary | ICD-10-CM

## 2020-01-08 PROCEDURE — 99421 OL DIG E/M SVC 5-10 MIN: CPT | Performed by: INTERNAL MEDICINE

## 2020-01-08 NOTE — TELEPHONE ENCOUNTER
I don't appear to have seen him for this problem in the past.   Please see if patient wants to see me tomorrow afternoon (same day spot), versus doing an E-visit today.   He could also keep his appointment next week.    I can send Physical Therapy referral today, if needed.

## 2020-01-09 ENCOUNTER — THERAPY VISIT (OUTPATIENT)
Dept: PHYSICAL THERAPY | Facility: CLINIC | Age: 45
End: 2020-01-09
Payer: COMMERCIAL

## 2020-01-09 DIAGNOSIS — M54.41 ACUTE MIDLINE LOW BACK PAIN WITH RIGHT-SIDED SCIATICA: ICD-10-CM

## 2020-01-09 DIAGNOSIS — M54.16 LUMBAR RADICULITIS: ICD-10-CM

## 2020-01-09 PROCEDURE — 97140 MANUAL THERAPY 1/> REGIONS: CPT | Mod: GP | Performed by: PHYSICAL THERAPIST

## 2020-01-09 PROCEDURE — 97162 PT EVAL MOD COMPLEX 30 MIN: CPT | Mod: GP | Performed by: PHYSICAL THERAPIST

## 2020-01-09 PROCEDURE — 97110 THERAPEUTIC EXERCISES: CPT | Mod: GP | Performed by: PHYSICAL THERAPIST

## 2020-01-09 RX ORDER — METHYLPREDNISOLONE 4 MG
TABLET, DOSE PACK ORAL
Qty: 21 TABLET | Refills: 0 | Status: SHIPPED | OUTPATIENT
Start: 2020-01-09 | End: 2020-01-16

## 2020-01-09 NOTE — PROGRESS NOTES
"[facility] Initial Evaluation -- Lumbar    Date: January 9, 2020  Abhijit Garcia is a 44 year old male with a lumbar condition.   Referral: Dr. Carter  Work mechanical stresses:    Employment status:  Full time  Leisure mechanical stresses: only stretching  Functional disability score (LAKEISHA/STarT Back):  See flow sheet;   VAS score (0-10): 8/10  Patient goals/expectations:  \"to get rid of the pain\"    HISTORY:    Present symptoms: R LBP, lateral upper thigh, R calf/lateral lower leg  Pain quality (sharp/shooting/stabbing/aching/burning/cramping):  aching   Paresthesia (yes/no):  no    Present since (onset date): November 2019.     Symptoms (improving/unchanging/worsening):  worsening.     Symptoms commenced as a result of: no apparent reason   Condition occurred in the following environment:   home     Symptoms at onset (back/thigh/leg): back/leg  Constant symptoms (back/thigh/leg):   Intermittent symptoms (back/thigh/leg): back/leg    Symptoms are made worse with the following: Always Rising, Always Standing, Sometimes Lying, Time of day - Always as the day progresses and Always PM and Sometimes On the move   Symptoms are made better with the following: Sometimes Sitting and Time of day - Always AM    Disturbed sleep (yes/no):  yes Sleeping postures (prone/sup/side R/L): L side    Previous episodes (0/1-5/6-10/11+): 1 Year of first episode: 2015    Previous history: episodic back  Previous treatments: PT good results      Specific Questions:  Cough/Sneeze/Strain (pos/neg): positive for sneeze  Bowel/Bladder (normal/abnormal): normal  Gait (normal/abnormal): normal  Medications (nil/NSAIDS/analg/steroids/anticoag/other):  NSAIDS and Other - High blood pressure  Medical allergies:  See chart  General health (excellent/good/fair/poor):  good  Pertinent medical history:  High blood pressure, History of fractures, Osteoarthritis, Overweight and Calf pain, swelling, " warmth  Imaging (None/Xray/MRI/Other):  none  Recent or major surgery (yes/no):  no  Night pain (yes/no): no  Accidents (yes/no): no  Unexplained weight loss (yes/no): none  Barriers at home: none  Other red flags: none    EXAMINATION    Posture:   Sitting (good/fair/poor): fair  Standing (good/fair/poor):fair  Lordosis (red/acc/normal): red  Correction of posture (better/worse/no effect): worse    Lateral Shift (right/left/nil): nil  Relevant (yes/no):  no  Other Observations: none    Neurological:    Motor deficit:    Reflexes:    Sensory deficit:    Dural signs:  -Slump    Movement Loss:   Kamron Mod Min Nil Pain   Flexion    x    Extension  x   incr R LBP, incr R Calf   Side Gliding R   x  incr R Calf   Side Gliding L  x x  incr R Calf     Test Movements:   During: produces, abolishes, increases, decreases, no effect, centralizing, peripheralizing   After: better, worse, no better, no worse, no effect, centralized, peripheralized    Pretest symptoms standing:    Symptoms During Symptoms After ROM increased ROM decreased No Effect   FIS        Rep FIS        EIS        Rep EIS        Pretest symptoms lying: R buttocks, thigh, calf    Symptoms During Symptoms After ROM increased ROM decreased No Effect   TRISTAN        Rep TRISTAN        EIL Increases    Worse         Rep EIL Increases    Worse      x   If required, pretest symptoms: R buttocks,  R thigh, R calf   Symptoms During Symptoms After ROM increased ROM decreased No Effect   SGIS - R Increases  Peripheralising    Worse         Rep SGIS - R Increases  Peripheralising    Worse      x   SGIS - L        Rep SGIS - L          Static Tests:  Sitting slouched:    Sitting erect:    Standing slouched   Standing erect:    Lying prone in extension:   Long sitting:      Other Tests: 1. Sustained flex/rotation: decr (not centralizing)/NB/NE ROM; 2. Manual mob flex/rotation: centralizing/better/incr R SG, EXT    Provisional Classification:  Derangement - Asymmetrical, unilateral,  symptoms below knee    Principle of Management:  Education:  Therapeutic dose of exercise, centralization, end range, traffic light   Equipment provided:  Instructed to hold on use of lumbar roll  Mechanical therapy (Y/N):  Y   Extension principle:    Lateral Principle:    Flexion principle:    Other:  Sustained flex/rotation w/pillow under hips to encourage end range 5'/4-5 x day    ASSESSMENT/PLAN:    Patient is a 44 year old male with lumbar complaints.    Patient has the following significant findings with corresponding treatment plan.                Diagnosis 1:  Lumbar radiuclopathy  Pain -  manual therapy, self management, education, directional preference exercise and home program  Decreased ROM/flexibility - manual therapy and therapeutic exercise  Decreased joint mobility - manual therapy and therapeutic exercise  Decreased function - therapeutic activities  Impaired posture - neuro re-education    Therapy Evaluation Codes:   1) History comprised of:   Personal factors that impact the plan of care:      None.    Comorbidity factors that impact the plan of care are:      High blood pressure, Osteoarthritis and Overweight.     Medications impacting care: Anti-inflammatory and High blood pressure.  2) Examination of Body Systems comprised of:   Body structures and functions that impact the plan of care:      Lumbar spine.   Activity limitations that impact the plan of care are:      Standing, Walking, Working and Sleeping.  3) Clinical presentation characteristics are:   Evolving/Changing.  4) Decision-Making    Moderate complexity using standardized patient assessment instrument and/or measureable assessment of functional outcome.  Cumulative Therapy Evaluation is: Moderate complexity.    Previous and current functional limitations:  (See Goal Flow Sheet for this information)    Short term and Long term goals: (See Goal Flow Sheet for this information)     Communication ability:  Patient appears to be able  to clearly communicate and understand verbal and written communication and follow directions correctly.  Treatment Explanation - The following has been discussed with the patient:   RX ordered/plan of care  Anticipated outcomes  Possible risks and side effects  This patient would benefit from PT intervention to resume normal activities.   Rehab potential is good.    Frequency:  2 X week, once daily  Duration:  for 1 weeks tapering to 1 X a week over 4 weeks  Discharge Plan:  Achieve all LTG.  Independent in home treatment program.  Reach maximal therapeutic benefit.    Please refer to the daily flowsheet for treatment today, total treatment time and time spent performing 1:1 timed codes.

## 2020-01-10 DIAGNOSIS — M10.9 GOUT OF MULTIPLE SITES, UNSPECIFIED CAUSE, UNSPECIFIED CHRONICITY: ICD-10-CM

## 2020-01-10 NOTE — TELEPHONE ENCOUNTER
"Requested Prescriptions   Pending Prescriptions Disp Refills     meloxicam (MOBIC) 15 MG tablet [Pharmacy Med Name: MELOXICAM 15MG TABLETS]  Last Written Prescription Date:  12/03/2019  Last Fill Quantity: 30,  # refills: 0   Last Office Visit: 11/25/2019   Future Office Visit:    Next 5 appointments (look out 90 days)    Jan 16, 2020  8:30 AM CST  PHYSICAL with Samir De La O MD  [facility] ([facility]) [address] [address] [phone]          30 tablet 0     Sig: TAKE 1 TABLET BY MOUTH DAILY AS NEEDED.       NSAID Medications Passed - 1/10/2020  1:41 PM        Passed - Blood pressure under 140/90 in past 12 months     BP Readings from Last 3 Encounters:   11/25/19 132/80   11/12/19 (!) 139/92   06/10/19 116/78                 Passed - Normal ALT on file in past 12 months     Recent Labs   Lab Test 06/10/19  0833   ALT 37             Passed - Normal AST on file in past 12 months     Recent Labs   Lab Test 06/10/19  0833   AST 23             Passed - Recent (12 mo) or future (30 days) visit within the authorizing provider's specialty     Patient has had an office visit with the authorizing provider or a provider within the authorizing providers department within the previous 12 mos or has a future within next 30 days. See \"Patient Info\" tab in inbasket, or \"Choose Columns\" in Meds & Orders section of the refill encounter.              Passed - Patient is age 6-64 years        Passed - Normal CBC on file in past 12 months     Recent Labs   Lab Test 12/10/19  0810   WBC 6.2   RBC 5.15   HGB 15.3   HCT 45.0                    Passed - Medication is active on med list        Passed - Normal serum creatinine on file in past 12 months     Recent Labs   Lab Test 12/10/19  0810   CR 1.12               "

## 2020-01-13 ENCOUNTER — THERAPY VISIT (OUTPATIENT)
Dept: PHYSICAL THERAPY | Facility: CLINIC | Age: 45
End: 2020-01-13
Payer: COMMERCIAL

## 2020-01-13 DIAGNOSIS — M54.16 LUMBAR RADICULITIS: ICD-10-CM

## 2020-01-13 PROCEDURE — 97140 MANUAL THERAPY 1/> REGIONS: CPT | Mod: GP | Performed by: PHYSICAL THERAPIST

## 2020-01-13 PROCEDURE — 97110 THERAPEUTIC EXERCISES: CPT | Mod: GP | Performed by: PHYSICAL THERAPIST

## 2020-01-13 NOTE — TELEPHONE ENCOUNTER
See 1/8 E-visit encounter.  Patient indicated that he would be stopping the meloxicam.   Please check with him.

## 2020-01-16 ENCOUNTER — OFFICE VISIT (OUTPATIENT)
Dept: INTERNAL MEDICINE | Facility: CLINIC | Age: 45
End: 2020-01-16
Payer: COMMERCIAL

## 2020-01-16 VITALS
HEART RATE: 77 BPM | TEMPERATURE: 97.9 F | BODY MASS INDEX: 39.01 KG/M2 | DIASTOLIC BLOOD PRESSURE: 68 MMHG | WEIGHT: 304 LBS | OXYGEN SATURATION: 99 % | HEIGHT: 74 IN | RESPIRATION RATE: 16 BRPM | SYSTOLIC BLOOD PRESSURE: 106 MMHG

## 2020-01-16 DIAGNOSIS — I10 ESSENTIAL HYPERTENSION: ICD-10-CM

## 2020-01-16 DIAGNOSIS — Z00.00 ROUTINE GENERAL MEDICAL EXAMINATION AT A HEALTH CARE FACILITY: Primary | ICD-10-CM

## 2020-01-16 DIAGNOSIS — E78.5 HYPERLIPIDEMIA LDL GOAL <130: ICD-10-CM

## 2020-01-16 DIAGNOSIS — M10.9 GOUT OF MULTIPLE SITES, UNSPECIFIED CAUSE, UNSPECIFIED CHRONICITY: ICD-10-CM

## 2020-01-16 PROCEDURE — 99213 OFFICE O/P EST LOW 20 MIN: CPT | Mod: 25 | Performed by: INTERNAL MEDICINE

## 2020-01-16 PROCEDURE — 99396 PREV VISIT EST AGE 40-64: CPT | Performed by: INTERNAL MEDICINE

## 2020-01-16 ASSESSMENT — MIFFLIN-ST. JEOR: SCORE: 2328.68

## 2020-01-16 NOTE — PATIENT INSTRUCTIONS
Preventive Health Recommendations  Male Ages 40 to 49    Yearly exam:             See your health care provider every year in order to  o   Review health changes.   o   Discuss preventive care.    o   Review your medicines if your doctor has prescribed any.    You should be tested each year for STDs (sexually transmitted diseases) if you re at risk.     Have a cholesterol test every 5 years.     Have a colonoscopy (test for colon cancer) if someone in your family has had colon cancer or polyps before age 50.     After age 45, have a diabetes test (fasting glucose). If you are at risk for diabetes, you should have this test every 3 years.      Talk with your health care provider about whether or not a prostate cancer screening test (PSA) is right for you.    Shots: Get a flu shot each year. Get a tetanus shot every 10 years.     Nutrition:    Eat at least 5 servings of fruits and vegetables daily.     Eat whole-grain bread, whole-wheat pasta and brown rice instead of white grains and rice.     Get adequate Calcium and Vitamin D.     Lifestyle    Exercise for at least 150 minutes a week (30 minutes a day, 5 days a week). This will help you control your weight and prevent disease.     Limit alcohol to one drink per day.     No smoking.     Wear sunscreen to prevent skin cancer.     See your dentist every six months for an exam and cleaning.      PLAN:  1.  Try to start exercise program  2.  Work on weight  3.  Repeat labs and follow-up in a year     4.  Consider meloxicam if R hip/leg symptoms not improving

## 2020-01-16 NOTE — NURSING NOTE
"Chief Complaint   Patient presents with     Physical       Initial /68   Pulse 77   Temp 97.9  F (36.6  C) (Oral)   Resp 16   Ht 1.88 m (6' 2\")   Wt 137.9 kg (304 lb)   SpO2 99%   BMI 39.03 kg/m   Estimated body mass index is 39.03 kg/m  as calculated from the following:    Height as of this encounter: 1.88 m (6' 2\").    Weight as of this encounter: 137.9 kg (304 lb).  BP completed using cuff size: large    Health Maintenance that is potentially due pending provider review:  Patient declines Flu vaccine    Pt has appt for PT scheduled  "

## 2020-01-16 NOTE — PROGRESS NOTES
SUBJECTIVE:   CC: Abhijit Garcia is an 44 year old male who presents for preventative health visit.     Healthy Habits:     Getting at least 3 servings of Calcium per day:  NO    Bi-annual eye exam:  Yes    Dental care twice a year:  Yes    Sleep apnea or symptoms of sleep apnea:  Daytime drowsiness    Diet:  Regular (no restrictions)    Frequency of exercise:  None    Duration of exercise:  N/A    Taking medications regularly:  Yes    Barriers to taking medications:  None    Medication side effects:  None    PHQ-2 Total Score: 2    Additional concerns today:  Yes     PT at [facility] in [facility]      Additional issues to address:  1.  Follow-up HTN.  Patient is not checking outpatient blood pressures recently.  He reports no side effects from BP medications.   2.  Follow-up labs from last month   3.  Had medrol and doing Physical Therapy past week+ for symptoms of R leg pain, probable radicular symptoms without weakness, etc.    Is better, but unclear how much medrol helped.  - had some minor redness tip of penis and foreskin, like a diaper rash.   Mostly better.     - R leg/hip/low back symptoms are 50% better.   Still doing PT.  4.  Check for ear wax  5.  Had CTs in [place] for lung nodules > 5 years ago.   Wonders if follow-up needed.   Last CXR late 2018, noncontributory   6.  Never had eval for sleep apnea.   Snores, no known apnea.   No PND.    Today's PHQ-2 Score:   PHQ-2 ( 1999 [employer]) 1/16/2020   Q1: Little interest or pleasure in doing things 1   Q2: Feeling down, depressed or hopeless 1   PHQ-2 Score 2   Q1: Little interest or pleasure in doing things Several days   Q2: Feeling down, depressed or hopeless Several days   PHQ-2 Score 2       Abuse: Current or Past(Physical, Sexual or Emotional)- No  Do you feel safe in your environment? Yes        Social History     Tobacco Use     Smoking status: Never Smoker     Smokeless tobacco: Never Used   Substance Use Topics     Alcohol use: Yes      "Alcohol/week: 0.0 standard drinks     Comment: 1-5 drinks per month     If you drink alcohol do you typically have >3 drinks per day or >7 drinks per week? No    Alcohol Use 1/16/2020   Prescreen: >3 drinks/day or >7 drinks/week? No       Last PSA: No results found for: PSA    Reviewed orders with patient. Reviewed health maintenance and updated orders accordingly - Yes      Reviewed and updated as needed this visit by clinical staff  Allergies  Meds         Reviewed and updated as needed this visit by Provider          Review of Systems  CONSTITUTIONAL: NEGATIVE for fever, chills, change in weight  INTEGUMENTARY/SKIN: NEGATIVE for worrisome rashes, moles or lesions  EYES: NEGATIVE for vision changes or irritation  ENT: NEGATIVE for ear, mouth and throat problems  RESP: NEGATIVE for significant cough or SOB  CV: NEGATIVE for chest pain, palpitations or peripheral edema  GI: NEGATIVE for nausea, abdominal pain, heartburn, or change in bowel habits   male: negative for dysuria, hematuria, decreased urinary stream, erectile dysfunction, urethral discharge  MUSCULOSKELETAL: NEGATIVE for significant arthralgias or myalgia  NEURO: NEGATIVE for weakness, dizziness or paresthesias  PSYCHIATRIC: NEGATIVE for changes in mood or affect    OBJECTIVE:   /68   Pulse 77   Temp 97.9  F (36.6  C) (Oral)   Resp 16   Ht 1.88 m (6' 2\")   Wt 137.9 kg (304 lb)   SpO2 99%   BMI 39.03 kg/m      Physical Exam   Recheck 110/70  GENERAL: healthy, alert and no distress, overweight  EYES: Eyes grossly normal to inspection  HENT: ear canals and TM's normal, nose and mouth without ulcers or lesions.   Mild amount of wax both canals.  NECK: no adenopathy, no asymmetry, masses, or scars and thyroid normal to palpation  RESP: lungs clear to auscultation - no rales, rhonchi or wheezes  BREAST: normal without masses, tenderness or nipple discharge and no palpable axillary masses or adenopathy  CV: regular rate and rhythm, normal S1 " "S2, no S3 or S4, no murmur, click or rub, no peripheral edema and peripheral pulses strong  ABDOMEN: soft, nontender, no hepatosplenomegaly, no masses and bowel sounds normal   (male): normal male genitalia without lesions or urethral discharge, no hernia.  No redness, discharge, etc.  MS: no gross musculoskeletal defects noted, no edema  SKIN: no suspicious lesions or rashes  NEURO: Normal strength and tone, mentation intact and speech normal  PSYCH: mentation appears normal, affect normal/bright  LYMPH: no cervical adenopathy    The 10-year ASCVD risk score (Rohit MURO Jr., et al., 2013) is: 3%    Values used to calculate the score:      Age: 44 years      Sex: Male      Is Non- : No      Diabetic: No      Tobacco smoker: No      Systolic Blood Pressure: 106 mmHg      Is BP treated: Yes      HDL Cholesterol: 38 mg/dL      Total Cholesterol: 215 mg/dL     ASSESSMENT/PLAN:     ASSESSMENT:   1.  Annual exam visit  2.  Hypertriglyceridemia.      3.  Morbid Obesity, related to high triglycerides   4.  HTN, controlled   5.  R sided sciatica, improved.  6.  History of pulm nodules, fully evaluated.   No reason to repeat testing.    PLAN:  1.  Try to start exercise program  2.  Work on weight  3.  Repeat labs and follow-up in a year  4.  Consider meloxicam if R hip/leg symptoms not improving    COUNSELING:   Reviewed preventive health counseling, as reflected in patient instructions       Regular exercise    Estimated body mass index is 40.19 kg/m  as calculated from the following:    Height as of 6/10/19: 1.88 m (6' 2\").    Weight as of 11/25/19: 142 kg (313 lb).     Weight management plan: Discussed healthy diet and exercise guidelines     reports that he has never smoked. He has never used smokeless tobacco.      Counseling Resources:  ATP IV Guidelines  Pooled Cohorts Equation Calculator  FRAX Risk Assessment  ICSI Preventive Guidelines  Dietary Guidelines for Americans, 2010  USDA's " MyPlate  ASA Prophylaxis  Lung CA Screening    Samir De La O MD  [facility]

## 2020-01-21 ENCOUNTER — OFFICE VISIT (OUTPATIENT)
Dept: INTERNAL MEDICINE | Facility: CLINIC | Age: 45
End: 2020-01-21
Payer: COMMERCIAL

## 2020-01-21 VITALS
RESPIRATION RATE: 16 BRPM | WEIGHT: 303 LBS | BODY MASS INDEX: 38.9 KG/M2 | TEMPERATURE: 97.7 F | SYSTOLIC BLOOD PRESSURE: 116 MMHG | HEART RATE: 83 BPM | OXYGEN SATURATION: 95 % | DIASTOLIC BLOOD PRESSURE: 78 MMHG

## 2020-01-21 DIAGNOSIS — L01.00 IMPETIGO: ICD-10-CM

## 2020-01-21 DIAGNOSIS — L08.9 SKIN INFECTION: Primary | ICD-10-CM

## 2020-01-21 PROCEDURE — 99213 OFFICE O/P EST LOW 20 MIN: CPT | Performed by: PHYSICIAN ASSISTANT

## 2020-01-21 RX ORDER — MELOXICAM 15 MG/1
TABLET ORAL
Qty: 30 TABLET | Refills: 0 | OUTPATIENT
Start: 2020-01-21

## 2020-01-21 RX ORDER — MUPIROCIN 20 MG/G
OINTMENT TOPICAL 3 TIMES DAILY
Qty: 15 G | Refills: 0 | Status: SHIPPED | OUTPATIENT
Start: 2020-01-21 | End: 2020-01-28

## 2020-01-21 NOTE — PROGRESS NOTES
Subjective     Abhijit Garcia is a 45 year old male who presents to clinic today for the following health issues:    HPI   Concern - Derm problem  Onset: >7 days    Description:   Red spot on tip of nose    Intensity: mild    Progression of Symptoms:  waxing and waning    Accompanying Signs & Symptoms:  Tenderness, painful, skin peeling, inside of left nare is painful as well,  Denies any pus or colored discharge  Started as a small pimple at the edge of the nose     Previous history of similar problem:   Yes on inside of nose before    Precipitating factors:   Worsened by: time    Alleviating factors:  Improved by: None    Therapies Tried and outcome: antibiotic ointment- no change  -------------------------------------    BP Readings from Last 3 Encounters:   01/21/20 116/78   01/16/20 106/68   11/25/19 132/80    Wt Readings from Last 3 Encounters:   01/21/20 137.4 kg (303 lb)   01/16/20 137.9 kg (304 lb)   11/25/19 142 kg (313 lb)                    -------------------------------------  Reviewed and updated as needed this visit by Provider  Allergies  Meds         Review of Systems   ROS COMP: Constitutional, HEENT, cardiovascular, pulmonary, gi and gu systems are negative, except as otherwise noted.      Objective    /78 (BP Location: Left arm, Patient Position: Chair, Cuff Size: Adult Large)   Pulse 83   Temp 97.7  F (36.5  C) (Oral)   Resp 16   Wt 137.4 kg (303 lb)   SpO2 95%   BMI 38.90 kg/m    Body mass index is 38.9 kg/m .  Physical Exam   GENERAL: healthy, alert and no distress  HENT: normal cephalic/atraumatic, oropharynx clear and nose dry mucosa noted   With scabbed area noted in the left nare  NECK: no adenopathy, no asymmetry, masses, or scars and thyroid normal to palpation  RESP: lungs clear to auscultation - no rales, rhonchi or wheezes  CV: regular rates and rhythm and normal S1 S2, no S3 or S4  SKIN: nose with redness and slightly scabbed area.     Diagnostic Test  Results:  none         Assessment & Plan       ICD-10-CM    1. Skin infection L08.9 mupirocin (BACTROBAN) 2 % external ointment   2. Impetigo L01.00 mupirocin (BACTROBAN) 2 % external ointment          Patient Instructions   Good hand washing         Return in about 2 weeks (around 2/4/2020) for recheck if not improving, regular primary provider.    Mary Gonzalez PA-C  [facility]

## 2020-01-21 NOTE — TELEPHONE ENCOUNTER
Request not needed, he saw you after this request. He is only taking the meloxicam only when his back is bothering him has plenty. Please disregard request.

## 2020-01-22 ENCOUNTER — THERAPY VISIT (OUTPATIENT)
Dept: PHYSICAL THERAPY | Facility: CLINIC | Age: 45
End: 2020-01-22
Payer: COMMERCIAL

## 2020-01-22 DIAGNOSIS — M54.16 LUMBAR RADICULITIS: ICD-10-CM

## 2020-01-22 PROCEDURE — 97530 THERAPEUTIC ACTIVITIES: CPT | Mod: GP | Performed by: PHYSICAL THERAPIST

## 2020-01-22 PROCEDURE — 97110 THERAPEUTIC EXERCISES: CPT | Mod: GP | Performed by: PHYSICAL THERAPIST

## 2020-02-17 ENCOUNTER — TELEPHONE (OUTPATIENT)
Dept: INTERNAL MEDICINE | Facility: CLINIC | Age: 45
End: 2020-02-17

## 2020-02-17 NOTE — TELEPHONE ENCOUNTER
"Dr. De La O-please advise. Thank you!    Triage RN (writer) returned patient's call to obtain more information. Patient reports the following symptoms:    - Ongoing history of intermittent UTI related symptoms x 10 years. Patient reports seeing specialists such as Urologist previously.   -Most recent symptoms began shortly after sexual intercourse with wife. Reports now almost every time he develops symptoms of a UTI, it is after sex with his spouse and he is concerned.   -Patient reports did use lubricant and not sure if that caused irritation to head of penis.   -Patient reports \"would have UTI like symptoms in years past after oral sex but we have stopped that.\"  -burning during urination/discomfort  -denies any discharge/blood from penis  -had to be on doxycycline in past for UTI  -notable redness on tip and foreskin of penis  -has been on antifungal cream in past-helped some but did burn while using  -staying hydrated-watching coffee intake, watching diet to a degree (patient reports he has had increasing blood sugars in the past and not sure if related?)  -clear yellow urine, no sediment, no blood, not foamy  -burning during urination usually only happened in the morning, now happening at all times    Patient would like to see Dr. De La O in office and not via phone visit. Patient reports his schedule is flexible and he can wait for a week or so if needed. Patient would like to see Dr. De La O to see if he currently has a UTI and to further discuss future interventions that may be needed due to this being ongoing.    No appointments available this month. Will forward to Dr. De La O to advise. Ok to use a same day appointment? Please advise. Thank you!    Madeleine Steiner RN on 2/17/2020 at 2:30 PM  "

## 2020-02-17 NOTE — TELEPHONE ENCOUNTER
Reason for Call:  Same Day Appointment, Requested Provider:  Samir De La O MD    PCP: Samir De La O    Reason for visit: On going urinary tract infection    Duration of symptoms: over a week currently but a recurrent symptom.     Have you been treated for this in the past?     Additional comments: Emerson didn't feel comfortable telling more other than he has been currently feeling symptoms for a week but it is a recurring. (he has more to discuss with a nurse) He also states that he thinks there is more going on than just UTI which is why he would like a face to face with  sooner than later.     Can we leave a detailed message on this number? YES    Phone number patient can be reached at: Cell number on file:    Telephone Information:   Mobile [phone]       Best Time: asap    Call taken on 2/17/2020 at 10:33 AM by Shanthi Enriquez

## 2020-02-18 ENCOUNTER — OFFICE VISIT (OUTPATIENT)
Dept: INTERNAL MEDICINE | Facility: CLINIC | Age: 45
End: 2020-02-18
Payer: COMMERCIAL

## 2020-02-18 VITALS
DIASTOLIC BLOOD PRESSURE: 84 MMHG | TEMPERATURE: 97.9 F | HEART RATE: 80 BPM | OXYGEN SATURATION: 97 % | BODY MASS INDEX: 39.42 KG/M2 | RESPIRATION RATE: 16 BRPM | SYSTOLIC BLOOD PRESSURE: 124 MMHG | WEIGHT: 307 LBS

## 2020-02-18 DIAGNOSIS — R30.0 DYSURIA: Primary | ICD-10-CM

## 2020-02-18 DIAGNOSIS — I10 ESSENTIAL HYPERTENSION: ICD-10-CM

## 2020-02-18 DIAGNOSIS — E66.01 MORBID OBESITY (H): ICD-10-CM

## 2020-02-18 LAB
ALBUMIN UR-MCNC: NEGATIVE MG/DL
APPEARANCE UR: CLEAR
BILIRUB UR QL STRIP: NEGATIVE
COLOR UR AUTO: YELLOW
GLUCOSE UR STRIP-MCNC: NEGATIVE MG/DL
HGB UR QL STRIP: NEGATIVE
KETONES UR STRIP-MCNC: NEGATIVE MG/DL
LEUKOCYTE ESTERASE UR QL STRIP: NEGATIVE
NITRATE UR QL: NEGATIVE
PH UR STRIP: 7 PH (ref 5–7)
RBC #/AREA URNS AUTO: NORMAL /HPF
SOURCE: NORMAL
SP GR UR STRIP: 1.01 (ref 1–1.03)
UROBILINOGEN UR STRIP-ACNC: 0.2 EU/DL (ref 0.2–1)
WBC #/AREA URNS AUTO: NORMAL /HPF

## 2020-02-18 PROCEDURE — 99214 OFFICE O/P EST MOD 30 MIN: CPT | Performed by: INTERNAL MEDICINE

## 2020-02-18 PROCEDURE — 81001 URINALYSIS AUTO W/SCOPE: CPT | Performed by: INTERNAL MEDICINE

## 2020-02-18 RX ORDER — AZITHROMYCIN 500 MG/1
1000 TABLET, FILM COATED ORAL DAILY
Qty: 2 TABLET | Refills: 0 | Status: SHIPPED | OUTPATIENT
Start: 2020-02-18 | End: 2020-02-19

## 2020-02-18 RX ORDER — IRBESARTAN 300 MG/1
TABLET ORAL
Qty: 90 TABLET | Refills: 3 | Status: SHIPPED | OUTPATIENT
Start: 2020-02-18 | End: 2020-05-04

## 2020-02-18 NOTE — PROGRESS NOTES
Subjective     Abhijit Garcia is a 45 year old male who presents to clinic today for the following health issues:    HPI   Genitourinary symptoms      Duration: 1 week    Description:  dysuria and burning after urination, random burning sensation of the penis during normal activity, redness to head of penis and foreskin, retention or increase in frequency. Denies any discharge, lumps and discoloration of urine.     Intensity:  moderate    Accompanying signs and symptoms (fever/discharge/nausea/vomiting/back or abdominal pain):  2 instances of stabbing pain into left testicle today. Leg was crossed and was unsure if related to leg position at the time.  Patient has not had these symptoms in the past.       History (frequent UTI's/kidney stones/prostate problems): Past history of uretritis, and a fungal infection. Seems to always happen 12-24 hours after having intercourse with his wife (which is infrequent).   Does not happen with masturbation.   No dyspareunia.   Uses lubricant for wife's vaginal dryness.   Await that lubricant can cause.    Hasn't used condom for years.   Symptoms happened in past with oral sex.   - current symptoms lasting for a week, which is new.   Denies any discharge/blood from penis   - clear yellow urine, no sediment, no blood, not foamy   - wife with no symptoms     Precipitating or alleviating factors: [place] with wife    Therapies tried and outcome: Ketoconazole cream, first time he tried   Outcome: mixed results.       Patient has seen Urologist in past, most recently 2016 - Dr. Parrish.  At that time, was having dysuria and felt to have prostatitis, aggravated by caffeine.   He did not have urethritis.      Patient currently staying hydrated, decreased coffee intake, watching diet to a degree, and limiting alcohol.       Has had some impaired fasting glucose and wonders if related.   No polyuria or polydipsia       Problem list and histories reviewed & adjusted, as  indicated.  Additional history: as documented      ROS:  No low back pain, fever/chills   Constitutional, HEENT, cardiovascular, pulmonary, gi and gu systems are negative, except as otherwise noted.    OBJECTIVE:                                                    /84 (BP Location: Left arm, Patient Position: Chair, Cuff Size: Adult Large)   Pulse 80   Temp 97.9  F (36.6  C) (Oral)   Resp 16   Wt 139.3 kg (307 lb)   SpO2 97%   BMI 39.42 kg/m    Body mass index is 39.42 kg/m .   No apparent distress   (male): testicles normal without atrophy or masses, no hernias and penis normal without urethral discharge.   Minimal redness under foreskin  Rectal exam: rectal normal, no masses, prostate normal in size without masses or nodules.  No real tenderness but soft, slightly boggy.      UA negative        ASSESSMENT:                                                      1.  Dysuria, recurrent after intercourse.   - previous diagnosis of non-gonococcal urethritis in [place] years ago.  Symptoms do not suggest GC.   - rule out prostatitis, which is probably most likely   - rule out trauma causing urethral irritation, possible allergic reaction to lubricant, etc.      - doubt this represents UTI, etc.     - patient may have a little fungal dermatitis of skin on penis, but seems unrelated  2.  HTN, controlled   3.  Morbid Obesity, related to HTN.   Doubt related to #1    PLAN:                                                      1.  Check dirty urine specimen ASAP (will do later as just voided) to rule out chlamydia  2.  After urine collected, emperically try taking azithromycin 1 gram  3.  Update MD in a week with results  4.  In the future, for recurrent symptoms, would plan to do one of the following:   - start aleve, 2 twice daily with food for several days.  Risks and benefits discussed, including possible GI side effects.    - if not effective, would try an emperic antibiotic after intercourse   - if still having  symptoms, would arrange follow-up with Dr. Francois De La O MD  [facility]

## 2020-02-18 NOTE — PATIENT INSTRUCTIONS
PLAN:                                                      1.  Check dirty urine specimen in the next 24 hours  2.  After urine collected, take azithromycin 1 gram  3.  Update MD in a week  4.  In the future, for recurrent symptoms, would plan to do one of the following:   - start aleve, 2 twice daily with food for several days.  Risks and benefits discussed, including possible GI side effects.    - if not effective, would try an emperic antibiotic after intercourse   - if still having symptoms, would arrange follow-up with Dr. Parrish

## 2020-02-19 DIAGNOSIS — R30.0 DYSURIA: ICD-10-CM

## 2020-02-19 PROCEDURE — 87491 CHLMYD TRACH DNA AMP PROBE: CPT | Performed by: INTERNAL MEDICINE

## 2020-02-20 LAB
C TRACH DNA SPEC QL NAA+PROBE: NEGATIVE
SPECIMEN SOURCE: NORMAL

## 2020-03-17 NOTE — PROGRESS NOTES
DISCHARGE REPORT    Progress reporting period is from 1-9-20 to 1-22-20.       SUBJECTIVE  Subjective changes noted by patient:  .  Subjective: Feeling much better--only had a little calf pain after standing for several hours over the weekend.  Only having calf pain interittently over the last week. Having only L LBP and L buttocks pain.    Current pain level is 2/10 Current Pain level: 2/10.     Previous pain level was  7/10 Initial Pain level: 7/10.   Changes in function:  Yes (See Goal flowsheet attached for changes in current functional level)  Adverse reaction to treatment or activity: None    OBJECTIVE  Changes noted in objective findings:  Patient has failed to return to therapy so current objective findings are unknown.  Objective: Lx ROM: EXT=min to mod loss, R SG=nil loss, incr L LBP     ASSESSMENT/PLAN  Updated problem list and treatment plan: Diagnosis 1:  LUMBAR RADIUCLOPATHY  Pain -  manual therapy, self management, education, directional preference exercise and home program  Decreased ROM/flexibility - manual therapy and therapeutic exercise  Decreased strength - therapeutic exercise and therapeutic activities  Decreased function - therapeutic activities  Impaired posture - neuro re-education  STG/LTGs have been met or progress has been made towards goals:  Yes (See Goal flow sheet completed today.)  Assessment of Progress: The patient has not returned to therapy. Current status is unknown.  Self Management Plans:  Patient is independent in a home treatment program.  Patient is independent in self management of symptoms.  I have re-evaluated this patient and find that the nature, scope, duration and intensity of the therapy is appropriate for the medical condition of the patient.  Abhijit continues to require the following intervention to meet STG and LTG's:  PT intervention is no longer required to meet STG/LTG.    Recommendations:  This patient is ready to be discharged from therapy and continue  their home treatment program.    Please refer to the daily flowsheet for treatment today, total treatment time and time spent performing 1:1 timed codes.

## 2020-05-02 DIAGNOSIS — I10 ESSENTIAL HYPERTENSION: ICD-10-CM

## 2020-05-03 NOTE — TELEPHONE ENCOUNTER
"Requested Prescriptions   Pending Prescriptions Disp Refills     irbesartan (AVAPRO) 300 MG tablet [Pharmacy Med Name: IRBESARTAN 300MG TABS] 90 tablet 1     Sig: TAKE ONE TABLET BY MOUTH ONCE DAILY       Angiotensin-II Receptors Passed - 5/2/2020 11:29 AM        Passed - Last blood pressure under 140/90 in past 12 months     BP Readings from Last 3 Encounters:   02/18/20 124/84   01/21/20 116/78   01/16/20 106/68                 Passed - Recent (12 mo) or future (30 days) visit within the authorizing provider's specialty     Patient has had an office visit with the authorizing provider or a provider within the authorizing providers department within the previous 12 mos or has a future within next 30 days. See \"Patient Info\" tab in inbasket, or \"Choose Columns\" in Meds & Orders section of the refill encounter.              Passed - Medication is active on med list        Passed - Patient is age 18 or older        Passed - Normal serum creatinine on file in past 12 months     Recent Labs   Lab Test 12/10/19  0810   CR 1.12       Ok to refill medication if creatinine is low          Passed - Normal serum potassium on file in past 12 months     Recent Labs   Lab Test 12/10/19  0810   POTASSIUM 4.2                       Last Written Prescription Date:  2/18/2020  Last Fill Quantity: 90,  # refills: 3   Last office visit: 2/18/2020 with prescribing provider:  2/18/2020   Future Office Visit:      "

## 2020-05-04 RX ORDER — IRBESARTAN 300 MG/1
TABLET ORAL
Qty: 90 TABLET | Refills: 2 | Status: SHIPPED | OUTPATIENT
Start: 2020-05-04 | End: 2021-02-12

## 2020-09-09 DIAGNOSIS — M10.9 GOUT OF MULTIPLE SITES, UNSPECIFIED CAUSE, UNSPECIFIED CHRONICITY: ICD-10-CM

## 2020-09-10 RX ORDER — MELOXICAM 15 MG/1
TABLET ORAL
Qty: 30 TABLET | Refills: 0 | Status: SHIPPED | OUTPATIENT
Start: 2020-09-10 | End: 2021-11-23

## 2020-09-15 ENCOUNTER — MYC MEDICAL ADVICE (OUTPATIENT)
Dept: INTERNAL MEDICINE | Facility: CLINIC | Age: 45
End: 2020-09-15

## 2020-09-17 ENCOUNTER — APPOINTMENT (OUTPATIENT)
Dept: ULTRASOUND IMAGING | Facility: CLINIC | Age: 45
End: 2020-09-17
Attending: PHYSICIAN ASSISTANT
Payer: COMMERCIAL

## 2020-09-17 ENCOUNTER — MYC MEDICAL ADVICE (OUTPATIENT)
Dept: INTERNAL MEDICINE | Facility: CLINIC | Age: 45
End: 2020-09-17

## 2020-09-17 ENCOUNTER — HOSPITAL ENCOUNTER (EMERGENCY)
Facility: CLINIC | Age: 45
Discharge: HOME | End: 2020-09-17
Attending: PHYSICIAN ASSISTANT | Admitting: PHYSICIAN ASSISTANT
Payer: COMMERCIAL

## 2020-09-17 VITALS
WEIGHT: 315 LBS | BODY MASS INDEX: 40.7 KG/M2 | SYSTOLIC BLOOD PRESSURE: 130 MMHG | HEART RATE: 88 BPM | OXYGEN SATURATION: 100 % | RESPIRATION RATE: 18 BRPM | DIASTOLIC BLOOD PRESSURE: 96 MMHG | TEMPERATURE: 96.9 F

## 2020-09-17 DIAGNOSIS — M79.662 PAIN OF LEFT CALF: ICD-10-CM

## 2020-09-17 LAB
ANION GAP SERPL CALCULATED.3IONS-SCNC: 5 MMOL/L (ref 3–14)
BUN SERPL-MCNC: 16 MG/DL (ref 7–30)
CALCIUM SERPL-MCNC: 8.1 MG/DL (ref 8.5–10.1)
CHLORIDE SERPL-SCNC: 106 MMOL/L (ref 94–109)
CO2 SERPL-SCNC: 28 MMOL/L (ref 20–32)
CREAT SERPL-MCNC: 1.07 MG/DL (ref 0.66–1.25)
GFR SERPL CREATININE-BSD FRML MDRD: 82 ML/MIN/{1.73_M2}
GLUCOSE SERPL-MCNC: 96 MG/DL (ref 70–99)
MAGNESIUM SERPL-MCNC: 2.1 MG/DL (ref 1.6–2.3)
POTASSIUM SERPL-SCNC: 4 MMOL/L (ref 3.4–5.3)
SODIUM SERPL-SCNC: 139 MMOL/L (ref 133–144)

## 2020-09-17 PROCEDURE — 99284 EMERGENCY DEPT VISIT MOD MDM: CPT | Mod: 25

## 2020-09-17 PROCEDURE — 80048 BASIC METABOLIC PNL TOTAL CA: CPT | Performed by: PHYSICIAN ASSISTANT

## 2020-09-17 PROCEDURE — 93971 EXTREMITY STUDY: CPT | Mod: LT

## 2020-09-17 PROCEDURE — 83735 ASSAY OF MAGNESIUM: CPT | Performed by: PHYSICIAN ASSISTANT

## 2020-09-17 ASSESSMENT — ENCOUNTER SYMPTOMS
BACK PAIN: 0
SHORTNESS OF BREATH: 0
WEAKNESS: 0
NUMBNESS: 0
MYALGIAS: 1

## 2020-09-17 NOTE — ED TRIAGE NOTES
Pt told to come in by his primary care for possible DVT. Pt has stabbing aching tight pain in his left calf, worse after sitting for a long time. Pt has hx of DVT in that leg in 2017. No redness or warmth noted.

## 2020-09-17 NOTE — ED AVS SNAPSHOT
[facility] Emergency Department  [address]  [facility] [address]  Phone:  [phone]  Fax:  [phone]                                    Abhijit Garcia   MRN: [id number]    Department:  [facility] Emergency Department   Date of Visit:  9/17/2020           After Visit Summary Signature Page    I have received my discharge instructions, and my questions have been answered. I have discussed any challenges I see with this plan with the nurse or doctor.    ..........................................................................................................................................  Patient/Patient Representative Signature      ..........................................................................................................................................  Patient Representative Print Name and Relationship to Patient    ..................................................               ................................................  Date                                   Time    ..........................................................................................................................................  Reviewed by Signature/Title    ...................................................              ..............................................  Date                                               Time          22EPIC Rev 08/18

## 2020-09-18 NOTE — ED PROVIDER NOTES
"  History     Chief Complaint: Leg Pain     HPI   Abhijit Garcia is a 45 year old male with a history of ankle surgery and DVT who presents with leg pain. The patient reports pain in his left calf muscle which has been present and worsening for the last three weeks. The last three days, he states that the pain has worsened significantly, leading him to reach out to his primary care provider. The patient had ankle surgery in 2017, and states that he developed a DVT in his left calf 10 days after surgery. He took coumadin for 6 months following this, but has not been anticoagulated for over two years now. Due to this history, when he expressed his discomfort to his primary care provider, he was referred to come to the emergency department for evaluation. The patient describes that he has been sitting more than usual and has noticed that his leg is particularly sore after sitting for extended periods of time. In addition to this, both yesterday and today he states that he noticed an aching sensation in the area upon waking up in the morning. He has had some occasional pain in his left thigh, though he states it is primarily localized in the calf, just distal to the knee. The patient also makes note of a \"gurgling\" sensation in the area for the last two days as well. He otherwise denies trauma to the area, new back pain, numbness, or change in sensation in the left knee, calf, or foot. He also denies chest pain and shortness of breath.    Allergies:  Amlodipine  Contrast Dye  Indomethacin  Iodine  Levaquin  Lisinopril  Losartan    Medications:    Allopurinol  Colchicine  Avapro  Loratadine  Meloxicam  Valacyclovir    Past Medical History:    Arthritis  Hyperplastic colon polyp  Hypertension    Past Surgical History:    Ankle surgery  Appendectomy     Family History:    Diabetes  Obesity  Cancer  Hypertension  Arthritis  COPD  CAD  Asthma     Social History:  Smoking Status: Never Smoker  Smokeless Tobacco: Never " Used  Alcohol Use: Yes  Drug Use: No  PCP: Samir De La O        Review of Systems   Respiratory: Negative for shortness of breath.    Cardiovascular: Negative for chest pain.   Musculoskeletal: Positive for myalgias (left lower leg). Negative for back pain.   Neurological: Negative for weakness and numbness.   All other systems reviewed and are negative.    Physical Exam     Patient Vitals for the past 24 hrs:   BP Temp Temp src Pulse Resp SpO2 Weight   09/17/20 1858 (!) 143/110 96.9  F (36.1  C) Temporal 77 18 100 % 143.8 kg (317 lb 0.3 oz)     Physical Exam  Vitals signs and nursing note reviewed.   Constitutional:       General: He is not in acute distress.     Appearance: He is not diaphoretic.   HENT:      Head: Normocephalic and atraumatic.   Eyes:      General: No scleral icterus.     Extraocular Movements: Extraocular movements intact.   Cardiovascular:      Rate and Rhythm: Normal rate and regular rhythm.      Pulses: Normal pulses.   Pulmonary:      Effort: Pulmonary effort is normal. No respiratory distress.   Musculoskeletal:         General: Tenderness present. No swelling or deformity.      Comments: Tenderness/pain to the lateral aspect of the L calf. NO erythema, induration, cyanosis, warmth, pallor noted. Baseline ROM, strength, sensation of the L knee, ankle, and foot. Pain noted with dorsiflexion. Unremarkable squeeze test.    Skin:     General: Skin is warm.      Findings: No rash.   Neurological:      Mental Status: He is alert.       Emergency Department Course   Imaging:  Radiology findings were communicated with the patient who voiced understanding of the findings.    US Lower Extremity Venous Duplex Left  IMPRESSION:  1.  No deep venous thrombosis in the left lower extremity.  As read by Radiology.     Laboratory:  Laboratory findings were communicated with the patient who voiced understanding of the findings.    Magnesium: 2.1    BMP: Calcium 8.1 (L), o/w WNL (Creatinine 1.07)      Emergency Department Course:  The patient arrived in the emergency department.  Past medical records, nursing notes, and vitals reviewed.    1907: I performed an exam of the patient and obtained history, as documented above.    IV inserted and blood drawn. This was sent to laboratory for testing, findings above.      The patient was sent for an ultrasound while in the emergency department, findings above.     2057: Findings and plan explained to the Patient. Patient discharged home with instructions regarding supportive care, medications, and reasons to return. The importance of close follow-up was reviewed.    I personally reviewed the laboratory and imaging results with the Patient and answered all related questions prior to discharge.     Impression & Plan     Medical Decision Making:  No changes on examination of cellulitis, abscess, necrotizing fascitis, septic arthritis, compartment syndrome, arterial occlusion. US without findings of DVT. Likely muscular in nature. Outpatient symptomatic care.      Diagnosis:    ICD-10-CM    1. Pain of left calf  M79.662        Disposition: Discharged to home.    Scribe Disclosure:  I, Bette Wilson, am serving as a scribe at 7:07 PM on 9/17/2020 to document services personally performed by Manish Nelson PA-C based on my observations and the provider's statements to me.      Bette Wilson  09/17/20  [facility] Emergency Department     Manish Nelson PA-C  09/17/20 [id number]

## 2020-09-22 ENCOUNTER — TELEPHONE (OUTPATIENT)
Dept: INTERNAL MEDICINE | Facility: CLINIC | Age: 45
End: 2020-09-22

## 2020-09-22 DIAGNOSIS — K62.9 ANUS PROBLEMS: Primary | ICD-10-CM

## 2020-09-22 NOTE — TELEPHONE ENCOUNTER
This is in follow-up of 9/15 Preggers message.    While I'm happy to see patient, I won't be able to act upon this problem, if a procedure is warranted to help his symptoms or make a diagnosis.   Going directly to colorectal surgery would be the one-stop-shop, if he wants to take that approach.   I have pended order, if needed.    Still happy to see him, and can use 5 pm spot on 10/6.    Thanks

## 2020-09-22 NOTE — TELEPHONE ENCOUNTER
Please sign order for referral and pt will take care of this but has a cyst on back of head and prostate issues he would like to see you for 10/6/20 .Mary Kim RN

## 2020-09-22 NOTE — TELEPHONE ENCOUNTER
"Nodules in rectum felt per pt and he is unsure if these are hemmoroids . See past my chart message . Prefers not to see a partner . Could PCP see 10/6 at 5 pm in clinic ? Could also then discuss if needs to establish ongoing care or transitioning to new provider.    Franck Norman,      We can see you in clinic and perhaps perform a brief rectal exam based on the information you have provided. We can go from there and see if there is a need to send you to a gastroenterologist if not able to pinpoint your symptoms.     Does this help? Feel free to call and schedule an appointment as soon as possible. [phone].     Andreea COHEN, RN, PHN  Mary Kim RN      Hi Dr. De La O,     I've been dealing with rectal pain for about three to four months now. I can best describe it as an \"ache\", though at times (twice at bedtime) it felt almost like the rectum was trying to cramp (like a leg cramp). There has been no sign of blood in my stool, though at times there has been bright red blood on the toilet paper when I wipe myself (which is something I've experienced off and on for over 10 years).       I blamed the pain at first on my home-office chair, but adding a pillow cushion hasn't helped. In the last three weeks I started using Preparation H, thinking maybe its a hemorrhoid though there was no external swelling as I've had in the past. I discovered at that time a nodule in my rectum. It is approximately the size of a BB, approximately 3/4\" of an inch inside of me, and hard like a BB. I've had hemorrhoid's before but they were always closer to the rectum opening and soft. I've continued with the Prep-H  but the pain persists as does this nodule.  My concern at this time is what is causing the pain, is there any risk this is an indication of cancer, what is this nodule, and how to address it.       Should I make an appointment with you or with a specialist? How do you recommend I proceed.     Thanks.     - Emerson    "

## 2020-09-28 NOTE — TELEPHONE ENCOUNTER
[facility]    DATE RECEIVED: 10/14/2020   NOTES STATUS DETAILS   OFFICE NOTE from referring provider  Internal 9/22/2020 Telephone    OFFICE NOTE from other specialist   N/A    DISCHARGE SUMMARY from hospital  N/A    DISCHARGE REPORT from the ER N/A    OPERATIVE REPORT  Internal    MEDICATION LIST Internal    LABS     PFC TESTING N/A    ANAL PAP N/A    BIOPSIES/PATHOLOGY RELATED TO DIAGNOSIS Internal 12/21/18   DIAGNOSTIC PROCEDURES     COLONOSCOPY Internal 12/21/18   UPPER ENDOSCOPY (EGD) N/A    FLEX SIGMOIDOSCOPY  N/A    ERCP N/A    IMAGING (DISC & REPORT)      CT  N/A    MRI N/A    XRAY N/A    ULTRASOUND (ENDOANAL/ENDORECTAL) N/A

## 2020-10-05 DIAGNOSIS — M10.9 GOUT OF MULTIPLE SITES, UNSPECIFIED CAUSE, UNSPECIFIED CHRONICITY: ICD-10-CM

## 2020-10-06 ENCOUNTER — PRE VISIT (OUTPATIENT)
Dept: SURGERY | Facility: CLINIC | Age: 45
End: 2020-10-06

## 2020-10-06 ENCOUNTER — OFFICE VISIT (OUTPATIENT)
Dept: INTERNAL MEDICINE | Facility: CLINIC | Age: 45
End: 2020-10-06
Payer: COMMERCIAL

## 2020-10-06 VITALS
SYSTOLIC BLOOD PRESSURE: 120 MMHG | HEART RATE: 96 BPM | OXYGEN SATURATION: 97 % | RESPIRATION RATE: 16 BRPM | DIASTOLIC BLOOD PRESSURE: 80 MMHG | WEIGHT: 315 LBS | TEMPERATURE: 97.2 F | BODY MASS INDEX: 40.83 KG/M2

## 2020-10-06 DIAGNOSIS — Z23 NEED FOR PROPHYLACTIC VACCINATION AND INOCULATION AGAINST INFLUENZA: ICD-10-CM

## 2020-10-06 DIAGNOSIS — D17.30 LIPOMA OF SKIN AND SUBCUTANEOUS TISSUE: Primary | ICD-10-CM

## 2020-10-06 DIAGNOSIS — Z23 NEED FOR VACCINATION: ICD-10-CM

## 2020-10-06 DIAGNOSIS — Z23 FLU VACCINE NEED: ICD-10-CM

## 2020-10-06 DIAGNOSIS — F43.9 STRESS: ICD-10-CM

## 2020-10-06 DIAGNOSIS — R39.9 GENITOURINARY SYMPTOMS: ICD-10-CM

## 2020-10-06 PROBLEM — Z86.718 PERSONAL HISTORY OF DVT (DEEP VEIN THROMBOSIS): Status: ACTIVE | Noted: 2017-04-05

## 2020-10-06 PROCEDURE — 90686 IIV4 VACC NO PRSV 0.5 ML IM: CPT | Performed by: INTERNAL MEDICINE

## 2020-10-06 PROCEDURE — 99214 OFFICE O/P EST MOD 30 MIN: CPT | Mod: 25 | Performed by: INTERNAL MEDICINE

## 2020-10-06 PROCEDURE — 90471 IMMUNIZATION ADMIN: CPT | Performed by: INTERNAL MEDICINE

## 2020-10-06 RX ORDER — ALLOPURINOL 100 MG/1
TABLET ORAL
Qty: 90 TABLET | Refills: 3 | Status: SHIPPED | OUTPATIENT
Start: 2020-10-06 | End: 2021-10-18

## 2020-10-06 ASSESSMENT — ANXIETY QUESTIONNAIRES
2. NOT BEING ABLE TO STOP OR CONTROL WORRYING: SEVERAL DAYS
6. BECOMING EASILY ANNOYED OR IRRITABLE: MORE THAN HALF THE DAYS
7. FEELING AFRAID AS IF SOMETHING AWFUL MIGHT HAPPEN: NOT AT ALL
5. BEING SO RESTLESS THAT IT IS HARD TO SIT STILL: NOT AT ALL
1. FEELING NERVOUS, ANXIOUS, OR ON EDGE: SEVERAL DAYS
IF YOU CHECKED OFF ANY PROBLEMS ON THIS QUESTIONNAIRE, HOW DIFFICULT HAVE THESE PROBLEMS MADE IT FOR YOU TO DO YOUR WORK, TAKE CARE OF THINGS AT HOME, OR GET ALONG WITH OTHER PEOPLE: NOT DIFFICULT AT ALL
GAD7 TOTAL SCORE: 5
3. WORRYING TOO MUCH ABOUT DIFFERENT THINGS: NOT AT ALL

## 2020-10-06 ASSESSMENT — PATIENT HEALTH QUESTIONNAIRE - PHQ9
5. POOR APPETITE OR OVEREATING: SEVERAL DAYS
SUM OF ALL RESPONSES TO PHQ QUESTIONS 1-9: 10

## 2020-10-06 NOTE — TELEPHONE ENCOUNTER
Routing refill request to provider for review/approval because:  Labs not current:  Alt, uric acid

## 2020-10-06 NOTE — PATIENT INSTRUCTIONS
Patient Education     Thrombosed Hemorrhoids    Hemorrhoids are swollen veins in the lower rectum and anus. They're similar to varicose veins that form in the legs. Hemorrhoids can happen inside the rectum (internal hemorrhoids). Or one may form at the anal opening (external hemorrhoids). They may bleed, but most hemorrhoids aren't cause for concern. But a small blood clot (thrombus) can develop in an external hemorrhoid. This may lead to severe pain and sometimes bleeding.  When to go to the emergency room (ER)  If you have severe pain or excessive bleeding, seek medical care right away.  What to expect in the ER  A healthcare provider is likely to check your anus and rectum using a thin, lighted tube (anoscope or proctoscope). You will get a local pain reliever (anesthetic) to ease any mild pain.  Treatment  Treatment recommendations include:    If the blood clot has formed within the past 48 to 72 hours, your healthcare provider may remove it from within the hemorrhoid. This is a simple procedure that can ease pain. You will have a local anesthetic to keep you pain-free during the procedure. The provider makes a small cut (incision) in the skin and removes the blood clot. Stitches are generally not needed.    If more than 72 hours have passed, your healthcare provider will suggest home treatments. Simple home treatments can ease your pain. These may include warm baths, ointments, suppositories, and witch hazel compresses. Many thrombosed hemorrhoids go away on their own in a few weeks.    If you have bleeding that continues or painful hemorrhoids, talk with your healthcare provider. Possible treatment may include banding, ligation, or removal (hemorrhoidectomy).  Tips for preventing hemorrhoids  Tips include:    Eat foods that are high in fiber and use fiber supplements to help prevent constipation.    Drink plenty of liquids.    Get regular exercise to help prevent constipation and promote good bowel  function.  Date Last Reviewed: 8/1/2018 2000-2019 [employer]. [address] [place] [address], [place] [address]. All rights reserved. This information is not intended as a substitute for professional medical care. Always follow your healthcare professional's instructions.           PLAN:                                                      1.  MEDICATIONS:  Continue current medications without change  2.  See colorectal specialist, as planned  3.  Consider removal of lipoma for symptoms or significant enlargement

## 2020-10-06 NOTE — PROGRESS NOTES
"Subjective     Abhijit Garcia is a 45 year old male who presents to clinic today for the following health issues:    HPI         Concern - Cyst   Onset: 10 years but, getting larger  Description: Back of head  Intensity: no pain  Progression of Symptoms:  constant  Accompanying Signs & Symptoms: none   Previous history of similar problem: none  Precipitating factors:        Worsened by: none  Alleviating factors:        Improved by: none   Therapies tried and outcome: {NONE DEFAULTED:152612::\" none \"    Additional issues to address:  1.  Discuss prostate issues.   Still pain intermittently.   One definite episode, treatment with since dose azithromycin in Februarly for possible NIKI.   Had another episode a month later, untreated.   Has had some rectal area pain past 3 months, just below rectal area.   Thought it might be hemorrhoid, used preparation H (helps).  Now 2 nodules.   Has appointment with colorectal soon.  - some incomplete voiding, or urinary hesitancy few times.  None today, mild pain anal area.   No testicular, penile symptoms recently  - some blood on wiping  2.  Pt to  and some cognitive issues (problems remembering names for the past year, intermittently).     - sleeping well, but not enough (6 hours).  No apnea, snoring only with allergies.  - mood is grumpy.   Stress level through the roof due to work.   Considering anger management training.  - denies significant anxiety symptoms   PHQ 10/6/2020   PHQ-9 Total Score 10   Q9: Thoughts of better off dead/self-harm past 2 weeks Not at all     TRENT-7 SCORE 10/6/2020   Total Score 5     3.  Monitoring glucoses with wife's machine, running < 100    Problem list and histories reviewed & adjusted, as indicated.  Additional history: as documented      ROS:    Constitutional, HEENT, cardiovascular, pulmonary, gi and gu systems are negative, except as otherwise noted.    OBJECTIVE:                                                    /80 (BP Location: " Left arm, Patient Position: Chair, Cuff Size: Adult Large)   Pulse 96   Temp 97.2  F (36.2  C) (Temporal)   Resp 16   Wt 144.2 kg (318 lb)   SpO2 97%   BMI 40.83 kg/m    Body mass index is 40.83 kg/m .   No apparent distress  cognition intact, immediate and 5-minute recall normal, insight normal  Large, rubbery, nontender raised lesion back of head  Anal exam shows no fissure or external hemorrhoids         ASSESSMENT:                                                      1. Lipoma over occiput  2. Genitourinary symptoms.   While cannot rule out low grade prostatitis, would not treat with antibiotics at this time.   Probable internal hemorrhoids.  3. Stressors, affective symptoms, mostly situational.   Suspect that name finding problems are secondary.   Discussed medication, counseling, etc.   Consider SSRI, cognitive behavioral therapy.   Patient will consider options and follow-up.      PLAN:                                                      1.  MEDICATIONS:  Continue current medications without change  2.  See colorectal specialist, as planned  3.  Consider removal of lipoma for symptoms or significant enlargement    Samir De La O MD  [facility] spent >25 minutes spent with patient, more than 50% spent on discussion/education/planning - as outlined in assessment and plan

## 2020-10-07 ASSESSMENT — ANXIETY QUESTIONNAIRES: GAD7 TOTAL SCORE: 5

## 2020-10-12 NOTE — PROGRESS NOTES
"Colon and Rectal Surgery Clinic Note    RE: Abhijit RANDALL [place].  : 1975.  PJ: 10/14/2020.    Reason for visit: anal pain with palpated nodule    HPI: 44 yo M presenting with a 4 month hx of anal pain, at the opening as he describes. The pain is sharp. BMs at times do make it worse. Does intermittently have some bright blood on wiping, ongoing every other day. Has tried preparation H, helped some. Has felt a hard nodule before in anus. Has at least 1 BM per day. Does intermittently use metamucil. Stools most days are soft, recently more firm requiring straining.     He had a colonoscopy in 2018:  Impression:       - One 5 mm polyp (hyperplastic on pathology) in the sigmoid colon, removed with                             a cold snare. Resected and retrieved.                             - Diverticulosis in the sigmoid colon.                             - The examination was otherwise normal on direct                             and retroflexion views.     Medical history:  Past Medical History:   Diagnosis Date     Arthritis      Dysuria      Hyperplastic colon polyp 2018     Hypertension        Surgical history:  Past Surgical History:   Procedure Laterality Date     ANKLE SURGERY       APPENDECTOMY       BIOPSY      biopsy from foreskin     COLONOSCOPY  2018    Dr. Dc [facility]     COLONOSCOPY N/A 2018    Procedure: COMBINED COLONOSCOPY THRU STOMA, BIOPSY BY SNARE using exacto snare;  Surgeon: Shamar Dc MD;  Location:  GI     ESOPHAGOSCOPY, GASTROSCOPY, DUODENOSCOPY (EGD), COMBINED      for stomach pain done in [place]-acid reflux     ORTHOPEDIC SURGERY  2017    ankle left -repair of tendons     wisdom teeth         Family history:  Family History   Problem Relation Age of Onset     Diabetes Mother      Obesity Mother      Cancer - colorectal Mother      Hypertension Mother      Colon Cancer Mother         6\" of bowel removed in      Arthritis Father      Respiratory " Father         asbestos exposure     Chronic Obstructive Pulmonary Disease Father      Coronary Artery Disease Father 71        Father b - 1933  d - 2004     Skin Cancer Father      Other Cancer Father         Skin cancer on face and ears     Allergies Brother      Respiratory Brother      Heart Defect Son      Asthma Brother         Primarily affected him as a child.   Mother with Colon cancer in her 70s, no IBD.    Medications:  Current Outpatient Medications   Medication Sig Dispense Refill     allopurinol (ZYLOPRIM) 100 MG tablet TAKE 1 TABLET(100 MG) BY MOUTH DAILY 90 tablet 3     Colchicine (MITIGARE) 0.6 MG CAPS Take 0.6 mg by mouth 2 times daily as needed 60 capsule 1     imiquimod (ALDARA) 5 % external cream Apply a small sized amount to warts QHS at bedtime.   Wash off after 8 hours. 15 packet 3     irbesartan (AVAPRO) 300 MG tablet TAKE ONE TABLET BY MOUTH ONCE DAILY 90 tablet 2     ketoconazole (NIZORAL) 2 % cream Apply topically daily 60 g 2     Loratadine (CLARITIN PO)        meloxicam (MOBIC) 15 MG tablet TAKE 1 TABLET BY MOUTH DAILY AS NEEDED. 30 tablet 0     valACYclovir (VALTREX) 1000 mg tablet TAKE 2 TABLETS BY MOUTH TWICE DAILY FOR 1 DAY 8 tablet 3       Allergies:  Allergies   Allergen Reactions     Amlodipine      Edema at 10 mg dose     Contrast Dye      Indomethacin GI Disturbance     Iodine      Iodine in IV contrast dye     Levaquin [Levofloxacin]      Gout flare in 2011     Lisinopril      cough     Losartan      Penile rash     Seasonal Allergies        Social history:   Social History     Tobacco Use     Smoking status: Never Smoker     Smokeless tobacco: Never Used   Substance Use Topics     Alcohol use: Yes     Alcohol/week: 0.0 standard drinks     Comment: 1-5 drinks per month     Marital status: .    ROS:  A complete review of systems was performed with the patient and all systems negative except as per HPI.    Physical Examination:  Exam was chaperoned by Jose Carlos  "Angie.  /85 (BP Location: Left arm, Patient Position: Sitting, Cuff Size: Adult Large)   Pulse 89   Temp 98.6  F (37  C) (Oral)   Ht 6' 2\"   Wt 316 lb 9.6 oz   SpO2 98%   BMI 40.65 kg/m    General: Well hydrated. No acute distress.  Abdomen: Soft, NT, ND. No inguinal adenopathy palpated.  Perianal external examination:  Perianal skin: intact.  Lesions: No.  Eversion of buttocks: There was not evidence of an anal fissure. Details: N/A.  Skin tags or external hemorrhoids: No.  Digital rectal examination: Was performed.   Sphincter tone: Good.  Palpable lesions: No.  Other: None.  Bimanual examination: was not performed.    Anoscopy: Was performed.   Hemorrhoids: Yes. Mild grade 1 right posterior and left lateral hemorrhoids. Right anterior grade 1, sequelae of thrombosed internal hemorrhoid present. No inflammation or erythema.  Lesions: none    Procedures:  none.    Laboratory values reviewed:  Recent Labs   Lab Test 09/17/20  2014 12/10/19  0810 06/10/19  0833 06/23/17  0000 06/23/17   WBC  --  6.2  --    < >  --    HGB  --  15.3  --    < >  --    PLT  --  196  --    < >  --    CR 1.07 1.12 1.11   < >  --    ALBUMIN  --   --  3.8  --   --    BILITOTAL  --   --  0.5  --   --    ALKPHOS  --   --  77  --   --    ALT  --   --  37  --   --    AST  --   --  23  --   --    INR  --   --   --   --  2.4*    < > = values in this interval not displayed.       Imaging personally reviewed by me:  none    ASSESSMENT  1. Grade 1 left lateral and right posterior hemorrhoid, small thrombosed right anterior hemorrhoid.    PLAN  1. Fiber supplement such as Metamucil, Citrucel, or Benefiber once to twice a day as discussed.  2. MiraLax or Milk of Magnesia if still constipated  3. Drink 10 glasses of water a day  4. Tylenol, ibuprofen, and warm tub baths/sitz baths for pain  5. Follow up in 8 weeks or as needed. Follow up sooner if symptoms do not improve after 4 weeks.      Time spent: 30 minutes.  >50% spent in " discussing, counseling and coordinating care.    Emre Horvath M.D    Division of Colon and Rectal Surgery  [facility]    Referring Provider:  Samir De La O MD  [address] [place] [id number]     Primary Care Provider:  Samir De La O

## 2020-10-14 ENCOUNTER — OFFICE VISIT (OUTPATIENT)
Dept: SURGERY | Facility: CLINIC | Age: 45
End: 2020-10-14
Attending: INTERNAL MEDICINE
Payer: COMMERCIAL

## 2020-10-14 VITALS
HEART RATE: 89 BPM | SYSTOLIC BLOOD PRESSURE: 128 MMHG | TEMPERATURE: 98.6 F | BODY MASS INDEX: 40.43 KG/M2 | DIASTOLIC BLOOD PRESSURE: 85 MMHG | OXYGEN SATURATION: 98 % | WEIGHT: 315 LBS | HEIGHT: 74 IN

## 2020-10-14 DIAGNOSIS — K64.5 HEMORRHOIDS, INTERNAL, THROMBOSED: ICD-10-CM

## 2020-10-14 DIAGNOSIS — K64.0 GRADE I INTERNAL HEMORRHOIDS: Primary | ICD-10-CM

## 2020-10-14 PROCEDURE — 99203 OFFICE O/P NEW LOW 30 MIN: CPT | Performed by: SURGERY

## 2020-10-14 ASSESSMENT — PAIN SCALES - GENERAL: PAINLEVEL: MILD PAIN (2)

## 2020-10-14 ASSESSMENT — MIFFLIN-ST. JEOR: SCORE: 2380.84

## 2020-10-14 NOTE — NURSING NOTE
"Chief Complaint   Patient presents with     Consult     Anal pain.       Vitals:    10/14/20 1104   BP: 128/85   BP Location: Left arm   Patient Position: Sitting   Cuff Size: Adult Large   Pulse: 89   Temp: 98.6  F (37  C)   TempSrc: Oral   SpO2: 98%   Weight: 316 lb 9.6 oz   Height: 6' 2\"       Body mass index is 40.65 kg/m .      Jose Carlos Downey EMT                      "

## 2020-10-14 NOTE — LETTER
10/14/2020       RE: Abhijit Garcia  [address] [address] [facility] [id number]     Dear Colleague,    Thank you for referring your patient, Abhijit Garcia, to the [facility] COLON AND RECTAL SURGERY CLINIC [place] at [facility]. Please see a copy of my visit note below.    Colon and Rectal Surgery Clinic Note    RE: Abhijit Garcia.  : 1975.  PJ: 10/14/2020.    Reason for visit: anal pain with palpated nodule    HPI: 44 yo M presenting with a 4 month hx of anal pain, at the opening as he describes. The pain is sharp. BMs at times do make it worse. Does intermittently have some bright blood on wiping, ongoing every other day. Has tried preparation H, helped some. Has felt a hard nodule before in anus. Has at least 1 BM per day. Does intermittently use metamucil. Stools most days are soft, recently more firm requiring straining.     He had a colonoscopy in 2018:  Impression:       - One 5 mm polyp (hyperplastic on pathology) in the sigmoid colon, removed with                             a cold snare. Resected and retrieved.                             - Diverticulosis in the sigmoid colon.                             - The examination was otherwise normal on direct                             and retroflexion views.     Medical history:  Past Medical History:   Diagnosis Date     Arthritis      Dysuria      Hyperplastic colon polyp 2018     Hypertension        Surgical history:  Past Surgical History:   Procedure Laterality Date     ANKLE SURGERY       APPENDECTOMY       BIOPSY      biopsy from foreskin     COLONOSCOPY  2018    Dr. Dc [facility]     COLONOSCOPY N/A 2018    Procedure: COMBINED COLONOSCOPY THRU STOMA, BIOPSY BY SNARE using exacto snare;  Surgeon: Shamar Dc MD;  Location:  GI     ESOPHAGOSCOPY, GASTROSCOPY, DUODENOSCOPY (EGD), COMBINED      for stomach pain done in [place]-acid reflux     ORTHOPEDIC  "SURGERY  03/23/2017    ankle left -repair of tendons     wisdom teeth         Family history:  Family History   Problem Relation Age of Onset     Diabetes Mother      Obesity Mother      Cancer - colorectal Mother      Hypertension Mother      Colon Cancer Mother         6\" of bowel removed in 2011     Arthritis Father      Respiratory Father         asbestos exposure     Chronic Obstructive Pulmonary Disease Father      Coronary Artery Disease Father 71        Father b - 1933  d - 2004     Skin Cancer Father      Other Cancer Father         Skin cancer on face and ears     Allergies Brother      Respiratory Brother      Heart Defect Son      Asthma Brother         Primarily affected him as a child.   Mother with Colon cancer in her 70s, no IBD.    Medications:  Current Outpatient Medications   Medication Sig Dispense Refill     allopurinol (ZYLOPRIM) 100 MG tablet TAKE 1 TABLET(100 MG) BY MOUTH DAILY 90 tablet 3     Colchicine (MITIGARE) 0.6 MG CAPS Take 0.6 mg by mouth 2 times daily as needed 60 capsule 1     imiquimod (ALDARA) 5 % external cream Apply a small sized amount to warts QHS at bedtime.   Wash off after 8 hours. 15 packet 3     irbesartan (AVAPRO) 300 MG tablet TAKE ONE TABLET BY MOUTH ONCE DAILY 90 tablet 2     ketoconazole (NIZORAL) 2 % cream Apply topically daily 60 g 2     Loratadine (CLARITIN PO)        meloxicam (MOBIC) 15 MG tablet TAKE 1 TABLET BY MOUTH DAILY AS NEEDED. 30 tablet 0     valACYclovir (VALTREX) 1000 mg tablet TAKE 2 TABLETS BY MOUTH TWICE DAILY FOR 1 DAY 8 tablet 3       Allergies:  Allergies   Allergen Reactions     Amlodipine      Edema at 10 mg dose     Contrast Dye      Indomethacin GI Disturbance     Iodine      Iodine in IV contrast dye     Levaquin [Levofloxacin]      Gout flare in 2011     Lisinopril      cough     Losartan      Penile rash     Seasonal Allergies        Social history:   Social History     Tobacco Use     Smoking status: Never Smoker     Smokeless tobacco: " "Never Used   Substance Use Topics     Alcohol use: Yes     Alcohol/week: 0.0 standard drinks     Comment: 1-5 drinks per month     Marital status: .    ROS:  A complete review of systems was performed with the patient and all systems negative except as per HPI.    Physical Examination:  Exam was chaperoned by Jose Carlos Adams.  /85 (BP Location: Left arm, Patient Position: Sitting, Cuff Size: Adult Large)   Pulse 89   Temp 98.6  F (37  C) (Oral)   Ht 6' 2\"   Wt 316 lb 9.6 oz   SpO2 98%   BMI 40.65 kg/m    General: Well hydrated. No acute distress.  Abdomen: Soft, NT, ND. No inguinal adenopathy palpated.  Perianal external examination:  Perianal skin: intact.  Lesions: No.  Eversion of buttocks: There was not evidence of an anal fissure. Details: N/A.  Skin tags or external hemorrhoids: No.  Digital rectal examination: Was performed.   Sphincter tone: Good.  Palpable lesions: No.  Other: None.  Bimanual examination: was not performed.    Anoscopy: Was performed.   Hemorrhoids: Yes. Mild grade 1 right posterior and left lateral hemorrhoids. Right anterior grade 1, sequelae of thrombosed internal hemorrhoid present. No inflammation or erythema.  Lesions: none    Procedures:  none.    Laboratory values reviewed:  Recent Labs   Lab Test 09/17/20  2014 12/10/19  0810 06/10/19  0833 06/23/17  0000 06/23/17   WBC  --  6.2  --    < >  --    HGB  --  15.3  --    < >  --    PLT  --  196  --    < >  --    CR 1.07 1.12 1.11   < >  --    ALBUMIN  --   --  3.8  --   --    BILITOTAL  --   --  0.5  --   --    ALKPHOS  --   --  77  --   --    ALT  --   --  37  --   --    AST  --   --  23  --   --    INR  --   --   --   --  2.4*    < > = values in this interval not displayed.       Imaging personally reviewed by me:  none    ASSESSMENT  1. Grade 1 left lateral and right posterior hemorrhoid, small thrombosed right anterior hemorrhoid.    PLAN  1. Fiber supplement such as Metamucil, Citrucel, or Benefiber once to " twice a day as discussed.  2. MiraLax or Milk of Magnesia if still constipated  3. Drink 10 glasses of water a day  4. Tylenol, ibuprofen, and warm tub baths/sitz baths for pain  5. Follow up in 8 weeks or as needed. Follow up sooner if symptoms do not improve after 4 weeks.      Time spent: 30 minutes.  >50% spent in discussing, counseling and coordinating care.    Emre Horvath M.D    Division of Colon and Rectal Surgery  [facility]    Referring Provider:  Samir De La O MD  [address] [place] [id number]     Primary Care Provider:  Samir De La O      Again, thank you for allowing me to participate in the care of your patient.      Sincerely,    Emre Horvath MD, MD

## 2020-12-14 NOTE — PROGRESS NOTES
[facility] - PRIMARY CARE SKIN    CC: Lesion(s)  SUBJECTIVE:   Abhijit Garcia is a(n) 45 year old male who presents to clinic today for the following issue ( s) :    Issue One:  Lesion on the right side of the neck, swelled up few months ago .   Issue two : lesion on the mid forehead , 2-3 weeks   Issue three : lesion on the foreskin , 2 weeks  Issue four : skin on the nose comes off easily , this started since staph infection on the nose . This occurs sporadically.    Personal Medical History  Skin cancer: NO  Eczema Psoriasis Lupus   NO NO NO   Other: .    Family Medical History  Skin cancer: YES - father- nonmelanoma  Eczema Psoriasis Lupus   NO NO NO   Other:   .    Occupation:     Refer to electronic medical record (EMR) for past medical history and medications.  ROS: 14 point review of systems was negative except the symptoms listed above in the HPI.    OBJECTIVE:   GENERAL: healthy, alert and no distress.  HEENT: PERRL. Conjunctiva, sclera clear.  SKIN: [place] Skin Type - II.  Face, Trunk and Groin examined. The dermatoscope was used to help evaluate pigmented lesions.  Skin Pertinent Findings:  Nose- no suspicious changes, no gritty texture  Right base of neck-2 mm smooth, raised, light brown lesion  Foreskin- 1 mm raised, white lesion consistent with milia  Forehead- 2 mm erythematous, raised lesion ? Sebaceous hyperplasia vs pimple    ASSESSMENT:     Encounter Diagnoses   Name Primary?     Sebaceous hyperplasia of face Yes     Melanocytic nevus, unspecified location      Milia          PLAN:   Patient Instructions   Recheck if needed      TT: 20 minutes.  .

## 2020-12-15 ENCOUNTER — OFFICE VISIT (OUTPATIENT)
Dept: FAMILY MEDICINE | Facility: CLINIC | Age: 45
End: 2020-12-15
Payer: COMMERCIAL

## 2020-12-15 VITALS — SYSTOLIC BLOOD PRESSURE: 108 MMHG | DIASTOLIC BLOOD PRESSURE: 80 MMHG

## 2020-12-15 DIAGNOSIS — L72.0 MILIA: ICD-10-CM

## 2020-12-15 DIAGNOSIS — L73.8 SEBACEOUS HYPERPLASIA OF FACE: Primary | ICD-10-CM

## 2020-12-15 DIAGNOSIS — D22.9 MELANOCYTIC NEVUS, UNSPECIFIED LOCATION: ICD-10-CM

## 2020-12-15 PROCEDURE — 99213 OFFICE O/P EST LOW 20 MIN: CPT | Performed by: FAMILY MEDICINE

## 2020-12-15 NOTE — LETTER
12/15/2020         RE: Abhijit Garcia  [address] [address] [facility] [id number]        Dear Colleague,    Thank you for referring your patient, Abhijit Garcia, to the [facility]. Please see a copy of my visit note below.    [facility] - PRIMARY CARE SKIN    CC: Lesion(s)  SUBJECTIVE:   Abhijit Garcia is a(n) 45 year old male who presents to clinic today for the following issue ( s) :    Issue One:  Lesion on the right side of the neck, swelled up few months ago .   Issue two : lesion on the mid forehead , 2-3 weeks   Issue three : lesion on the foreskin , 2 weeks  Issue four : skin on the nose comes off easily , this started since staph infection on the nose . This occurs sporadically.    Personal Medical History  Skin cancer: NO  Eczema Psoriasis Lupus   NO NO NO   Other: .    Family Medical History  Skin cancer: YES - father- nonmelanoma  Eczema Psoriasis Lupus   NO NO NO   Other:   .    Occupation:     Refer to electronic medical record (EMR) for past medical history and medications.  ROS: 14 point review of systems was negative except the symptoms listed above in the HPI.    OBJECTIVE:   GENERAL: healthy, alert and no distress.  HEENT: PERRL. Conjunctiva, sclera clear.  SKIN: [place] Skin Type - II.  Face, Trunk and Groin examined. The dermatoscope was used to help evaluate pigmented lesions.  Skin Pertinent Findings:  Nose- no suspicious changes, no gritty texture  Right base of neck-2 mm smooth, raised, light brown lesion  Foreskin- 1 mm raised, white lesion consistent with milia  Forehead- 2 mm erythematous, raised lesion ? Sebaceous hyperplasia vs pimple    ASSESSMENT:     Encounter Diagnoses   Name Primary?     Sebaceous hyperplasia of face Yes     Melanocytic nevus, unspecified location      Milia          PLAN:   Patient Instructions   Recheck if needed      TT: 20 minutes.  .          Again, thank you for allowing me to  participate in the care of your patient.        Sincerely,        Clarissa Ponce MD

## 2021-02-02 ENCOUNTER — MYC MEDICAL ADVICE (OUTPATIENT)
Dept: INTERNAL MEDICINE | Facility: CLINIC | Age: 46
End: 2021-02-02

## 2021-02-12 DIAGNOSIS — I10 ESSENTIAL HYPERTENSION: ICD-10-CM

## 2021-02-12 RX ORDER — IRBESARTAN 300 MG/1
TABLET ORAL
Qty: 90 TABLET | Refills: 2 | Status: SHIPPED | OUTPATIENT
Start: 2021-02-12 | End: 2021-12-17

## 2021-02-12 RX ORDER — IRBESARTAN 300 MG/1
TABLET ORAL
Qty: 90 TABLET | Refills: 2 | Status: CANCELLED | OUTPATIENT
Start: 2021-02-12

## 2021-03-08 ENCOUNTER — MYC MEDICAL ADVICE (OUTPATIENT)
Dept: INTERNAL MEDICINE | Facility: CLINIC | Age: 46
End: 2021-03-08

## 2021-03-08 DIAGNOSIS — N42.81 PROSTATE PAIN: Primary | ICD-10-CM

## 2021-03-10 NOTE — TELEPHONE ENCOUNTER
PCP please see ADORt message with update regarding recurrent prostate pain. Would you like him to follow up with Urology? He may need a referral. Has not been referred since 2016    Andreea COHEN, RN, PHN

## 2021-04-10 ENCOUNTER — HEALTH MAINTENANCE LETTER (OUTPATIENT)
Age: 46
End: 2021-04-10

## 2021-05-21 NOTE — PROGRESS NOTES
HPI:   Abhijit Garcia is a 43 year old male who presents for evaluation of spot on left cheek   chief complaint  Location: left cheek    Condition present for:  recent.   Previous treatments include: none    Review Of Systems  Eyes: negative  Ears/Nose/Throat: negative  Respiratory: No shortness of breath, dyspnea on exertion, cough, or hemoptysis  Cardiovascular: negative  Gastrointestinal: negative  Genitourinary: negative  Musculoskeletal: negative  Neurologic: negative  Psychiatric: negative    This document serves as a record of the services and decisions personally performed and made by Shanthi Red, MS, PA-C. It was created on her behalf by Olimpia Bryson, a trained medical scribe. The creation of this document is based on the provider's statements to the medical scribe.  Olimpia Bryson 11:06 AM September 7, 2018    PHYSICAL EXAM:    /85  Pulse 66  SpO2 97%  Skin exam performed as follows: Type 2 skin. Mood appropriate  Alert and Oriented X 3. Well developed, well nourished in no distress.  General appearance: Normal  Head including face: Normal  Eyes: conjunctiva and lids: Normal  Mouth: Lips, teeth, gums: Normal  Neck: Normal  Chest-breast/axillae: Normal  Back: Normal  Spleen and liver: Normal  Cardiovascular: Exam of peripheral vascular system by observation for swelling, varicosities, edema: Normal  Genitalia: groin, buttocks: Normal  Extremities: digits/nails (clubbing): Normal  Eccrine and Apocrine glands: Normal  Right upper extremity: Normal  Left upper extremity: Normal  Right lower extremity: Normal  Left lower extremity: Normal  Skin: Scalp and body hair: See below    1. Flat topped small verrucous papules located on left jawline x 3     ASSESSMENT/PLAN:     1. Verruca plana on left jawline x 3 - advised. As bothersome to pt cryosurgery performed. Advised on blistering and post op care.         Follow-up: PRN  CC:   Scribed By: Olimpia Bryson,  Medical Scribe    The information in this document, created by the medical scribe for me, accurately reflects the services I personally performed and the decisions made by me. I have reviewed and approved this document for accuracy prior to leaving the patient care area.  September 7, 2018 11:11 AM    Shanthi Red MS, PA-C     Doxycycline Pregnancy And Lactation Text: This medication is Pregnancy Category D and not consider safe during pregnancy. It is also excreted in breast milk but is considered safe for shorter treatment courses.

## 2021-06-23 ENCOUNTER — VIRTUAL VISIT (OUTPATIENT)
Dept: UROLOGY | Facility: CLINIC | Age: 46
End: 2021-06-23
Attending: INTERNAL MEDICINE
Payer: COMMERCIAL

## 2021-06-23 DIAGNOSIS — G89.29 CHRONIC PELVIC PAIN IN MALE: ICD-10-CM

## 2021-06-23 DIAGNOSIS — N48.89 PENILE LUMP: ICD-10-CM

## 2021-06-23 DIAGNOSIS — R39.12 WEAK URINARY STREAM: Primary | ICD-10-CM

## 2021-06-23 DIAGNOSIS — R10.2 CHRONIC PELVIC PAIN IN MALE: ICD-10-CM

## 2021-06-23 DIAGNOSIS — N39.43 POST-VOID DRIBBLING: ICD-10-CM

## 2021-06-23 PROCEDURE — 99203 OFFICE O/P NEW LOW 30 MIN: CPT | Mod: 95 | Performed by: STUDENT IN AN ORGANIZED HEALTH CARE EDUCATION/TRAINING PROGRAM

## 2021-06-23 RX ORDER — ALFUZOSIN HYDROCHLORIDE 10 MG/1
10 TABLET, EXTENDED RELEASE ORAL DAILY
Qty: 30 TABLET | Refills: 3 | Status: SHIPPED | OUTPATIENT
Start: 2021-06-23 | End: 2021-06-24

## 2021-06-23 NOTE — LETTER
6/23/2021       RE: Abhijit Garcia  [address] [facility] [address]     Dear Colleague,    Thank you for referring your patient, Abhijit Garcia, to the [facility] [place] at [facility]. Please see a copy of my visit note below.    Virtual visit    villa    Patient at home    Start 10:51 am    End 11:08 am      [facility]  Main Office: [address]  [address]  [place] [address]       CHIEF COMPLAINT:  Pelvic pain    HISTORY:   45 yo with chronic pelvic pain    In early 2020 was examined by PCP for pelvic pain. Apparently was treated with course of antibiotics which improved. Now recurrent issues. Symptoms now are deep pain in the rectum, dull ache, varies in severity. Sometimes pain radiating into left testicle. Has now also had some issues with urination, feeling of incomplete emptying. Also post void dribbling, feels like has to manually squeeze base of penis to get the last few drops out. Also has problems with emptying after ejaculation. Had a few episodes in the past few months of pain after ejaculation. In the past month has found a small lump in the shaft of the penis.    In 2011 had a prostate infection in [place]. Had an infection after unprotected anal sex with his wife.    In 2012-13 he was seen at [facility] and underwent cystoscopy and apparently found no issues.  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  Was given doxycycline long term with improvement    In 2016 saw Francois for dysuria, rectal pressure and left scrotal discomfort. UA notably unremarkable. Was given a month course of Bactrim DS. Issues seemed to resolve after this course of abx. Patient lost to followup    Has not had sexual intercourse in the past 12-18 months, wife undergoing issues with low libido. Has never been diagnosed with gonorrhea/chlamydia.    No FH of prostate  "cancer      Denies gross hematuria    PAST MEDICAL HISTORY:   Past Medical History:   Diagnosis Date     Arthritis      Dysuria      Hyperplastic colon polyp 12/2018     Hypertension        PAST SURGICAL HISTORY:   Past Surgical History:   Procedure Laterality Date     ANKLE SURGERY       APPENDECTOMY       BIOPSY  2011    biopsy from foreskin     COLONOSCOPY  12/21/2018    Dr. Dc [facility]     COLONOSCOPY N/A 12/21/2018    Procedure: COMBINED COLONOSCOPY THRU STOMA, BIOPSY BY SNARE using exacto snare;  Surgeon: Shamar Dc MD;  Location:  GI     ESOPHAGOSCOPY, GASTROSCOPY, DUODENOSCOPY (EGD), COMBINED  2012    for stomach pain done in [place]-acid reflux     ORTHOPEDIC SURGERY  03/23/2017    ankle left -repair of tendons     wisdom teeth         FAMILY HISTORY:   Family History   Problem Relation Age of Onset     Diabetes Mother      Obesity Mother      Cancer - colorectal Mother      Hypertension Mother      Colon Cancer Mother         6\" of bowel removed in 2011     Arthritis Father      Respiratory Father         asbestos exposure     Chronic Obstructive Pulmonary Disease Father      Coronary Artery Disease Father 71        Father b - 1933  d - 2004     Skin Cancer Father      Other Cancer Father         Skin cancer on face and ears     Allergies Brother      Respiratory Brother      Heart Defect Son      Asthma Brother         Primarily affected him as a child.       SOCIAL HISTORY:   Social History     Tobacco Use     Smoking status: Never Smoker     Smokeless tobacco: Never Used   Substance Use Topics     Alcohol use: Yes     Alcohol/week: 0.0 standard drinks     Comment: 1-5 drinks per month          Allergies   Allergen Reactions     Amlodipine      Edema at 10 mg dose     Contrast Dye      Indomethacin GI Disturbance     Iodine      Iodine in IV contrast dye     Levaquin [Levofloxacin]      Gout flare in 2011     Lisinopril      cough     Losartan      Penile rash     Seasonal Allergies  "         Current Outpatient Medications:      allopurinol (ZYLOPRIM) 100 MG tablet, TAKE 1 TABLET(100 MG) BY MOUTH DAILY, Disp: 90 tablet, Rfl: 3     Colchicine (MITIGARE) 0.6 MG CAPS, Take 0.6 mg by mouth 2 times daily as needed, Disp: 60 capsule, Rfl: 1     imiquimod (ALDARA) 5 % external cream, Apply a small sized amount to warts QHS at bedtime.   Wash off after 8 hours., Disp: 15 packet, Rfl: 3     irbesartan (AVAPRO) 300 MG tablet, TAKE ONE TABLET BY MOUTH ONCE DAILY, Disp: 90 tablet, Rfl: 2     ketoconazole (NIZORAL) 2 % cream, Apply topically daily, Disp: 60 g, Rfl: 2     Loratadine (CLARITIN PO), , Disp: , Rfl:      meloxicam (MOBIC) 15 MG tablet, TAKE 1 TABLET BY MOUTH DAILY AS NEEDED., Disp: 30 tablet, Rfl: 0     valACYclovir (VALTREX) 1000 mg tablet, TAKE 2 TABLETS BY MOUTH TWICE DAILY FOR 1 DAY, Disp: 8 tablet, Rfl: 3    Review Of Systems:  Skin: No rash, pruritis, or skin pigmentation  Eyes: No changes in vision  Ears/Nose/Throat: No changes in hearing, no nosebleeds  Respiratory: No shortness of breath, dyspnea on exertion, cough, or hemoptysis  Cardiovascular: No chest pain or palpitations  Gastrointestinal: No diarrhea or constipation. No abdominal pain. No hematochezia  Genitourinary: see HPI  Musculoskeletal: No pain or swelling of joints, normal range of motion  Neurologic: No weakness or tremors  Psychiatric: No recent changes in memory or mood  Hematologic/Lymphatic/Immunologic: No easy bruising or enlarged lymph nodes  Endocrine: No weight gain or loss      PHYSICAL EXAM:    There were no vitals taken for this visit. due to virtual visit  General appearance: In NAD, conversant  HEENT: Normocephalic and atraumatic, anicteric sclera  Cardiovascular: Not examined  Respiratory: normal, non-labored breathing  Musculoskeletal: Not Examined  Peripheral Vascular/extremity: No peripheral edema  Skin: Normal temperature, turgor, and texture. No rash  Psychiatric: Appropriate affect, alert and oriented to  person, place, and time    Cystoscopy: Not done    UA RESULTS:  Recent Labs   Lab Test 02/18/20  1530   COLOR Yellow   APPEARANCE Clear   URINEGLC Negative   URINEBILI Negative   URINEKETONE Negative   SG 1.010   UBLD Negative   URINEPH 7.0   PROTEIN Negative   UROBILINOGEN 0.2   NITRITE Negative   LEUKEST Negative   RBCU O - 2   WBCU 0 - 5       Bladder Scan:     Other Labs:      Imaging Studies: None      CLINICAL IMPRESSION:   47 yo with chronic pelvic pain, lower urinary tract symptoms including feeling of incomplete emptying, post void dribbling. Has had prior urologic workups which were unremarkable apparently. Only thing that has seemed to help is abx. Will need to make sure he is emptying his bladder with a post void residual. Will also repeat the cystoscopic workup to rule out obstructive uropathy. New penile lump. Needs physical exam. Will check PSA, if elevated then prostatitis is higher on the differential. Empirically will start alpha blocker to see if this helps with symptoms. May need prostate imaging (TRUS or MRI) if symptoms continue      PLAN:   Nurse visit for UA, reflex urine culture, PVR, possible catheterization teaching vs. Indwelling Hodge placement  Check PSA  Start alfuzosin  Return for cystoscopy, physical exam    Dave Nunes MD   [facility]  [phone] clinic phone        Again, thank you for allowing me to participate in the care of your patient.      Sincerely,    Dave Nunes MD

## 2021-06-23 NOTE — Clinical Note
Nurse visit for UA, reflex urine culture, PVR, possible catheterization teaching    Check PSA    Return for cystoscopy non urgent

## 2021-06-23 NOTE — PROGRESS NOTES
Virtual visit    villa    Patient at home    Start 10:51 am    End 11:08 am      [facility]  Main Office: [address]  [address]  [place] [address]       CHIEF COMPLAINT:  Pelvic pain    HISTORY:   47 yo with chronic pelvic pain    In early 2020 was examined by PCP for pelvic pain. Apparently was treated with course of antibiotics which improved. Now recurrent issues. Symptoms now are deep pain in the rectum, dull ache, varies in severity. Sometimes pain radiating into left testicle. Has now also had some issues with urination, feeling of incomplete emptying. Also post void dribbling, feels like has to manually squeeze base of penis to get the last few drops out. Also has problems with emptying after ejaculation. Had a few episodes in the past few months of pain after ejaculation. In the past month has found a small lump in the shaft of the penis.    In 2011 had a prostate infection in [place]. Had an infection after unprotected anal sex with his wife.    In 2012-13 he was seen at [facility] and underwent cystoscopy and apparently found no issues.  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  Was given doxycycline long term with improvement    In 2016 saw Francois for dysuria, rectal pressure and left scrotal discomfort. UA notably unremarkable. Was given a month course of Bactrim DS. Issues seemed to resolve after this course of abx. Patient lost to followup    Has not had sexual intercourse in the past 12-18 months, wife undergoing issues with low libido. Has never been diagnosed with gonorrhea/chlamydia.    No FH of prostate cancer      Denies gross hematuria    PAST MEDICAL HISTORY:   Past Medical History:   Diagnosis Date     Arthritis      Dysuria      Hyperplastic colon polyp 12/2018     Hypertension        PAST SURGICAL HISTORY:   Past Surgical History:   Procedure Laterality Date     ANKLE SURGERY       APPENDECTOMY       BIOPSY  2011    biopsy  "from foreskin     COLONOSCOPY  12/21/2018    Dr. cD [facility]     COLONOSCOPY N/A 12/21/2018    Procedure: COMBINED COLONOSCOPY THRU STOMA, BIOPSY BY SNARE using exacto snare;  Surgeon: Shamar Dc MD;  Location:  GI     ESOPHAGOSCOPY, GASTROSCOPY, DUODENOSCOPY (EGD), COMBINED  2012    for stomach pain done in [place]-acid reflux     ORTHOPEDIC SURGERY  03/23/2017    ankle left -repair of tendons     wisdom teeth         FAMILY HISTORY:   Family History   Problem Relation Age of Onset     Diabetes Mother      Obesity Mother      Cancer - colorectal Mother      Hypertension Mother      Colon Cancer Mother         6\" of bowel removed in 2011     Arthritis Father      Respiratory Father         asbestos exposure     Chronic Obstructive Pulmonary Disease Father      Coronary Artery Disease Father 71        Father b - 1933  d - 2004     Skin Cancer Father      Other Cancer Father         Skin cancer on face and ears     Allergies Brother      Respiratory Brother      Heart Defect Son      Asthma Brother         Primarily affected him as a child.       SOCIAL HISTORY:   Social History     Tobacco Use     Smoking status: Never Smoker     Smokeless tobacco: Never Used   Substance Use Topics     Alcohol use: Yes     Alcohol/week: 0.0 standard drinks     Comment: 1-5 drinks per month          Allergies   Allergen Reactions     Amlodipine      Edema at 10 mg dose     Contrast Dye      Indomethacin GI Disturbance     Iodine      Iodine in IV contrast dye     Levaquin [Levofloxacin]      Gout flare in 2011     Lisinopril      cough     Losartan      Penile rash     Seasonal Allergies          Current Outpatient Medications:      allopurinol (ZYLOPRIM) 100 MG tablet, TAKE 1 TABLET(100 MG) BY MOUTH DAILY, Disp: 90 tablet, Rfl: 3     Colchicine (MITIGARE) 0.6 MG CAPS, Take 0.6 mg by mouth 2 times daily as needed, Disp: 60 capsule, Rfl: 1     imiquimod (ALDARA) 5 % external cream, Apply a small sized amount to warts QHS at " bedtime.   Wash off after 8 hours., Disp: 15 packet, Rfl: 3     irbesartan (AVAPRO) 300 MG tablet, TAKE ONE TABLET BY MOUTH ONCE DAILY, Disp: 90 tablet, Rfl: 2     ketoconazole (NIZORAL) 2 % cream, Apply topically daily, Disp: 60 g, Rfl: 2     Loratadine (CLARITIN PO), , Disp: , Rfl:      meloxicam (MOBIC) 15 MG tablet, TAKE 1 TABLET BY MOUTH DAILY AS NEEDED., Disp: 30 tablet, Rfl: 0     valACYclovir (VALTREX) 1000 mg tablet, TAKE 2 TABLETS BY MOUTH TWICE DAILY FOR 1 DAY, Disp: 8 tablet, Rfl: 3    Review Of Systems:  Skin: No rash, pruritis, or skin pigmentation  Eyes: No changes in vision  Ears/Nose/Throat: No changes in hearing, no nosebleeds  Respiratory: No shortness of breath, dyspnea on exertion, cough, or hemoptysis  Cardiovascular: No chest pain or palpitations  Gastrointestinal: No diarrhea or constipation. No abdominal pain. No hematochezia  Genitourinary: see HPI  Musculoskeletal: No pain or swelling of joints, normal range of motion  Neurologic: No weakness or tremors  Psychiatric: No recent changes in memory or mood  Hematologic/Lymphatic/Immunologic: No easy bruising or enlarged lymph nodes  Endocrine: No weight gain or loss      PHYSICAL EXAM:    There were no vitals taken for this visit. due to virtual visit  General appearance: In NAD, conversant  HEENT: Normocephalic and atraumatic, anicteric sclera  Cardiovascular: Not examined  Respiratory: normal, non-labored breathing  Musculoskeletal: Not Examined  Peripheral Vascular/extremity: No peripheral edema  Skin: Normal temperature, turgor, and texture. No rash  Psychiatric: Appropriate affect, alert and oriented to person, place, and time    Cystoscopy: Not done    UA RESULTS:  Recent Labs   Lab Test 02/18/20  1530   COLOR Yellow   APPEARANCE Clear   URINEGLC Negative   URINEBILI Negative   URINEKETONE Negative   SG 1.010   UBLD Negative   URINEPH 7.0   PROTEIN Negative   UROBILINOGEN 0.2   NITRITE Negative   LEUKEST Negative   RBCU O - 2   WBCU 0 -  5       Bladder Scan:     Other Labs:      Imaging Studies: None      CLINICAL IMPRESSION:   45 yo with chronic pelvic pain, lower urinary tract symptoms including feeling of incomplete emptying, post void dribbling. Has had prior urologic workups which were unremarkable apparently. Only thing that has seemed to help is abx. Will need to make sure he is emptying his bladder with a post void residual. Will also repeat the cystoscopic workup to rule out obstructive uropathy. New penile lump. Needs physical exam. Will check PSA, if elevated then prostatitis is higher on the differential. Empirically will start alpha blocker to see if this helps with symptoms. May need prostate imaging (TRUS or MRI) if symptoms continue      PLAN:   Nurse visit for UA, reflex urine culture, PVR, possible catheterization teaching vs. Indwelling Hodge placement  Check PSA  Start alfuzosin  Return for cystoscopy, physical exam    Dave Nunes MD   [facility]  [phone] clinic phone

## 2021-06-24 DIAGNOSIS — R39.12 WEAK URINARY STREAM: ICD-10-CM

## 2021-06-24 RX ORDER — ALFUZOSIN HYDROCHLORIDE 10 MG/1
10 TABLET, EXTENDED RELEASE ORAL DAILY
Qty: 90 TABLET | Refills: 1 | Status: SHIPPED | OUTPATIENT
Start: 2021-06-24 | End: 2022-02-07

## 2021-07-05 ENCOUNTER — ALLIED HEALTH/NURSE VISIT (OUTPATIENT)
Dept: UROLOGY | Facility: CLINIC | Age: 46
End: 2021-07-05
Payer: COMMERCIAL

## 2021-07-05 DIAGNOSIS — N39.43 POST-VOID DRIBBLING: ICD-10-CM

## 2021-07-05 DIAGNOSIS — R39.12 WEAK URINARY STREAM: Primary | ICD-10-CM

## 2021-07-05 LAB — RESIDUAL VOLUME (RV) (EXTERNAL): 47

## 2021-07-05 PROCEDURE — 87086 URINE CULTURE/COLONY COUNT: CPT | Performed by: STUDENT IN AN ORGANIZED HEALTH CARE EDUCATION/TRAINING PROGRAM

## 2021-07-05 PROCEDURE — 84153 ASSAY OF PSA TOTAL: CPT | Performed by: STUDENT IN AN ORGANIZED HEALTH CARE EDUCATION/TRAINING PROGRAM

## 2021-07-05 PROCEDURE — 51798 US URINE CAPACITY MEASURE: CPT

## 2021-07-05 NOTE — PROGRESS NOTES
Abhijit Garcia comes into clinic today at the request of Dr Nunes Ordering Provider for PVR/Flow (uroflow)and UA/UC. PVR was 47 ml today        This service provided today was under the supervising provider of the day Chelle Burger, who was available if needed.    Madeleine Russell LPN

## 2021-07-06 LAB
BACTERIA SPEC CULT: NO GROWTH
Lab: NORMAL
PSA SERPL-MCNC: 2.4 NG/ML (ref 0–4)
SPECIMEN SOURCE: NORMAL

## 2021-07-12 ENCOUNTER — OFFICE VISIT (OUTPATIENT)
Dept: INTERNAL MEDICINE | Facility: CLINIC | Age: 46
End: 2021-07-12
Payer: COMMERCIAL

## 2021-07-12 VITALS
HEART RATE: 94 BPM | WEIGHT: 315 LBS | HEIGHT: 74 IN | BODY MASS INDEX: 40.43 KG/M2 | OXYGEN SATURATION: 96 % | SYSTOLIC BLOOD PRESSURE: 120 MMHG | TEMPERATURE: 97.3 F | DIASTOLIC BLOOD PRESSURE: 84 MMHG

## 2021-07-12 DIAGNOSIS — E66.01 MORBID OBESITY (H): ICD-10-CM

## 2021-07-12 DIAGNOSIS — R12 HEARTBURN: ICD-10-CM

## 2021-07-12 DIAGNOSIS — I10 ESSENTIAL HYPERTENSION: ICD-10-CM

## 2021-07-12 DIAGNOSIS — R07.89 ATYPICAL CHEST PAIN: Primary | ICD-10-CM

## 2021-07-12 DIAGNOSIS — Z13.220 LIPID SCREENING: ICD-10-CM

## 2021-07-12 DIAGNOSIS — M1A.09X0 CHRONIC GOUT OF MULTIPLE SITES, UNSPECIFIED CAUSE: ICD-10-CM

## 2021-07-12 LAB — TROPONIN I SERPL-MCNC: <0.015 UG/L (ref 0–0.04)

## 2021-07-12 PROCEDURE — 36415 COLL VENOUS BLD VENIPUNCTURE: CPT | Performed by: INTERNAL MEDICINE

## 2021-07-12 PROCEDURE — 84484 ASSAY OF TROPONIN QUANT: CPT | Performed by: INTERNAL MEDICINE

## 2021-07-12 PROCEDURE — 93000 ELECTROCARDIOGRAM COMPLETE: CPT | Performed by: INTERNAL MEDICINE

## 2021-07-12 PROCEDURE — 99214 OFFICE O/P EST MOD 30 MIN: CPT | Performed by: INTERNAL MEDICINE

## 2021-07-12 ASSESSMENT — ENCOUNTER SYMPTOMS
CHEST TIGHTNESS: 0
NAUSEA: 0
ABDOMINAL DISTENTION: 1
FATIGUE: 1
SHORTNESS OF BREATH: 0
COUGH: 0
HEARTBURN: 1
FEVER: 0

## 2021-07-12 ASSESSMENT — MIFFLIN-ST. JEOR: SCORE: 2393.07

## 2021-07-12 NOTE — PROGRESS NOTES
"  Assessment & Plan     Atypical chest pain  Heartburn  -Appears to be more GI in etiology.   - Troponin I  - omeprazole (PRILOSEC) 20 MG DR capsule; Take 1 capsule (20 mg) by mouth daily    Morbid obesity (H)  Weight loss    Chronic gout of multiple sites, unspecified cau  - **Basic metabolic panel FUTURE 14d; Future  - Uric acid; Future    Essential hypertension  - **Basic metabolic panel FUTURE 14d; Future    Lipid screening  - Lipid panel reflex to direct LDL Fasting; Future    Review of the result(s) of each unique test - labs  Ordering of each unique test  Prescription drug management         BMI:   Estimated body mass index is 41.14 kg/m  as calculated from the following:    Height as of this encounter: 1.88 m (6' 2\").    Weight as of this encounter: 145.3 kg (320 lb 6.4 oz).   Weight management plan: Discussed healthy diet and exercise guidelines    See Patient Instructions    Return in about 2 weeks (around 7/26/2021) for Preventative Visit, Pre-Visit Fasting Labs.    Eriberto Oh MD  [facility]    Ming Norman is a 46 year old who presents for the following health issues  accompanied by his spouse:    HPI     Chest Pain/bloating/gas  Onset/Duration: ongoing over the past 3 weeks, does feel like ties to stressful situation at work   Description:   Location: left side  Character: dull achy pain  Radiation: from left side over shoulder  Duration: 10 min - couple hours and intermittent   Intensity: moderate  Progression of Symptoms: same  Accompanying Signs & Symptoms:  Shortness of breath: no  Sweating: no  Nausea/vomiting: no  Lightheadedness: no  Palpitations: no  Fever/Chills: no  Cough: no           Heartburn: no  History:   Family history of heart disease: no  Tobacco use: no  Previous similar symptoms: YES- hx with similar sx since 2005  Precipitating factors:   Worse with exertion: no  Worse with deep breaths: YES           Related to eating: YES- at times it " "feels like eating triggers sx           Better with burping: YES- relief with burping    Therapies tried and outcome: tums, with maybe some improvement, but doesn't completely resolve        Review of Systems   Constitutional: Positive for fatigue. Negative for fever.   Respiratory: Negative for cough, chest tightness and shortness of breath.    Cardiovascular: Positive for chest pain.   Gastrointestinal: Positive for abdominal distention and heartburn. Negative for nausea.            Objective    BP (!) 136/94   Pulse 94   Temp 97.3  F (36.3  C) (Temporal)   Ht 1.88 m (6' 2\")   Wt 145.3 kg (320 lb 6.4 oz)   SpO2 96%   BMI 41.14 kg/m    Body mass index is 41.14 kg/m .  Physical Exam   GENERAL APPEARANCE: alert, no distress and over weight  HENT: nose and mouth without ulcers or lesions and normal cephalic/atraumatic  NECK: no adenopathy, no asymmetry, masses, or scars and thyroid normal to palpation  RESP: lungs clear to auscultation - no rales, rhonchi or wheezes  CV: regular rates and rhythm, normal S1 S2, no S3 or S4 and no murmur, click or rub  ABDOMEN: soft, nontender, without hepatosplenomegaly or masses and bowel sounds normal  MS: extremities normal- no gross deformities noted  SKIN: no suspicious lesions or rashes                  "

## 2021-08-04 DIAGNOSIS — R30.0 DYSURIA: Primary | ICD-10-CM

## 2021-08-05 ENCOUNTER — HOSPITAL ENCOUNTER (OUTPATIENT)
Dept: ULTRASOUND IMAGING | Facility: CLINIC | Age: 46
Discharge: HOME | End: 2021-08-05
Attending: STUDENT IN AN ORGANIZED HEALTH CARE EDUCATION/TRAINING PROGRAM | Admitting: STUDENT IN AN ORGANIZED HEALTH CARE EDUCATION/TRAINING PROGRAM
Payer: COMMERCIAL

## 2021-08-05 ENCOUNTER — OFFICE VISIT (OUTPATIENT)
Dept: UROLOGY | Facility: CLINIC | Age: 46
End: 2021-08-05
Payer: COMMERCIAL

## 2021-08-05 VITALS
HEIGHT: 73 IN | DIASTOLIC BLOOD PRESSURE: 82 MMHG | BODY MASS INDEX: 41.75 KG/M2 | SYSTOLIC BLOOD PRESSURE: 112 MMHG | WEIGHT: 315 LBS

## 2021-08-05 DIAGNOSIS — R10.2 CHRONIC PELVIC PAIN IN MALE: ICD-10-CM

## 2021-08-05 DIAGNOSIS — N50.82 SCROTAL PAIN: ICD-10-CM

## 2021-08-05 DIAGNOSIS — R10.2 CHRONIC PELVIC PAIN IN MALE: Primary | ICD-10-CM

## 2021-08-05 DIAGNOSIS — G89.29 CHRONIC PELVIC PAIN IN MALE: Primary | ICD-10-CM

## 2021-08-05 DIAGNOSIS — R39.12 WEAK URINARY STREAM: ICD-10-CM

## 2021-08-05 DIAGNOSIS — G89.29 CHRONIC PELVIC PAIN IN MALE: ICD-10-CM

## 2021-08-05 PROCEDURE — 52000 CYSTOURETHROSCOPY: CPT | Performed by: STUDENT IN AN ORGANIZED HEALTH CARE EDUCATION/TRAINING PROGRAM

## 2021-08-05 PROCEDURE — 99213 OFFICE O/P EST LOW 20 MIN: CPT | Mod: 25 | Performed by: STUDENT IN AN ORGANIZED HEALTH CARE EDUCATION/TRAINING PROGRAM

## 2021-08-05 PROCEDURE — 76870 US EXAM SCROTUM: CPT

## 2021-08-05 RX ORDER — LIDOCAINE HYDROCHLORIDE 20 MG/ML
JELLY TOPICAL ONCE
Status: COMPLETED | OUTPATIENT
Start: 2021-08-05 | End: 2021-08-05

## 2021-08-05 RX ADMIN — LIDOCAINE HYDROCHLORIDE: 20 JELLY TOPICAL at 08:32

## 2021-08-05 ASSESSMENT — PAIN SCALES - GENERAL: PAINLEVEL: MODERATE PAIN (4)

## 2021-08-05 ASSESSMENT — MIFFLIN-ST. JEOR: SCORE: 2375.39

## 2021-08-05 NOTE — NURSING NOTE
Prior to the start of the procedure and with procedural staff participation, I verbally confirmed the patient s identity using two indicators, relevant allergies, that the procedure was appropriate and matched the consent or emergent situation, and that the correct equipment/implants were available. Immediately prior to starting the procedure I conducted the Time Out with the procedural staff and re-confirmed the patient s name, procedure, and site/side. I have wiped the patient off with the povidone-Iodine solution, draped them,  used Lidocaine hydrochloride jelly, and instilled sterile water into the bladder. (The Joint Commission universal protocol was followed.)  Yes    Sedation (Moderate or Deep): None  5mL 2% lidocaine hydrochloride Urojet instilled into urethra.    NDC# [address]-0  Lot #: [id number]  Expiration Date:  1-23  Anita Singh LPN

## 2021-08-05 NOTE — LETTER
"8/5/2021       RE: Abhijit Garcia  [address] [facility] [address]     Dear Colleague,    Thank you for referring your patient, Abhijit Garcia, to the [facility] [place] at [facility]. Please see a copy of my visit note below.    CHIEF COMPLAINT   Abhijit Garcia who is a 46 year old male returns today for follow-up of chronic pelvic pain.      HPI   Abhijit Garcia is a 46 year old male who presents with a history of chronic pelvic pain, penile lump, scrotal pain.    Seen as virtual visit 6/23/2021 for chronic pelvic pain. Was recommended UA, PVR. Started on alfuzosin empirically. Urine culture was negative. PVR was low at 47 ml. PSA wnl at 2.40 ng/ml. alfuzosin did not seem to help his symptoms    Today has no pain. Has had intermittent pelvic pain and left testicular pain    PHYSICAL EXAM  Patient is a 46 year old  male   Vitals: Blood pressure 112/82, height 1.854 m (6' 1\"), weight 145.2 kg (320 lb).  Body mass index is 42.22 kg/m .  General Appearance Adult:   Alert, no acute distress, oriented  HENT: throat/mouth:normal, good dentition  Lungs: no respiratory distress, or pursed lip breathing  Heart: No obvious jugular venous distension present  Abdomen: soft, nontender, no organomegaly or masses  Musculoskeltal: extremities normal, no peripheral edema  Skin: no suspicious lesions or rashes  Neuro: Alert, oriented, speech and mentation normal  Psych: affect and mood normal  Gait: Normal  : uncirc phallus, unremarkable, no plaques or lumps palpable  Normal testes bilaterally, somewhat tight scrotum  LICO with 40 gr prostate, symmetric, soft, anodular, nontender. Normal to tight sphincter tone      PRE-PROCEDURE DIAGNOSIS: chronic pelvic pain    POST-PROCEDURE DIAGNOSIS: chronic pelvic pain    PROCEDURE: Cystoscopy    DESCRIPTION OF PROCEDURE: After informed consent was obtained, the patient was brought to the " procedure room where he was placed in the supine position with all pressure points well padded.  The penis was prepped and draped in sterile fashion. A flexible cystoscope was introduced through a well-lubricated urethra.     Anterior urethra normal    Prostate short, about 3 cm, without significant lobar hypertrophy, but somewhat tight appearing bladder neck (not PBNO per se)    The flexible cystoscope was removed and the findings were described to the patient.       ASSESSMENT and PLAN  46 year old male who presents with a history of chronic pelvic pain, penile lump, scrotal pain. No evidence of penile lump on exam. Intermittent scrotal pain but normal testes on exam. On cystoscopy, nonobstructive prostate but somewhat tight appearing bladder neck. Emptying well based on PVR and alfuzosin did not help. Benign LICO, normal PSA, no concern for prostate cancer. Will refer for pelvic floor physical therapy to see if this improves symptoms. Alfuzosin did not seem to help so will stop this. Will get scrotal ultrasound to see if there is any scrotal pathology causing pain. If scrotal ultrasound is unremarkable, will consider cross sectional imaging to look for a distal ureteral stone which could be causing referred pain (appears to have had consistently bland urinalysis which points away from that diagnosis however)    Can stop alfuzosin since not effective at relieving symptoms  Scrotal ultrasound  Referral for PFPT  RTC 3 months    Dave Nunes MD   [facility]  [facility] Phone: [phone]

## 2021-08-05 NOTE — PATIENT INSTRUCTIONS

## 2021-08-05 NOTE — PROGRESS NOTES
"CHIEF COMPLAINT   Abhijit Garcia who is a 46 year old male returns today for follow-up of chronic pelvic pain.      HPI   Abhijit Garcia is a 46 year old male who presents with a history of chronic pelvic pain, penile lump, scrotal pain.    Seen as virtual visit 6/23/2021 for chronic pelvic pain. Was recommended UA, PVR. Started on alfuzosin empirically. Urine culture was negative. PVR was low at 47 ml. PSA wnl at 2.40 ng/ml. alfuzosin did not seem to help his symptoms    Today has no pain. Has had intermittent pelvic pain and left testicular pain    PHYSICAL EXAM  Patient is a 46 year old  male   Vitals: Blood pressure 112/82, height 1.854 m (6' 1\"), weight 145.2 kg (320 lb).  Body mass index is 42.22 kg/m .  General Appearance Adult:   Alert, no acute distress, oriented  HENT: throat/mouth:normal, good dentition  Lungs: no respiratory distress, or pursed lip breathing  Heart: No obvious jugular venous distension present  Abdomen: soft, nontender, no organomegaly or masses  Musculoskeltal: extremities normal, no peripheral edema  Skin: no suspicious lesions or rashes  Neuro: Alert, oriented, speech and mentation normal  Psych: affect and mood normal  Gait: Normal  : uncirc phallus, unremarkable, no plaques or lumps palpable  Normal testes bilaterally, somewhat tight scrotum  LICO with 40 gr prostate, symmetric, soft, anodular, nontender. Normal to tight sphincter tone      PRE-PROCEDURE DIAGNOSIS: chronic pelvic pain    POST-PROCEDURE DIAGNOSIS: chronic pelvic pain    PROCEDURE: Cystoscopy    DESCRIPTION OF PROCEDURE: After informed consent was obtained, the patient was brought to the procedure room where he was placed in the supine position with all pressure points well padded.  The penis was prepped and draped in sterile fashion. A flexible cystoscope was introduced through a well-lubricated urethra.     Anterior urethra normal    Prostate short, about 3 cm, without significant lobar hypertrophy, but " somewhat tight appearing bladder neck (not PBNO per se)    The flexible cystoscope was removed and the findings were described to the patient.       ASSESSMENT and PLAN  46 year old male who presents with a history of chronic pelvic pain, penile lump, scrotal pain. No evidence of penile lump on exam. Intermittent scrotal pain but normal testes on exam. On cystoscopy, nonobstructive prostate but somewhat tight appearing bladder neck. Emptying well based on PVR and alfuzosin did not help. Benign LICO, normal PSA, no concern for prostate cancer. Will refer for pelvic floor physical therapy to see if this improves symptoms. Alfuzosin did not seem to help so will stop this. Will get scrotal ultrasound to see if there is any scrotal pathology causing pain. If scrotal ultrasound is unremarkable, will consider cross sectional imaging to look for a distal ureteral stone which could be causing referred pain (appears to have had consistently bland urinalysis which points away from that diagnosis however)    Can stop alfuzosin since not effective at relieving symptoms  Scrotal ultrasound  Referral for PFPT  RTC 3 months    Dave Nunes MD   [facility]  [facility] Phone: [phone]

## 2021-08-26 ENCOUNTER — THERAPY VISIT (OUTPATIENT)
Dept: PHYSICAL THERAPY | Facility: CLINIC | Age: 46
End: 2021-08-26
Attending: STUDENT IN AN ORGANIZED HEALTH CARE EDUCATION/TRAINING PROGRAM
Payer: COMMERCIAL

## 2021-08-26 DIAGNOSIS — R10.2 CHRONIC PELVIC PAIN IN MALE: ICD-10-CM

## 2021-08-26 DIAGNOSIS — G89.29 CHRONIC PELVIC PAIN IN MALE: ICD-10-CM

## 2021-08-26 PROCEDURE — 97112 NEUROMUSCULAR REEDUCATION: CPT | Mod: GP | Performed by: PHYSICAL THERAPIST

## 2021-08-26 PROCEDURE — 97110 THERAPEUTIC EXERCISES: CPT | Mod: GP | Performed by: PHYSICAL THERAPIST

## 2021-08-26 PROCEDURE — 97161 PT EVAL LOW COMPLEX 20 MIN: CPT | Mod: GP | Performed by: PHYSICAL THERAPIST

## 2021-08-26 NOTE — PROGRESS NOTES
Physical Therapy Initial Evaluation  Subjective:  History of rectal pain for over 2 years of unknown etiology.  Pt has had a history of constipation but feels that has not been a problem recently. Pt denies pain with bowel movements, urination, erections and ejaculation. Pt referred by MD for physical therapy on 8-5-21    The history is provided by the patient. No  was used.   Patient Health History  Abhijit Garcia being seen for Urology wanted me to try pelvic floor PT to address some pain I've been having..     Problem began: 1/1/2020.   Problem occurred: Root cause unknown. Has been occurring off and on for over 24 months   Pain is reported as 2/10 on pain scale.  General health as reported by patient is good.  Pertinent medical history includes: calf pain-swelling-warmth, chest pain, history of fractures, high blood pressure, numbness/tingling, overweight and pain at night/rest.     Medical allergies: none.   Surgeries include:  Orthopedic surgery.    Current medications:  High blood pressure medication and other. Other medications details: Gout medication and allergy medication.    Current occupation is .   Primary job tasks include:  Computer work, prolonged sitting and repetitive tasks.                  Therapist Generated HPI Evaluation         Type of problem:  Pelvic dysfunction (rectal pain).    This is a chronic condition.  Condition occurred with:  Insidious onset.  Where condition occurred: other.  Site of Pain:  rectal.  Pain is described as aching and is intermittent.  Pain is worse during the night.  Since onset symptoms are gradually improving.  Exacerbated by: sitting, stress, straining during a bowel movement, lying supine.  Relieved by: movement.      Restrictions due to condition include:  Working in normal job without restrictions.  Barriers include:  None as reported by patient.                        Objective:  System                                  Pelvic Dysfunction Evaluation:      Diagnostic Tests:  Diagnostic tests pelvic: see chart.                        Flexibility:    Tightness present at:Adductors; Iliopsoas; Hamstrings and Piriformis    Abdominal Wall:      Trigger Points:  Iliopsoas          External Assessment:        Bearing Down/Coughing:  Normal        Internal Assessment:      Contraction/Grade:  Fair squeeze, definite lift (3)          SEMG Biofeedback:    Equipment:  Biofeedback    Suraface electrode placement--Perianal:  Bilateral   Baseline EMG PM:  3 mV pt was able to decrease resting muscle tone to 1 mV with breathing and relaxation exercises                                 General     ROS    Assessment/Plan:    Patient is a 46 year old male with pelvic complaints.    Patient has the following significant findings with corresponding treatment plan.                Diagnosis 1:  Pelvic pain  Pain -  self management, education and home program  Decreased ROM/flexibility - therapeutic exercise, therapeutic activity, home program and manual therapy as indicated  Impaired muscle performance - biofeedback, neuro re-education and home program    Therapy Evaluation Codes:   1) History comprised of:   Personal factors that impact the plan of care:      None.    Comorbidity factors that impact the plan of care are:      Numbness/tingling, Overweight, Pain at night/rest and chest pain, calf pain swelling, history of fractures.     Medications impacting care: High blood pressure and gout and allergy medication.  2) Examination of Body Systems comprised of:   Body structures and functions that impact the plan of care:      Pelvis.   Activity limitations that impact the plan of care are:      Sitting, Laying down and stress.  3) Clinical presentation characteristics are:   Stable/Uncomplicated.  4) Decision-Making    Low complexity using standardized patient assessment instrument and/or measureable assessment of functional outcome.  Cumulative Therapy  Evaluation is: Low complexity.    Previous and current functional limitations:  (See Goal Flow Sheet for this information)    Short term and Long term goals: (See Goal Flow Sheet for this information)     Communication ability:  Patient appears to be able to clearly communicate and understand verbal and written communication and follow directions correctly.  Treatment Explanation - The following has been discussed with the patient:   RX ordered/plan of care  Anticipated outcomes  Possible risks and side effects  This patient would benefit from PT intervention to resume normal activities.   Rehab potential is good.    Frequency:  1 X week, once daily  Duration:  for 6 weeks  Discharge Plan:  Achieve all LTG.  Independent in home treatment program.  Reach maximal therapeutic benefit.    Please refer to the daily flowsheet for treatment today, total treatment time and time spent performing 1:1 timed codes.

## 2021-09-25 ENCOUNTER — HEALTH MAINTENANCE LETTER (OUTPATIENT)
Age: 46
End: 2021-09-25

## 2021-10-06 ENCOUNTER — THERAPY VISIT (OUTPATIENT)
Dept: PHYSICAL THERAPY | Facility: CLINIC | Age: 46
End: 2021-10-06
Payer: COMMERCIAL

## 2021-10-06 DIAGNOSIS — R10.2 CHRONIC PELVIC PAIN IN MALE: ICD-10-CM

## 2021-10-06 DIAGNOSIS — G89.29 CHRONIC PELVIC PAIN IN MALE: ICD-10-CM

## 2021-10-06 PROCEDURE — 97112 NEUROMUSCULAR REEDUCATION: CPT | Mod: GP | Performed by: PHYSICAL THERAPIST

## 2021-10-06 PROCEDURE — 97110 THERAPEUTIC EXERCISES: CPT | Mod: GP | Performed by: PHYSICAL THERAPIST

## 2021-10-06 NOTE — PROGRESS NOTES
Subjective:  HPI  Physical Exam                    Objective:  System    Physical Exam    General     ROS    Assessment/Plan:    SUBJECTIVE  Subjective changes as noted by pt:  Pt was absent from therapy secondary to work conflicts and onset of sciatic pain. Pt reports pelvic pain has decreased from initial visit.      Current pain level: 1/10     Changes in function:  Pt tolerating exercises well. Pt notes increased ease with sitting as long as he is using an ergonomic chair     Adverse reaction to treatment or activity:  None    OBJECTIVE  Changes in objective findings:  Pt was able to decrease resting pelvic muscle tone to 1.5 mV with breathing exercises. Hip abductor and pisiformis flexibility improved.         ASSESSMENT  Abhijit continues to require intervention to meet STG and LTG's: PT  Patient's symptoms are resolving.  Response to therapy has shown an improvement in  pain level and flexibility  Progress made towards STG/LTG?  Yes,     PLAN  Current treatment program is being advanced to more complex exercises.    PTA/ATC plan:  N/A    Please refer to the daily flowsheet for treatment today, total treatment time and time spent performing 1:1 timed codes.

## 2021-10-20 ENCOUNTER — THERAPY VISIT (OUTPATIENT)
Dept: PHYSICAL THERAPY | Facility: CLINIC | Age: 46
End: 2021-10-20
Payer: COMMERCIAL

## 2021-10-20 DIAGNOSIS — G89.29 CHRONIC PELVIC PAIN IN MALE: ICD-10-CM

## 2021-10-20 DIAGNOSIS — R10.2 CHRONIC PELVIC PAIN IN MALE: ICD-10-CM

## 2021-10-20 PROCEDURE — 97110 THERAPEUTIC EXERCISES: CPT | Mod: GP | Performed by: PHYSICAL THERAPIST

## 2021-10-20 PROCEDURE — 97112 NEUROMUSCULAR REEDUCATION: CPT | Mod: GP | Performed by: PHYSICAL THERAPIST

## 2021-10-20 NOTE — PROGRESS NOTES
Subjective:  HPI  Physical Exam                    Objective:  System    Physical Exam    General     ROS    Assessment/Plan:    DISCHARGE REPORT    Progress reporting period is from 8-26-21 to 10-20-21.       SUBJECTIVE  Subjective changes noted by patient:  Pt reports being pain free since last visit. .       Current pain level is 0/10  .     Previous pain level was  3/10  .   Changes in function:  Pt has had to discontinue piriformis stretches secondary to lumbar and knee pain but is tolerating hip adductor stretch well.   Adverse reaction to treatment or activity: None    OBJECTIVE  Changes noted in objective findings:  Pt demonstrates improved flexibility with hip adductors and piriformis muscles. Pt is able to decrease resting muscle tone of the pelvic floor muscles to 1 mV with guided relaxation exercises.         ASSESSMENT/PLAN  Updated problem list and treatment plan: Diagnosis 1:  Pelvic pain  Pain -  self management, education and home program  Impaired muscle performance - biofeedback, neuro re-education and home program  STG/LTGs have been met or progress has been made towards goals:  Yes,   Assessment of Progress: The patient's condition is improving.  Self Management Plans:  Patient has been instructed in a home treatment program.  I have re-evaluated this patient and find that the nature, scope, duration and intensity of the therapy is appropriate for the medical condition of the patient.  Abhijit continues to require the following intervention to meet STG and LTG's:  PT intervention is no longer required to meet STG/LTG.    Recommendations:  This patient is ready to be discharged from therapy and continue their home treatment program.    Please refer to the daily flowsheet for treatment today, total treatment time and time spent performing 1:1 timed codes.

## 2021-11-23 ENCOUNTER — LAB (OUTPATIENT)
Dept: LAB | Facility: CLINIC | Age: 46
End: 2021-11-23
Payer: COMMERCIAL

## 2021-11-23 DIAGNOSIS — R30.0 DYSURIA: ICD-10-CM

## 2021-11-23 DIAGNOSIS — Z13.220 LIPID SCREENING: ICD-10-CM

## 2021-11-23 DIAGNOSIS — G89.29 CHRONIC PELVIC PAIN IN MALE: ICD-10-CM

## 2021-11-23 DIAGNOSIS — R10.2 CHRONIC PELVIC PAIN IN MALE: ICD-10-CM

## 2021-11-23 DIAGNOSIS — I10 ESSENTIAL HYPERTENSION: ICD-10-CM

## 2021-11-23 DIAGNOSIS — M1A.09X0 CHRONIC GOUT OF MULTIPLE SITES, UNSPECIFIED CAUSE: ICD-10-CM

## 2021-11-23 LAB
ALBUMIN UR-MCNC: NEGATIVE MG/DL
ANION GAP SERPL CALCULATED.3IONS-SCNC: 4 MMOL/L (ref 3–14)
APPEARANCE UR: CLEAR
BILIRUB UR QL STRIP: NEGATIVE
BUN SERPL-MCNC: 13 MG/DL (ref 7–30)
CALCIUM SERPL-MCNC: 8.9 MG/DL (ref 8.5–10.1)
CHLORIDE BLD-SCNC: 105 MMOL/L (ref 94–109)
CHOLEST SERPL-MCNC: 220 MG/DL
CO2 SERPL-SCNC: 28 MMOL/L (ref 20–32)
COLOR UR AUTO: YELLOW
CREAT SERPL-MCNC: 1.08 MG/DL (ref 0.66–1.25)
FASTING STATUS PATIENT QL REPORTED: YES
GFR SERPL CREATININE-BSD FRML MDRD: 81 ML/MIN/1.73M2
GLUCOSE BLD-MCNC: 101 MG/DL (ref 70–99)
GLUCOSE UR STRIP-MCNC: NEGATIVE MG/DL
HDLC SERPL-MCNC: 37 MG/DL
HGB UR QL STRIP: NEGATIVE
KETONES UR STRIP-MCNC: NEGATIVE MG/DL
LDLC SERPL CALC-MCNC: 140 MG/DL
LEUKOCYTE ESTERASE UR QL STRIP: NEGATIVE
NITRATE UR QL: NEGATIVE
NONHDLC SERPL-MCNC: 183 MG/DL
PH UR STRIP: 6.5 [PH] (ref 5–7)
POTASSIUM BLD-SCNC: 4.4 MMOL/L (ref 3.4–5.3)
PSA SERPL-MCNC: 2.68 UG/L (ref 0–4)
SODIUM SERPL-SCNC: 137 MMOL/L (ref 133–144)
SP GR UR STRIP: 1.01 (ref 1–1.03)
TRIGL SERPL-MCNC: 214 MG/DL
URATE SERPL-MCNC: 7.2 MG/DL (ref 3.5–7.2)
UROBILINOGEN UR STRIP-ACNC: 0.2 E.U./DL

## 2021-11-23 PROCEDURE — 81003 URINALYSIS AUTO W/O SCOPE: CPT | Mod: QW

## 2021-11-23 PROCEDURE — 80048 BASIC METABOLIC PNL TOTAL CA: CPT

## 2021-11-23 PROCEDURE — 80061 LIPID PANEL: CPT

## 2021-11-23 PROCEDURE — 36415 COLL VENOUS BLD VENIPUNCTURE: CPT

## 2021-11-23 PROCEDURE — 84550 ASSAY OF BLOOD/URIC ACID: CPT

## 2021-11-23 PROCEDURE — 84153 ASSAY OF PSA TOTAL: CPT

## 2021-12-15 DIAGNOSIS — I10 ESSENTIAL HYPERTENSION: ICD-10-CM

## 2021-12-17 ENCOUNTER — MYC MEDICAL ADVICE (OUTPATIENT)
Dept: INTERNAL MEDICINE | Facility: CLINIC | Age: 46
End: 2021-12-17
Payer: COMMERCIAL

## 2021-12-17 DIAGNOSIS — B00.9 HERPES SIMPLEX VIRUS (HSV) INFECTION: ICD-10-CM

## 2021-12-17 RX ORDER — IRBESARTAN 300 MG/1
TABLET ORAL
Qty: 90 TABLET | Refills: 1 | Status: SHIPPED | OUTPATIENT
Start: 2021-12-17 | End: 2022-03-08

## 2021-12-17 NOTE — TELEPHONE ENCOUNTER
Allergy/Contraindication with Losartan.    Please refill as appropriate.  Thank you,    Cristal Griffin RN  [facility]

## 2022-01-26 DIAGNOSIS — B00.9 HERPES SIMPLEX VIRUS (HSV) INFECTION: ICD-10-CM

## 2022-01-26 RX ORDER — VALACYCLOVIR HYDROCHLORIDE 1 G/1
TABLET, FILM COATED ORAL
Qty: 8 TABLET | Refills: 3 | Status: CANCELLED | OUTPATIENT
Start: 2022-01-26

## 2022-01-26 RX ORDER — VALACYCLOVIR HYDROCHLORIDE 1 G/1
TABLET, FILM COATED ORAL
Qty: 8 TABLET | Refills: 0 | Status: SHIPPED | OUTPATIENT
Start: 2022-01-26 | End: 2022-10-13

## 2022-02-04 ENCOUNTER — MYC MEDICAL ADVICE (OUTPATIENT)
Dept: INTERNAL MEDICINE | Facility: CLINIC | Age: 47
End: 2022-02-04
Payer: COMMERCIAL

## 2022-02-07 ENCOUNTER — OFFICE VISIT (OUTPATIENT)
Dept: INTERNAL MEDICINE | Facility: CLINIC | Age: 47
End: 2022-02-07
Payer: COMMERCIAL

## 2022-02-07 VITALS
OXYGEN SATURATION: 97 % | TEMPERATURE: 97.5 F | DIASTOLIC BLOOD PRESSURE: 86 MMHG | HEART RATE: 83 BPM | BODY MASS INDEX: 41.75 KG/M2 | SYSTOLIC BLOOD PRESSURE: 124 MMHG | HEIGHT: 73 IN | WEIGHT: 315 LBS

## 2022-02-07 DIAGNOSIS — M54.41 ACUTE RIGHT-SIDED LOW BACK PAIN WITH RIGHT-SIDED SCIATICA: Primary | ICD-10-CM

## 2022-02-07 DIAGNOSIS — E66.01 MORBID OBESITY (H): ICD-10-CM

## 2022-02-07 PROCEDURE — 99214 OFFICE O/P EST MOD 30 MIN: CPT | Mod: 25 | Performed by: INTERNAL MEDICINE

## 2022-02-07 PROCEDURE — 90686 IIV4 VACC NO PRSV 0.5 ML IM: CPT | Performed by: INTERNAL MEDICINE

## 2022-02-07 PROCEDURE — 90471 IMMUNIZATION ADMIN: CPT | Performed by: INTERNAL MEDICINE

## 2022-02-07 ASSESSMENT — ANXIETY QUESTIONNAIRES
3. WORRYING TOO MUCH ABOUT DIFFERENT THINGS: SEVERAL DAYS
7. FEELING AFRAID AS IF SOMETHING AWFUL MIGHT HAPPEN: NOT AT ALL
GAD7 TOTAL SCORE: 3
IF YOU CHECKED OFF ANY PROBLEMS ON THIS QUESTIONNAIRE, HOW DIFFICULT HAVE THESE PROBLEMS MADE IT FOR YOU TO DO YOUR WORK, TAKE CARE OF THINGS AT HOME, OR GET ALONG WITH OTHER PEOPLE: NOT DIFFICULT AT ALL
1. FEELING NERVOUS, ANXIOUS, OR ON EDGE: SEVERAL DAYS
5. BEING SO RESTLESS THAT IT IS HARD TO SIT STILL: NOT AT ALL
6. BECOMING EASILY ANNOYED OR IRRITABLE: SEVERAL DAYS
2. NOT BEING ABLE TO STOP OR CONTROL WORRYING: NOT AT ALL

## 2022-02-07 ASSESSMENT — PATIENT HEALTH QUESTIONNAIRE - PHQ9
5. POOR APPETITE OR OVEREATING: NOT AT ALL
SUM OF ALL RESPONSES TO PHQ QUESTIONS 1-9: 4

## 2022-02-07 ASSESSMENT — MIFFLIN-ST. JEOR: SCORE: 2374.47

## 2022-02-07 NOTE — PROGRESS NOTES
"  Assessment & Plan     Acute right-sided low back pain with right-sided sciatica  mobic for a few weeks to see if this helps with more acute pain.  Physical therapy  Consider imaging /MRI in future if this doesn't improve sx.  - [facility]; Future    Morbid obesity (H)  Weight loss      Prescription drug management         BMI:   Estimated body mass index is 42.34 kg/m  as calculated from the following:    Height as of this encounter: 1.854 m (6' 1\").    Weight as of this encounter: 145.6 kg (320 lb 14.4 oz).   Weight management plan: Discussed healthy diet and exercise guidelines    See Patient Instructions    No follow-ups on file.    Eriberto Oh MD  [facility]    Ming Norman is a 47 year old who presents for the following health issues     HPI     Pain History:  When did you first notice your pain? - More than 6 weeks   Have you seen this provider for your pain in the past? No   Where in your body do your have pain? R hip, down into R calf  Are you seeing anyone else for your pain? No  What makes your pain better? PT exercises, tylenol, ibuprofen  What makes your pain worse? standing  How has pain affected your ability to work? Pain does not limit ability to work   What type of work do you or did you do?   Who lives in your household? Spouse, 2 kids    Better w/movement than standing or sitting.          Review of Systems         Objective    /86   Pulse 83   Temp 97.5  F (36.4  C) (Temporal)   Ht 1.854 m (6' 1\")   Wt 145.6 kg (320 lb 14.4 oz)   SpO2 97%   BMI 42.34 kg/m    Body mass index is 42.34 kg/m .  Physical Exam   GENERAL: alert, no distress and over weight  Comprehensive back pain exam:  No tenderness, Range of motion not limited by pain, Lower extremity strength functional and equal on both sides, Lower extremity reflexes within normal limits bilaterally, Lower extremity sensation normal and equal on both sides " and Straight leg positive on  right, indicating possible ipsilateral radiculopathy

## 2022-02-07 NOTE — PATIENT INSTRUCTIONS
When You Have Low Back Pain    Caring for Your Back  You are not alone.    Low back pain is very common. Nearly half of all adults have low back pain in any given year. The good news is that back pain is rarely a danger to your health. Most people can manage their back pain on their own and about half of them start feeling better within 2 weeks. In 9 out of 10 cases, low back pain goes away or no longer limits daily activity within 6 weeks.     Your outlook is good!    Your symptoms tell us that your low back pain is most likely not a danger to you. Most of the time we do not know the exact cause of low back pain, even if you see a doctor or have an MRI. However, treatment can still work without knowing the cause of the pain. Less than 1 in 100 people need surgery for their back pain.     What can I do about my low back pain?     There are three things you can do to ease low back pain and help it go away.    Use heat or cold packs.    Take medicine as directed.    Use positions, movements and exercises.     Using heat or cold packs    Try cold packs or gentle heat to ease your pain. Use whichever gives you the most relief. Apply the cold pack or heat for 15 minutes at a time, as often as needed.    Taking medicine      If your doctor has prescribed medicine, be sure to follow the directions.    If you take over-the-counter medicine, read and follow the directions.    Talk to your doctor if you have any questions.     Using positions, movements and exercises    Research tells us that moving your joints and muscles can help you recover from back pain. Such activity should be simple and gentle. Use the positions in the photos as well as walking to help relieve your pain. Try taking a short walk every 3 to 4 hours during the day. Walk for a few minutes inside your home or take longer walks outside, on a treadmill or at a mall. Slowly increase the amount of time you walk. Expect discomfort when you begin, but it should  lessen as your back starts to heal. When your back feels better, walk daily to keep your back and body healthy.    Finding a comfortable position    When your back pain is new, certain positions will ease your pain. Gently try each of the positions below until you find one that is helpful. Once you find a position of comfort, use it as often as you like when you are resting. You will recover faster if you combine rest with activity.         Lie on your back with your legs bent. You can do this by placing a pillow under your knees. Or you may lie on the floor and rest your lower legs on the seat of a chair.       Lie on your side with your knees bent, and place a pillow between your knees.       Lie on your stomach over pillows.      When should I call my doctor?    Your back pain should improve over the first couple of weeks. As it improves, you should be able to return to your normal activities. But call your doctor if:    You have a sudden change in your ability to control your bladder or bowels.    You feel tingling in your groin or legs.    The pain spreads down your leg and into your foot.    Your toes, feet or leg muscles feel weak.    You feel generally unwell or sick.    Your pain does not get better or gets worse.      If you are deaf or hard of hearing, please let us know. We provide many free services including sign language interpreters,oral interpreters, TTYs, telephone amplifiers, note takers and written materials.    For informational purposes only. Not to replace the advice of your health care provider. Copyright   2013 [facility]. All rights reserved. [employer] 574394 - Rev 06/14.

## 2022-02-08 ASSESSMENT — ANXIETY QUESTIONNAIRES: GAD7 TOTAL SCORE: 3

## 2022-02-09 ENCOUNTER — THERAPY VISIT (OUTPATIENT)
Dept: PHYSICAL THERAPY | Facility: CLINIC | Age: 47
End: 2022-02-09
Attending: INTERNAL MEDICINE
Payer: COMMERCIAL

## 2022-02-09 DIAGNOSIS — M54.16 LUMBAR RADICULOPATHY: ICD-10-CM

## 2022-02-09 DIAGNOSIS — M54.41 ACUTE RIGHT-SIDED LOW BACK PAIN WITH RIGHT-SIDED SCIATICA: ICD-10-CM

## 2022-02-09 PROCEDURE — 97110 THERAPEUTIC EXERCISES: CPT | Mod: GP | Performed by: PHYSICAL THERAPIST

## 2022-02-09 PROCEDURE — 97161 PT EVAL LOW COMPLEX 20 MIN: CPT | Mod: GP | Performed by: PHYSICAL THERAPIST

## 2022-02-09 PROCEDURE — 97530 THERAPEUTIC ACTIVITIES: CPT | Mod: GP | Performed by: PHYSICAL THERAPIST

## 2022-02-09 NOTE — PROGRESS NOTES
[facility] Initial Evaluation -- Lumbar    Date: February 9, 2022  Abhijit Garcia is a 47 year old male with a lumbar condition.   Referral: Dr Oh  Work mechanical stresses:   - sitting, computer work  Employment status:  Full time  Leisure mechanical stresses: housework, cooking  Functional disability score (LAKEISHA/STarT Back):  See flowsheet  VAS score (0-10): 1/10, worst = 8/10  Patient goals/expectations:  decrease pain, return to prior level of function    HISTORY:    Present symptoms: low right back/hip, right calf for the past 4-5 months  Pain quality (sharp/shooting/stabbing/aching/burning/cramping):  Burning, sharp, dull/achy   Paresthesia (yes/no):  no    Present since (onset date): 4-5 months.     Symptoms (improving/unchanging/worsening):  Worsening for first 4 months, started doing regular exercises improved now it is unchanging.     Symptoms commenced as a result of: insidious onset   Condition occurred in the following environment:     Symptoms at onset (back/thigh/leg): low right side back, right calf  Constant symptoms (back/thigh/leg):   Intermittent symptoms (back/thigh/leg): back, hip, calf    Symptoms are made worse with the following: sleeping, standing, sitting  Symptoms are made better with the following: heat, bent over standing when it gets more intense    Disturbed sleep (yes/no):  yes    Year of first episode: mid 80's, multiple episodes since    Previous history: chronic history  Previous treatments: PT      Specific Questions:   Answers for HPI/ROS submitted by the patient on 2/9/2022  Reason for Visit:: Pain in right lower back, right hip, and right calf  How problem occurred:: Long-term issue dating back to mid 1980s. Current pain started over 4 months ago and has gotten progressively worse. PT stretches for last 2-3 weeks are helping but pain remains.  Number scale: 5/10  General health as reported by patient: good  Please check all that  apply to your current or past medical history: calf pain-swelling-warmth, chest pain, concussions/dizziness, history of fractures, high blood pressure, overweight, osteoarthritis, pain at night/rest  Medical allergies: other  Other Allergies Detail: Iodine when used as contrast in imaging gave me hives  Surgeries: orthopedic surgery, other  Other Surgery Detail: appendectomy in 1994  Medications you are currently taking: anti-inflammatory, high blood pressure medication, other  Other Meds Detail: gout medication  Occupation:: System and   What are your primary job tasks: computer work, prolonged sitting, repetitive tasks    Cough/Sneeze/Strain (pos/neg): pos - when flared up  Bowel/Bladder (normal/abnormal): normal  Gait (normal/abnormal): abnormal - when flared up  Night pain (yes/no): yes  Accidents (yes/no): no  Unexplained weight loss (yes/no): no  Barriers at home: none  Other red flags: none to note    EXAMINATION    Posture:   Sitting (good/fair/poor): Fair  Standing (good/fair/poor):Fair  Correction of posture (better/worse/no effect): worse     Lateral Shift (right/left/nil): nil  Relevant (yes/no):  Yes - lateral component  Other Observations:     Neurological:    Motor deficit:  decr L5, S1 myotomes on R   Dural signs:  Positive slump R    Movement Loss:   [place] Mod Min Nil Pain   Flexion   x  Pulling in bilateral low back   Extension  x      Side Gliding R  x   Pain in L hip   Side Gliding L  x   Pain in R calf and in L hip     Test Movements:   During: produces, abolishes, increases, decreases, no effect, centralizing, peripheralizing   After: better, worse, no better, no worse, no effect, centralized, peripheralized    Static Tests:  Prone lying: incr back, hip calf/worse/NE ROM  Prone lying with hips off center: decr calf, hip, R low back/NB/incr power in R leg      Other Tests:    Provisional Classification:  Derangement - Asymmetrical, unilateral, symptoms below knee with relevant  lateral component     Principle of Management:  Education:  Posture education - when to use the lumbar roll and when not to, activity modification, centralization principle      Mechanical therapy (Y/N):  Y   Extension principle:  Prone lying with hips off center (to the left) 5-8 mins every 2 hours            ASSESSMENT/PLAN:    Patient is a 47 year old male with lumbar complaints.    Patient has the following significant findings with corresponding treatment plan.                Diagnosis 1:  Lumbar radiculopathy  Pain -  hot/cold therapy and home program  Decreased ROM/flexibility - manual therapy and therapeutic exercise  Decreased joint mobility - manual therapy and therapeutic exercise  Decreased strength - therapeutic exercise and therapeutic activities  Impaired gait - gait training  Impaired muscle performance - neuro re-education  Decreased function - therapeutic activities  Impaired posture - neuro re-education    Therapy Evaluation Codes:   1) History comprised of:   Personal factors that impact the plan of care:      Time since onset of symptoms.    Comorbidity factors that impact the plan of care are:      Chest pain, Concussion, High blood pressure, Osteoarthritis, Overweight and Pain at night/rest.     Medications impacting care: Anti-inflammatory and High blood pressure.  2) Examination of Body Systems comprised of:   Body structures and functions that impact the plan of care:      Lumbar spine.   Activity limitations that impact the plan of care are:      Cooking, Sitting, Standing, Walking and Sleeping.  3) Clinical presentation characteristics are:   Stable/Uncomplicated.  4) Decision-Making    Low complexity using standardized patient assessment instrument and/or measureable assessment of functional outcome.  Cumulative Therapy Evaluation is: Low complexity.    Previous and current functional limitations:  (See Goal Flow Sheet for this information)    Short term and Long term goals: (See Goal  Flow Sheet for this information)     Communication ability:  Patient appears to be able to clearly communicate and understand verbal and written communication and follow directions correctly.  Treatment Explanation - The following has been discussed with the patient:   RX ordered/plan of care  Anticipated outcomes  Possible risks and side effects  This patient would benefit from PT intervention to resume normal activities.   Rehab potential is good.    Frequency:  1 X week, once daily  Duration:  for 6 weeks    Please refer to the daily flowsheet for treatment today, total treatment time and time spent performing 1:1 timed codes.

## 2022-02-16 ENCOUNTER — THERAPY VISIT (OUTPATIENT)
Dept: PHYSICAL THERAPY | Facility: CLINIC | Age: 47
End: 2022-02-16
Payer: COMMERCIAL

## 2022-02-16 DIAGNOSIS — M54.16 LUMBAR RADICULOPATHY: ICD-10-CM

## 2022-02-16 PROCEDURE — 97530 THERAPEUTIC ACTIVITIES: CPT | Mod: GP | Performed by: PHYSICAL THERAPIST

## 2022-02-16 PROCEDURE — 97110 THERAPEUTIC EXERCISES: CPT | Mod: GP | Performed by: PHYSICAL THERAPIST

## 2022-02-23 ENCOUNTER — THERAPY VISIT (OUTPATIENT)
Dept: PHYSICAL THERAPY | Facility: CLINIC | Age: 47
End: 2022-02-23
Payer: COMMERCIAL

## 2022-02-23 DIAGNOSIS — M10.9 GOUT OF MULTIPLE SITES, UNSPECIFIED CAUSE, UNSPECIFIED CHRONICITY: ICD-10-CM

## 2022-02-23 DIAGNOSIS — M54.16 LUMBAR RADICULOPATHY: ICD-10-CM

## 2022-02-23 PROCEDURE — 97140 MANUAL THERAPY 1/> REGIONS: CPT | Mod: GP | Performed by: PHYSICAL THERAPIST

## 2022-02-23 PROCEDURE — 97110 THERAPEUTIC EXERCISES: CPT | Mod: GP | Performed by: PHYSICAL THERAPIST

## 2022-02-23 NOTE — TELEPHONE ENCOUNTER
Routing refill request to provider for review/approval because:  Failed protocol due to:    CBC on file in past 12 months    ALT on file in past 12 months       Reed Alford, RN  [facility] Triage Nurse

## 2022-02-24 RX ORDER — ALLOPURINOL 100 MG/1
TABLET ORAL
Qty: 30 TABLET | Refills: 0 | Status: SHIPPED | OUTPATIENT
Start: 2022-02-24 | End: 2022-03-08

## 2022-03-02 ENCOUNTER — THERAPY VISIT (OUTPATIENT)
Dept: PHYSICAL THERAPY | Facility: CLINIC | Age: 47
End: 2022-03-02
Payer: COMMERCIAL

## 2022-03-02 DIAGNOSIS — M54.16 LUMBAR RADICULOPATHY: ICD-10-CM

## 2022-03-02 PROCEDURE — 97110 THERAPEUTIC EXERCISES: CPT | Mod: GP | Performed by: PHYSICAL THERAPIST

## 2022-03-02 PROCEDURE — 97140 MANUAL THERAPY 1/> REGIONS: CPT | Mod: GP | Performed by: PHYSICAL THERAPIST

## 2022-03-07 ASSESSMENT — ENCOUNTER SYMPTOMS
ABDOMINAL PAIN: 0
COUGH: 0
WEAKNESS: 0
DYSURIA: 0
CHILLS: 0
CONSTIPATION: 0
FREQUENCY: 0
SHORTNESS OF BREATH: 0
HEMATOCHEZIA: 0
DIZZINESS: 0
PARESTHESIAS: 0
NAUSEA: 0
SORE THROAT: 0
EYE PAIN: 0
HEMATURIA: 0
PALPITATIONS: 0
HEADACHES: 0
NERVOUS/ANXIOUS: 0
JOINT SWELLING: 0
DIARRHEA: 0
HEARTBURN: 0
FEVER: 0
ARTHRALGIAS: 0
MYALGIAS: 1

## 2022-03-08 ENCOUNTER — OFFICE VISIT (OUTPATIENT)
Dept: INTERNAL MEDICINE | Facility: CLINIC | Age: 47
End: 2022-03-08
Payer: COMMERCIAL

## 2022-03-08 ENCOUNTER — TELEPHONE (OUTPATIENT)
Dept: INTERNAL MEDICINE | Facility: CLINIC | Age: 47
End: 2022-03-08

## 2022-03-08 VITALS
DIASTOLIC BLOOD PRESSURE: 78 MMHG | SYSTOLIC BLOOD PRESSURE: 112 MMHG | OXYGEN SATURATION: 96 % | HEIGHT: 73 IN | HEART RATE: 82 BPM | WEIGHT: 315 LBS | BODY MASS INDEX: 41.75 KG/M2 | TEMPERATURE: 98.2 F

## 2022-03-08 DIAGNOSIS — I10 ESSENTIAL HYPERTENSION: ICD-10-CM

## 2022-03-08 DIAGNOSIS — Z11.59 NEED FOR HEPATITIS C SCREENING TEST: ICD-10-CM

## 2022-03-08 DIAGNOSIS — Z00.00 ROUTINE GENERAL MEDICAL EXAMINATION AT A HEALTH CARE FACILITY: Primary | ICD-10-CM

## 2022-03-08 DIAGNOSIS — R06.83 SNORING: ICD-10-CM

## 2022-03-08 DIAGNOSIS — M10.9 GOUT OF MULTIPLE SITES, UNSPECIFIED CAUSE, UNSPECIFIED CHRONICITY: ICD-10-CM

## 2022-03-08 DIAGNOSIS — K62.89 RECTAL PAIN: ICD-10-CM

## 2022-03-08 DIAGNOSIS — B35.3 TINEA PEDIS OF BOTH FEET: ICD-10-CM

## 2022-03-08 PROCEDURE — 99396 PREV VISIT EST AGE 40-64: CPT | Performed by: INTERNAL MEDICINE

## 2022-03-08 PROCEDURE — 99214 OFFICE O/P EST MOD 30 MIN: CPT | Mod: 25 | Performed by: INTERNAL MEDICINE

## 2022-03-08 RX ORDER — COLCHICINE 0.6 MG/1
0.6 TABLET ORAL DAILY
Qty: 90 TABLET | Refills: 0 | Status: SHIPPED | OUTPATIENT
Start: 2022-03-08 | End: 2022-05-17

## 2022-03-08 RX ORDER — IRBESARTAN 300 MG/1
300 TABLET ORAL DAILY
Qty: 90 TABLET | Refills: 3 | Status: SHIPPED | OUTPATIENT
Start: 2022-03-08 | End: 2022-07-01

## 2022-03-08 RX ORDER — IMIQUIMOD 12.5 MG/.25G
CREAM TOPICAL
Qty: 15 PACKET | Refills: 3 | Status: CANCELLED | OUTPATIENT
Start: 2022-03-08

## 2022-03-08 RX ORDER — ALLOPURINOL 100 MG/1
200 TABLET ORAL DAILY
Qty: 180 TABLET | Refills: 1 | Status: SHIPPED | OUTPATIENT
Start: 2022-03-08 | End: 2022-05-08

## 2022-03-08 RX ORDER — KETOCONAZOLE 20 MG/G
CREAM TOPICAL DAILY PRN
Qty: 60 G | Refills: 3 | Status: SHIPPED | OUTPATIENT
Start: 2022-03-08

## 2022-03-08 ASSESSMENT — ENCOUNTER SYMPTOMS
SHORTNESS OF BREATH: 0
HEADACHES: 0
EYE PAIN: 0
CONSTIPATION: 0
WEAKNESS: 0
HEARTBURN: 0
FEVER: 0
COUGH: 0
ABDOMINAL PAIN: 0
NERVOUS/ANXIOUS: 0
DYSURIA: 0
CHILLS: 0
NAUSEA: 0
PARESTHESIAS: 0
MYALGIAS: 1
PALPITATIONS: 0
HEMATURIA: 0
DIARRHEA: 0
FREQUENCY: 0
JOINT SWELLING: 0
HEMATOCHEZIA: 0
DIZZINESS: 0
SORE THROAT: 0
ARTHRALGIAS: 0

## 2022-03-23 ENCOUNTER — THERAPY VISIT (OUTPATIENT)
Dept: PHYSICAL THERAPY | Facility: CLINIC | Age: 47
End: 2022-03-23
Payer: COMMERCIAL

## 2022-03-23 DIAGNOSIS — M54.16 LUMBAR RADICULOPATHY: ICD-10-CM

## 2022-03-23 PROCEDURE — 97112 NEUROMUSCULAR REEDUCATION: CPT | Mod: GP | Performed by: PHYSICAL THERAPIST

## 2022-03-23 PROCEDURE — 97530 THERAPEUTIC ACTIVITIES: CPT | Mod: GP | Performed by: PHYSICAL THERAPIST

## 2022-03-23 PROCEDURE — 97110 THERAPEUTIC EXERCISES: CPT | Mod: GP | Performed by: PHYSICAL THERAPIST

## 2022-05-02 PROBLEM — M54.16 LUMBAR RADICULOPATHY: Status: RESOLVED | Noted: 2022-02-09 | Resolved: 2022-05-02

## 2022-05-02 NOTE — PROGRESS NOTES
DISCHARGE REPORT    Progress reporting period is from 2-9-22 to 3-23-22.       SUBJECTIVE  Subjective changes noted by patient:  .  Subjective: Has not had any leg pain--has been sick for a few weeks for     Current pain level is 0/10  .     Previous pain level was  8/10 Initial Pain level: 8/10.   Changes in function:  Yes (See Goal flowsheet attached for changes in current functional level)  Adverse reaction to treatment or activity: None    OBJECTIVE  Changes noted in objective findings:  The objective findings below are from DOS 3-23-22.  Objective: Ext=min loss, Flex=min loss erp;able to progress to ROF, core strengthening; poor TA activation, decreased glut activation     ASSESSMENT/PLAN  Updated problem list and treatment plan: Diagnosis 1:  Lumbar radiculopathy  Pain -  manual therapy, self management, education, directional preference exercise and home program  Decreased ROM/flexibility - manual therapy and therapeutic exercise  Decreased joint mobility - manual therapy and therapeutic exercise  Decreased strength - therapeutic exercise and therapeutic activities  Decreased function - therapeutic activities  Impaired posture - neuro re-education  STG/LTGs have been met or progress has been made towards goals:  Yes (See Goal flow sheet completed today.)  Assessment of Progress: The patient has met all of their long term goals.  Self Management Plans:  Patient is independent in a home treatment program.  Patient is independent in self management of symptoms.  I have re-evaluated this patient and find that the nature, scope, duration and intensity of the therapy is appropriate for the medical condition of the patient.  Abhijit continues to require the following intervention to meet STG and LTG's:  PT intervention is no longer required to meet STG/LTG.    Recommendations:  This patient is ready to be discharged from therapy and continue their home treatment program.    Please refer to the daily flowsheet for  treatment today, total treatment time and time spent performing 1:1 timed codes.

## 2022-05-03 NOTE — PROGRESS NOTES
ANTICOAGULATION FOLLOW-UP CLINIC VISIT    Patient Name:  Abhijit Garcia  Date:  4/26/2017  Contact Type:  Face to Face    SUBJECTIVE:     Patient Findings     Positives No Problem Findings           OBJECTIVE    INR Protime   Date Value Ref Range Status   04/26/2017 2.3 (A) 0.86 - 1.14 Final       ASSESSMENT / PLAN  INR assessment THER    Recheck INR In: 1 WEEK    INR Location Clinic      Anticoagulation Summary as of 4/26/2017     INR goal 2.0-3.0   Today's INR 2.3   Maintenance plan No maintenance plan   Full instructions 4/26: 15 mg; 4/27: 10 mg; 4/28: 15 mg; 4/29: 10 mg; 4/30: 15 mg; 5/1: 15 mg; 5/2: 10 mg   Plan last modified Rebecca Montoya RN (4/26/2017)   Next INR check 5/3/2017   Target end date 7/5/2017    Indications   Long-term (current) use of anticoagulants [Z79.01] [Z79.01]  Deep vein thrombosis (DVT) (H) [I82.409] (Resolved) [I82.409]         Anticoagulation Episode Summary     INR check location     Preferred lab     Send INR reminders to RN POOL - FARIBA    Comments             See the Encounter Report to view Anticoagulation Flowsheet and Dosing Calendar (Go to Encounters tab in chart review, and find the Anticoagulation Therapy Visit)      Rebecca Montoya RN               
[FreeTextEntry1] : bronchitis after 3 weeks\par  z danilo, fluids continue advil prn

## 2022-05-05 ENCOUNTER — LAB (OUTPATIENT)
Dept: LAB | Facility: CLINIC | Age: 47
End: 2022-05-05
Payer: COMMERCIAL

## 2022-05-05 DIAGNOSIS — Z11.59 NEED FOR HEPATITIS C SCREENING TEST: ICD-10-CM

## 2022-05-05 DIAGNOSIS — M10.9 GOUT OF MULTIPLE SITES, UNSPECIFIED CAUSE, UNSPECIFIED CHRONICITY: ICD-10-CM

## 2022-05-05 PROCEDURE — 84550 ASSAY OF BLOOD/URIC ACID: CPT

## 2022-05-05 PROCEDURE — 86803 HEPATITIS C AB TEST: CPT

## 2022-05-05 PROCEDURE — 36415 COLL VENOUS BLD VENIPUNCTURE: CPT

## 2022-05-06 ENCOUNTER — MYC MEDICAL ADVICE (OUTPATIENT)
Dept: INTERNAL MEDICINE | Facility: CLINIC | Age: 47
End: 2022-05-06
Payer: COMMERCIAL

## 2022-05-06 DIAGNOSIS — M10.9 GOUT OF MULTIPLE SITES, UNSPECIFIED CAUSE, UNSPECIFIED CHRONICITY: ICD-10-CM

## 2022-05-06 LAB
HCV AB SERPL QL IA: NONREACTIVE
URATE SERPL-MCNC: 7.1 MG/DL (ref 3.5–7.2)

## 2022-05-08 RX ORDER — ALLOPURINOL 100 MG/1
300 TABLET ORAL DAILY
Start: 2022-05-08 | End: 2022-07-14

## 2022-05-12 DIAGNOSIS — M10.9 GOUT OF MULTIPLE SITES, UNSPECIFIED CAUSE, UNSPECIFIED CHRONICITY: ICD-10-CM

## 2022-05-16 RX ORDER — MELOXICAM 15 MG/1
TABLET ORAL
Qty: 30 TABLET | Refills: 5 | Status: SHIPPED | OUTPATIENT
Start: 2022-05-16 | End: 2023-01-17

## 2022-05-16 NOTE — TELEPHONE ENCOUNTER
Routing refill request to provider for review/approval because:  Labs not current:    Lab Results   Component Value Date    ALT 37 06/10/2019      AST   Date Value Ref Range Status   06/10/2019 23 0 - 45 U/L Final      Lab Results   Component Value Date    WBC 6.2 12/10/2019     Lab Results   Component Value Date    RBC 5.15 12/10/2019     Lab Results   Component Value Date    HGB 15.3 12/10/2019     Lab Results   Component Value Date    HCT 45.0 12/10/2019     Lab Results   Component Value Date    MCV 87 12/10/2019     Lab Results   Component Value Date    MCH 29.7 12/10/2019     Lab Results   Component Value Date    MCHC 34.0 12/10/2019     Lab Results   Component Value Date    RDW 12.3 12/10/2019     Lab Results   Component Value Date     12/10/2019

## 2022-05-17 RX ORDER — COLCHICINE 0.6 MG/1
0.6 TABLET ORAL DAILY
Qty: 90 TABLET | Refills: 1 | Status: SHIPPED | OUTPATIENT
Start: 2022-05-17 | End: 2022-12-23

## 2022-06-30 DIAGNOSIS — I10 ESSENTIAL HYPERTENSION: ICD-10-CM

## 2022-07-01 RX ORDER — IRBESARTAN 300 MG/1
TABLET ORAL
Qty: 90 TABLET | Refills: 3 | Status: SHIPPED | OUTPATIENT
Start: 2022-03-08 | End: 2023-01-17

## 2022-07-01 NOTE — TELEPHONE ENCOUNTER
Rx approved per pharmacy change     irbesartan (AVAPRO) 300 MG tablet 90 tablet 3 3/8/2022  No   Sig - Route: Take 1 tablet (300 mg) by mouth daily - Oral   Sent to pharmacy as: Irbesartan 300 MG Oral Tablet (AVAPRO)   Class: E-Prescribe   Order: [id number]   E-Prescribing Status: Receipt confirmed by pharmacy (3/8/2022  9:07 AM CST)       Printout Tracking    External Result Report     Pharmacy    [id number] - [place], [place] - [address]     Valeria Eden RN  [facility]

## 2022-07-06 ENCOUNTER — OFFICE VISIT (OUTPATIENT)
Dept: SLEEP MEDICINE | Facility: CLINIC | Age: 47
End: 2022-07-06
Attending: INTERNAL MEDICINE
Payer: COMMERCIAL

## 2022-07-06 VITALS
SYSTOLIC BLOOD PRESSURE: 129 MMHG | HEART RATE: 76 BPM | WEIGHT: 315 LBS | OXYGEN SATURATION: 96 % | DIASTOLIC BLOOD PRESSURE: 88 MMHG | RESPIRATION RATE: 16 BRPM | HEIGHT: 73 IN | BODY MASS INDEX: 41.75 KG/M2

## 2022-07-06 DIAGNOSIS — I10 ESSENTIAL HYPERTENSION: ICD-10-CM

## 2022-07-06 DIAGNOSIS — G47.52 DREAM ENACTMENT BEHAVIOR: ICD-10-CM

## 2022-07-06 DIAGNOSIS — G47.8 UNREFRESHED BY SLEEP: ICD-10-CM

## 2022-07-06 DIAGNOSIS — E66.01 MORBID OBESITY (H): ICD-10-CM

## 2022-07-06 DIAGNOSIS — R06.83 SNORING: Primary | ICD-10-CM

## 2022-07-06 PROCEDURE — 99205 OFFICE O/P NEW HI 60 MIN: CPT | Performed by: PHYSICIAN ASSISTANT

## 2022-07-06 NOTE — NURSING NOTE
"Chief Complaint   Patient presents with     Sleep Problem     Tired during the day, not feeling rested, loud snoring        Initial /88   Pulse 76   Resp 16   Ht 1.854 m (6' 0.99\")   Wt (!) 150.1 kg (331 lb)   SpO2 96%   BMI 43.68 kg/m   Estimated body mass index is 43.68 kg/m  as calculated from the following:    Height as of this encounter: 1.854 m (6' 0.99\").    Weight as of this encounter: 150.1 kg (331 lb).    Medication Reconciliation: complete    Neck circumference: 18.5 inches / 47 centimeters.    ESS 6  TENNILLE 5  Kamilah Stephens MA      "

## 2022-07-06 NOTE — PROGRESS NOTES
"Outpatient Sleep Medicine Consultation:      Name: Abhijit Garcia MRN# [id number]   Age: 47 year old YOB: 1975     Date of Consultation: July 6, 2022  Consultation is requested by: Eriberto Oh MD  [address]   [address]  [place] [address] Eriberto Oh  Primary care provider: Eriberto Oh       Reason for Sleep Consult:     Abhijit Garcia is sent by Eriberto Oh for a sleep consultation regarding snoring.     Patient s Reason for visit  Abhijit Garcia main reason for visit:  Frequently tired during the day and don't feel rested when I wake up in the morning.  My wife reports that I am snoring loudly more often.\"  Patient states problem(s) started:  At least a year ago for snoring.  About 10 years for not feeling rested.  Abhijit Garcia's goals for this visit: Find out if I need to do a sleep lab         Assessment and Plan:     Summary Sleep Diagnoses:  1. Snoring  2. Unrefreshed by sleep  3. Dream enactment behavior    Comorbid Diagnoses:  4. Essential hypertension  5. Morbid obesity (H)  Patient is being evaluated for Obstructive Sleep Apnea (RACHID).  Patient's risk factors for RACHID include: disruptive snoring, unrefreshing sleep, enlarged neck girth (47 cm), obesity (BMI 43.68), high blood pressure, and male gender. We discussed pathophysiology of RACHID and testing with overnight attended polysomnography versus home sleep apnea testing. An order for in lab PSG placed today. HST is inappropriate given additional report of dream enactment, though of note has been years since last episode. PSG will screen for both sleep disordered breathing and REM motor tone.  - Comprehensive Sleep Study; Future    Briefly discussed potential treatment modalities in the event sleep apnea is identified on testing and additional information given in patient instructions. Will plan to discuss in more detail at next visit pending study results but patient is willing to " start CPAP if recommended, though admittedly not overly excited.  His wife does have CPAP machine at home.  Discussed that he may not be the best candidate for oral appliance therapy given TMJ issues.      Follow up 1-2 weeks following his study for review of results and to expedite care. Educational materials provided in instructions         History of Present Illness:     Abhijit Garcia is a 47 year old male with HTN gout, obesity who presents to clinic today for evaluation of possible RACHID.     SLEEP-WAKE SCHEDULE:   Admits he does not keep the most consistent sleep schedule  Work/School Days: Patient goes to school/work:   Yes  Usually gets into bed sometime between 10:00 PM and 1:00 AM  Takes <20 minutes to fall asleep, typically 5-10 minutes   Has trouble falling asleep  0 nights per week  Wakes up in the middle of the night 1  times.  Wakes up due to pain, external stimuli (light coming into room), or restroom is most common  He has trouble falling back asleep 0 times a week.   It usually takes a few minutes to get back to sleep  Patient is usually up at 630-7:00 AM    Weekends/Non-work Days/All Other Days:  Usually gets into bed between 12-2:00AM.  Takes patient <20 minutes to fall asleep  Patient is usually up anywhere between 9:00AM-12:00PM    Sleep Need  Patient gets about 6 hours sleep on average   Patient thinks he needs about 8 hours sleep    Abhijit Garcia prefers to sleep in this position(s):   Sides, back  Patient states they do the following activities in bed: Read, use electronics    Naps  Patient takes a purposeful nap rarely and naps are usually   minutes in duration  He feels better after a nap: Yes  He dozes off unintentionally 1  days per week  Patient has had a driving accident or near-miss due to sleepiness/drowsiness: No      SLEEP DISRUPTIONS:    Breathing/Snoring  Patient snores: Yes  Other people complain about his snoring: Yes - keeps wife awake   Patient has been told  "he stops breathing in his sleep: No  Unaware of gasping/choking arousals   He has issues with the following: Morning mouth dryness, stuffy nose when wake up, heartburn or reflux at night    Movement:  Denies restless legs syndrome symptoms.   Does however get leg cramping in left leg specifically s/p surgery in 2017, can wake him from sleep  Patient has been told he kicks his legs at night:   No    Behaviors in Sleep:  Abhijit Garcia has experienced the following behaviors while sleeping:    Sleep talking  Teeth grinding - has bite guard but doesn't wear  Kicking or punching - \"there have been instances over the years where I have vivid dreams and my wife reports I swung my arms and punched things in the air and for example in college in 1998/1999 I had an episode of swatting and banging on the wall\". Occurs \"quite infrequent\", has been years.     Is there anything else you would like your sleep provider to know: \"I get muscle cramps in my calves that wake me up\".    CAFFEINE AND OTHER SUBSTANCES:    Patient consumes caffeinated beverages per day:   16-32oz coffee  Last caffeine use is usually: Between 12-3:00 PM  List of any prescribed or over the counter stimulants that patient takes: None  List of any prescribed or over the counter sleep medication patient takes: None  List of previous sleep medications that patient has tried: None  Patient drinks alcohol to help them sleep: No  Patient drinks alcohol near bedtime: No    SCALES:    EPWORTH SLEEPINESS SCALE      Martha Sleepiness Scale ( ABRAHAN Butt  1990-1997[facility] [place]/English - Final version - 21 Nov 07 - [facility].) 7/6/2022   Martha Score (Sleep) 6         INSOMNIA SEVERITY INDEX (TENNILLE)      Insomnia Severity Index (TENNILLE) 7/6/2022   Difficulty falling asleep 0   Difficulty staying asleep 0   Problems waking up too early 1   How SATISFIED/DISSATISFIED are you with your CURRENT sleep pattern? 2   How NOTICEABLE to others do you think your sleep " problem is in terms of impairing the quality of your life? 0   How WORRIED/DISTRESSED are you about your current sleep problem? 1   To what extent do you consider your sleep problem to INTERFERE with your daily functioning (e.g. daytime fatigue, mood, ability to function at work/daily chores, concentration, memory, mood, etc.) CURRENTLY? 1   TENNILLE Total Score 5       Guidelines for Scoring/Interpretation:  Total score categories:  0-7 = No clinically significant insomnia   8-14 = Subthreshold insomnia   15-21 = Clinical insomnia (moderate severity)  22-28 = Clinical insomnia (severe)  Used via courtesy of www.[employer].[place].gov with permission from Reynaldo Joy PhD., [facility]    Allergies:    Allergies   Allergen Reactions     Amlodipine      Edema at 10 mg dose     Contrast Dye      Indomethacin GI Disturbance     Iodine      Iodine in IV contrast dye     Levaquin [Levofloxacin]      Gout flare in 2011     Lisinopril      cough     Seasonal Allergies      Losartan Rash     Penile rash       Medications:    Current Outpatient Medications   Medication Sig Dispense Refill     allopurinol (ZYLOPRIM) 100 MG tablet Take 3 tablets (300 mg) by mouth daily       colchicine (COLCYRS) 0.6 MG tablet Take 1 tablet (0.6 mg) by mouth daily 90 tablet 1     imiquimod (ALDARA) 5 % external cream Apply a small sized amount to warts QHS at bedtime.   Wash off after 8 hours. 15 packet 3     irbesartan (AVAPRO) 300 MG tablet TAKE 1 TABLET BY MOUTH ONCE DAILY 90 tablet 3     ketoconazole (NIZORAL) 2 % external cream Apply topically daily as needed for other (athletes foot) 60 g 3     Loratadine (CLARITIN PO)        meloxicam (MOBIC) 15 MG tablet TAKE 1 TABLET(15 MG) BY MOUTH DAILY 30 tablet 5     valACYclovir (VALTREX) 1000 mg tablet TAKE 2 TABLETS BY MOUTH TWICE DAILY FOR 1 DAY 8 tablet 0       Problem List:  Patient Active Problem List    Diagnosis Date Noted     Rectal pain 03/08/2022     Priority: Medium     Obesity, BMI > 35  (H) 07/20/2017     Priority: Medium     History of DVT left leg. postop 04/05/2017     Priority: Medium     Treated with 3 months of anticoagulation, follow-up ultrasound negative        Gout 01/07/2015     Priority: Medium     Onset age 33.  High uric acid.  Has had flares of great toe, midfoot, and ankles.  Uses meloxicam and cholchicine for acute flares.  Used allopurinol, developed low back pain after a few months.  Ultrasound and CT fine.  Eye problems.  Symptoms all resolved with stopping med       Essential hypertension 04/18/2007     Priority: Medium     Onset age 17, medications started in 2015.       Herpes simplex virus (HSV) infection 02/06/2007     Priority: Medium     Gets on lips.  Valtrex and Abreva work.  Episodes x 2 tip of nose, including 2/2016.  No fluid collected.         Mixed Hyperlipidemia LDL goal <130 02/06/2007     Priority: Medium        Past Medical/Surgical History:  Past Medical History:   Diagnosis Date     Arthritis      Dysuria      Gout      Hyperplastic colon polyp 12/2018     Hypertension      Prostate infection      Past Surgical History:   Procedure Laterality Date     ANKLE SURGERY       APPENDECTOMY       BIOPSY  2011    biopsy from foreskin     COLONOSCOPY  12/21/2018    Dr. Dc Pending sale to [facility]     COLONOSCOPY N/A 12/21/2018    Procedure: COMBINED COLONOSCOPY THRU STOMA, BIOPSY BY SNARE using exacto snare;  Surgeon: Shamar Dc MD;  Location:  GI     CYSTOSCOPY       ESOPHAGOSCOPY, GASTROSCOPY, DUODENOSCOPY (EGD), COMBINED  2012    for stomach pain done in [place]-acid reflux     ORTHOPEDIC SURGERY  03/23/2017    ankle left -repair of tendons     wisdom teeth         Social History:  Social History     Socioeconomic History     Marital status:      Spouse name: Not on file     Number of children: 1     Years of education: Not on file     Highest education level: Not on file   Occupational History     Employer: [employer]   Tobacco Use     Smoking status: Never  "Smoker     Smokeless tobacco: Never Used     Tobacco comment: I have never used tobacco. My dad was a heavy smoker so I did have second hand smoke exposure   Substance and Sexual Activity     Alcohol use: Yes     Alcohol/week: 0.0 standard drinks     Comment: social drinker.     Drug use: Never     Sexual activity: Not Currently     Partners: Female     Birth control/protection: Abstinence, Pull-out method, Condom, Female Surgical     Comment: My wife has lost her libido, so we aren't having sex   Other Topics Concern      Service Yes     Blood Transfusions No     Caffeine Concern Yes     Comment: 2 pots daily     Occupational Exposure No     Comment: Occ. all with in managible levels     Hobby Hazards No     Sleep Concern Yes     Stress Concern Yes     Weight Concern Yes     Special Diet No     Back Care Yes     Comment: L4 and L5 damaged     Exercise No     Bike Helmet Not Asked     Seat Belt Yes     Self-Exams No     Parent/sibling w/ CABG, MI or angioplasty before 65F 55M? No   Social History Narrative     Not on file     Social Determinants of Health     Financial Resource Strain: Not on file   Food Insecurity: Not on file   Transportation Needs: Not on file   Physical Activity: Not on file   Stress: Not on file   Social Connections: Not on file   Intimate Partner Violence: Not on file   Housing Stability: Not on file       Family History:  Family History   Problem Relation Age of Onset     Arthritis Father      Respiratory Father         asbestos exposure     Chronic Obstructive Pulmonary Disease Father      Coronary Artery Disease Father 71        Father b - 1933   -      Skin Cancer Father      Other Cancer Father         Skin cancer on face and ears     Diabetes Mother         Mother  2021     Obesity Mother      Cerebrovascular Disease Mother          of stroke     Hypertension Mother      Colon Cancer Mother         6\" of bowel removed in      Allergies Brother      " "Respiratory Brother      Asthma Brother         Primarily affected him as a child.     Heart Defect Son        Review of Systems:  A complete review of systems reviewed by me is negative with the exeption of what has been mentioned in the history of present illness.  Pain at night, changes in vision, difficulty breathing through nose, sore throat and morning, dry mouth and morning, swelling in feet or legs, heartburn at night, joint pains at night, depressed mood    Physical Examination:  Vitals: /88   Pulse 76   Resp 16   Ht 1.854 m (6' 0.99\")   Wt (!) 150.1 kg (331 lb)   SpO2 96%   BMI 43.68 kg/m    BMI= Body mass index is 43.68 kg/m .  General appearance: Awake, alert, cooperative. Well groomed. Sitting comfortably in chair. In no apparent distress.  HEENT: Head: Normocephalic, atraumatic.Eyes: PERRL. Conjunctiva clear. Sclera normal. Nose: External appearance without deformity, internal exam deferred.  Oropharynx: Mallampati classification 1.  Tonsils not visualized.  Uvula midline.  Neck:  Neck Cir (cm): 47 cm (7/6/2022 11:19 AM)  Cardiovascular: Regular rate and rhythm. Regular S1 and S2.  Pulmonary:  Chest symmetric. Clear to auscultation bilaterally. No crackles, wheezes or rales.  Skin:  No rashes or significant lesions.   Neurologic: Alert, oriented x3. No focal neurological deficit. Gait normal.   Psychiatric: Mood euthymic. Affect congruent with full range and intensity.         Data: All pertinent previous laboratory data reviewed     Recent Labs   Lab Test 11/23/21  1022 09/17/20 2014    139   POTASSIUM 4.4 4.0   CHLORIDE 105 106   CO2 28 28   ANIONGAP 4 5   * 96   BUN 13 16   CR 1.08 1.07   KHUSHBU 8.9 8.1*       Recent Labs   Lab Test 12/10/19  0810   WBC 6.2   RBC 5.15   HGB 15.3   HCT 45.0   MCV 87   MCH 29.7   MCHC 34.0   RDW 12.3          Recent Labs   Lab Test 06/10/19  0833   PROTTOTAL 6.6*   ALBUMIN 3.8   BILITOTAL 0.5   ALKPHOS 77   AST 23   ALT 37       TSH "   Date Value   05/05/2015 2.72 mU/L   08/01/2014 2.630 mIU/mL       No results found for: UAMP, UBARB, BENZODIAZEUR, UCANN, UCOC, OPIT, UPCP    No results found for: IRONSAT, [id number], POLO    No results found for: PH, PHARTERIAL, PO2, [id number], SAT, PCO2, HCO3, BASEEXCESS, YANICK, BEB    @LABRCNTIPR(phv:4,pco2v:4,po2v:4,hco3v:4,viktoriya:4,o2per:4)@    Echocardiology: No results found for this or any previous visit (from the past 4320 hour(s)).    Chest x-ray: No results found for this or any previous visit from the past 365 days.      Chest CT: No results found for this or any previous visit from the past 365 days.      PFT: Most Recent [facility] Pulmonary Function Testing    No results found for: 20001  No results found for: 20002  No results found for: 20003  No results found for: 20015  No results found for: 20016  No results found for: 20027  No results found for: 20028  No results found for: 20029  No results found for: 20079  No results found for: 20080  No results found for: 20081  No results found for: 20335  No results found for: 20105  No results found for: 20053  No results found for: 20054  No results found for: 20055      Brea Gonzalez PA-C 7/6/2022   [facility] [place]  [address], [place] [address]     [facility] [place]  [address], [place] [address]    Chart documentation was completed, in part, with [employer] voice-recognition software. Even though reviewed, some grammatical, spelling, and word errors may remain.    62 minutes spent on day of encounter reviewing medical records, history and physical examination, review of previous testing and interpretation, documentation and further activities as noted above

## 2022-07-06 NOTE — PATIENT INSTRUCTIONS
"      MY TREATMENT INFORMATION FOR SLEEP APNEA-  Abhijit RANDALL Garcia      Am I having a sleep study at a sleep center?  --->Due to normal delays, you will be contacted within 2-4 weeks to schedule    Frequently asked questions:  1. What is Obstructive Sleep Apnea (RACHID)? RACHID is the most common type of sleep apnea. Apnea means, \"without breath.\"  Apnea is most often caused by narrowing or collapse of the upper airway as muscles relax during sleep.   Almost everyone has occasional apneas. Most people with sleep apnea have had brief interruptions at night frequently for many years.  The severity of sleep apnea is related to how frequent and severe the events are.   2. What are the consequences of RACHID? Symptoms include: feeling sleepy during the day, snoring loudly, gasping or stopping of breathing, trouble sleeping, and occasionally morning headaches or heartburn at night.  Sleepiness can be serious and even increase the risk of falling asleep while driving. Other health consequences may include development of high blood pressure and other cardiovascular disease in persons who are susceptible. Untreated RACHID  can contribute to heart disease, stroke and diabetes.   3. What are the treatment options? In most situations, sleep apnea is a lifelong disease that must be managed with daily therapy. Medications are not effective for sleep apnea and surgery is generally not considered until other therapies have been tried. Your treatment is your choice . Continuous Positive Airway (CPAP) works right away and is the therapy that is effective in nearly everyone. An oral device to hold your jaw forward is usually the next most reliable option. Other options include postioning devices (to keep you off your back), weight loss, and surgery including a tongue pacing device. There is more detail about some of these options below.  4. Are my sleep studies covered by insurance? Although we will request verification of coverage, we advise " you also check in advance of the study to ensure there is coverage.    Important tips for those choosing CPAP and similar devices   Know your equipment:  CPAP is continuous positive airway pressure that prevents obstructive sleep apnea by keeping the throat from collapsing while you are sleeping. In most cases, the device is  smart  and can slowly self-adjusts if your throat collapses and keeps a record every day of how well you are treated-this information is available to you and your care team.  BPAP is bilevel positive airway pressure that keeps your throat open and also assists each breath with a pressure boost to maintain adequate breathing.  Special kinds of BPAP are used in patients who have inadequate breathing from lung or heart disease. In most cases, the device is  smart  and can slowly self-adjusts to assist breathing. Like CPAP, the device keeps a record of how well you are treated.  Your mask is your connection to the device. You get to choose what feels most comfortable and the staff will help to make sure if fits. Here: are some examples of the different masks that are available:       Key points to remember on your journey with sleep apnea:  Sleep study.  PAP devices often need to be adjusted during a sleep study to show that they are effective and adjusted right.  Good tips to remember: Try wearing just the mask during a quiet time during the day so your body adapts to wearing it. A humidifier is recommended for comfort in most cases to prevent drying of your nose and throat. Allergy medication from your provider may help you if you are having nasal congestion.  Getting settled-in. It takes more than one night for most of us to get used to wearing a mask. Try wearing just the mask during a quiet time during the day so your body adapts to wearing it. A humidifier is recommended for comfort in most cases. Our team will work with you carefully on the first day and will be in contact within 4 days and  again at 2 and 4 weeks for advice and remote device adjustments. Your therapy is evaluated by the device each day.   Use it every night. The more you are able to sleep naturally for 7-8 hours, the more likely you will have good sleep and to prevent health risks or symptoms from sleep apnea. Even if you use it 4 hours it helps. Occasionally all of us are unable to use a medical therapy, in sleep apnea, it is not dangerous to miss one night.   Communicate. Call our skilled team on the number provided on the first day if your visit for problems that make it difficult to wear the device. Over 2 out of 3 patients can learn to wear the device long-term with help from our team. Remember to call our team or your sleep providers if you are unable to wear the device as we may have other solutions for those who cannot adapt to mask CPAP therapy. It is recommended that you sleep your sleep provider within the first 3 months and yearly after that if you are not having problems.   Use it for your health. We encourage use of CPAP masks during daytime quiet periods to allow your face and brain to adapt to the sensation of CPAP so that it will be a more natural sensation to awaken to at night or during naps. This can be very useful during the first few weeks or months of adapting to CPAP though it does not help medically to wear CPAP during wakefulness and  should not be used as a strategy just to meet guidelines.  Take care of your equipment. Make sure you clean your mask and tubing using directions every day and that your filter and mask are replaced as recommended or if they are not working.     BESIDES CPAP, WHAT OTHER THERAPIES ARE THERE?    Positioning Device  Positioning devices are generally used when sleep apnea is mild and only occurs on your back.This example shows a pillow that straps around the waist. It may be appropriate for those whose sleep study shows milder sleep apnea that occurs primarily when lying flat on  one's back. Preliminary studies have shown benefit but effectiveness at home may need to be verified by a home sleep test. These devices are generally not covered by medical insurance.  Examples of devices that maintain sleeping on the back to prevent snoring and mild sleep apnea.    Belt type body positioner  http://[employer].Sofea/    Electronic reminder  http://nightshifttherapy.com/  http://www.WordWatch.com.au/      Oral Appliance  What is oral appliance therapy?  An oral appliance device fits on your teeth at night like a retainer used after having braces. The device is made by a specialized dentist and requires several visits over 1-2 months before a manufactured device is made to fit your teeth and is adjusted to prevent your sleep apnea. Once an oral device is working properly, snoring should be improved. A home sleep test may be recommended at that time if to determine whether the sleep apnea is adequately treated.       Some things to remember:  -Oral devices are often, but not always, covered by your medical insurance. Be sure to check with your insurance provider.   -If you are referred for oral therapy, you will be given a list of specialized dentists to consider or you may choose to visit the Web site of the American Academy of Dental Sleep Medicine  -Oral devices are less likely to work if you have severe sleep apnea or are extremely overweight.     More detailed information  An oral appliance is a small acrylic device that fits over the upper and lower teeth  (similar to a retainer or a mouth guard). This device slightly moves jaw forward, which moves the base of the tongue forward, opens the airway, improves breathing for effective treat snoring and obstructive sleep apnea in perhaps 7 out of 10 people .  The best working devices are custom-made by a dental device  after a mold is made of the teeth 1, 2, 3.  When is an oral appliance indicated?  Oral appliance therapy is recommended as a  first-line treatment for patients with primary snoring, mild sleep apnea, and for patients with moderate sleep apnea who prefer appliance therapy to use of CPAP4, 5. Severity of sleep apnea is determined by sleep testing and is based on the number of respiratory events per hour of sleep.   How successful is oral appliance therapy?  The success rate of oral appliance therapy in patients with mild sleep apnea is 75-80% while in patients with moderate sleep apnea it is 50-70%. The chance of success in patients with severe sleep apnea is 40-50%. The research also shows that oral appliances have a beneficial effect on the cardiovascular health of RACHID patients at the same magnitude as CPAP therapy7.  Oral appliances should be a second-line treatment in cases of severe sleep apnea, but if not completely successful then a combination therapy utilizing CPAP plus oral appliance therapy may be effective. Oral appliances tend to be effective in a broad range of patients although studies show that the patients who have the highest success are females, younger patients, those with milder disease, and less severe obesity. 3, 6.   Finding a dentist that practices dental sleep medicine  Specific training is available through the American Academy of Dental Sleep Medicine for dentists interested in working in the field of sleep. To find a dentist who is educated in the field of sleep and the use of oral appliances, near you, visit the Web site of the American Academy of Dental Sleep Medicine.    References  1. Gama et al. Objectively measured vs self-reported compliance during oral appliance therapy for sleep-disordered breathing. Chest 2013; 144(5): 7251-2193.  2. Betty et al. Objective measurement of compliance during oral appliance therapy for sleep-disordered breathing. Thorax 2013; 68(1): 91-96.  3. Jane et al. Mandibular advancement devices in 620 men and women with RACHID and snoring: tolerability and predictors  of treatment success. Chest 2004; 125: 7110-4022.  4. Sabrina et al. Oral appliances for snoring and RACHID: a review. Sleep 2006; 29: 244-262.  5. Bon et al. Oral appliance treatment for RACHID: an update. J Clin Sleep Med 2014; 10(2): 215-227.  6. Rocael et al. Predictors of OSAH treatment outcome. J Dent Res 2007; 86: 4973-6492.      Weight Loss:    Weight loss is a long-term strategy that may improve sleep apnea in some patients.    Weight management is a personal decision and the decision should be based on your interest and the potential benefits.  If you are interested in exploring weight loss strategies, the following discussion covers the impact on weight loss on sleep apnea and the approaches that may be successful.    Being overweight does not necessarily mean you will have health consequences.  Those who have BMI over 35 or over 27 with existing medical conditions carries greater risk.   Weight loss decreases severity of sleep apnea in most people with obesity. For those with mild obesity who have developed snoring with weight gain, even 15-30 pound weight loss can improve and occasionally eliminate sleep apnea.  Structured and life-long dietary and health habits are necessary to lose weight and keep healthier weight levels.     Though there may be significant health benefits from weight loss, long-term weight loss is very difficult to achieve- studies show success with dietary management in less than 10% of people. In addition, substantial weight loss may require years of dietary control and may be difficult if patients have severe obesity. In these cases, surgical management may be considered.  Finally, older individuals who have tolerated obesity without health complications may be less likely to benefit from weight loss strategies.      [unfilled]    Surgery:    Surgery for obstructive sleep apnea is considered generally only when other therapies fail to work. Surgery may be discussed with you  if you are having a difficult time tolerating CPAP and or when there is an abnormal structure that requires surgical correction.  Nose and throat surgeries often enlarge the airway to prevent collapse.  Most of these surgeries create pain for 1-2 weeks and up to half of the most common surgeries are not effective throughout life.  You should carefully discuss the benefits and drawbacks to surgery with your sleep provider and surgeon to determine if it is the best solution for you.   More information  Surgery for RACHID is directed at areas that are responsible for narrowing or complete obstruction of the airway during sleep.  There are a wide range of procedures available to enlarge and/or stabilize the airway to prevent blockage of breathing in the three major areas where it can occur: the palate, tongue, and nasal regions.  Successful surgical treatment depends on the accurate identification of the factors responsible for obstructive sleep apnea in each person.  A personalized approach is required because there is no single treatment that works well for everyone.  Because of anatomic variation, consultation with an examination by a sleep surgeon is a critical first step in determining what surgical options are best for each patient.  In some cases, examination during sedation may be recommended in order to guide the selection of procedures.  Patients will be counseled about risks and benefits as well as the typical recovery course after surgery. Surgery is typically not a cure for a person s RACHID.  However, surgery will often significantly improve one s RACHID severity (termed  success rate ).  Even in the absence of a cure, surgery will decrease the cardiovascular risk associated with OSA7; improve overall quality of life8 (sleepiness, functionality, sleep quality, etc).      Palate Procedures:  Patients with RACHID often have narrowing of their airway in the region of their tonsils and uvula.  The goals of palate  procedures are to widen the airway in this region as well as to help the tissues resist collapse.  Modern palate procedure techniques focus on tissue conservation and soft tissue rearrangement, rather than tissue removal.  Often the uvula is preserved in this procedure. Residual sleep apnea is common in patient after pharyngoplasty with an average reduction in sleep apnea events of 33%2.      Tongue Procedures:  ExamWhile patients are awake, the muscles that surround the throat are active and keep this region open for breathing. These muscles relax during sleep, allowing the tongue and other structures to collapse and block breathing.  There are several different tongue procedures available.  Selection of a tongue base procedure depends on characteristics seen on physical exam.  Generally, procedures are aimed at removing bulky tissues in this area or preventing the back of the tongue from falling back during sleep.  Success rates for tongue surgery range from 50-62%3.    Hypoglossal Nerve Stimulation:  Hypoglossal nerve stimulation has recently received approval from the [place] Food and Drug Administration for the treatment of obstructive sleep apnea.  This is based on research showing that the system was safe and effective in treating sleep apnea6.  Results showed that the median AHI score decreased 68%, from 29.3 to 9.0. This therapy uses an implant system that senses breathing patterns and delivers mild stimulation to airway muscles, which keeps the airway open during sleep.  The system consists of three fully implanted components: a small generator (similar in size to a pacemaker), a breathing sensor, and a stimulation lead.  Using a small handheld remote, a patient turns the therapy on before bed and off upon awakening.    Candidates for this device must be greater than 22 years of age, have moderate to severe RACHID (AHI between 20-65), BMI less than 32, have tried CPAP/oral appliance without success, and  have appropriate upper airway anatomy (determined by a sleep endoscopy performed by Dr. Cameron).    Hypoglossal Nerve Stimulation Pathway:    The sleep surgeon s office will work with the patient through the insurance prior-authorization process (including communications and appeals).    Nasal Procedures:  Nasal obstruction can interfere with nasal breathing during the day and night.  Studies have shown that relief of nasal obstruction can improve the ability of some patients to tolerate positive airway pressure therapy for obstructive sleep apnea1.  Treatment options include medications such as nasal saline, topical corticosteroid and antihistamine sprays, and oral medications such as antihistamines or decongestants. Non-surgical treatments can include external nasal dilators for selected patients. If these are not successful by themselves, surgery can improve the nasal airway either alone or in combination with these other options.      Combination Procedures:  Combination of surgical procedures and other treatments may be recommended, particularly if patients have more than one area of narrowing or persistent positional disease.  The success rate of combination surgery ranges from 66-80%2,3.    References  Raffy RODRIGUEZ. The Role of the Nose in Snoring and Obstructive Sleep Apnoea: An Update.  Eur Arch Otorhinolaryngol. 2011; 268: 1365-73.   Capperla SM; Yordy JA; Marcelo JR; Pallanch JF; Johny MB; Rinku SG; Rachael TAFOYA. Surgical modifications of the upper airway for obstructive sleep apnea in adults: a systematic review and meta-analysis. SLEEP 2010;33(10):7398-8243. Inderjit DANIEL. Hypopharyngeal surgery in obstructive sleep apnea: an evidence-based medicine review.  Arch Otolaryngol Head Neck Surg. 2006 Feb;132(2):206-13.  Amandeep YH1, Scott Y, Kwasi DARY. The efficacy of anatomically based multilevel surgery for obstructive sleep apnea. Otolaryngol Head Neck Surg. 2003 Oct;129(4):327-35.  Inderjit DANIEL, Goldberg A.  Hypopharyngeal Surgery in Obstructive Sleep Apnea: An Evidence-Based Medicine Review. Arch Otolaryngol Head Neck Surg. 2006 Feb;132(2):206-13.  Sharyn GARCIA et al. Upper-Airway Stimulation for Obstructive Sleep Apnea.  N Engl J Med. 2014 Jan 9;370(2):139-49.  Willis Y et al. Increased Incidence of Cardiovascular Disease in Middle-aged Men with Obstructive Sleep Apnea. Am J Respir Crit Care Med; 2002 166: 159-165  Manzo EM et al. Studying Life Effects and Effectiveness of Palatopharyngoplasty (SLEEP) study: Subjective Outcomes of Isolated Uvulopalatopharyngoplasty. Otolaryngol Head Neck Surg. 2011; 144: 623-631.        WHAT IF I ONLY HAVE SNORING?    Mandibular advancement devices, lateral sleep positioning, long-term weight loss and treatment of nasal allergies have been shown to improve snoring.  Exercising tongue muscles with a game (https://www.ncbi.nlm.nih.gov/pubmed/21383418) or stimulating the tongue during the day with a device (https://doi.org/10.3390/wgp51394154) have improved snoring in some individuals.    Remember to Drive Safe... Drive Alive     Sleep health profoundly affects your health, mood, and your safety.  Thirty three percent of the population (one in three of us) is not getting enough sleep and many have a sleep disorder. Not getting enough sleep or having an untreated / undertreated sleep condition may make us sleepy without even knowing it. In fact, our driving could be dramatically impaired due to our sleep health. As your provider, here are some things I would like you to know about driving:     Here are some warning signs for impairment and dangerous drowsy driving:              -Having been awake more than 16 hours               -Looking tired               -Eyelid drooping              -Head nodding (it could be too late at this point)              -Driving for more than 30 minutes     Some things you could do to make the driving safer if you are experiencing some drowsiness:               -Stop driving and rest              -Call for transportation              -Make sure your sleep disorder is adequately treated     Some things that have been shown NOT to work when experiencing drowsiness while driving:              -Turning on the radio              -Opening windows              -Eating any  distracting  /  entertaining  foods (e.g., sunflower seeds, candy, or any other)              -Talking on the phone      Your decision may not only impact your life, but also the life of others. Please, remember to drive safe for yourself and all of us.

## 2022-07-12 DIAGNOSIS — M10.9 GOUT OF MULTIPLE SITES, UNSPECIFIED CAUSE, UNSPECIFIED CHRONICITY: ICD-10-CM

## 2022-07-14 ENCOUNTER — MYC REFILL (OUTPATIENT)
Dept: INTERNAL MEDICINE | Facility: CLINIC | Age: 47
End: 2022-07-14

## 2022-07-14 DIAGNOSIS — M10.9 GOUT OF MULTIPLE SITES, UNSPECIFIED CAUSE, UNSPECIFIED CHRONICITY: ICD-10-CM

## 2022-07-14 RX ORDER — ALLOPURINOL 100 MG/1
TABLET ORAL
Qty: 180 TABLET | Refills: 1 | Status: SHIPPED | OUTPATIENT
Start: 2022-07-14 | End: 2022-11-09

## 2022-07-14 NOTE — TELEPHONE ENCOUNTER
Routing refill request to provider for review/approval because:  Failed protocol due to:    CBC on file in past 12 months    ALT on file in past 12 months    Has Uric Acid on file in past 12 months and value is less than 6     Caitlin Lopez RN

## 2022-07-17 RX ORDER — ALLOPURINOL 100 MG/1
300 TABLET ORAL DAILY
OUTPATIENT
Start: 2022-07-17

## 2022-07-17 NOTE — TELEPHONE ENCOUNTER
This is a duplicate refill request, that has been refused to the pharmacy.   Reed Alford RN  [facility] Triage Nurse

## 2022-07-29 ENCOUNTER — OFFICE VISIT (OUTPATIENT)
Dept: DERMATOLOGY | Facility: CLINIC | Age: 47
End: 2022-07-29
Payer: COMMERCIAL

## 2022-07-29 DIAGNOSIS — D18.01 ANGIOMA OF SKIN: ICD-10-CM

## 2022-07-29 DIAGNOSIS — L82.1 SEBORRHEIC KERATOSIS: ICD-10-CM

## 2022-07-29 DIAGNOSIS — D22.9 NEVUS: Primary | ICD-10-CM

## 2022-07-29 DIAGNOSIS — L81.4 LENTIGO: ICD-10-CM

## 2022-07-29 PROCEDURE — 99213 OFFICE O/P EST LOW 20 MIN: CPT | Performed by: PHYSICIAN ASSISTANT

## 2022-07-29 ASSESSMENT — PAIN SCALES - GENERAL: PAINLEVEL: NO PAIN (0)

## 2022-07-29 NOTE — LETTER
7/29/2022         RE: Abhijit Garcia  [address] [facility] [facility] [address]        Dear Colleague,    Thank you for referring your patient, Abhijit Garcia, to the [facility]. Please see a copy of my visit note below.    HPI:   Chief complaints: Abhijit Garcia is a pleasant 47 year old male who presents for Full skin cancer screening to rule out skin cancer   Last Skin Exam: n/a      1st Baseline: yes  Personal HX of Skin Cancer: no   Personal HX of Malignant Melanoma: no   Family HX of Skin Cancer / Malignant Melanoma: yes   Personal HX of Atypical Moles:   no  Risk factors: history of sun exposure and burns  New / Changing lesions:yes few bumps; would like to make sure none are flat warts  Social History: has 2 sons; from [place] originally  On review of systems, there are no further skin complaints, patient is feeling otherwise well.   ROS of the following were done and are negative: Constitutional, Eyes, Ears, Nose,   Mouth, Throat, Cardiovascular, Respiratory, GI, Genitourinary, Musculoskeletal,   Psychiatric, Endocrine, Allergic/Immunologic.    PHYSICAL EXAM:   There were no vitals taken for this visit.  Skin exam performed as follows: Type 2 skin. Mood appropriate  Alert and Oriented X 3. Well developed, well nourished in no distress.  General appearance: Normal  Head including face: Normal  Eyes: conjunctiva and lids: Normal  Mouth: Lips, teeth, gums: Normal  Neck: Normal  Chest-breast/axillae: Normal  Back: Normal  Spleen and liver: Normal  Cardiovascular: Exam of peripheral vascular system by observation for swelling, varicosities, edema: Normal  Genitalia: groin, buttocks: Normal  Extremities: digits/nails (clubbing): Normal  Eccrine and Apocrine glands: Normal  Right upper extremity: Normal  Left upper extremity: Normal  Right lower extremity: Normal  Left lower extremity: Normal  Skin: Scalp and body hair: See below    Pt deferred exam of breasts,  groin, buttocks: No    Other physical findings:  1. Multiple pigmented macules on extremities and trunk  2. Multiple pigmented macules on face, trunk and extremities  3. Multiple vascular papules on trunk, arms and legs  4. Multiple scattered keratotic plaques       Except as noted above, no other signs of skin cancer or melanoma.     ASSESSMENT/PLAN:   Benign Full skin cancer screening today. . Patient with history of none  Advised on monthly self exams and 1 year  Patient Education: Appropriate brochures given.    1. Multiple benign appearing melanocytic nevi on arms, legs and trunk. Discussed ABCDEs of melanoma and sunscreen.   2. Multiple lentigos on arms, legs and trunk. Advised benign, no treatment needed.  3. Multiple scattered angiomas. Advised benign, no treatment needed.   4. Seborrheic keratosis on arms, legs and trunk. Advised benign, no treatment needed.              Follow-up: yearly/PRN sooner    1.) Patient was asked about new and changing moles. YES  2.) Patient received a complete physical skin examination: YES  3.) Patient was counseled to perform a monthly self skin examination: YES  Scribed By: Shanthi Red, MS, PACaroC          Again, thank you for allowing me to participate in the care of your patient.        Sincerely,        Shanthi Red PA-C

## 2022-07-29 NOTE — PROGRESS NOTES
HPI:   Chief complaints: Abhijit Garcia is a pleasant 47 year old male who presents for Full skin cancer screening to rule out skin cancer   Last Skin Exam: n/a      1st Baseline: yes  Personal HX of Skin Cancer: no   Personal HX of Malignant Melanoma: no   Family HX of Skin Cancer / Malignant Melanoma: yes   Personal HX of Atypical Moles:   no  Risk factors: history of sun exposure and burns  New / Changing lesions:yes few bumps; would like to make sure none are flat warts  Social History: has 2 sons; from [place] originally  On review of systems, there are no further skin complaints, patient is feeling otherwise well.   ROS of the following were done and are negative: Constitutional, Eyes, Ears, Nose,   Mouth, Throat, Cardiovascular, Respiratory, GI, Genitourinary, Musculoskeletal,   Psychiatric, Endocrine, Allergic/Immunologic.    PHYSICAL EXAM:   There were no vitals taken for this visit.  Skin exam performed as follows: Type 2 skin. Mood appropriate  Alert and Oriented X 3. Well developed, well nourished in no distress.  General appearance: Normal  Head including face: Normal  Eyes: conjunctiva and lids: Normal  Mouth: Lips, teeth, gums: Normal  Neck: Normal  Chest-breast/axillae: Normal  Back: Normal  Spleen and liver: Normal  Cardiovascular: Exam of peripheral vascular system by observation for swelling, varicosities, edema: Normal  Genitalia: groin, buttocks: Normal  Extremities: digits/nails (clubbing): Normal  Eccrine and Apocrine glands: Normal  Right upper extremity: Normal  Left upper extremity: Normal  Right lower extremity: Normal  Left lower extremity: Normal  Skin: Scalp and body hair: See below    Pt deferred exam of breasts, groin, buttocks: No    Other physical findings:  1. Multiple pigmented macules on extremities and trunk  2. Multiple pigmented macules on face, trunk and extremities  3. Multiple vascular papules on trunk, arms and legs  4. Multiple scattered keratotic plaques       Except  as noted above, no other signs of skin cancer or melanoma.     ASSESSMENT/PLAN:   Benign Full skin cancer screening today. . Patient with history of none  Advised on monthly self exams and 1 year  Patient Education: Appropriate brochures given.    1. Multiple benign appearing melanocytic nevi on arms, legs and trunk. Discussed ABCDEs of melanoma and sunscreen.   2. Multiple lentigos on arms, legs and trunk. Advised benign, no treatment needed.  3. Multiple scattered angiomas. Advised benign, no treatment needed.   4. Seborrheic keratosis on arms, legs and trunk. Advised benign, no treatment needed.              Follow-up: yearly/PRN sooner    1.) Patient was asked about new and changing moles. YES  2.) Patient received a complete physical skin examination: YES  3.) Patient was counseled to perform a monthly self skin examination: YES  Scribed By: Shanthi Red MS, PA-C

## 2022-09-08 ENCOUNTER — MYC MEDICAL ADVICE (OUTPATIENT)
Dept: INTERNAL MEDICINE | Facility: CLINIC | Age: 47
End: 2022-09-08

## 2022-09-08 DIAGNOSIS — M10.9 GOUT OF MULTIPLE SITES, UNSPECIFIED CAUSE, UNSPECIFIED CHRONICITY: Primary | ICD-10-CM

## 2022-09-08 NOTE — TELEPHONE ENCOUNTER
Dr. Oh,    Patient asking for an updated script for his Allopurinol. Per labs completed 5/5/22, Allopurinol dose increased to 300 mg. Uric acid lab also pended for review.     Caitlin Lopez RN

## 2022-09-09 RX ORDER — ALLOPURINOL 300 MG/1
300 TABLET ORAL DAILY
Qty: 90 TABLET | Refills: 3 | Status: SHIPPED | OUTPATIENT
Start: 2022-09-09 | End: 2023-01-20

## 2022-09-12 ENCOUNTER — TELEPHONE (OUTPATIENT)
Dept: SLEEP MEDICINE | Facility: CLINIC | Age: 47
End: 2022-09-12

## 2022-09-12 DIAGNOSIS — G47.52 DREAM ENACTMENT BEHAVIOR: ICD-10-CM

## 2022-09-12 DIAGNOSIS — I10 ESSENTIAL HYPERTENSION: ICD-10-CM

## 2022-09-12 DIAGNOSIS — E66.01 MORBID OBESITY (H): ICD-10-CM

## 2022-09-12 DIAGNOSIS — R06.83 SNORING: Primary | ICD-10-CM

## 2022-09-12 DIAGNOSIS — G47.8 UNREFRESHED BY SLEEP: ICD-10-CM

## 2022-09-12 NOTE — TELEPHONE ENCOUNTER
Received following email please review:    Peer to Peer Request    Patient Name:                        Abhijit Garcia  :                                      1975  MRN:                                      [id number]        Brea Gonzalez PA-C,    The authorization for procedure (description) [facility] on date of service 2022  has been denied. We were unsuccessful in obtaining approval through clinical review. A amub-bd-vatw review can be done by calling the insurance/third party authorization vendor with the following information:    Insurance:       [employer]  Auth Vendor:   [employer]  Phone:             [phone]  Due:                ASAP    Clinicals already Provided  Order:                          N/A  Visit Notes:                  SLEEP OV 2022  Results:                       N/A  Other:                          N/A    Patient ID:       [id number]  Case/Ref #:     [id number]    Denial Reason: Not medically necessary, The clinical reason for our determination is:  A sleep test done at a sleep facility has been requested on your behalf. We reviewed office  notes from your doctor. These show that you may have sleep apnea. In this condition there  are breathing pauses during sleep. We reviewed your health plan medical policy on sleep  study testing. You do not have the health problems that would require that the sleep test to  be done in a sleep facility rather than in your home. Therefore, the requested service is not a  covered benefit because it is not medically necessary. However, you may be eligible to have  this testing done in your home. Please discuss this with your doctor.        If you feel you have additional clinical information that could get this denial overturned, please complete the Peer to Peer.  If you choose not to do the P2P, please let me know as soon as possible so that we can reach out to the patient and advise them of their  denial.      Thank you,      Crystal Escamilla  Financial  [facility]  [facility]   [address] [place] [id number]  [email or web address]  [email or web address]   Office: [phone]  Fax: [phone]  Connect with [facility] on social media.

## 2022-10-10 DIAGNOSIS — B00.9 HERPES SIMPLEX VIRUS (HSV) INFECTION: ICD-10-CM

## 2022-10-11 ENCOUNTER — OFFICE VISIT (OUTPATIENT)
Dept: SLEEP MEDICINE | Facility: CLINIC | Age: 47
End: 2022-10-11
Payer: COMMERCIAL

## 2022-10-11 DIAGNOSIS — R06.83 SNORING: ICD-10-CM

## 2022-10-11 DIAGNOSIS — I10 ESSENTIAL HYPERTENSION: ICD-10-CM

## 2022-10-11 DIAGNOSIS — G47.8 UNREFRESHED BY SLEEP: ICD-10-CM

## 2022-10-11 DIAGNOSIS — G47.52 DREAM ENACTMENT BEHAVIOR: ICD-10-CM

## 2022-10-11 DIAGNOSIS — E66.01 MORBID OBESITY (H): ICD-10-CM

## 2022-10-11 PROCEDURE — G0399 HOME SLEEP TEST/TYPE 3 PORTA: HCPCS | Performed by: INTERNAL MEDICINE

## 2022-10-12 ENCOUNTER — DOCUMENTATION ONLY (OUTPATIENT)
Dept: SLEEP MEDICINE | Facility: CLINIC | Age: 47
End: 2022-10-12
Payer: COMMERCIAL

## 2022-10-12 NOTE — PROGRESS NOTES
This HSAT was performed using a Noxturnal T3 device which recorded snore, sound, movement activity, body position, nasal pressure, oronasal thermal airflow, pulse, oximetry and both chest and abdominal respiratory effort. HSAT data was restricted to the time patient states they were in bed.     HSAT was scored using 1B 4% hypopnea rule.     HST AHI (Non-PAT): 23.5  Snoring was reported as moderate and loud.  Time with SpO2 below 89% was 6.2 minutes.   Overall signal quality was good     Pt will follow up with sleep provider to determine appropriate therapy.

## 2022-10-12 NOTE — PROGRESS NOTES
HST POST-STUDY QUESTIONNAIRE    1. What time did you go to bed?  11:10pm  2. How long do you think it took to fall asleep?  10 mins  3. What time did you wake up to start the day?  Alarm at 6:00am, snoozed a few times up at 6:25am  4. Did you get up during the night at all?  No  5. If you woke up, do you remember approximately what time(s)?   6. Did you have any difficulty with the equipment?  Yes Had a little trouble with the nose tube but figured it out.  7. Did you us any type of treatment with this study?  None  8. Was the head of the bed elevated? No  9. Did you sleep in a recliner?  No  10. Did you stop using CPAP at least 3 days before this test?  NA  11. Any other information you'd like us to know? I slept better with the equipment on then I have slept in 6 months. I woke up without a sore throat, headache, or brain fog. My wife said this morning I didn't snore at all, for the first time in months. I'm very disappointed that my body did not demonstrate any of my typical sleep issues. The only changes from my normal bedtime routine were: went to bed at 11 pm, instead of closer to past midnight. Once in bed I spent no time on my phone surfing the web. I slept with a t-shirt on, no other changes to my normal routine.

## 2022-10-12 NOTE — PROCEDURES
"HOME SLEEP STUDY INTERPRETATION        Patient: Abhijit Garcia  MRN: [id number]  YOB: 1975  Study Date: 10/11/2022  PCP/Referring Provider: Eriberto Oh;   Ordering Provider: Brea Gonzalez PA-C         Indications for Home Study: Abhijit Garcia is a 47 year old male who presents with symptoms suggestive of obstructive sleep apnea.    Estimated body mass index is 43.68 kg/m  as calculated from the following:    Height as of 7/6/22: 1.854 m (6' 0.99\").    Weight as of 7/6/22: 150.1 kg (331 lb).  Total score - [place]: 6 (7/6/2022 10:00 AM)      Data: A full night home sleep study was performed recording the standard physiologic parameters including body position, movement, sound, nasal pressure, thermal oral airflow, chest and abdominal movements with respiratory inductance plethysmography, and oxygen saturation by pulse oximetry. Pulse rate was estimated by oximetry recording. This study was considered adequate based on > 4 hours of quality oximetry and respiratory recording. As specified by the AASM Manual for the Scoring of Sleep and Associated events, version 2.3, Rule VIII.D 1B, 4% oxygen desaturation scoring for hypopneas is used as a standard of care on all home sleep apnea testing.        Analysis Time:  434.5 minutes        Respiration:   Sleep Associated Hypoxemia: sustained hypoxemia was present. Baseline oxygen saturation was 95%.  Time with saturation less than or equal to 88% was 6.2 minutes. The lowest oxygen saturation was 82%.   Snoring: Snoring was present.  Respiratory events: The home study revealed a presence of 46 obstructive apneas and 4 mixed and central apneas. There were 120 hypopneas resulting in a combined apnea/hypopnea index [AHI] of 23.5 events per hour.  AHI was 23.8 per hour supine, N/A per hour prone, 48.5 per hour on left side, and 8.6 per hour on right side.   Pattern: Excluding events noted above, respiratory rate and pattern was " Normal.      Position: Percent of time spent: supine - 88.2%, prone - 0%, on left - 3.7%, on right - 8%.      Heart Rate: By pulse oximetry normal rate was noted.       Assessment:     Moderate obstructive sleep apnea.    Sleep associated hypoxemia was present.    Recommendations:    CPAP therapy is the treatment of choice for moderate obstructive sleep apnea. Consider auto-CPAP at 5-15 cmH2O with appropriate clinical follow up to monitor therapy.     If CPAP is not tolerated or accepted, patient can consider oral appliance therapy through referral to dental sleep medicine.     Suggest optimizing sleep hygiene and avoiding sleep deprivation.    Weight management.        Diagnosis Code(s): Obstructive Sleep Apnea G47.33, Hypoxemia G47.36    Yousif Guzman MD, October 12, 2022   Diplomate, American Board of Psychiatry and Neurology, Sleep Medicine

## 2022-10-13 RX ORDER — VALACYCLOVIR HYDROCHLORIDE 1 G/1
TABLET, FILM COATED ORAL
Qty: 8 TABLET | Refills: 0 | Status: SHIPPED | OUTPATIENT
Start: 2022-10-13 | End: 2023-01-17

## 2022-10-13 NOTE — TELEPHONE ENCOUNTER
Prescription approved per [facility] Refill Protocol.  Reed Alford RN  Critical access hospital Triage Nurse

## 2022-11-03 ASSESSMENT — SLEEP AND FATIGUE QUESTIONNAIRES
HOW LIKELY ARE YOU TO NOD OFF OR FALL ASLEEP WHILE SITTING INACTIVE IN A PUBLIC PLACE: WOULD NEVER DOZE
HOW LIKELY ARE YOU TO NOD OFF OR FALL ASLEEP WHILE SITTING AND TALKING TO SOMEONE: WOULD NEVER DOZE
HOW LIKELY ARE YOU TO NOD OFF OR FALL ASLEEP WHILE SITTING QUIETLY AFTER LUNCH WITHOUT ALCOHOL: WOULD NEVER DOZE
HOW LIKELY ARE YOU TO NOD OFF OR FALL ASLEEP WHILE LYING DOWN TO REST IN THE AFTERNOON WHEN CIRCUMSTANCES PERMIT: HIGH CHANCE OF DOZING
HOW LIKELY ARE YOU TO NOD OFF OR FALL ASLEEP WHEN YOU ARE A PASSENGER IN A CAR FOR AN HOUR WITHOUT A BREAK: MODERATE CHANCE OF DOZING
HOW LIKELY ARE YOU TO NOD OFF OR FALL ASLEEP WHILE WATCHING TV: MODERATE CHANCE OF DOZING
HOW LIKELY ARE YOU TO NOD OFF OR FALL ASLEEP WHILE SITTING AND READING: MODERATE CHANCE OF DOZING
HOW LIKELY ARE YOU TO NOD OFF OR FALL ASLEEP IN A CAR, WHILE STOPPED FOR A FEW MINUTES IN TRAFFIC: WOULD NEVER DOZE

## 2022-11-09 ENCOUNTER — OFFICE VISIT (OUTPATIENT)
Dept: SLEEP MEDICINE | Facility: CLINIC | Age: 47
End: 2022-11-09
Payer: COMMERCIAL

## 2022-11-09 VITALS
HEART RATE: 79 BPM | DIASTOLIC BLOOD PRESSURE: 89 MMHG | WEIGHT: 315 LBS | SYSTOLIC BLOOD PRESSURE: 130 MMHG | OXYGEN SATURATION: 99 % | BODY MASS INDEX: 41.75 KG/M2 | HEIGHT: 73 IN

## 2022-11-09 DIAGNOSIS — I10 ESSENTIAL HYPERTENSION: ICD-10-CM

## 2022-11-09 DIAGNOSIS — G47.33 OSA (OBSTRUCTIVE SLEEP APNEA): Primary | ICD-10-CM

## 2022-11-09 DIAGNOSIS — G47.34 NOCTURNAL HYPOXEMIA: ICD-10-CM

## 2022-11-09 DIAGNOSIS — E66.01 MORBID OBESITY (H): ICD-10-CM

## 2022-11-09 PROCEDURE — 99214 OFFICE O/P EST MOD 30 MIN: CPT | Performed by: PHYSICIAN ASSISTANT

## 2022-11-09 NOTE — PATIENT INSTRUCTIONS
"HOME SLEEP STUDY INTERPRETATION           Patient: Abhijit Garcia  MRN: [id number]  YOB: 1975  Study Date: 10/11/2022  PCP/Referring Provider: Eriberto Oh;   Ordering Provider: Brea Gonzalez PA-C           Indications for Home Study: Abhijit Garcia is a 47 year old male who presents with symptoms suggestive of obstructive sleep apnea.     Estimated body mass index is 43.68 kg/m  as calculated from the following:    Height as of 7/6/22: 1.854 m (6' 0.99\").    Weight as of 7/6/22: 150.1 kg (331 lb).  Total score - [place]: 6 (7/6/2022 10:00 AM)        Data: A full night home sleep study was performed recording the standard physiologic parameters including body position, movement, sound, nasal pressure, thermal oral airflow, chest and abdominal movements with respiratory inductance plethysmography, and oxygen saturation by pulse oximetry. Pulse rate was estimated by oximetry recording. This study was considered adequate based on > 4 hours of quality oximetry and respiratory recording. As specified by the AASM Manual for the Scoring of Sleep and Associated events, version 2.3, Rule VIII.D 1B, 4% oxygen desaturation scoring for hypopneas is used as a standard of care on all home sleep apnea testing.           Analysis Time:  434.5 minutes           Respiration:   Sleep Associated Hypoxemia: sustained hypoxemia was present. Baseline oxygen saturation was 95%.  Time with saturation less than or equal to 88% was 6.2 minutes. The lowest oxygen saturation was 82%.   Snoring: Snoring was present.  Respiratory events: The home study revealed a presence of 46 obstructive apneas and 4 mixed and central apneas. There were 120 hypopneas resulting in a combined apnea/hypopnea index [AHI] of 23.5 events per hour.  AHI was 23.8 per hour supine, N/A per hour prone, 48.5 per hour on left side, and 8.6 per hour on right side.   Pattern: Excluding events noted above, respiratory rate and pattern was " Normal.        Position: Percent of time spent: supine - 88.2%, prone - 0%, on left - 3.7%, on right - 8%.        Heart Rate: By pulse oximetry normal rate was noted.         Assessment:   Moderate obstructive sleep apnea.  Sleep associated hypoxemia was present.     Recommendations:  CPAP therapy is the treatment of choice for moderate obstructive sleep apnea. Consider auto-CPAP at 5-15 cmH2O with appropriate clinical follow up to monitor therapy.   If CPAP is not tolerated or accepted, patient can consider oral appliance therapy through referral to dental sleep medicine.   Suggest optimizing sleep hygiene and avoiding sleep deprivation.  Weight management.           Diagnosis Code(s): Obstructive Sleep Apnea G47.33, Hypoxemia G47.36     Yousif Guzman MD, October 12, 2022   Diplomate, American Board of Psychiatry and Neurology, Sleep Medicine

## 2022-11-09 NOTE — NURSING NOTE
"Chief Complaint   Patient presents with     Sleep Problem     Follow up home sleep study       Initial /89   Pulse 79   Ht 1.854 m (6' 0.99\")   Wt 149.4 kg (329 lb 6.4 oz)   SpO2 99%   BMI 43.47 kg/m   Estimated body mass index is 43.47 kg/m  as calculated from the following:    Height as of this encounter: 1.854 m (6' 0.99\").    Weight as of this encounter: 149.4 kg (329 lb 6.4 oz).    Medication Reconciliation: complete  ESS 9  TENNILLE 7  Kamilah Stephens MA    "
DISPLAY PLAN FREE TEXT

## 2022-11-09 NOTE — PROGRESS NOTES
[facility]   Outpatient Sleep Medicine  Nov 9, 2022       Name: Abhijit Garcia MRN# [id number]   Age: 47 year old YOB: 1975            Assessment and Plan:   1. RACHID (obstructive sleep apnea)  2. Nocturnal hypoxemia  3. Essential hypertension  4. Morbid obesity (H)    During this visit, we reviewed the summary of the study including position, event distribution, oximetry and heart rate. Moderate obstructive sleep apnea was identified with AHI 23.5 events per hour.  Sleep associated hypoxemia was present with 6.2 minutes spent less than or equal to 88%, oxygen mely 82%.    I reviewed the diagnosis of obstructive sleep apnea with patient.  We discussed consequences of untreated Obstructive Sleep Apnea, such as markedly elevated risk for heart attack or stroke, and benefits of treatment. He is interested in starting treatment of RACHID with PAP therapy. Order placed today to start autoCPAP [id number]. Will be enrolled in [facility].   - Comprehensive DME    Additionally, discussed weight loss can lead to improvement in sleep apnea severity. Patient to work on weight loss with goal of 280#. Discussed with significant weight loss like this he may not require PAP therapy as weight loss can be curative of RACHID in many individuals. Plans to follow-up with PCP for help in weight management.     He will be seen back approximately 2 months after starting PAP therapy to ensure compliance and review treatment outcomes.  However, if he develops any difficulties with treatment he is instructed to contact us or DME company immediately to troubleshoot problems.         Chief Complaint      Chief Complaint   Patient presents with     Sleep Problem     Follow up home sleep study            History of Present Illness:   Abhijit Garcia is a 47 year old male who presents to the clinic for results of recent sleep study completed on 10/11/22. Study was completed to evaluate for obstructive sleep apnea.   "    Reviewed results of sleep study with patient as follows:    HOME SLEEP STUDY INTERPRETATION   Estimated body mass index is 43.68 kg/m  as calculated from the following:    Height as of 7/6/22: 1.854 m (6' 0.99\").    Weight as of 7/6/22: 150.1 kg (331 lb).  Total score - [place]: 6 (7/6/2022 10:00 AM)     Analysis Time:  434.5 minutes      Respiration:   Sleep Associated Hypoxemia: sustained hypoxemia was present. Baseline oxygen saturation was 95%.  Time with saturation less than or equal to 88% was 6.2 minutes. The lowest oxygen saturation was 82%.   Snoring: Snoring was present.  Respiratory events: The home study revealed a presence of 46 obstructive apneas and 4 mixed and central apneas. There were 120 hypopneas resulting in a combined apnea/hypopnea index [AHI] of 23.5 events per hour.  AHI was 23.8 per hour supine, N/A per hour prone, 48.5 per hour on left side, and 8.6 per hour on right side.   Pattern: Excluding events noted above, respiratory rate and pattern was Normal.     Position: Percent of time spent: supine - 88.2%, prone - 0%, on left - 3.7%, on right - 8%.     Heart Rate: By pulse oximetry normal rate was noted.      Past medical/surgical history, family history, social history, medications and allergies were reviewed.           Physical Examination:   /89   Pulse 79   Ht 1.854 m (6' 0.99\")   Wt 149.4 kg (329 lb 6.4 oz)   SpO2 99%   BMI 43.47 kg/m    General appearance: Awake, alert, cooperative. Well groomed. Sitting comfortably in chair. In no apparent distress.  HEENT: Head: Normocephalic, atraumatic. Eyes:Conjunctiva clear. Sclera normal. Remainder of face covered by mask.   Pulmonary:  Able to speak easily in full sentences. No cough or wheeze.   Skin:  No rashes or significant lesions on visible skin.   Neurologic: Alert, oriented x3.   Psychiatric: Mood euthymic. Affect congruent with full range and intensity.      CC:  Eriberto Oh PA-C  Nov 9, 2022 "     [facility]  [address] , [place] [address]     [facility]  [address] [facility], [place] [address]    Chart documentation was completed, in part, with Pix[employer] voice-recognition software. Even though reviewed, some grammatical, spelling, and word errors may remain.      35 minutes spent on day of encounter doing chart review, history and exam, documentation, and further activities as noted above

## 2022-11-11 ENCOUNTER — LAB (OUTPATIENT)
Dept: LAB | Facility: CLINIC | Age: 47
End: 2022-11-11
Payer: COMMERCIAL

## 2022-11-11 DIAGNOSIS — M10.9 GOUT OF MULTIPLE SITES, UNSPECIFIED CAUSE, UNSPECIFIED CHRONICITY: ICD-10-CM

## 2022-11-11 LAB — URATE SERPL-MCNC: 7.2 MG/DL (ref 3.5–7.2)

## 2022-11-11 PROCEDURE — 36415 COLL VENOUS BLD VENIPUNCTURE: CPT

## 2022-11-11 PROCEDURE — 84550 ASSAY OF BLOOD/URIC ACID: CPT

## 2022-11-21 ENCOUNTER — TELEPHONE (OUTPATIENT)
Dept: SLEEP MEDICINE | Facility: CLINIC | Age: 47
End: 2022-11-21

## 2022-12-09 ENCOUNTER — DOCUMENTATION ONLY (OUTPATIENT)
Dept: SLEEP MEDICINE | Facility: CLINIC | Age: 47
End: 2022-12-09
Payer: COMMERCIAL

## 2022-12-09 DIAGNOSIS — G47.33 OBSTRUCTIVE SLEEP APNEA (ADULT) (PEDIATRIC): Primary | ICD-10-CM

## 2022-12-09 NOTE — PROGRESS NOTES
Patient was offered choice of vendor and chose [facility].  Patient Abhijit Garcia was set up at [facility] on December 9, 2022. Patient received a Resmed Airsense 11 Pressures were set at 5-15 cm H2O.   Patient s ramp is 5 cm H2O for Auto and FLEX/EPR is EPR, 2.  Patient received a Resmed Mask name: Airtouch F20  Full Face mask size Large, heated tubing and heated humidifier.  Patient does not need to meet compliance. Patient has a follow up on TBD with Brea Gonzalez PA-C .    Shanetlle Argueta

## 2022-12-12 ENCOUNTER — DOCUMENTATION ONLY (OUTPATIENT)
Dept: SLEEP MEDICINE | Facility: CLINIC | Age: 47
End: 2022-12-12
Payer: COMMERCIAL

## 2022-12-12 NOTE — PROGRESS NOTES
3 day Sleep therapy management telephone visit    Diagnostic AHI:  23.5 HST    Confirmed with patient at time of call- N/A Patient is still interested in STM service       Message left for patient to return call    Order settings:  CPAP MIN CPAP MAX   5 cm H2O 15 cm H2O       Device settings:  CPAP MIN CPAP MAX EPR RESMED SOFT RESPONSE SETTING   5 cm  H20 15 cm  H20 2 OFF       Compliance 0 %    Assessment: No usage but has account in [facility]       Action plan: Patient to have 14 day STM visit. Patient has a follow up visit scheduled:   no and not required by insurance    Replacement device: No  STM ordered by provider: Yes     Total time spent on accessing and  interpreting remote patient PAP therapy data  10 minutes    Total time spent counseling, coaching  and reviewing PAP therapy data with patient  1 minutes    [id number] no

## 2022-12-23 ENCOUNTER — OFFICE VISIT (OUTPATIENT)
Dept: URGENT CARE | Facility: URGENT CARE | Age: 47
End: 2022-12-23
Payer: COMMERCIAL

## 2022-12-23 ENCOUNTER — ANCILLARY PROCEDURE (OUTPATIENT)
Dept: GENERAL RADIOLOGY | Facility: CLINIC | Age: 47
End: 2022-12-23
Attending: NURSE PRACTITIONER
Payer: COMMERCIAL

## 2022-12-23 VITALS
OXYGEN SATURATION: 96 % | TEMPERATURE: 98.8 F | SYSTOLIC BLOOD PRESSURE: 126 MMHG | DIASTOLIC BLOOD PRESSURE: 76 MMHG | RESPIRATION RATE: 18 BRPM | HEART RATE: 99 BPM

## 2022-12-23 DIAGNOSIS — J40 BRONCHITIS: Primary | ICD-10-CM

## 2022-12-23 DIAGNOSIS — R05.1 ACUTE COUGH: ICD-10-CM

## 2022-12-23 DIAGNOSIS — J45.21 MILD INTERMITTENT REACTIVE AIRWAY DISEASE WITH ACUTE EXACERBATION: ICD-10-CM

## 2022-12-23 LAB
BASOPHILS # BLD AUTO: 0 10E3/UL (ref 0–0.2)
BASOPHILS NFR BLD AUTO: 0 %
EOSINOPHIL # BLD AUTO: 0.4 10E3/UL (ref 0–0.7)
EOSINOPHIL NFR BLD AUTO: 4 %
ERYTHROCYTE [DISTWIDTH] IN BLOOD BY AUTOMATED COUNT: 12.5 % (ref 10–15)
FLUAV AG SPEC QL IA: NEGATIVE
FLUBV AG SPEC QL IA: NEGATIVE
HCT VFR BLD AUTO: 43.9 % (ref 40–53)
HGB BLD-MCNC: 15 G/DL (ref 13.3–17.7)
LYMPHOCYTES # BLD AUTO: 1.5 10E3/UL (ref 0.8–5.3)
LYMPHOCYTES NFR BLD AUTO: 15 %
MCH RBC QN AUTO: 30.8 PG (ref 26.5–33)
MCHC RBC AUTO-ENTMCNC: 34.2 G/DL (ref 31.5–36.5)
MCV RBC AUTO: 90 FL (ref 78–100)
MONOCYTES # BLD AUTO: 0.8 10E3/UL (ref 0–1.3)
MONOCYTES NFR BLD AUTO: 8 %
NEUTROPHILS # BLD AUTO: 7.3 10E3/UL (ref 1.6–8.3)
NEUTROPHILS NFR BLD AUTO: 72 %
PLATELET # BLD AUTO: 214 10E3/UL (ref 150–450)
RBC # BLD AUTO: 4.87 10E6/UL (ref 4.4–5.9)
WBC # BLD AUTO: 10.1 10E3/UL (ref 4–11)

## 2022-12-23 PROCEDURE — 36415 COLL VENOUS BLD VENIPUNCTURE: CPT | Performed by: NURSE PRACTITIONER

## 2022-12-23 PROCEDURE — 85025 COMPLETE CBC W/AUTO DIFF WBC: CPT | Performed by: NURSE PRACTITIONER

## 2022-12-23 PROCEDURE — 87804 INFLUENZA ASSAY W/OPTIC: CPT | Performed by: NURSE PRACTITIONER

## 2022-12-23 PROCEDURE — 71046 X-RAY EXAM CHEST 2 VIEWS: CPT | Mod: TC | Performed by: RADIOLOGY

## 2022-12-23 PROCEDURE — 99214 OFFICE O/P EST MOD 30 MIN: CPT | Performed by: NURSE PRACTITIONER

## 2022-12-23 RX ORDER — ALBUTEROL SULFATE 90 UG/1
2 AEROSOL, METERED RESPIRATORY (INHALATION) EVERY 6 HOURS PRN
Qty: 18 G | Refills: 0 | Status: SHIPPED | OUTPATIENT
Start: 2022-12-23 | End: 2023-01-17

## 2022-12-23 RX ORDER — AZITHROMYCIN 250 MG/1
TABLET, FILM COATED ORAL
Qty: 6 TABLET | Refills: 0 | Status: SHIPPED | OUTPATIENT
Start: 2022-12-23 | End: 2022-12-28

## 2022-12-23 RX ORDER — CODEINE PHOSPHATE AND GUAIFENESIN 10; 100 MG/5ML; MG/5ML
1-2 SOLUTION ORAL EVERY 6 HOURS PRN
Qty: 118 ML | Refills: 0 | Status: SHIPPED | OUTPATIENT
Start: 2022-12-23 | End: 2023-01-06

## 2022-12-23 NOTE — PROGRESS NOTES
Chief Complaint   Patient presents with     Cough     Productive coughing started Tuesday, lots of sneezing, wheezing. Lots of slimy mucus production. BP goes up during coughing fits. L sided chest discomfort (possibly tied to gastrointestinal issues) Pain down his sides and back when coughing. Pt is possibly concerned about pneumonia.      SUBJECTIVE:  Abhijit Garcia is a 47 year old male presenting with cough thick mucus chest pains wheeze for 4 days worsening.  He has a history of reactive airway needing albuterol.  He had COVID in October.  His sputum is mostly thick yellow and had 1 small blood-tinged after a harsh cough.  [place] PDMP checked and no controlled substances within the last year.    Past Medical History:   Diagnosis Date     Arthritis      Dysuria      Gout      Hyperplastic colon polyp 12/2018     Hypertension      RACHID (obstructive sleep apnea) 11/9/2022     Prostate infection      allopurinol (ZYLOPRIM) 300 MG tablet, Take 1 tablet (300 mg) by mouth daily  imiquimod (ALDARA) 5 % external cream, Apply a small sized amount to warts QHS at bedtime.   Wash off after 8 hours.  irbesartan (AVAPRO) 300 MG tablet, TAKE 1 TABLET BY MOUTH ONCE DAILY  ketoconazole (NIZORAL) 2 % external cream, Apply topically daily as needed for other (athletes foot)  Loratadine (CLARITIN PO),   meloxicam (MOBIC) 15 MG tablet, TAKE 1 TABLET(15 MG) BY MOUTH DAILY  valACYclovir (VALTREX) 1000 mg tablet, TAKE 2 TABLETS BY MOUTH TWICE DAILY FOR 1 DAY    No current facility-administered medications on file prior to visit.    Social History     Tobacco Use     Smoking status: Never     Smokeless tobacco: Never     Tobacco comments:     I have never used tobacco. My dad was a heavy smoker so I did have second hand smoke exposure   Substance Use Topics     Alcohol use: Yes     Alcohol/week: 0.0 standard drinks     Comment: social drinker.     Allergies   Allergen Reactions     Amlodipine      Edema at 10 mg dose      Contrast Dye      Indomethacin GI Disturbance     Iodine      Iodine in IV contrast dye     Levaquin [Levofloxacin]      Gout flare in 2011     Lisinopril      cough     Seasonal Allergies      Losartan Rash     Penile rash       Review of Systems   All systems negative except for those listed above in HPI.    OBJECTIVE:   /76 (BP Location: Right arm, Patient Position: Sitting, Cuff Size: Adult Large)   Pulse 99   Temp 98.8  F (37.1  C) (Tympanic)   Resp 18   SpO2 96%      Physical Exam  Vitals reviewed.   Constitutional:       Appearance: Normal appearance.   HENT:      Head: Normocephalic and atraumatic.      Right Ear: Tympanic membrane and ear canal normal.      Left Ear: Tympanic membrane and ear canal normal.      Nose: Congestion and rhinorrhea present.      Mouth/Throat:      Mouth: Mucous membranes are moist.      Pharynx: Oropharynx is clear. No oropharyngeal exudate or posterior oropharyngeal erythema.   Eyes:      Extraocular Movements: Extraocular movements intact.      Conjunctiva/sclera: Conjunctivae normal.      Pupils: Pupils are equal, round, and reactive to light.   Cardiovascular:      Rate and Rhythm: Normal rate.      Pulses: Normal pulses.   Pulmonary:      Effort: Respiratory distress present.      Breath sounds: No stridor. Wheezing present. No rhonchi or rales.   Chest:      Chest wall: No tenderness.   Musculoskeletal:         General: Normal range of motion.      Cervical back: Normal range of motion and neck supple.   Lymphadenopathy:      Cervical: No cervical adenopathy.   Skin:     General: Skin is warm and dry.      Findings: No rash.   Neurological:      General: No focal deficit present.      Mental Status: He is alert and oriented to person, place, and time.   Psychiatric:         Mood and Affect: Mood normal.         Behavior: Behavior normal.       Chest x-ray done in clinic read by me as positive for opacities in bilateral lung lobes.  Results for orders placed or  performed in visit on 12/23/22   CBC with platelets and differential     Status: None   Result Value Ref Range    WBC Count 10.1 4.0 - 11.0 10e3/uL    RBC Count 4.87 4.40 - 5.90 10e6/uL    Hemoglobin 15.0 13.3 - 17.7 g/dL    Hematocrit 43.9 40.0 - 53.0 %    MCV 90 78 - 100 fL    MCH 30.8 26.5 - 33.0 pg    MCHC 34.2 31.5 - 36.5 g/dL    RDW 12.5 10.0 - 15.0 %    Platelet Count 214 150 - 450 10e3/uL    % Neutrophils 72 %    % Lymphocytes 15 %    % Monocytes 8 %    % Eosinophils 4 %    % Basophils 0 %    Absolute Neutrophils 7.3 1.6 - 8.3 10e3/uL    Absolute Lymphocytes 1.5 0.8 - 5.3 10e3/uL    Absolute Monocytes 0.8 0.0 - 1.3 10e3/uL    Absolute Eosinophils 0.4 0.0 - 0.7 10e3/uL    Absolute Basophils 0.0 0.0 - 0.2 10e3/uL   Influenza A/B antigen     Status: Normal    Specimen: Nose; Swab   Result Value Ref Range    Influenza A antigen Negative Negative    Influenza B antigen Negative Negative    Narrative    Test results must be correlated with clinical data. If necessary, results should be confirmed by a molecular assay or viral culture.   CBC with platelets and differential     Status: None    Narrative    The following orders were created for panel order CBC with platelets and differential.  Procedure                               Abnormality         Status                     ---------                               -----------         ------                     CBC with platelets and d...[[id number]]                      Final result                 Please view results for these tests on the individual orders.     ASSESSMENT:    ICD-10-CM    1. Bronchitis  J40 azithromycin (ZITHROMAX) 250 MG tablet     albuterol (PROAIR HFA/PROVENTIL HFA/VENTOLIN HFA) 108 (90 Base) MCG/ACT inhaler     guaiFENesin-codeine (ROBITUSSIN AC) 100-10 MG/5ML solution      2. Acute cough  R05.1 Influenza A/B antigen     XR Chest 2 Views     CBC with platelets and differential     CBC with platelets and differential     guaiFENesin-codeine  (ROBITUSSIN AC) 100-10 MG/5ML solution      3. Mild intermittent reactive airway disease with acute exacerbation  J45.21         PLAN:     Z-Guy albuterol for bronchitis given worsening cough with opacities on chest x-ray  Codeine cough syrup per request patient will only take up if worsening understands caution with sedation constipation  Rest! Your body needs more rest to heal.  Drink plenty of fluids (warm fluids like tea or soup are soothing and reduce cough)  Sit in the bathroom with a hot shower running and breathe in the steam.  Honey may soothe your sore throat and help manage your cough- may take straight or in warm water with lemon juice.  Avoid smoke (cigarettes, bonfires, fireplace, wood burning stoves).  Take Tylenol or an NSAID such as ibuprofen or naproxen as needed for pain.  Delsym (dextromethorphan polistirex) is an over the counter cough medication that lasts 12 hours.   Mucinex or Robitussin (guiafenesin) thin mucus and may help it to loosen more quickly  Good handwashing is the best way to prevent spread of germs  Present to emergency room if you develop trouble breathing, swallowing or cough-up blood.  Follow up with your primary care provider if symptoms worsen or fail to improve as expected.    Follow up with primary care provider with any problems, questions or concerns or if symptoms worsen or fail to improve. Patient agreed to plan and verbalized understanding.    Leelee Watson, [facility]

## 2022-12-26 ENCOUNTER — HEALTH MAINTENANCE LETTER (OUTPATIENT)
Age: 47
End: 2022-12-26

## 2022-12-27 ENCOUNTER — DOCUMENTATION ONLY (OUTPATIENT)
Dept: SLEEP MEDICINE | Facility: CLINIC | Age: 47
End: 2022-12-27
Payer: COMMERCIAL

## 2022-12-27 NOTE — PROGRESS NOTES
14  DAY STM VISIT    Diagnostic AHI:  23.5 HST    Subjective measures: Patient has not started CPAP yet due to being very sick He plans to start this weekend     Assessment: Pt not meeting objective benchmarks for compliance       Action plan: pt to have 30 day STM visit.      Device type: Auto-CPAP    PAP settings:  DEVICE TYPE CPAP MIN CPAP MAX 95TH % PRESSURE EPR MASK DISPENSED   Auto-CPAP    5 cm  H20 15 cm  H20 0 cm  H20  2 Nasal Mask     Mask type:  Nasal Mask    Objective measures: 14 day rolling measures   COMPLIANCE LEAK AHI AVERAGE USE IN MINUTES   0 % 0 0 0   GOAL >70% GOAL < 24 LPM GOAL <5 GOAL >240          Total time spent on accessing and interpreting remote patient PAP therapy data  10 minutes    Total time spent counseling, coaching  and reviewing PAP therapy data with patient  4 minutes    [id number]  [id number]  no (3 day STM)

## 2022-12-29 ENCOUNTER — MYC MEDICAL ADVICE (OUTPATIENT)
Dept: PEDIATRICS | Facility: CLINIC | Age: 47
End: 2022-12-29

## 2023-01-10 ENCOUNTER — DOCUMENTATION ONLY (OUTPATIENT)
Dept: SLEEP MEDICINE | Facility: CLINIC | Age: 48
End: 2023-01-10

## 2023-01-10 NOTE — PROGRESS NOTES
30 DAY STM VISIT    Diagnostic AHI:  23.5 HST    PAP settings:  CPAP MIN CPAP MAX 95TH % PRESSURE EPR RESMED SOFT RESPONSE SETTING   5 cm  H20 15 cm  H20 0 cm  H20  2 OFF     Device type: Auto-CPAP  Mask type:  Nasal Mask    Objective measures: 14 day rolling measures:    COMPLIANCE LEAK AHI AVERAGE USE IN MINUTES   0 % 0 0 0   GOAL >70% GOAL < 24 LPM GOAL <5 GOAL >240        Assessment: Pt not meeting objective benchmarks for compliance     Subjective measures: Patient has not started CPAP yet due to being very sick. He just got better and plans to start soon. Patient requests call in 2 weeks.   Action plan: pt to have 14 day STM visit.  Patient has not scheduled a follow up visit.     Total time spent on accessing and interpreting remote patient PAP therapy data  10 minutes    Total time spent counseling, coaching  and reviewing PAP therapy data with patient  3 minutes     [id number] this call  [id number] no  at 3 or 14 day [facility]              Routing refill request to provider for review/approval because:  Drug not on the [facility] refill protocol   Meredith Norwood BSN, RN

## 2023-01-16 ASSESSMENT — ENCOUNTER SYMPTOMS
ARTHRALGIAS: 1
DIARRHEA: 0
WEAKNESS: 0
DYSURIA: 0
SHORTNESS OF BREATH: 0
PARESTHESIAS: 0
SORE THROAT: 0
NERVOUS/ANXIOUS: 0
FREQUENCY: 0
COUGH: 1
CHILLS: 0
ABDOMINAL PAIN: 0
PALPITATIONS: 0
EYE PAIN: 1
NAUSEA: 0
MYALGIAS: 1
HEADACHES: 1
HEARTBURN: 1
HEMATURIA: 0
DIZZINESS: 0
CONSTIPATION: 0
JOINT SWELLING: 0
HEMATOCHEZIA: 0
FEVER: 0

## 2023-01-16 ASSESSMENT — ASTHMA QUESTIONNAIRES
QUESTION_5 LAST FOUR WEEKS HOW WOULD YOU RATE YOUR ASTHMA CONTROL: COMPLETELY CONTROLLED
QUESTION_2 LAST FOUR WEEKS HOW OFTEN HAVE YOU HAD SHORTNESS OF BREATH: NOT AT ALL
QUESTION_4 LAST FOUR WEEKS HOW OFTEN HAVE YOU USED YOUR RESCUE INHALER OR NEBULIZER MEDICATION (SUCH AS ALBUTEROL): ONCE A WEEK OR LESS
QUESTION_1 LAST FOUR WEEKS HOW MUCH OF THE TIME DID YOUR ASTHMA KEEP YOU FROM GETTING AS MUCH DONE AT WORK, SCHOOL OR AT HOME: NONE OF THE TIME
QUESTION_3 LAST FOUR WEEKS HOW OFTEN DID YOUR ASTHMA SYMPTOMS (WHEEZING, COUGHING, SHORTNESS OF BREATH, CHEST TIGHTNESS OR PAIN) WAKE YOU UP AT NIGHT OR EARLIER THAN USUAL IN THE MORNING: NOT AT ALL
ACT_TOTALSCORE: 24
ACT_TOTALSCORE: 24

## 2023-01-17 ENCOUNTER — OFFICE VISIT (OUTPATIENT)
Dept: INTERNAL MEDICINE | Facility: CLINIC | Age: 48
End: 2023-01-17
Payer: COMMERCIAL

## 2023-01-17 VITALS
RESPIRATION RATE: 16 BRPM | DIASTOLIC BLOOD PRESSURE: 88 MMHG | BODY MASS INDEX: 41.75 KG/M2 | HEIGHT: 73 IN | OXYGEN SATURATION: 98 % | WEIGHT: 315 LBS | SYSTOLIC BLOOD PRESSURE: 110 MMHG | TEMPERATURE: 97 F | HEART RATE: 86 BPM

## 2023-01-17 DIAGNOSIS — I10 ESSENTIAL HYPERTENSION: ICD-10-CM

## 2023-01-17 DIAGNOSIS — B00.1 RECURRENT COLD SORES: ICD-10-CM

## 2023-01-17 DIAGNOSIS — E78.5 HYPERLIPIDEMIA LDL GOAL <130: ICD-10-CM

## 2023-01-17 DIAGNOSIS — E66.01 MORBID OBESITY (H): ICD-10-CM

## 2023-01-17 DIAGNOSIS — Z00.00 ROUTINE GENERAL MEDICAL EXAMINATION AT A HEALTH CARE FACILITY: Primary | ICD-10-CM

## 2023-01-17 DIAGNOSIS — M1A.09X0 CHRONIC GOUT OF MULTIPLE SITES, UNSPECIFIED CAUSE: ICD-10-CM

## 2023-01-17 DIAGNOSIS — R12 HEARTBURN: ICD-10-CM

## 2023-01-17 PROBLEM — K62.89 RECTAL PAIN: Status: RESOLVED | Noted: 2022-03-08 | Resolved: 2023-01-17

## 2023-01-17 LAB
ALBUMIN SERPL BCG-MCNC: 4.2 G/DL (ref 3.5–5.2)
ALP SERPL-CCNC: 81 U/L (ref 40–129)
ALT SERPL W P-5'-P-CCNC: 44 U/L (ref 10–50)
ANION GAP SERPL CALCULATED.3IONS-SCNC: 16 MMOL/L (ref 7–15)
AST SERPL W P-5'-P-CCNC: 28 U/L (ref 10–50)
BILIRUB DIRECT SERPL-MCNC: <0.2 MG/DL (ref 0–0.3)
BILIRUB SERPL-MCNC: 0.5 MG/DL
BUN SERPL-MCNC: 14.3 MG/DL (ref 6–20)
CALCIUM SERPL-MCNC: 9.5 MG/DL (ref 8.6–10)
CHLORIDE SERPL-SCNC: 104 MMOL/L (ref 98–107)
CHOLEST SERPL-MCNC: 217 MG/DL
CREAT SERPL-MCNC: 1.14 MG/DL (ref 0.67–1.17)
DEPRECATED HCO3 PLAS-SCNC: 19 MMOL/L (ref 22–29)
ERYTHROCYTE [DISTWIDTH] IN BLOOD BY AUTOMATED COUNT: 12.8 % (ref 10–15)
GFR SERPL CREATININE-BSD FRML MDRD: 78 ML/MIN/1.73M2
GLUCOSE SERPL-MCNC: 100 MG/DL (ref 70–99)
HCT VFR BLD AUTO: 47.3 % (ref 40–53)
HDLC SERPL-MCNC: 42 MG/DL
HGB BLD-MCNC: 15.6 G/DL (ref 13.3–17.7)
LDLC SERPL CALC-MCNC: 131 MG/DL
MCH RBC QN AUTO: 30.1 PG (ref 26.5–33)
MCHC RBC AUTO-ENTMCNC: 33 G/DL (ref 31.5–36.5)
MCV RBC AUTO: 91 FL (ref 78–100)
NONHDLC SERPL-MCNC: 175 MG/DL
PLATELET # BLD AUTO: 212 10E3/UL (ref 150–450)
POTASSIUM SERPL-SCNC: 4.5 MMOL/L (ref 3.4–5.3)
PROT SERPL-MCNC: 6.5 G/DL (ref 6.4–8.3)
RBC # BLD AUTO: 5.18 10E6/UL (ref 4.4–5.9)
SODIUM SERPL-SCNC: 139 MMOL/L (ref 136–145)
TRIGL SERPL-MCNC: 219 MG/DL
WBC # BLD AUTO: 7.9 10E3/UL (ref 4–11)

## 2023-01-17 PROCEDURE — 82248 BILIRUBIN DIRECT: CPT | Performed by: INTERNAL MEDICINE

## 2023-01-17 PROCEDURE — 85027 COMPLETE CBC AUTOMATED: CPT | Performed by: INTERNAL MEDICINE

## 2023-01-17 PROCEDURE — 80053 COMPREHEN METABOLIC PANEL: CPT | Performed by: INTERNAL MEDICINE

## 2023-01-17 PROCEDURE — 80061 LIPID PANEL: CPT | Performed by: INTERNAL MEDICINE

## 2023-01-17 PROCEDURE — 99214 OFFICE O/P EST MOD 30 MIN: CPT | Mod: 25 | Performed by: INTERNAL MEDICINE

## 2023-01-17 PROCEDURE — 90682 RIV4 VACC RECOMBINANT DNA IM: CPT | Performed by: INTERNAL MEDICINE

## 2023-01-17 PROCEDURE — 99396 PREV VISIT EST AGE 40-64: CPT | Mod: 25 | Performed by: INTERNAL MEDICINE

## 2023-01-17 PROCEDURE — 36415 COLL VENOUS BLD VENIPUNCTURE: CPT | Performed by: INTERNAL MEDICINE

## 2023-01-17 PROCEDURE — 90471 IMMUNIZATION ADMIN: CPT | Performed by: INTERNAL MEDICINE

## 2023-01-17 RX ORDER — IRBESARTAN 300 MG/1
300 TABLET ORAL DAILY
Qty: 90 TABLET | Refills: 3 | Status: SHIPPED | OUTPATIENT
Start: 2023-01-17 | End: 2024-01-23

## 2023-01-17 RX ORDER — COLCHICINE 0.6 MG/1
0.6 TABLET ORAL DAILY
Qty: 90 TABLET | Refills: 3 | Status: SHIPPED | OUTPATIENT
Start: 2023-01-17 | End: 2023-04-17

## 2023-01-17 RX ORDER — COLCHICINE 0.6 MG/1
0.6 TABLET ORAL DAILY
COMMUNITY
End: 2023-01-17

## 2023-01-17 RX ORDER — VALACYCLOVIR HYDROCHLORIDE 1 G/1
TABLET, FILM COATED ORAL
Qty: 8 TABLET | Refills: 11 | Status: SHIPPED | OUTPATIENT
Start: 2023-01-17 | End: 2024-01-23

## 2023-01-17 ASSESSMENT — ENCOUNTER SYMPTOMS
PALPITATIONS: 0
NERVOUS/ANXIOUS: 0
FEVER: 0
NAUSEA: 0
MYALGIAS: 1
CHILLS: 0
PARESTHESIAS: 0
DIZZINESS: 0
FREQUENCY: 0
CONSTIPATION: 0
ARTHRALGIAS: 1
DYSURIA: 0
WEAKNESS: 0
JOINT SWELLING: 0
ABDOMINAL PAIN: 0
HEARTBURN: 1
DIARRHEA: 0
COUGH: 1
HEMATOCHEZIA: 0
EYE PAIN: 1
SHORTNESS OF BREATH: 0
HEMATURIA: 0
SORE THROAT: 0
HEADACHES: 1

## 2023-01-17 NOTE — PROGRESS NOTES
SUBJECTIVE:   CC: Emerson is an 48 year old who presents for preventative health visit.     Patient has been advised of split billing requirements and indicates understanding: Yes  Healthy Habits:     Getting at least 3 servings of Calcium per day:  NO    Bi-annual eye exam:  Yes    Dental care twice a year:  Yes    Sleep apnea or symptoms of sleep apnea:  Excessive snoring and Sleep apnea    Diet:  Low salt    Frequency of exercise:  None    Taking medications regularly:  Yes    Medication side effects:  None    PHQ-2 Total Score: 1    Additional concerns today:  Yes          Today's PHQ-2 Score:   PHQ-2 ( 1999 Pfizer) 1/16/2023   Q1: Little interest or pleasure in doing things 1   Q2: Feeling down, depressed or hopeless 0   PHQ-2 Score 1   PHQ-2 Total Score (12-17 Years)- Positive if 3 or more points; Administer PHQ-A if positive -   Q1: Little interest or pleasure in doing things Several days   Q2: Feeling down, depressed or hopeless Not at all   PHQ-2 Score 1       Social History     Tobacco Use     Smoking status: Never     Smokeless tobacco: Never     Tobacco comments:     I have never used tobacco. My dad was a heavy smoker so I did have second hand smoke exposure   Substance Use Topics     Alcohol use: Yes     Alcohol/week: 3.0 standard drinks     Types: 3 Standard drinks or equivalent per week     Comment: social drinker.         Alcohol Use 1/16/2023   Prescreen: >3 drinks/day or >7 drinks/week? No   Prescreen: >3 drinks/day or >7 drinks/week? -       Last PSA:   PSA Tumor Marker   Date Value Ref Range Status   11/23/2021 2.68 0.00 - 4.00 ug/L Final       Reviewed orders with patient. Reviewed health maintenance and updated orders accordingly - Yes  Lab work is in process  Labs reviewed in EPIC    Reviewed and updated as needed this visit by clinical staff   Tobacco  Allergies  Meds      Soc Hx        Reviewed and updated as needed this visit by Provider   Tobacco        Soc Hx           Review of Systems  "  Constitutional: Negative for chills and fever.   HENT: Negative for congestion, ear pain, hearing loss and sore throat.    Eyes: Positive for pain. Negative for visual disturbance.   Respiratory: Positive for cough. Negative for shortness of breath.    Cardiovascular: Positive for chest pain and peripheral edema. Negative for palpitations.   Gastrointestinal: Positive for heartburn. Negative for abdominal pain, constipation, diarrhea, hematochezia and nausea.   Genitourinary: Positive for impotence. Negative for dysuria, frequency, genital sores, hematuria, penile discharge and urgency.   Musculoskeletal: Positive for arthralgias and myalgias. Negative for joint swelling.   Skin: Negative for rash.   Neurological: Positive for headaches. Negative for dizziness, weakness and paresthesias.   Psychiatric/Behavioral: Negative for mood changes. The patient is not nervous/anxious.          OBJECTIVE:   /88   Pulse 86   Temp 97  F (36.1  C) (Temporal)   Resp 16   Ht 1.854 m (6' 1\")   Wt 149.7 kg (330 lb 1.6 oz)   SpO2 98%   BMI 43.55 kg/m      Physical Exam  GENERAL: alert, no distress and obese  EYES: Eyes grossly normal to inspection, PERRL and conjunctivae and sclerae normal  HENT: ear canals and TM's normal, nose and mouth without ulcers or lesions  NECK: no adenopathy, no asymmetry, masses, or scars and thyroid normal to palpation  RESP: lungs clear to auscultation - no rales, rhonchi or wheezes  CV: regular rate and rhythm, normal S1 S2, no S3 or S4, no murmur, click or rub, no peripheral edema and peripheral pulses strong  ABDOMEN: soft, nontender, no hepatosplenomegaly, no masses and bowel sounds normal  MS: no gross musculoskeletal defects noted, no edema  SKIN: no suspicious lesions or rashes  NEURO: Normal strength and tone, mentation intact and speech normal  PSYCH: mentation appears normal, affect normal/bright  LYMPH: no cervical, supraclavicular, axillary, or inguinal adenopathy    Diagnostic " Test Results:  Results for orders placed or performed in visit on 01/17/23 (from the past 24 hour(s))   BASIC METABOLIC PANEL   Result Value Ref Range    Sodium 139 136 - 145 mmol/L    Potassium 4.5 3.4 - 5.3 mmol/L    Chloride 104 98 - 107 mmol/L    Carbon Dioxide (CO2) 19 (L) 22 - 29 mmol/L    Anion Gap 16 (H) 7 - 15 mmol/L    Urea Nitrogen 14.3 6.0 - 20.0 mg/dL    Creatinine 1.14 0.67 - 1.17 mg/dL    Calcium 9.5 8.6 - 10.0 mg/dL    Glucose 100 (H) 70 - 99 mg/dL    GFR Estimate 78 >60 mL/min/1.73m2   Lipid panel reflex to direct LDL Non-fasting   Result Value Ref Range    Cholesterol 217 (H) <200 mg/dL    Triglycerides 219 (H) <150 mg/dL    Direct Measure HDL 42 >=40 mg/dL    LDL Cholesterol Calculated 131 (H) <=100 mg/dL    Non HDL Cholesterol 175 (H) <130 mg/dL    Narrative    Cholesterol  Desirable:  <200 mg/dL    Triglycerides  Normal:  Less than 150 mg/dL  Borderline High:  150-199 mg/dL  High:  200-499 mg/dL  Very High:  Greater than or equal to 500 mg/dL    Direct Measure HDL  Female:  Greater than or equal to 50 mg/dL   Male:  Greater than or equal to 40 mg/dL    LDL Cholesterol  Desirable:  <100mg/dL  Above Desirable:  100-129 mg/dL   Borderline High:  130-159 mg/dL   High:  160-189 mg/dL   Very High:  >= 190 mg/dL    Non HDL Cholesterol  Desirable:  130 mg/dL  Above Desirable:  130-159 mg/dL  Borderline High:  160-189 mg/dL  High:  190-219 mg/dL  Very High:  Greater than or equal to 220 mg/dL   Hepatic panel (Albumin, ALT, AST, Bili, Alk Phos, TP)   Result Value Ref Range    Protein Total 6.5 6.4 - 8.3 g/dL    Albumin 4.2 3.5 - 5.2 g/dL    Bilirubin Total 0.5 <=1.2 mg/dL    Alkaline Phosphatase 81 40 - 129 U/L    AST 28 10 - 50 U/L    ALT 44 10 - 50 U/L    Bilirubin Direct <0.20 0.00 - 0.30 mg/dL   CBC with platelets   Result Value Ref Range    WBC Count 7.9 4.0 - 11.0 10e3/uL    RBC Count 5.18 4.40 - 5.90 10e6/uL    Hemoglobin 15.6 13.3 - 17.7 g/dL    Hematocrit 47.3 40.0 - 53.0 %    MCV 91 78 - 100 fL  "   MCH 30.1 26.5 - 33.0 pg    MCHC 33.0 31.5 - 36.5 g/dL    RDW 12.8 10.0 - 15.0 %    Platelet Count 212 150 - 450 10e3/uL       ASSESSMENT/PLAN:       ICD-10-CM    1. Routine general medical examination at a health care facility  Z00.00       2. Essential hypertension  I10 BASIC METABOLIC PANEL     CT Coronary Calcium Scan     irbesartan (AVAPRO) 300 MG tablet     BASIC METABOLIC PANEL      3. Morbid obesity (H)  E66.01 Comprehensive Weight Management      4. Hyperlipidemia LDL goal <130  E78.5 Lipid panel reflex to direct LDL Non-fasting     CT Coronary Calcium Scan     Hepatic panel (Albumin, ALT, AST, Bili, Alk Phos, TP)     Lipid panel reflex to direct LDL Non-fasting     Hepatic panel (Albumin, ALT, AST, Bili, Alk Phos, TP)      5. Recurrent cold sores  B00.1 valACYclovir (VALTREX) 1000 mg tablet      6. Chronic gout of multiple sites, unspecified cause  M1A.09X0 colchicine (COLCYRS) 0.6 MG tablet     Hepatic panel (Albumin, ALT, AST, Bili, Alk Phos, TP)     CBC with platelets     Hepatic panel (Albumin, ALT, AST, Bili, Alk Phos, TP)     CBC with platelets     CANCELED: Uric acid      7. Heartburn  R12         Updated screening, immunizations, prevention.  Please see health maintenance list, care gaps  Get CaCT heart scan. If any exertional CP noted beforehand- consider CCTA instead. CV risk 4-5% though has fam hx  Needs to really focus on weight loss.   Start using CPAP for newly dx sleep apnea     Patient has been advised of split billing requirements and indicates understanding: Yes      COUNSELING:   Reviewed preventive health counseling, as reflected in patient instructions       Regular exercise       Healthy diet/nutrition       Vision screening       Colorectal cancer screening       Prostate cancer screening      BMI:   Estimated body mass index is 43.55 kg/m  as calculated from the following:    Height as of this encounter: 1.854 m (6' 1\").    Weight as of this encounter: 149.7 kg (330 lb 1.6 oz). "   Weight management plan: Discussed healthy diet and exercise guidelines Specific weight management program called [facility]  discussed      He reports that he has never smoked. He has never used smokeless tobacco.            Eriberto Oh MD  [facility]    The 10-year ASCVD risk score (Sheri YAN, et al., 2019) is: 4.9%    Values used to calculate the score:      Age: 48 years      Sex: Male      Is Non- : No      Diabetic: No      Tobacco smoker: No      Systolic Blood Pressure: 110 mmHg      Is BP treated: Yes      HDL Cholesterol: 37 mg/dL      Total Cholesterol: 220 mg/dL

## 2023-01-18 ENCOUNTER — ANCILLARY ORDERS (OUTPATIENT)
Dept: INTERNAL MEDICINE | Facility: CLINIC | Age: 48
End: 2023-01-18

## 2023-01-18 DIAGNOSIS — I10 ESSENTIAL HYPERTENSION: ICD-10-CM

## 2023-01-18 DIAGNOSIS — E78.5 HYPERLIPIDEMIA LDL GOAL <130: ICD-10-CM

## 2023-01-19 ENCOUNTER — MYC MEDICAL ADVICE (OUTPATIENT)
Dept: INTERNAL MEDICINE | Facility: CLINIC | Age: 48
End: 2023-01-19

## 2023-01-20 ENCOUNTER — VIRTUAL VISIT (OUTPATIENT)
Dept: FAMILY MEDICINE | Facility: CLINIC | Age: 48
End: 2023-01-20
Payer: COMMERCIAL

## 2023-01-20 DIAGNOSIS — I10 ESSENTIAL HYPERTENSION: Primary | ICD-10-CM

## 2023-01-20 DIAGNOSIS — M10.9 GOUT, UNSPECIFIED CAUSE, UNSPECIFIED CHRONICITY, UNSPECIFIED SITE: ICD-10-CM

## 2023-01-20 DIAGNOSIS — R25.2 NOCTURNAL MUSCLE CRAMP: ICD-10-CM

## 2023-01-20 PROCEDURE — 99214 OFFICE O/P EST MOD 30 MIN: CPT | Mod: TEL | Performed by: FAMILY MEDICINE

## 2023-01-20 RX ORDER — ALLOPURINOL 100 MG/1
100 TABLET ORAL DAILY
Qty: 90 TABLET | Refills: 3 | Status: SHIPPED | OUTPATIENT
Start: 2023-01-20 | End: 2023-03-14

## 2023-01-20 NOTE — PROGRESS NOTES
Emerson is a 48 year old who is being evaluated via a billable telephone visit.      What phone number would you like to be contacted at? [phone]  How would you like to obtain your AVS? Dayo     Distant Location (provider location):  Off-site    Assessment & Plan     Essential hypertension - recently checked, controlled. Recheck anion gap after lowering allopurinol dose  - Basic metabolic panel  (Ca, Cl, CO2, Creat, Gluc, K, Na, BUN); Future    Gout, unspecified cause, unspecified chronicity, unspecified site - advised lower allopurinol as it seems to be causing side effects. UA levels in 2 weeks.   - Uric acid; Future  - allopurinol (ZYLOPRIM) 100 MG tablet; Take 1 tablet (100 mg) by mouth daily    Nocturnal muscle cramp - labs as below. Will also carry through plan as above.   - Magnesium; Future  - Iron and iron binding capacity; Future    Return in about 1 year (around 1/20/2024) for as needed.    Madeleine Lynch MD  [facility]   Emerson is a 48 year old, presenting for the following health issues:  Consult (Recent lab results)      HPI     Needs new primary. Had recent physical with blood test and has concerns on the anion gap lab. Concerned because his allopurinol dose has been increasing over the past few years (despite UA levels not decreasing in response), and he is worried it's damaging his kidneys.     Reports nocturnal leg cramps over same amount of time.       Review of Systems   Constitutional, HEENT, cardiovascular, pulmonary, gi and gu systems are negative, except as otherwise noted.      Objective       Vitals:  No vitals were obtained today due to virtual visit.    Physical Exam   healthy, alert and no distress  PSYCH: Alert and oriented times 3; coherent speech, normal   rate and volume, able to articulate logical thoughts, able   to abstract reason, no tangential thoughts, no hallucinations   or delusions  His affect is normal  RESP: No cough, no  audible wheezing, able to talk in full sentences  Remainder of exam unable to be completed due to telephone visits        Phone call duration: 15 minutes

## 2023-01-24 ENCOUNTER — DOCUMENTATION ONLY (OUTPATIENT)
Dept: SLEEP MEDICINE | Facility: CLINIC | Age: 48
End: 2023-01-24

## 2023-01-24 NOTE — PROGRESS NOTES
STM Recheck: Patient started using CPAP last night and reports that it went very well. He feels well rested today and his wife reports he was not snoring as much last night. Patient requests  Call back in 2 weeks for check in.

## 2023-01-30 ENCOUNTER — TELEPHONE (OUTPATIENT)
Dept: SLEEP MEDICINE | Facility: CLINIC | Age: 48
End: 2023-01-30

## 2023-01-30 NOTE — TELEPHONE ENCOUNTER
Patient called in with questions regarding the sleep reports on [employer] rehan and his FFM leaking due to his beard. Recommend he consider trying the Airfit F20 cushion instead of the Airtouch F20 cushion next time he gets a new cushion to see if it helps.

## 2023-02-09 ENCOUNTER — MYC MEDICAL ADVICE (OUTPATIENT)
Dept: FAMILY MEDICINE | Facility: CLINIC | Age: 48
End: 2023-02-09

## 2023-02-09 NOTE — TELEPHONE ENCOUNTER
Patient had physical 1/17/23 with Dr. Oh at [facility].     Patient had virtual visit with Dr. Lynch on 1/20/23    Next 5 appointments (look out 90 days)    Mar 14, 2023  5:00 PM  (Arrive by 4:40 PM)  Provider Visit with Madeleine Lynch MD  [facility] ([facility] ) [id number] [place] [id number] [phone]         Advised patient to proceed with recommended testing.     DAY WILKINS RN on 2/9/2023 at 1:44 PM   [facility]

## 2023-02-22 ENCOUNTER — ANCILLARY ORDERS (OUTPATIENT)
Dept: INTERNAL MEDICINE | Facility: CLINIC | Age: 48
End: 2023-02-22

## 2023-02-22 DIAGNOSIS — I10 ESSENTIAL HYPERTENSION: ICD-10-CM

## 2023-02-22 DIAGNOSIS — E78.5 HYPERLIPIDEMIA LDL GOAL <130: ICD-10-CM

## 2023-02-24 ENCOUNTER — HOSPITAL ENCOUNTER (OUTPATIENT)
Dept: CARDIOLOGY | Facility: CLINIC | Age: 48
Discharge: HOME | End: 2023-02-24
Attending: INTERNAL MEDICINE | Admitting: INTERNAL MEDICINE

## 2023-02-24 DIAGNOSIS — E78.5 HYPERLIPIDEMIA LDL GOAL <130: ICD-10-CM

## 2023-02-24 DIAGNOSIS — I10 ESSENTIAL HYPERTENSION: ICD-10-CM

## 2023-02-24 PROCEDURE — 75571 CT HRT W/O DYE W/CA TEST: CPT | Mod: GA

## 2023-02-24 PROCEDURE — 75571 CT HRT W/O DYE W/CA TEST: CPT | Mod: 26 | Performed by: INTERNAL MEDICINE

## 2023-02-28 NOTE — PROGRESS NOTES
SUBJECTIVE:   Abhijit Garcia is a 42 year old male who presents to clinic today for the following health issues:      Worker's comp visit    Date of injury: 10/2/2017    Mechanism: Was loading freight onto a shelf, foot fell through two planks, scraped his left shin - causing 1.5 inch abrasion. He cleaned it and reported the injury.     Interval events: Approx 7 days ago, he noted more pain, redness, and swelling surrounding the abrasion. He was seen at urgent care, diagnosed with cellulitis, and put on 7 days antibiotics. He reports some swelling surrounding the lesion but overall wound is healing.         Problem list and histories reviewed & adjusted, as indicated.  Additional history: none    Patient Active Problem List   Diagnosis     Herpes simplex virus (HSV) infection     Mixed Hyperlipidemia LDL goal <130     Essential hypertension     Gout     Lumbar radiculitis     Acute deep vein thrombosis (DVT) of other specified vein of left lower extremity (H)     Obesity, BMI > 35 (H)     Left elbow pain     Past Surgical History:   Procedure Laterality Date     ANKLE SURGERY       APPENDECTOMY       ORTHOPEDIC SURGERY  03/23/2017    ankle     wisdom teeth         Social History   Substance Use Topics     Smoking status: Never Smoker     Smokeless tobacco: Never Used     Alcohol use 0.0 oz/week     0 Standard drinks or equivalent per week      Comment: 1-5 drinks per week     Family History   Problem Relation Age of Onset     DIABETES Mother      Obesity Mother      Cancer - colorectal Mother      Hypertension Mother      Arthritis Father      Respiratory Father      asbestos exposure     Chronic Obstructive Pulmonary Disease Father      Coronary Artery Disease Father 71     Allergies Brother      Respiratory Brother      Heart Defect Son              Reviewed and updated as needed this visit by clinical staff       Reviewed and updated as needed this visit by Provider         ROS:  Constitutional, HEENT,  "cardiovascular, pulmonary, gi and gu systems are negative, except as otherwise noted.      OBJECTIVE:   /84 (BP Location: Right arm, Patient Position: Chair, Cuff Size: Adult Large)  Pulse 76  Temp 97.9  F (36.6  C) (Oral)  Ht 6' 2\" (1.88 m)  Wt 296 lb (134.3 kg)  BMI 38 kg/m2  Body mass index is 38 kg/(m^2).  GENERAL: healthy, alert and no distress  SKIN: 1.5 cm vertical scab overlying the left shin, some surrounding erythema but no swelling or induration or warmth    Diagnostic Test Results:  none     ASSESSMENT/PLAN:     1. Encounter related to worker's compensation claim    2. Wound of skin - looks to be healing well, advised follow up should he develop any recurrence of pain, redness, or warmth    Madeleine Lynch MD  [facility]  " none

## 2023-03-09 ENCOUNTER — LAB (OUTPATIENT)
Dept: LAB | Facility: CLINIC | Age: 48
End: 2023-03-09
Payer: COMMERCIAL

## 2023-03-09 DIAGNOSIS — I10 ESSENTIAL HYPERTENSION: ICD-10-CM

## 2023-03-09 DIAGNOSIS — M10.9 GOUT, UNSPECIFIED CAUSE, UNSPECIFIED CHRONICITY, UNSPECIFIED SITE: ICD-10-CM

## 2023-03-09 DIAGNOSIS — R25.2 NOCTURNAL MUSCLE CRAMP: ICD-10-CM

## 2023-03-09 LAB
ANION GAP SERPL CALCULATED.3IONS-SCNC: 10 MMOL/L (ref 7–15)
BUN SERPL-MCNC: 16.8 MG/DL (ref 6–20)
CALCIUM SERPL-MCNC: 9.4 MG/DL (ref 8.6–10)
CHLORIDE SERPL-SCNC: 104 MMOL/L (ref 98–107)
CREAT SERPL-MCNC: 1.16 MG/DL (ref 0.67–1.17)
DEPRECATED HCO3 PLAS-SCNC: 27 MMOL/L (ref 22–29)
GFR SERPL CREATININE-BSD FRML MDRD: 77 ML/MIN/1.73M2
GLUCOSE SERPL-MCNC: 92 MG/DL (ref 70–99)
IRON BINDING CAPACITY (ROCHE): 267 UG/DL (ref 240–430)
IRON SATN MFR SERPL: 26 % (ref 15–46)
IRON SERPL-MCNC: 69 UG/DL (ref 61–157)
MAGNESIUM SERPL-MCNC: 2.4 MG/DL (ref 1.7–2.3)
POTASSIUM SERPL-SCNC: 4.5 MMOL/L (ref 3.4–5.3)
SODIUM SERPL-SCNC: 141 MMOL/L (ref 136–145)
URATE SERPL-MCNC: 7.9 MG/DL (ref 3.4–7)

## 2023-03-09 PROCEDURE — 83735 ASSAY OF MAGNESIUM: CPT

## 2023-03-09 PROCEDURE — 80048 BASIC METABOLIC PNL TOTAL CA: CPT

## 2023-03-09 PROCEDURE — 83540 ASSAY OF IRON: CPT

## 2023-03-09 PROCEDURE — 36415 COLL VENOUS BLD VENIPUNCTURE: CPT

## 2023-03-09 PROCEDURE — 84550 ASSAY OF BLOOD/URIC ACID: CPT

## 2023-03-09 PROCEDURE — 83550 IRON BINDING TEST: CPT

## 2023-03-14 ENCOUNTER — OFFICE VISIT (OUTPATIENT)
Dept: FAMILY MEDICINE | Facility: CLINIC | Age: 48
End: 2023-03-14
Payer: COMMERCIAL

## 2023-03-14 ENCOUNTER — MYC MEDICAL ADVICE (OUTPATIENT)
Dept: FAMILY MEDICINE | Facility: CLINIC | Age: 48
End: 2023-03-14

## 2023-03-14 VITALS
BODY MASS INDEX: 41.75 KG/M2 | SYSTOLIC BLOOD PRESSURE: 134 MMHG | HEIGHT: 73 IN | RESPIRATION RATE: 14 BRPM | HEART RATE: 80 BPM | OXYGEN SATURATION: 98 % | WEIGHT: 315 LBS | TEMPERATURE: 98.6 F | DIASTOLIC BLOOD PRESSURE: 86 MMHG

## 2023-03-14 DIAGNOSIS — Z12.11 SPECIAL SCREENING FOR MALIGNANT NEOPLASMS, COLON: ICD-10-CM

## 2023-03-14 DIAGNOSIS — M10.9 GOUT, UNSPECIFIED CAUSE, UNSPECIFIED CHRONICITY, UNSPECIFIED SITE: Primary | ICD-10-CM

## 2023-03-14 DIAGNOSIS — E66.01 MORBID OBESITY (H): ICD-10-CM

## 2023-03-14 DIAGNOSIS — E83.41 HYPERMAGNESEMIA: ICD-10-CM

## 2023-03-14 DIAGNOSIS — Z83.49 FH: THYROID CONDITION: ICD-10-CM

## 2023-03-14 PROCEDURE — 99214 OFFICE O/P EST MOD 30 MIN: CPT | Performed by: FAMILY MEDICINE

## 2023-03-14 RX ORDER — ALLOPURINOL 100 MG/1
200 TABLET ORAL DAILY
Qty: 180 TABLET | Refills: 3 | Status: SHIPPED | OUTPATIENT
Start: 2023-03-14 | End: 2024-01-23

## 2023-03-14 NOTE — PROGRESS NOTES
"  Assessment & Plan     Gout, unspecified cause, unspecified chronicity, unspecified site - will increase allopurinol to 200 mg daily. UA levels near future  - allopurinol (ZYLOPRIM) 100 MG tablet; Take 2 tablets (200 mg) by mouth daily  - Uric acid; Future    FH: thyroid condition - surveillance labs  - TSH; Future  - T4, free; Future    Hypermagnesemia - gave list of high mag foods, he will review. Repeat levels 1-2 months.   - Magnesium; Future    Special screening for malignant neoplasms, colon  - Colonoscopy Screening  Referral; Future    Morbid obesity (H) - encouraged weight loss     Return in about 10 months (around 1/14/2024) for Yearly Preventive Exam.    Madeleine Lynch MD  [facility] DIMITRY Norman is a 48 year old, presenting for the following health issues:  Establish Care and Musculoskeletal Problem      History of Present Illness       Reason for visit:  CT Calcium Scan review, Review Test results/plan, shoulder injury from fall on ice    He eats 0-1 servings of fruits and vegetables daily.He consumes 0 sweetened beverage(s) daily.He exercises with enough effort to increase his heart rate 9 or less minutes per day.  He exercises with enough effort to increase his heart rate 3 or less days per week. He is missing 1 dose(s) of medications per week.  He is not taking prescribed medications regularly due to remembering to take.       1. Review CT coronary calcium score results.   2. Review recent lab results.   3. Lowered allopurinol - now UA levels are elevated. Wonders if he should increase dose again.   4. Mom with goiter later in life.       Review of Systems   Constitutional, HEENT, cardiovascular, pulmonary, gi and gu systems are negative, except as otherwise noted.      Objective    /86 (BP Location: Right arm, Patient Position: Chair, Cuff Size: Adult Large)   Pulse 80   Temp 98.6  F (37  C) (Oral)   Resp 14   Ht 1.854 m (6' 1\")   Wt (!) 151 " kg (333 lb)   SpO2 98%   BMI 43.93 kg/m    Body mass index is 43.93 kg/m .  Physical Exam   GENERAL: healthy, alert and no distress  PSYCH: mentation appears normal, affect normal/bright

## 2023-03-14 NOTE — PATIENT INSTRUCTIONS
Food Milligrams  (mg) per  serving Percent  DV*  Pumpkin seeds, roasted, 1 ounce 156 37  Kee seeds, 1 ounce 111 26  Almonds, dry roasted, 1 ounce 80 19  Spinach, boiled,   cup 78 19  Cashews, dry roasted, 1 ounce 74 18  Peanuts, oil roasted,   cup 63 15  Cereal, shredded wheat, 2 large biscuits 61 15  Soymilk, plain or vanilla, 1 cup 61 15  Black beans, cooked,   cup 60 14  Edamame, shelled, cooked,   cup 50 12  Peanut butter, smooth, 2 tablespoons 49 12  Potato, baked with skin, 3.5 ounces 43 10  Rice, brown, cooked,   cup 42 10  Yogurt, plain, low fat, 8 ounces 42 10  Breakfast cereals, fortified with 10% of the DV for magnesium, 1 serving 42 10  Oatmeal, instant, 1 packet 36 9  Kidney beans, canned,   cup 35 8  Banana, 1 medium 32 8  [place], [place], farmed, cooked, 3 ounces 26 6  Milk, 1 cup 24-27 6  Halibut, cooked, 3 ounces 24 6  Raisins,   cup 23 5  Bread, whole wheat, 1 slice 23 5  Avocado, cubed,   cup 22 5  Chicken breast, roasted, 3 ounces 22 5  Beef, ground, 90% lean, pan broiled, 3 ounces 20 5  Broccoli, chopped and cooked,   cup 12 3  Rice, white, cooked,   cup 10 2  Apple, 1 medium 9 2  Carrot, raw, 1 medium 7 2

## 2023-03-27 ENCOUNTER — OFFICE VISIT (OUTPATIENT)
Dept: URGENT CARE | Facility: URGENT CARE | Age: 48
End: 2023-03-27
Payer: COMMERCIAL

## 2023-03-27 VITALS
TEMPERATURE: 99 F | BODY MASS INDEX: 43.93 KG/M2 | DIASTOLIC BLOOD PRESSURE: 80 MMHG | SYSTOLIC BLOOD PRESSURE: 128 MMHG | RESPIRATION RATE: 14 BRPM | OXYGEN SATURATION: 97 % | HEART RATE: 84 BPM | WEIGHT: 315 LBS

## 2023-03-27 DIAGNOSIS — R11.0 NAUSEA: ICD-10-CM

## 2023-03-27 DIAGNOSIS — R10.32 LLQ ABDOMINAL PAIN: Primary | ICD-10-CM

## 2023-03-27 LAB
ALBUMIN UR-MCNC: NEGATIVE MG/DL
APPEARANCE UR: CLEAR
BASOPHILS # BLD AUTO: 0 10E3/UL (ref 0–0.2)
BASOPHILS NFR BLD AUTO: 1 %
BILIRUB UR QL STRIP: NEGATIVE
COLOR UR AUTO: YELLOW
EOSINOPHIL # BLD AUTO: 0.2 10E3/UL (ref 0–0.7)
EOSINOPHIL NFR BLD AUTO: 2 %
ERYTHROCYTE [DISTWIDTH] IN BLOOD BY AUTOMATED COUNT: 12.4 % (ref 10–15)
GLUCOSE UR STRIP-MCNC: NEGATIVE MG/DL
HCT VFR BLD AUTO: 44.9 % (ref 40–53)
HGB BLD-MCNC: 15.3 G/DL (ref 13.3–17.7)
HGB UR QL STRIP: NEGATIVE
KETONES UR STRIP-MCNC: NEGATIVE MG/DL
LEUKOCYTE ESTERASE UR QL STRIP: NEGATIVE
LYMPHOCYTES # BLD AUTO: 2.1 10E3/UL (ref 0.8–5.3)
LYMPHOCYTES NFR BLD AUTO: 24 %
MCH RBC QN AUTO: 30.5 PG (ref 26.5–33)
MCHC RBC AUTO-ENTMCNC: 34.1 G/DL (ref 31.5–36.5)
MCV RBC AUTO: 89 FL (ref 78–100)
MONOCYTES # BLD AUTO: 1 10E3/UL (ref 0–1.3)
MONOCYTES NFR BLD AUTO: 12 %
NEUTROPHILS # BLD AUTO: 5.4 10E3/UL (ref 1.6–8.3)
NEUTROPHILS NFR BLD AUTO: 62 %
NITRATE UR QL: NEGATIVE
PH UR STRIP: 6 [PH] (ref 5–7)
PLATELET # BLD AUTO: 229 10E3/UL (ref 150–450)
RBC # BLD AUTO: 5.02 10E6/UL (ref 4.4–5.9)
SP GR UR STRIP: 1.02 (ref 1–1.03)
UROBILINOGEN UR STRIP-ACNC: 0.2 E.U./DL
WBC # BLD AUTO: 8.7 10E3/UL (ref 4–11)

## 2023-03-27 PROCEDURE — 83735 ASSAY OF MAGNESIUM: CPT | Performed by: FAMILY MEDICINE

## 2023-03-27 PROCEDURE — 99214 OFFICE O/P EST MOD 30 MIN: CPT | Performed by: FAMILY MEDICINE

## 2023-03-27 PROCEDURE — 85025 COMPLETE CBC W/AUTO DIFF WBC: CPT | Performed by: FAMILY MEDICINE

## 2023-03-27 PROCEDURE — 84550 ASSAY OF BLOOD/URIC ACID: CPT | Performed by: FAMILY MEDICINE

## 2023-03-27 PROCEDURE — 84439 ASSAY OF FREE THYROXINE: CPT | Performed by: FAMILY MEDICINE

## 2023-03-27 PROCEDURE — 84443 ASSAY THYROID STIM HORMONE: CPT | Performed by: FAMILY MEDICINE

## 2023-03-27 PROCEDURE — 36415 COLL VENOUS BLD VENIPUNCTURE: CPT | Performed by: FAMILY MEDICINE

## 2023-03-27 PROCEDURE — 81003 URINALYSIS AUTO W/O SCOPE: CPT | Performed by: FAMILY MEDICINE

## 2023-03-27 NOTE — PROGRESS NOTES
Chief Complaint   Patient presents with     Abdominal Pain     HX of diverticulitis -- and is flying to [facility] at 3am on WED. ---Abdomen  pain lower left pain under the rib area ---, also pain in lower left back area --feels like a pinching pain, burping/gas -- x SAT  - taking Metemusil  -- has  questions due to traveling outside the country- increasing water intake- watching his diet and lifting     Abhijit was seen today for abdominal pain.    Diagnoses and all orders for this visit:    LLQ abdominal pain  -     UA Macro with Reflex to Micro and Culture - lab collect; Future  -     CBC with platelets and differential; Future  -     UA Macro with Reflex to Micro and Culture - lab collect  -     CBC with platelets and differential  -     amoxicillin-clavulanate (AUGMENTIN) 875-125 MG tablet; Take 1 tablet by mouth 2 times daily    Nausea    Other orders  -     Uric acid  -     Magnesium  -     TSH  -     T4, free        Results for orders placed or performed in visit on 03/27/23   UA Macro with Reflex to Micro and Culture - lab collect     Status: Normal    Specimen: Urine, Clean Catch   Result Value Ref Range    Color Urine Yellow Colorless, Straw, Light Yellow, Yellow    Appearance Urine Clear Clear    Glucose Urine Negative Negative mg/dL    Bilirubin Urine Negative Negative    Ketones Urine Negative Negative mg/dL    Specific Gravity Urine 1.020 1.003 - 1.035    Blood Urine Negative Negative    pH Urine 6.0 5.0 - 7.0    Protein Albumin Urine Negative Negative mg/dL    Urobilinogen Urine 0.2 0.2, 1.0 E.U./dL    Nitrite Urine Negative Negative    Leukocyte Esterase Urine Negative Negative    Narrative    Microscopic not indicated   CBC with platelets and differential     Status: None   Result Value Ref Range    WBC Count 8.7 4.0 - 11.0 10e3/uL    RBC Count 5.02 4.40 - 5.90 10e6/uL    Hemoglobin 15.3 13.3 - 17.7 g/dL    Hematocrit 44.9 40.0 - 53.0 %    MCV 89 78 - 100 fL    MCH 30.5 26.5 - 33.0 pg    MCHC 34.1  31.5 - 36.5 g/dL    RDW 12.4 10.0 - 15.0 %    Platelet Count 229 150 - 450 10e3/uL    % Neutrophils 62 %    % Lymphocytes 24 %    % Monocytes 12 %    % Eosinophils 2 %    % Basophils 1 %    Absolute Neutrophils 5.4 1.6 - 8.3 10e3/uL    Absolute Lymphocytes 2.1 0.8 - 5.3 10e3/uL    Absolute Monocytes 1.0 0.0 - 1.3 10e3/uL    Absolute Eosinophils 0.2 0.0 - 0.7 10e3/uL    Absolute Basophils 0.0 0.0 - 0.2 10e3/uL   CBC with platelets and differential     Status: None    Narrative    The following orders were created for panel order CBC with platelets and differential.  Procedure                               Abnormality         Status                     ---------                               -----------         ------                     CBC with platelets and d...[[id number]]                      Final result                 Please view results for these tests on the individual orders.       Abhijit Garcia is a 48 year old male who presents for evaluation of abdominal pain.  A broad differential diagnosis was considered UTI, kidney stone, diverticulitis, colitis, bowel obstruction this appears consistent with the history and physical exam so I doubt another underlying etiology is also present.  The patient's pain has been controlled.This represents uncomplicated diverticulitis at this time.  There are no signs of sepsis, shock or bacteremia.  The natural history of this problem was discussed, and I educated the patient regarding the symptoms and signs that should prompt return to the Emergency Department.  This would include worsening fevers, chills, vomiting, and more intense pain.  The patient is to take antibiotics and pain medications as directed.    Follow-up with primary care physician is indicated in 1-2 days.   CBC was within normal limits and UA was normal but with the pain being so localized in the left lower quadrant would consider treating with an antibiotic.    Patient does have a history of travel  in 2 days out of the country I did discuss with patient the risks with symptoms not improving.  At this point his symptoms are not bad enough to consider getting a CT scan.      SUBJECTIVE  HPI:  Abhijit Garcia is a 48 year old male who presents with the CC of abdominal/pelvic pain.  Colonoscopy in 2018 showed diverticulosis in the sigmoid colon   Pain is located in the LLQ area, with radiation to None.  The pain is characterized as dull, Pain has been present for 3 day(s) and is stable.  EXACERBATING FACTORS: nothing  RELIEVING FACTORS: nothing.  ASSOCIATED SX: nausea and burping.    Past Medical History:   Diagnosis Date     Arthritis      Dysuria      Gout      Hyperplastic colon polyp 12/2018     Hypertension      RACHID (obstructive sleep apnea) 11/9/2022     Prostate infection      Current Outpatient Medications   Medication Sig Dispense Refill     allopurinol (ZYLOPRIM) 100 MG tablet Take 2 tablets (200 mg) by mouth daily 180 tablet 3     amoxicillin-clavulanate (AUGMENTIN) 875-125 MG tablet Take 1 tablet by mouth 2 times daily 20 tablet 0     imiquimod (ALDARA) 5 % external cream Apply a small sized amount to warts QHS at bedtime.   Wash off after 8 hours. 15 packet 3     irbesartan (AVAPRO) 300 MG tablet Take 1 tablet (300 mg) by mouth daily 90 tablet 3     ketoconazole (NIZORAL) 2 % external cream Apply topically daily as needed for other (athletes foot) 60 g 3     Loratadine (CLARITIN PO)        colchicine (COLCYRS) 0.6 MG tablet Take 1 tablet (0.6 mg) by mouth daily for 90 days Then as needed for flares (2 at onset of flare, 3rd 2 hrs later if no relief, then 1 twice daily until flare resolves) (Patient not taking: Reported on 3/27/2023) 90 tablet 3     valACYclovir (VALTREX) 1000 mg tablet TAKE 2 TABLETS BY MOUTH TWICE DAILY FOR 1 DAY (Patient not taking: Reported on 3/27/2023) 8 tablet 11     Social History     Tobacco Use     Smoking status: Never     Smokeless tobacco: Never     Tobacco comments:      I have never used tobacco. My dad was a heavy smoker so I did have second hand smoke exposure   Substance Use Topics     Alcohol use: Yes     Alcohol/week: 3.0 standard drinks     Types: 3 Standard drinks or equivalent per week     Comment: social drinker.       ROS:  Review of systems negative except as stated above.    OBJECTIVE:  /80 (BP Location: Right arm, Patient Position: Chair, Cuff Size: Adult Large)   Pulse 84   Temp 99  F (37.2  C) (Oral)   Resp 14   Wt (!) 151 kg (333 lb)   SpO2 97%   BMI 43.93 kg/m    GENERAL APPEARANCE: healthy, alert and no distress  EYES: EOMI,  PERRL, conjunctiva clear   mouth without ulcers, erythema or lesions, oral mucosa moist  RESP: lungs clear to auscultation - no rales, rhonchi or wheezes  CV: regular rates and rhythm, normal S1 S2, no murmur noted  ABDOMEN:  soft,LLQtender, no HSM or masses and bowel sounds normal  PSYCH: mentation appears normal      Torri Michel MD

## 2023-03-28 LAB
MAGNESIUM SERPL-MCNC: 1.9 MG/DL (ref 1.7–2.3)
T4 FREE SERPL-MCNC: 1.21 NG/DL (ref 0.9–1.7)
TSH SERPL DL<=0.005 MIU/L-ACNC: 4.45 UIU/ML (ref 0.3–4.2)
URATE SERPL-MCNC: 7.2 MG/DL (ref 3.4–7)

## 2023-04-25 ENCOUNTER — OFFICE VISIT (OUTPATIENT)
Dept: FAMILY MEDICINE | Facility: CLINIC | Age: 48
End: 2023-04-25
Payer: COMMERCIAL

## 2023-04-25 ENCOUNTER — ANCILLARY PROCEDURE (OUTPATIENT)
Dept: GENERAL RADIOLOGY | Facility: CLINIC | Age: 48
End: 2023-04-25
Attending: NURSE PRACTITIONER
Payer: COMMERCIAL

## 2023-04-25 VITALS
DIASTOLIC BLOOD PRESSURE: 80 MMHG | BODY MASS INDEX: 41.75 KG/M2 | RESPIRATION RATE: 20 BRPM | HEIGHT: 73 IN | TEMPERATURE: 98.5 F | SYSTOLIC BLOOD PRESSURE: 116 MMHG | OXYGEN SATURATION: 97 % | HEART RATE: 76 BPM | WEIGHT: 315 LBS

## 2023-04-25 DIAGNOSIS — B07.8 FLAT WART: ICD-10-CM

## 2023-04-25 DIAGNOSIS — M25.511 ACUTE PAIN OF RIGHT SHOULDER: Primary | ICD-10-CM

## 2023-04-25 PROCEDURE — 99213 OFFICE O/P EST LOW 20 MIN: CPT | Mod: 25 | Performed by: NURSE PRACTITIONER

## 2023-04-25 PROCEDURE — 17110 DESTRUCTION B9 LES UP TO 14: CPT | Performed by: NURSE PRACTITIONER

## 2023-04-25 PROCEDURE — 73030 X-RAY EXAM OF SHOULDER: CPT | Mod: TC | Performed by: RADIOLOGY

## 2023-04-25 NOTE — PROGRESS NOTES
Assessment & Plan     Acute pain of right shoulder: xray appears without fracture or dislocation, awaiting final reading by radiology.  Will refer to ortho for further evaluation and treatment.      - XR Shoulder Right G/E 3 Views  - Orthopedic  Referral    Flat wart: upper chest  - DESTRUCT BENIGN LESION, UP TO 14  Procedure explained to patient, verbal consent given, cleansed with alcohol, liquid nitrogen applied x 3 pulses (5 sec each).  Explained that may take more than 1 treatment, if wart remains return in 3-4 weeks for an additional treatment.       FUTURE APPOINTMENTS:       - Follow-up visit in 4 weeks if wart remains.    Susan Haase, APRN [facility] DIMITRY Norman is a 48 year old, presenting for the following health issues:  RECHECK, Shoulder Injury, and Derm Problem        4/25/2023    11:09 AM   Additional Questions   Roomed by ANN MARIE Yusuf LPN   Accompanied by none         4/25/2023    11:09 AM   Patient Reported Additional Medications   Patient reports taking the following new medications none     History of Present Illness       Reason for visit:  Right shoulder injured in a fall at home  Symptom onset:  More than a month  Symptoms include:  Pain with, stiffness,popping with motion  Symptom intensity:  Moderate  Symptom progression:  Worsening  Had these symptoms before:  No  What makes it worse:  Motion reaching twisting  What makes it better:  Not doing those things    He eats 2-3 servings of fruits and vegetables daily.He consumes 0 sweetened beverage(s) daily.He exercises with enough effort to increase his heart rate 9 or less minutes per day.  He exercises with enough effort to increase his heart rate 3 or less days per week.   He is taking medications regularly.     Right shoulder pain due to a fall 8 weeks ago, fell on ice landing on outstretched palm of his hand.  Continues to have anterior right shoulder pain with any type of activity such as  "playing guitar, rowing, putting on coat, reaching across the body, sleeping on the right side.  He also hears a crunch and pop of the right shoulder when reaching across the body or overhead.   Denies previous injury to the shoulder.     Wart noted on the upper chest, history of warts in the past.     Review of Systems   CONSTITUTIONAL: NEGATIVE for fever, chills, change in weight  INTEGUMENTARY/SKIN: see HPI  ENT/MOUTH: NEGATIVE for ear, mouth and throat problems  RESP: NEGATIVE for significant cough or SOB  CV: NEGATIVE for chest pain, palpitations or peripheral edema  MUSCULOSKELETAL:see HPI  NEURO: NEGATIVE for RUE weakness, paresthesia.      Objective    /89   Pulse 76   Temp 98.5  F (36.9  C) (Oral)   Resp 20   Ht 1.854 m (6' 1\")   Wt (!) 150.1 kg (331 lb)   SpO2 97%   BMI 43.67 kg/m    Body mass index is 43.67 kg/m .  Physical Exam   GENERAL: healthy, alert and no distress  SKIN;  Upper chest with a 1 mm flesh colored wart  NECK: no adenopathy, no asymmetry, masses, or scars and thyroid normal to palpation  MS: right anterior shoulder with tenderness upon palpation.  Increased pain and decreased flexion and adduction.  No gross musculoskeletal defects noted, no edema  NEURO: BUE with normal strength and tone              "

## 2023-05-05 ENCOUNTER — OFFICE VISIT (OUTPATIENT)
Dept: FAMILY MEDICINE | Facility: CLINIC | Age: 48
End: 2023-05-05
Payer: COMMERCIAL

## 2023-05-05 DIAGNOSIS — L73.8 SEBACEOUS HYPERPLASIA OF FACE: Primary | ICD-10-CM

## 2023-05-05 DIAGNOSIS — H02.823 MILIA OF RIGHT EYELID: ICD-10-CM

## 2023-05-05 PROCEDURE — 99213 OFFICE O/P EST LOW 20 MIN: CPT | Mod: 24 | Performed by: DERMATOLOGY

## 2023-05-05 ASSESSMENT — PAIN SCALES - GENERAL: PAINLEVEL: MILD PAIN (2)

## 2023-05-05 NOTE — LETTER
5/5/2023         RE: Abhijit Garcia  [address] [id number]        Dear Colleague,    Thank you for referring your patient, [facility]. Please see a copy of my visit note below.    [facility] Dermatology Clinic Note - EP    Encounter Date: May 5, 2023    Dermatology Problem List:   FBSE, 7/29/22  #Sebaceous Hyperplasia on the bilateral temples and lateral  Canthus   #Milia of the right medial cheek   #Multiple Benign Nevi     ____________________________________________    Assessment & Plan:     #. Sebaceous Hyperplasia on the bilateral temples and lateral canthi   - Counseled the patient about the benign nature of the lesion   - Discussed Adapalene to apply nightly to the areas     #. Milia on the Mid Right Cheek   - Counseled the patient about the benign nature of these lesions   - Discussed the use of a comedone extractor to express the contents    Procedures Performed:   None    Follow-up: PRN    Scribe Disclosure:  I, SHERLEY MIRAMONTES, am serving as a scribe to document services personally performed by Teodoro Manzanares MD based on data collection and the provider's statements to me.     Eden Lund MD  Dermatology Resident, PGY-2     Staff Physician Comments:   I saw and evaluated the patient with the resident and I agree with the assessment and plan.  I was present for the examination. I have made edits if needed.    Teodoro Manzanares MD  Staff Dermatologist and Dermatopathologist  , Department of Dermatology     ____________________________________________    CC: Derm Problem (? Flat warts under eyes and left temple/Any more flat warts around neck)      HPI:  Mr. Abhijit Garcia is a(n) extremely pleasant 48 year old male who presents today as a return patient for concerns with flat warts. Last seen by Shanthi Red PA-C on 7/29/22, at which time the patient had no lesions of concern.    -Patient has  a history of flat warts treated by PCP   -Notes that in the areas that he wears his CPAP mask he has developed bumps on his lateral temples, wants to see if they are flat warts   -Has bumps on his right cheek that he also would like to evaluate if it is a flat wart or not.     The patient denies any painful, bleeding, or nonhealing lesions, or any new or changing moles.    Patient is otherwise feeling well, without additional skin concerns.     Labs Reviewed:  N/A    Physical Exam:  Vitals: There were no vitals taken for this visit.  SKIN: Waist-up skin, which includes the head/face, neck, both arms, chest, back, abdomen, digits and/or nails was examined.  - Bilateral temples with yellow papules with cornoid vessels   - Right cheek with central pore c/w milia   - No other lesions of concern on areas examined.     Medications:  Current Outpatient Medications   Medication     allopurinol (ZYLOPRIM) 100 MG tablet     imiquimod (ALDARA) 5 % external cream     irbesartan (AVAPRO) 300 MG tablet     ketoconazole (NIZORAL) 2 % external cream     Loratadine (CLARITIN PO)     valACYclovir (VALTREX) 1000 mg tablet     No current facility-administered medications for this visit.      Past Medical History:   Patient Active Problem List   Diagnosis     Mixed Hyperlipidemia LDL goal <130     Essential hypertension     Gout     History of DVT left leg. postop     RACHID (obstructive sleep apnea)     Carpal tunnel syndrome     Gastroesophageal reflux     Heartburn     Morbid obesity (H)     Past Medical History:   Diagnosis Date     Arthritis      Dysuria      Gout      Hyperplastic colon polyp 12/2018     Hypertension      RACHID (obstructive sleep apnea) 11/9/2022     Prostate infection        CC No referring provider defined for this encounter. on close of this encounter.    This note has been created using voice recognition software; while it has been reviewed, some errors may persist.         Again, thank you for allowing me to  participate in the care of your patient.        Sincerely,        Teodoro Manzanares MD

## 2023-05-05 NOTE — PROGRESS NOTES
[facility] Dermatology Clinic Note - EP    Encounter Date: May 5, 2023    Dermatology Problem List:   FBSE, 7/29/22  #Sebaceous Hyperplasia on the bilateral temples and lateral  Canthus   #Milia of the right medial cheek   #Multiple Benign Nevi     ____________________________________________    Assessment & Plan:     #. Sebaceous Hyperplasia on the bilateral temples and lateral canthi   - Counseled the patient about the benign nature of the lesion   - Discussed Adapalene to apply nightly to the areas     #. Milia on the Mid Right Cheek   - Counseled the patient about the benign nature of these lesions   - Discussed the use of a comedone extractor to express the contents    Procedures Performed:   None    Follow-up: PRN    Scribe Disclosure:  I, SHERLEY MIRAMONTES, am serving as a scribe to document services personally performed by Teodoro Manzanares MD based on data collection and the provider's statements to me.     Eden Lund MD  Dermatology Resident, PGY-2     Staff Physician Comments:   I saw and evaluated the patient with the resident and I agree with the assessment and plan.  I was present for the examination. I have made edits if needed.    Teodoro Manzanares MD  Staff Dermatologist and Dermatopathologist  , Department of Dermatology     ____________________________________________    CC: Derm Problem (? Flat warts under eyes and left temple/Any more flat warts around neck)      HPI:  Mr. Abhijit Garcia is a(n) extremely pleasant 48 year old male who presents today as a return patient for concerns with flat warts. Last seen by Shanthi Red PA-C on 7/29/22, at which time the patient had no lesions of concern.    -Patient has a history of flat warts treated by PCP   -Notes that in the areas that he wears his CPAP mask he has developed bumps on his lateral temples, wants to see if they are flat warts   -Has bumps on his right cheek that he also would like to evaluate if it is a flat wart  or not.     The patient denies any painful, bleeding, or nonhealing lesions, or any new or changing moles.    Patient is otherwise feeling well, without additional skin concerns.     Labs Reviewed:  N/A    Physical Exam:  Vitals: There were no vitals taken for this visit.  SKIN: Waist-up skin, which includes the head/face, neck, both arms, chest, back, abdomen, digits and/or nails was examined.  - Bilateral temples with yellow papules with cornoid vessels   - Right cheek with central pore c/w milia   - No other lesions of concern on areas examined.     Medications:  Current Outpatient Medications   Medication     allopurinol (ZYLOPRIM) 100 MG tablet     imiquimod (ALDARA) 5 % external cream     irbesartan (AVAPRO) 300 MG tablet     ketoconazole (NIZORAL) 2 % external cream     Loratadine (CLARITIN PO)     valACYclovir (VALTREX) 1000 mg tablet     No current facility-administered medications for this visit.      Past Medical History:   Patient Active Problem List   Diagnosis     Mixed Hyperlipidemia LDL goal <130     Essential hypertension     Gout     History of DVT left leg. postop     RACHID (obstructive sleep apnea)     Carpal tunnel syndrome     Gastroesophageal reflux     Heartburn     Morbid obesity (H)     Past Medical History:   Diagnosis Date     Arthritis      Dysuria      Gout      Hyperplastic colon polyp 12/2018     Hypertension      RACHID (obstructive sleep apnea) 11/9/2022     Prostate infection        CC No referring provider defined for this encounter. on close of this encounter.    This note has been created using voice recognition software; while it has been reviewed, some errors may persist.

## 2023-05-05 NOTE — PATIENT INSTRUCTIONS
Salvatore on the medial Cheek   Comedone Extractor - Meant to take off those spots   Sharp tip end: poke the spoke to create a channel to the outside world  Loop end: Put the loop over the white spot and drag downward to express the contents     Temples: Sebaceous Hyperplasia   -Can apply a thin layer of adapalene nightly

## 2023-05-30 ENCOUNTER — TELEPHONE (OUTPATIENT)
Dept: GASTROENTEROLOGY | Facility: CLINIC | Age: 48
End: 2023-05-30
Payer: COMMERCIAL

## 2023-05-30 NOTE — TELEPHONE ENCOUNTER
Screening Questions  BLUE  KIND OF PREP RED  LOCATION [review exclusion criteria] GREEN  SEDATION TYPE        y Are you active on mychart?       Madeleine Lynch MD in  FAMILY PRACTICE Ordering/Referring Provider?        [employer]BS What type of coverage do you have?      n Have you had a positive covid test in the last 14 days?     43.5 1. BMI  [BMI 40+ - review exclusion criteria& smart-phrase document]    y  2. Are you able to give consent for your medical care? [IF NO,RN REVIEW]          n  3. Are you taking any prescription pain medications on a routine schedule   (ex narcotics: oxycodone, roxicodone, oxycontin,  and percocet)? [RN Review]        n  3a. EXTENDED PREP What kind of prescription?     n 4. Do you have any chemical dependencies such as alcohol, street drugs, or methadone?        **If yes 3- 5 , please schedule with MAC sedation.**          IF YES TO ANY 6 - 10 - HOSPITAL SETTING ONLY.     n 6.   Do you need assistance transferring?     n 7.   Have you had a heart or lung transplant?    n 8.   Are you currently on dialysis?   n 9.   Do you use daily home oxygen?   n 10. Do you take nitroglycerin?   10a. n If yes, how often?     n 11. Are you currently pregnant?    11a. n If yes, how many weeks? [ Greater than 12 weeks, OR NEEDED]    n 12. Do you have Pulmonary Hypertension? *NEED PAC APPT AT [facility] w/ [facility]*     n 13. [review exclusion criteria]  Do you have any implantable devices in your body (pacemaker, defib, LVAD)?    n 14. In the past 6 months, have you had any heart related issues including cardiomyopathy or heart attack?     14a. n If yes, did it require cardiac stenting if so when?     n 15. Have you had a stroke or Transient ischemic attack (TIA - aka  mini stroke ) within 6 months?      y 16. Do you have mod to severe Obstructive Sleep Apnea?  [Hospital only]    n 17. Do you have SEVERE AND UNCONTROLLED asthma? *NEED PAC APPT AT [facility] w/MAC*     18.Do you take blood thinners?  No    n 19.  "Do you take any of the following medications?    nPhentermine    nOzempic    nWegovy (Semaglutide)      19a. If yes, \"Hold for 7 days before procedure.  Please consult your prescribing provider if you have questions about holding this medication.\"     n  20. Do you have chronic kidney disease?      n  21. Do you have a diagnosis of diabetes?     n  22. On a regular basis do you go 3-5 days between bowel movements?      23. Preferred LOCAL Pharmacy for Pre Prescription           [facility] [address] [place] [address]        - CLOSING REMINDERS -    You will receive a call from a Nurse to review instructions and health history.  This assessment must be completed prior to your procedure.  Failure to complete the Nurse assessment may result in the procedure being cancelled.      On the day of your procedure, please designatean adult(s) who can drive you home stay with you for the next 24 hours. The medicines used in the exam will make you sleepy. You will not be able to drive.      You cannot take public transportation, ride share services, or non-medical taxi service without a responsible caregiver.  Medical transport services are allowed with the requirement that a responsible caregiver will receive you at your destination.  We require that drivers and caregivers are confirmed prior to your procedure.      - SCHEDULING DETAILS -  y & RACHID Hospital Setting Required & If yes, what is the exclusion?   Vanda  Surgeon    08/23/2023  Date of Procedure  Lower Endoscopy [Colonoscopy]  Type of Procedure Scheduled  [facility] Location   GOLYTELY EXTENDED - If you answer yes to questions #1, #3, #22 (De Cathrynen and CF pts)Which Colonoscopy Prep was Sent?     moderate Sedation Type     n PAC / Pre-op Required                 "

## 2023-06-05 ENCOUNTER — MYC MEDICAL ADVICE (OUTPATIENT)
Dept: FAMILY MEDICINE | Facility: CLINIC | Age: 48
End: 2023-06-05

## 2023-06-05 DIAGNOSIS — E79.0 ELEVATED URIC ACID IN BLOOD: Primary | ICD-10-CM

## 2023-06-05 DIAGNOSIS — R79.89 ELEVATED TSH: ICD-10-CM

## 2023-06-28 ENCOUNTER — MYC MEDICAL ADVICE (OUTPATIENT)
Dept: FAMILY MEDICINE | Facility: CLINIC | Age: 48
End: 2023-06-28
Payer: COMMERCIAL

## 2023-06-29 NOTE — TELEPHONE ENCOUNTER
See Dr. Lynch note on labs on 3/27, she wants him to have labs checked this month, future labs have been placed by her.  Thanks,  Susan Haase, CNP

## 2023-06-30 ENCOUNTER — TELEPHONE (OUTPATIENT)
Dept: GASTROENTEROLOGY | Facility: CLINIC | Age: 48
End: 2023-06-30
Payer: COMMERCIAL

## 2023-06-30 NOTE — TELEPHONE ENCOUNTER
Caller: Vanda  Reason for Reschedule/Cancellation (please be detailed, any staff messages or encounters to note?): Day doesn't work      Prior to reschedule please review:    Ordering Provider: Cris    Sedation per order: Moderate    Does patient have any ASC Exclusions, please identify?: Y- RACHID      Notes on Cancelled Procedure:    Procedure: Lower Endoscopy [Colonoscopy]     Date: 8/23/23    Location: [facility]; [address] [place] [address]    Surgeon: Vanda      Rescheduled: Yes    Procedure: Lower Endoscopy [Colonoscopy]     Date: 9/22    Location: [facility]; [address] [place] [address]    Surgeon: Vanda    Sedation Level Scheduled  Moderate,  Reason for Sedation Level Per order    Prep/Instructions updated and sent: Yes     Send In - basket message to Panc - Nishant Pool if EUS  procedure is canceled or rescheduled: [ N/A, YES or NO] N/A

## 2023-07-07 ENCOUNTER — LAB (OUTPATIENT)
Dept: LAB | Facility: CLINIC | Age: 48
End: 2023-07-07
Payer: COMMERCIAL

## 2023-07-07 DIAGNOSIS — E79.0 ELEVATED URIC ACID IN BLOOD: ICD-10-CM

## 2023-07-07 DIAGNOSIS — R79.89 ELEVATED TSH: ICD-10-CM

## 2023-07-07 LAB
MAGNESIUM SERPL-MCNC: 2 MG/DL (ref 1.7–2.3)
T4 FREE SERPL-MCNC: 1.16 NG/DL (ref 0.9–1.7)
TSH SERPL DL<=0.005 MIU/L-ACNC: 2.57 UIU/ML (ref 0.3–4.2)
URATE SERPL-MCNC: 6.7 MG/DL (ref 3.4–7)

## 2023-07-07 PROCEDURE — 36415 COLL VENOUS BLD VENIPUNCTURE: CPT

## 2023-07-07 PROCEDURE — 84439 ASSAY OF FREE THYROXINE: CPT

## 2023-07-07 PROCEDURE — 83735 ASSAY OF MAGNESIUM: CPT

## 2023-07-07 PROCEDURE — 84443 ASSAY THYROID STIM HORMONE: CPT

## 2023-07-07 PROCEDURE — 84550 ASSAY OF BLOOD/URIC ACID: CPT

## 2023-08-04 ENCOUNTER — OFFICE VISIT (OUTPATIENT)
Dept: FAMILY MEDICINE | Facility: CLINIC | Age: 48
End: 2023-08-04
Payer: COMMERCIAL

## 2023-08-04 VITALS
DIASTOLIC BLOOD PRESSURE: 76 MMHG | HEIGHT: 73 IN | SYSTOLIC BLOOD PRESSURE: 120 MMHG | WEIGHT: 315 LBS | OXYGEN SATURATION: 97 % | HEART RATE: 86 BPM | RESPIRATION RATE: 18 BRPM | BODY MASS INDEX: 41.75 KG/M2 | TEMPERATURE: 98.3 F

## 2023-08-04 DIAGNOSIS — R21 RASH: Primary | ICD-10-CM

## 2023-08-04 DIAGNOSIS — B07.8 FLAT WART: ICD-10-CM

## 2023-08-04 DIAGNOSIS — H69.92 DYSFUNCTION OF LEFT EUSTACHIAN TUBE: ICD-10-CM

## 2023-08-04 PROCEDURE — 99213 OFFICE O/P EST LOW 20 MIN: CPT | Mod: 25 | Performed by: NURSE PRACTITIONER

## 2023-08-04 PROCEDURE — 17110 DESTRUCTION B9 LES UP TO 14: CPT | Performed by: NURSE PRACTITIONER

## 2023-08-04 RX ORDER — CLOTRIMAZOLE 1 %
CREAM (GRAM) TOPICAL 2 TIMES DAILY
Qty: 60 G | Refills: 0 | Status: SHIPPED | OUTPATIENT
Start: 2023-08-04

## 2023-08-04 ASSESSMENT — ASTHMA QUESTIONNAIRES: ACT_TOTALSCORE: 25

## 2023-08-04 NOTE — PROGRESS NOTES
Assessment & Plan     (R21) Rash  (primary encounter diagnosis)  Comment: likely due to fungal infection,   Plan: clotrimazole (LOTRIMIN) 1 % external cream            (H69.82) Dysfunction of left eustachian tube  Comment: TM appears normal, no cerumen impaction or OM, will refer to ENT  Plan: Adult ENT  Referral           (B07.8) Flat wart  Comment: right chin, history of warts along the neck.    Procedure explained to patient, verbal consent given, cleansed with alcohol,  liquid nitrogen applied x 3 pulses (3 sec each), bandaide placed.    Plan: DESTRUCT BENIGN LESION, UP TO 14        FUTURE APPOINTMENTS:       - Follow-up visit as needed.     Susan Haase, APRN [facility]   Emerson is a 48 year old, presenting for the following health issues:  Derm Problem (Flat warts on right side of cheek. Rash on chest and right armpit.) and Ear Problem (Possible ear blockage. )        8/4/2023     7:36 AM   Additional Questions   Roomed by Yamini YAN CMA       History of Present Illness       Reason for visit:  3 issues:   Suspected flat wart to be removed on my chin.  Left ear blockage and pain.  Strange rash/ spot on my chest and under right arm.    He eats 2-3 servings of fruits and vegetables daily.He consumes 0 sweetened beverage(s) daily.He exercises with enough effort to increase his heart rate 9 or less minutes per day.  He exercises with enough effort to increase his heart rate 3 or less days per week.   He is taking medications regularly.       Emerson presents with a flat wart on lower chin, has had these on his neck in the same region, noticed this several days ago.     Left ear blockage:  has had left ear blockage, muffled hearing for several weeks, feels that he has ear wax.  Also has noticed left nasal blockage.  Denies sinus pressure.     Rash:   has a red spot in the center of his chest and in the center of his axilla, noticed several days ago, slightly itchy.   "    Review of Systems   CONSTITUTIONAL: NEGATIVE for fever, chills, change in weight  INTEGUMENTARY/SKIN: see HPI  ENT/MOUTH: NEGATIVE for mouth and throat problems.  See HPI  RESP: NEGATIVE for significant cough or SOB  CV: NEGATIVE for chest pain, palpitations or peripheral edema      Objective    /76   Pulse 86   Temp 98.3  F (36.8  C) (Oral)   Resp 18   Ht 1.854 m (6' 1\")   Wt (!) 153.3 kg (338 lb)   SpO2 97%   BMI 44.59 kg/m    Body mass index is 44.59 kg/m .  Physical Exam   GENERAL: healthy, alert and no distress  HENT: ear canals and TM's normal, nose and mouth without ulcers or lesions  NECK: no adenopathy, no asymmetry, masses, or scars and thyroid normal to palpation  RESP: lungs clear to auscultation - no rales, rhonchi or wheezes  CV: regular rate and rhythm, normal S1 S2,   SKIN: right chin with a 2 mm flesh colored wart.    Anterior chest and center of axilla with a 2 cm circular area of erythema, not raised.                "

## 2023-08-06 ENCOUNTER — OFFICE VISIT (OUTPATIENT)
Dept: URGENT CARE | Facility: URGENT CARE | Age: 48
End: 2023-08-06
Payer: COMMERCIAL

## 2023-08-06 VITALS
BODY MASS INDEX: 44.59 KG/M2 | TEMPERATURE: 98.5 F | HEART RATE: 72 BPM | WEIGHT: 315 LBS | DIASTOLIC BLOOD PRESSURE: 85 MMHG | OXYGEN SATURATION: 98 % | SYSTOLIC BLOOD PRESSURE: 123 MMHG

## 2023-08-06 DIAGNOSIS — R21 BLISTERING RASH: Primary | ICD-10-CM

## 2023-08-06 PROCEDURE — 87529 HSV DNA AMP PROBE: CPT | Performed by: PHYSICIAN ASSISTANT

## 2023-08-06 PROCEDURE — 87798 DETECT AGENT NOS DNA AMP: CPT | Performed by: PHYSICIAN ASSISTANT

## 2023-08-06 PROCEDURE — 99214 OFFICE O/P EST MOD 30 MIN: CPT | Mod: 24 | Performed by: PHYSICIAN ASSISTANT

## 2023-08-06 RX ORDER — VALACYCLOVIR HYDROCHLORIDE 1 G/1
2000 TABLET, FILM COATED ORAL 2 TIMES DAILY
Qty: 4 TABLET | Refills: 0 | Status: SHIPPED | OUTPATIENT
Start: 2023-08-06 | End: 2024-01-23

## 2023-08-06 NOTE — PROGRESS NOTES
Assessment & Plan     Blistering rash  Acute problem. DDx: HSV vs herpes zoster vs other. No evidence of skin abscess or cellulitis on exam. Valtrex Rx today for presumed HSV. HSV and zoster skin swab are pending. If zoster swab returns positive we discussed we will extend the course of the valtrex to one week. Keep affected area clean. Tylenol or motrin prn pain/discomfort. Keep monitoring symptoms. Follow up if any worsening ysmptoms. Patient agrees.   - HSV Types 1 and 2 Qualitative PCR CSF  - Varicella Zoster DNA PCR CSF or Skin Swab  - valACYclovir (VALTREX) 1000 mg tablet  Dispense: 4 tablet; Refill: 0       32 minutes spent on the date of the encounter doing chart review, history and exam, patient education, documentation, and further activities per the note.    Return in about 1 week (around 8/13/2023) for Symptoms failing to improve.    Shraddha Long PA-C  [facility] Norman is a 48 year old male who presents to clinic today for the following health issues:  Chief Complaint   Patient presents with    Ankle/Foot left     Blisters,sol of foot, filled with pus, soreness   x last night      HPI    Patient is presenting to urgent care today with a complaint of blistery rash plantar surface of left foot.  Noted last night.  Affected area is tender.  No recent fevers or chills. Currently no ST. No known insect bites.  No injury to the affected area. Did stay at a motel in the past couple days. Treatment tried: None. Hx of cold sores.  Of note, was seen in the clinic 2 days ago for a rash right axillary area suspicious for  fungal infection for which he is using clotrimazole cream at this time.      Review of Systems  Constitutional, HEENT, cardiovascular, pulmonary, GI, , musculoskeletal, neuro, skin, endocrine and psych systems are negative, except as otherwise noted.      Objective    /85   Pulse 72   Temp 98.5  F (36.9  C) (Tympanic)   Wt (!) 153.3 kg  (338 lb)   SpO2 98%   BMI 44.59 kg/m    Physical Exam   GENERAL: healthy, alert and no distress  HENT: mouth without ulcers or lesions, throat is moist and pink  RESP: normal breathing effort  SKIN: 2 tender vesicles approximately 2 mm each noted plantar surface of the left foot mid foot region, no surrounding erythema, no evidence of abscess. ROM of the left foot is normal.

## 2023-08-06 NOTE — PATIENT INSTRUCTIONS
Please take the antiviral medication as we discuss. Results of the skin swabs should be available in the next couple days.    Please take tylenol or motrin as needed for pain/discomfort.

## 2023-08-07 LAB
HSV1 DNA SPEC QL NAA+PROBE: NOT DETECTED
HSV2 DNA SPEC QL NAA+PROBE: NOT DETECTED
VZV DNA SPEC QL NAA+PROBE: NOT DETECTED

## 2023-08-11 ENCOUNTER — OFFICE VISIT (OUTPATIENT)
Dept: DERMATOLOGY | Facility: CLINIC | Age: 48
End: 2023-08-11
Payer: COMMERCIAL

## 2023-08-11 DIAGNOSIS — B35.4 TINEA CORPORIS: ICD-10-CM

## 2023-08-11 DIAGNOSIS — R23.8 INFLAMMATORY PAPULE: Primary | ICD-10-CM

## 2023-08-11 PROCEDURE — 99214 OFFICE O/P EST MOD 30 MIN: CPT | Performed by: NURSE PRACTITIONER

## 2023-08-11 RX ORDER — TRIAMCINOLONE ACETONIDE 1 MG/G
CREAM TOPICAL 2 TIMES DAILY
Qty: 15 G | Refills: 0 | Status: SHIPPED | OUTPATIENT
Start: 2023-08-11 | End: 2023-12-15

## 2023-08-11 NOTE — LETTER
8/11/2023         RE: Abhijit Garcia  [address] [facility] [place] [address]        Dear Colleague,    Thank you for referring your patient, Abhijit Garcia, to the [facility]. Please see a copy of my visit note below.    [facility] Note  Encounter Date: Aug 11, 2023  Office Visit     Reviewed patients past medical history and pertinent chart review prior to patients visit today.     Dermatology Problem List:  Patient denies personal history of skin cancer or dysplastic nevi.      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 Has taken valtrex for these in the past.   ____________________________________________    Assessment & Plan:     # Healing inflammatory papules. Lesions appear to be healing well but there is still some underlying inflammation and patient is still feeling some itching. Given triamcinolone 0.1% ointment to use until the redness and itching has fully resolved. If not fully resolved in 1 month RTC for reevaluation.     # Likely tinea infection of the right axilla.  Advised to continue use of the clotrimazole 1% cream twice daily until 2 weeks after the lesion has fully healed.  If not fully healed in 3 to 4 weeks he will do a trial of the triamcinolone 0.1% ointment at this time twice daily until resolved.  If this is worsening or not going away he will return to clinic for reevaluation.    Amanda Bruno, CNP  Dermatology    _______________________________________    CC: No chief complaint on file.    HPI:  Mr. Abhijit Garcia is a(n) 48 year old male who presents today as a return patient for new rash on the left bottom of the foot.  Last week and he was staying at a hotel and says he is extremely careful about not walking barefoot but he developed 3 small blisters on the bottom of his left foot.  He went to urgent care and had them cultured for HSV and varicella but both  did not detect the virus..  He says that they  were   He was given a 1 day course of Valtrex which he completed painful and itchy when they started but this has mostly resolved.  He still feels a bit of itching at times but thinks that they are getting better.  He also has a rash that was on the mid chest and the right axilla.  He has been using some clotrimazole 1% cream 1-2 times daily and the one on the chest has fully resolved but the one on the right armpit has not.  He has been using this for about 2 weeks.    Patient is otherwise feeling well, without additional skin concerns.      Physical Exam:  SKIN: Focused examination of chest, abdomen, right axilla, left plantar foot was performed.  - left plantar foot has three small 1 mm healing papules at arch of foot  -Right axilla, about a 1 cm erythematous scaly plaque    - No other lesions of concern on areas examined.     Medications:  Current Outpatient Medications   Medication     allopurinol (ZYLOPRIM) 100 MG tablet     clotrimazole (LOTRIMIN) 1 % external cream     imiquimod (ALDARA) 5 % external cream     irbesartan (AVAPRO) 300 MG tablet     ketoconazole (NIZORAL) 2 % external cream     Loratadine (CLARITIN PO)     valACYclovir (VALTREX) 1000 mg tablet     valACYclovir (VALTREX) 1000 mg tablet     No current facility-administered medications for this visit.      Past Medical History:   Patient Active Problem List   Diagnosis     Mixed Hyperlipidemia LDL goal <130     Essential hypertension     Gout     History of DVT left leg. postop     RACHID (obstructive sleep apnea)     Carpal tunnel syndrome     Gastroesophageal reflux     Heartburn     Morbid obesity (H)     Past Medical History:   Diagnosis Date     Arthritis      Dysuria      Gout      Hyperplastic colon polyp 12/2018     Hypertension      RACHID (obstructive sleep apnea) 11/9/2022     Prostate infection        CC Referred Self, MD  No address on file on close of this encounter.       Again, thank you for allowing me to participate in the care of  your patient.        Sincerely,        STEFANI Wood CNP

## 2023-08-11 NOTE — PATIENT INSTRUCTIONS
Patient Education       Proper skin care from [facility]:    -Eliminate harsh soaps as they strip the natural oils from the skin, often resulting in dry itchy skin ( i.e. Dial, Zest, [place])  -Use mild soaps such as Cetaphil or Dove Sensitive Skin in the shower. You do not need to use soap on arms, legs, and trunk every time you shower unless visibly soiled.   -Avoid hot or cold showers.  -After showering, lightly dry off and apply moisturizing within 2-3 minutes. This will help trap moisture in the skin.   -Aggressive use of a moisturizer at least 1-2 times a day to the entire body (including -Vanicream, Cetaphil, Aquaphor or Cerave) and moisturize hands after every washing.  -We recommend using moisturizers that come in a tub that needs to be scooped out, not a pump. This has more of an oil base. It will hold moisture in your skin much better than a water base moisturizer. The above recommended are non-pore clogging.      Wear a sunscreen with at least SPF 30 on your face, ears, neck and V of the chest daily. Wear sunscreen on other areas of the body if those areas are exposed to the sun throughout the day. Sunscreens can contain physical and/or chemical blockers. Physical blockers are less likely to clog pores, these include zinc oxide and titanium dioxide. Reapply every two hour and after swimming.     Sunscreen examples: https://www.ewg.org/sunscreen/    UV radiation  UVA radiation remains constant throughout the day and throughout the year. It is a longer wavelength than UVB and therefore penetrates deeper into the skin leading to immediate and delayed tanning, photoaging, and skin cancer. 70-80% of UVA and UVB radiation occurs between the hours of 10am-2pm.  UVB radiation  UVB radiation causes the most harmful effects and is more significant during the summer months. However, snow and ice can reflect UVB radiation leading to skin damage during the winter months as well. UVB radiation is  responsible for tanning, burning, inflammation, delayed erythema (pinkness), pigmentation (brown spots), and skin cancer.     I recommend self monthly full body exams and yearly full body exams with a dermatology provider. If you develop a new or changing lesion please follow up for examination. Most skin cancers are pink and scaly or pink and pearly. However, we do see blue/brown/black skin cancers.  Consider the ABCDEs of melanoma when giving yourself your monthly full body exam ( don't forget the groin, buttocks, feet, toes, etc). A-asymmetry, B-borders, C-color, D-diameter, E-elevation or evolving. If you see any of these changes please follow up in clinic. If you cannot see your back I recommend purchasing a hand held mirror to use with a larger wall mirror.       Checking for Skin Cancer  You can find cancer early by checking your skin each month. There are 3 kinds of skin cancer. They are melanoma, basal cell carcinoma, and squamous cell carcinoma. Doing monthly skin checks is the best way to find new marks or skin changes. Follow the instructions below for checking your skin.   The ABCDEs of checking moles for melanoma   Check your moles or growths for signs of melanoma using ABCDE:   Asymmetry: the sides of the mole or growth don t match  Border: the edges are ragged, notched, or blurred  Color: the color within the mole or growth varies  Diameter: the mole or growth is larger than 6 mm (size of a pencil eraser)  Evolving: the size, shape, or color of the mole or growth is changing (evolving is not shown in the images below)    Checking for other types of skin cancer  Basal cell carcinoma or squamous cell carcinoma have symptoms such as:     A spot or mole that looks different from all other marks on your skin  Changes in how an area feels, such as itching, tenderness, or pain  Changes in the skin's surface, such as oozing, bleeding, or scaliness  A sore that does not heal  New swelling or redness beyond  the border of a mole    Who s at risk?  Anyone can get skin cancer. But you are at greater risk if you have:   Fair skin, light-colored hair, or light-colored eyes  Many moles or abnormal moles on your skin  A history of sunburns from sunlight or tanning beds  A family history of skin cancer  A history of exposure to radiation or chemicals  A weakened immune system  If you have had skin cancer in the past, you are at risk for recurring skin cancer.   How to check your skin  Do your monthly skin checkups in front of a full-length mirror. Check all parts of your body, including your:   Head (ears, face, neck, and scalp)  Torso (front, back, and sides)  Arms (tops, undersides, upper, and lower armpits)  Hands (palms, backs, and fingers, including under the nails)  Buttocks and genitals  Legs (front, back, and sides)  Feet (tops, soles, toes, including under the nails, and between toes)  If you have a lot of moles, take digital photos of them each month. Make sure to take photos both up close and from a distance. These can help you see if any moles change over time.   Most skin changes are not cancer. But if you see any changes in your skin, call your doctor right away. Only he or she can diagnose a problem. If you have skin cancer, seeing your doctor can be the first step toward getting the treatment that could save your life.   [email or web address] last reviewed this educational content on 4/1/2019 2000-2020 [employer]. [address] [place] [address], [place] [address]. All rights reserved. This information is not intended as a substitute for professional medical care. Always follow your healthcare professional's instructions.       When should I call my doctor?  If you are worsening or not improving, please, contact us or seek urgent care as noted below.     Who should I call with questions (adults)?  [facility] (adult and pediatric): [phone]  [facility]  [place] (adult): [phone]  [facility] ([place], [place], [place] and [place]) [phone]  For urgent needs outside of business hours call the [facility] at [phone] and ask for the dermatology resident on call to be paged  If this is a medical emergency and you are unable to reach an ER, Call 911      If you need a prescription refill, please contact your pharmacy. Refills are approved or denied by our Physicians during normal business hours, Monday through Fridays  Per office policy, refills will not be granted if you have not been seen within the past year (or sooner depending on your child's condition)

## 2023-08-11 NOTE — PROGRESS NOTES
[facility] Dermatology Note  Encounter Date: Aug 11, 2023  Office Visit     Reviewed patients past medical history and pertinent chart review prior to patients visit today.     Dermatology Problem List:  Patient denies personal history of skin cancer or dysplastic nevi.      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 Has taken valtrex for these in the past.   ____________________________________________    Assessment & Plan:     # Healing inflammatory papules. Lesions appear to be healing well but there is still some underlying inflammation and patient is still feeling some itching. Given triamcinolone 0.1% ointment to use until the redness and itching has fully resolved. If not fully resolved in 1 month RTC for reevaluation.     # Likely tinea infection of the right axilla.  Advised to continue use of the clotrimazole 1% cream twice daily until 2 weeks after the lesion has fully healed.  If not fully healed in 3 to 4 weeks he will do a trial of the triamcinolone 0.1% ointment at this time twice daily until resolved.  If this is worsening or not going away he will return to clinic for reevaluation.    Amanda Bruno, CNP  Dermatology    _______________________________________    CC: No chief complaint on file.    HPI:  Mr. Abhijit Garcia is a(n) 48 year old male who presents today as a return patient for new rash on the left bottom of the foot.  Last week and he was staying at a hotel and says he is extremely careful about not walking barefoot but he developed 3 small blisters on the bottom of his left foot.  He went to urgent care and had them cultured for HSV and varicella but both  did not detect the virus..  He says that they were   He was given a 1 day course of Valtrex which he completed painful and itchy when they started but this has mostly resolved.  He still feels a bit of itching at times but thinks that they are getting better.  He also has a rash that was on the mid chest and  the right axilla.  He has been using some clotrimazole 1% cream 1-2 times daily and the one on the chest has fully resolved but the one on the right armpit has not.  He has been using this for about 2 weeks.    Patient is otherwise feeling well, without additional skin concerns.      Physical Exam:  SKIN: Focused examination of chest, abdomen, right axilla, left plantar foot was performed.  - left plantar foot has three small 1 mm healing papules at arch of foot  -Right axilla, about a 1 cm erythematous scaly plaque    - No other lesions of concern on areas examined.     Medications:  Current Outpatient Medications   Medication    allopurinol (ZYLOPRIM) 100 MG tablet    clotrimazole (LOTRIMIN) 1 % external cream    imiquimod (ALDARA) 5 % external cream    irbesartan (AVAPRO) 300 MG tablet    ketoconazole (NIZORAL) 2 % external cream    Loratadine (CLARITIN PO)    valACYclovir (VALTREX) 1000 mg tablet    valACYclovir (VALTREX) 1000 mg tablet     No current facility-administered medications for this visit.      Past Medical History:   Patient Active Problem List   Diagnosis    Mixed Hyperlipidemia LDL goal <130    Essential hypertension    Gout    History of DVT left leg. postop    RACHID (obstructive sleep apnea)    Carpal tunnel syndrome    Gastroesophageal reflux    Heartburn    Morbid obesity (H)     Past Medical History:   Diagnosis Date    Arthritis     Dysuria     Gout     Hyperplastic colon polyp 12/2018    Hypertension     RACHID (obstructive sleep apnea) 11/9/2022    Prostate infection        CC Referred Self, MD  No address on file on close of this encounter.    no vascular compromise

## 2023-08-21 ENCOUNTER — TRANSFERRED RECORDS (OUTPATIENT)
Dept: HEALTH INFORMATION MANAGEMENT | Facility: CLINIC | Age: 48
End: 2023-08-21
Payer: COMMERCIAL

## 2023-09-05 ENCOUNTER — OFFICE VISIT (OUTPATIENT)
Dept: URGENT CARE | Facility: URGENT CARE | Age: 48
End: 2023-09-05
Payer: COMMERCIAL

## 2023-09-05 VITALS
SYSTOLIC BLOOD PRESSURE: 151 MMHG | DIASTOLIC BLOOD PRESSURE: 84 MMHG | TEMPERATURE: 98 F | OXYGEN SATURATION: 97 % | HEART RATE: 78 BPM

## 2023-09-05 DIAGNOSIS — Z04.9 SUSPECTED CONDITION NOT FOUND: Primary | ICD-10-CM

## 2023-09-05 PROCEDURE — 99213 OFFICE O/P EST LOW 20 MIN: CPT | Performed by: PHYSICIAN ASSISTANT

## 2023-09-05 NOTE — PROGRESS NOTES
"  Assessment/Plan:    No verrucous lesions amenable to cryotherapy noted on exam.   Follow up with dermatology regarding this recurrent issue.     See patient instructions below.    At the end of the encounter, I discussed results, diagnosis, medications. Discussed red flags for immediate return to clinic/ER, as well as indications for follow up if no improvement. Patient understood and agreed to plan. Patient was stable for discharge.      ICD-10-CM    1. Suspected condition not found  Z04.9             No follow-ups on file.    JOVANNI Resendiz, JENELLE  [facility]  -----------------------------------------------------------------------------------------------------------------------------------------------------    HPI:  Abhijit Garcia is a 48 year old male who presents for evaluation of \"flat wart\" on R side of his neck which he noticed today. He states he gets these every so often on the same area over the last 7-8 years; and typically has them frozen off. He states that since arriving in the clinic, however, he can no longer feel it.    Past Medical History:   Diagnosis Date    Arthritis     Dysuria     Gout     Hyperplastic colon polyp 12/2018    Hypertension     RACHID (obstructive sleep apnea) 11/9/2022    Prostate infection        Vitals:    09/05/23 1712   BP: (!) 151/84   BP Location: Right arm   Patient Position: Sitting   Cuff Size: Adult Large   Pulse: 78   Temp: 98  F (36.7  C)   TempSrc: Tympanic   SpO2: 97%       Physical Exam  Vitals and nursing note reviewed.   Pulmonary:      Effort: Pulmonary effort is normal.   Skin:     Comments: No verrucous or other lesions noted to neck   Neurological:      Mental Status: He is alert.         Labs/Imaging:  No results found for this or any previous visit (from the past 24 hour(s)).  No results found for this or any previous visit (from the past 24 hour(s)).        There are no Patient Instructions on file for this " visit.

## 2023-09-08 RX ORDER — BISACODYL 5 MG/1
TABLET, DELAYED RELEASE ORAL
Qty: 4 TABLET | Refills: 0 | Status: SHIPPED | OUTPATIENT
Start: 2023-09-08 | End: 2024-01-23

## 2023-09-13 ENCOUNTER — OFFICE VISIT (OUTPATIENT)
Dept: SLEEP MEDICINE | Facility: CLINIC | Age: 48
End: 2023-09-13
Payer: COMMERCIAL

## 2023-09-13 VITALS
HEART RATE: 73 BPM | OXYGEN SATURATION: 100 % | WEIGHT: 315 LBS | BODY MASS INDEX: 44.4 KG/M2 | DIASTOLIC BLOOD PRESSURE: 78 MMHG | SYSTOLIC BLOOD PRESSURE: 117 MMHG

## 2023-09-13 DIAGNOSIS — G47.33 OSA (OBSTRUCTIVE SLEEP APNEA): Primary | ICD-10-CM

## 2023-09-13 DIAGNOSIS — E66.01 MORBID OBESITY (H): ICD-10-CM

## 2023-09-13 PROCEDURE — 99213 OFFICE O/P EST LOW 20 MIN: CPT | Performed by: PHYSICIAN ASSISTANT

## 2023-09-13 ASSESSMENT — SLEEP AND FATIGUE QUESTIONNAIRES
HOW LIKELY ARE YOU TO NOD OFF OR FALL ASLEEP WHILE LYING DOWN TO REST IN THE AFTERNOON WHEN CIRCUMSTANCES PERMIT: MODERATE CHANCE OF DOZING
HOW LIKELY ARE YOU TO NOD OFF OR FALL ASLEEP WHILE SITTING AND TALKING TO SOMEONE: WOULD NEVER DOZE
HOW LIKELY ARE YOU TO NOD OFF OR FALL ASLEEP WHEN YOU ARE A PASSENGER IN A CAR FOR AN HOUR WITHOUT A BREAK: SLIGHT CHANCE OF DOZING
HOW LIKELY ARE YOU TO NOD OFF OR FALL ASLEEP WHILE SITTING QUIETLY AFTER LUNCH WITHOUT ALCOHOL: WOULD NEVER DOZE
HOW LIKELY ARE YOU TO NOD OFF OR FALL ASLEEP WHILE SITTING INACTIVE IN A PUBLIC PLACE: WOULD NEVER DOZE
HOW LIKELY ARE YOU TO NOD OFF OR FALL ASLEEP WHILE SITTING AND READING: SLIGHT CHANCE OF DOZING
HOW LIKELY ARE YOU TO NOD OFF OR FALL ASLEEP IN A CAR, WHILE STOPPED FOR A FEW MINUTES IN TRAFFIC: WOULD NEVER DOZE
HOW LIKELY ARE YOU TO NOD OFF OR FALL ASLEEP WHILE WATCHING TV: SLIGHT CHANCE OF DOZING

## 2023-09-13 NOTE — NURSING NOTE
"Chief Complaint   Patient presents with    Sleep Problem     CPAP Follow up       Initial /78   Pulse 73   Wt (!) 152.6 kg (336 lb 8 oz)   SpO2 100%   BMI 44.40 kg/m   Estimated body mass index is 44.4 kg/m  as calculated from the following:    Height as of 8/4/23: 1.854 m (6' 1\").    Weight as of this encounter: 152.6 kg (336 lb 8 oz).    Medication Reconciliation: complete    DME: [facility]    ESS 5    TENNILLE 4    Iván Allison CMA (AAMA)  "

## 2023-09-13 NOTE — PROGRESS NOTES
"[facility]   Outpatient Sleep Medicine  Sep 13, 2023     Name: Abhijit Garcia MRN# [id number]   Age: 48 year old YOB: 1975            Assessment and Plan:   1. RACHID (obstructive sleep apnea)  Patient's sleep apnea is adequately managed and well treated with current PAP settings [id number] with low residual AHI of 1.3 events per hour. Compliance is excellent.  Tolerating PAP well. Daytime symptoms and nighttime sleep quality are improved with use. No indication to adjust settings. Will continue nightly therapy. Prescription updated for mask/supplies.   - Comprehensive DME    2. Morbid obesity (H)  Working on weight loss with goal weight 270#. Discussed with weight loss sleep apnea severity should improve, will plan to pursue updated sleep study once at goal weight to determine continued need of PAP therapy.     Abhijit Garcia will follow up in about 1-2 years or sooner as needed.        Chief Complaint      Chief Complaint   Patient presents with    Sleep Problem     CPAP Follow up          History of Present Illness:     Abhijit Garcia is a 48 year old male with obesity, HTN, GERD, gout who presents to the clinic for follow-up of their moderate obstructive sleep apnea treated with CPAP.     Originally diagnosed via HST on 10/11/2022 (331#, BMI 43.68) revealing AHI 23.5, O2 mely 82%, 6.2 minutes spent <=88%. Initial presenting sx included disruptive snoring, unrefreshing sleep.     Set up with Resmed Airsense 11 machine set to [id number] on 12/9/2022 though [facility].     Presents to clinic today for CPAP follow-up. Overall, the patient rates their experience with PAP as 9 (0 poor, 10 great). States \" I am used to wearing it I have no problems falling asleep with it no issues with the machine itself and my wife is happy I don't snore anymore the only thing is it can leak and I can have a dry mouth but it's fine\". Patient is using a full face mask. The mask is comfortable. " The mask is leaking due to his facial hair. They are not snoring with the mask on. They are not having gasp arousals.  Some dry mouth but not bothersome, no nasal dryness. They are not having pain/skin breakdown. The pressure settings are comfortable.     Bedtime is typically anytime between 10:00PM-2:00AM. Usually it takes about <=10 minutes to fall asleep with the mask on. Wake time is typically 6:00AM during week, later on weekends.  Patient is using PAP therapy average of 7 hours per night.     ResMed Auto-PAP 5-15 cmH2O download (8/13/23-9/11/23):  30 total days of use. 0 nonuse days. 30 days with >4 hours use.  Average use 7 hours 6 minutes per day. Median Leak 22.3 L/min. 95%ile Leak 56.5 L/min. CPAP 95% pressure 9.6cm. AHI 1.3    SCALES:   INSOMNIA: Insomnia Severity Score: 4   SLEEPINESS: [place] Sleepiness Score: 5    Past medical/surgical history, family history, social history, medications and allergies were reviewed.           Physical Examination:   /78   Pulse 73   Wt (!) 152.6 kg (336 lb 8 oz)   SpO2 100%   BMI 44.40 kg/m    General appearance: Awake, alert, cooperative. Well groomed. Sitting comfortably in chair. In no apparent distress.  HEENT: Head: Normocephalic, atraumatic. Eyes:Conjunctiva clear. Sclera normal. Nose: External appearance without deformity.   Pulmonary:  Able to speak easily in full sentences. No cough or wheeze.   Skin:  No rashes or significant lesions on visible skin.   Neurologic: Alert, oriented x3.   Psychiatric: Mood euthymic. Affect congruent with full range and intensity.      CC:  Madeleine Lynch PA-C  Sep 13, 2023     [facility]  [address] , [place] [address]     [facility]  [address] [address]    Chart documentation was completed, in part, with [employer] voice-recognition software. Even though reviewed, some grammatical, spelling, and word  errors may remain.    28 minutes spent on day of encounter doing chart review, history and exam, documentation, and further activities as noted above

## 2023-09-22 ENCOUNTER — HOSPITAL ENCOUNTER (OUTPATIENT)
Facility: CLINIC | Age: 48
Discharge: HOME | End: 2023-09-22
Attending: INTERNAL MEDICINE | Admitting: INTERNAL MEDICINE
Payer: COMMERCIAL

## 2023-09-22 VITALS
RESPIRATION RATE: 14 BRPM | BODY MASS INDEX: 41.75 KG/M2 | SYSTOLIC BLOOD PRESSURE: 127 MMHG | HEART RATE: 81 BPM | OXYGEN SATURATION: 95 % | DIASTOLIC BLOOD PRESSURE: 78 MMHG | WEIGHT: 315 LBS | HEIGHT: 73 IN

## 2023-09-22 DIAGNOSIS — Z12.11 SPECIAL SCREENING FOR MALIGNANT NEOPLASMS, COLON: Primary | ICD-10-CM

## 2023-09-22 LAB — COLONOSCOPY: NORMAL

## 2023-09-22 PROCEDURE — 250N000011 HC RX IP 250 OP 636: Performed by: INTERNAL MEDICINE

## 2023-09-22 PROCEDURE — G0500 MOD SEDAT ENDO SERVICE >5YRS: HCPCS | Performed by: INTERNAL MEDICINE

## 2023-09-22 PROCEDURE — 45385 COLONOSCOPY W/LESION REMOVAL: CPT | Performed by: INTERNAL MEDICINE

## 2023-09-22 PROCEDURE — 88305 TISSUE EXAM BY PATHOLOGIST: CPT | Mod: TC | Performed by: INTERNAL MEDICINE

## 2023-09-22 RX ORDER — ATROPINE SULFATE 0.1 MG/ML
1 INJECTION INTRAVENOUS
Status: DISCONTINUED | OUTPATIENT
Start: 2023-09-22 | End: 2023-09-22 | Stop reason: HOSPADM

## 2023-09-22 RX ORDER — LIDOCAINE 40 MG/G
CREAM TOPICAL
Status: DISCONTINUED | OUTPATIENT
Start: 2023-09-22 | End: 2023-09-22 | Stop reason: HOSPADM

## 2023-09-22 RX ORDER — FLUMAZENIL 0.1 MG/ML
0.2 INJECTION, SOLUTION INTRAVENOUS
Status: DISCONTINUED | OUTPATIENT
Start: 2023-09-22 | End: 2023-09-22 | Stop reason: HOSPADM

## 2023-09-22 RX ORDER — NALOXONE HYDROCHLORIDE 0.4 MG/ML
0.2 INJECTION, SOLUTION INTRAMUSCULAR; INTRAVENOUS; SUBCUTANEOUS
Status: DISCONTINUED | OUTPATIENT
Start: 2023-09-22 | End: 2023-09-22 | Stop reason: HOSPADM

## 2023-09-22 RX ORDER — ONDANSETRON 4 MG/1
4 TABLET, ORALLY DISINTEGRATING ORAL EVERY 6 HOURS PRN
Status: DISCONTINUED | OUTPATIENT
Start: 2023-09-22 | End: 2023-09-22 | Stop reason: HOSPADM

## 2023-09-22 RX ORDER — NALOXONE HYDROCHLORIDE 0.4 MG/ML
0.4 INJECTION, SOLUTION INTRAMUSCULAR; INTRAVENOUS; SUBCUTANEOUS
Status: DISCONTINUED | OUTPATIENT
Start: 2023-09-22 | End: 2023-09-22 | Stop reason: HOSPADM

## 2023-09-22 RX ORDER — SIMETHICONE 40MG/0.6ML
133 SUSPENSION, DROPS(FINAL DOSAGE FORM)(ML) ORAL
Status: DISCONTINUED | OUTPATIENT
Start: 2023-09-22 | End: 2023-09-22 | Stop reason: HOSPADM

## 2023-09-22 RX ORDER — PROCHLORPERAZINE MALEATE 10 MG
10 TABLET ORAL EVERY 6 HOURS PRN
Status: DISCONTINUED | OUTPATIENT
Start: 2023-09-22 | End: 2023-09-22 | Stop reason: HOSPADM

## 2023-09-22 RX ORDER — DIPHENHYDRAMINE HYDROCHLORIDE 50 MG/ML
25-50 INJECTION INTRAMUSCULAR; INTRAVENOUS
Status: DISCONTINUED | OUTPATIENT
Start: 2023-09-22 | End: 2023-09-22 | Stop reason: HOSPADM

## 2023-09-22 RX ORDER — ONDANSETRON 2 MG/ML
4 INJECTION INTRAMUSCULAR; INTRAVENOUS EVERY 6 HOURS PRN
Status: DISCONTINUED | OUTPATIENT
Start: 2023-09-22 | End: 2023-09-22 | Stop reason: HOSPADM

## 2023-09-22 RX ORDER — EPINEPHRINE 1 MG/ML
0.1 INJECTION, SOLUTION INTRAMUSCULAR; SUBCUTANEOUS
Status: DISCONTINUED | OUTPATIENT
Start: 2023-09-22 | End: 2023-09-22 | Stop reason: HOSPADM

## 2023-09-22 RX ORDER — ONDANSETRON 2 MG/ML
4 INJECTION INTRAMUSCULAR; INTRAVENOUS
Status: DISCONTINUED | OUTPATIENT
Start: 2023-09-22 | End: 2023-09-22 | Stop reason: HOSPADM

## 2023-09-22 RX ORDER — FENTANYL CITRATE 50 UG/ML
50-100 INJECTION, SOLUTION INTRAMUSCULAR; INTRAVENOUS EVERY 5 MIN PRN
Status: DISCONTINUED | OUTPATIENT
Start: 2023-09-22 | End: 2023-09-22 | Stop reason: HOSPADM

## 2023-09-22 RX ADMIN — MIDAZOLAM 1 MG: 1 INJECTION INTRAMUSCULAR; INTRAVENOUS at 09:19

## 2023-09-22 RX ADMIN — MIDAZOLAM 2 MG: 1 INJECTION INTRAMUSCULAR; INTRAVENOUS at 09:14

## 2023-09-22 RX ADMIN — FENTANYL CITRATE 100 MCG: 50 INJECTION INTRAMUSCULAR; INTRAVENOUS at 09:13

## 2023-09-22 RX ADMIN — FENTANYL CITRATE 50 MCG: 50 INJECTION INTRAMUSCULAR; INTRAVENOUS at 09:19

## 2023-09-22 ASSESSMENT — ACTIVITIES OF DAILY LIVING (ADL): ADLS_ACUITY_SCORE: 35

## 2023-09-22 NOTE — H&P
Pre-Endoscopy History and Physical     Abhijit Garcia MRN# [id number]   YOB: 1975 Age: 48 year old     Date of Procedure: 9/22/2023  Primary care provider: Madeleine Lynch  Type of Endoscopy: Colonoscopy with possible biopsy, possible polypectomy  Reason for Procedure: polyp  Type of Anesthesia Anticipated: Conscious Sedation    HPI:    Abhijit is a 48 year old male who will be undergoing the above procedure.      A history and physical has been performed. The patient's medications and allergies have been reviewed. The risks and benefits of the procedure and the sedation options and risks were discussed with the patient.  All questions were answered and informed consent was obtained.      He denies a personal or family history of anesthesia complications or bleeding disorders.     Patient Active Problem List   Diagnosis    Mixed Hyperlipidemia LDL goal <130    Essential hypertension    Gout    History of DVT left leg. postop    RACHID (obstructive sleep apnea)    Carpal tunnel syndrome    Gastroesophageal reflux    Heartburn    Morbid obesity (H)        Past Medical History:   Diagnosis Date    Arthritis     Dysuria     Gout     Hyperplastic colon polyp 12/2018    Hypertension     RACHID (obstructive sleep apnea) 11/9/2022    Prostate infection         Past Surgical History:   Procedure Laterality Date    ANKLE SURGERY      APPENDECTOMY      BIOPSY  2011    biopsy from foreskin    COLONOSCOPY  12/21/2018    Dr. Dc [facility]    COLONOSCOPY N/A 12/21/2018    Procedure: COMBINED COLONOSCOPY THRU STOMA, BIOPSY BY SNARE using exacto snare;  Surgeon: Shamar Dc MD;  Location:  GI    CYSTOSCOPY      ESOPHAGOSCOPY, GASTROSCOPY, DUODENOSCOPY (EGD), COMBINED  2012    for stomach pain done in [place]-acid reflux    HAND SURGERY  2020    cyst removal from hand    ORTHOPEDIC SURGERY  03/23/2017    ankle left -repair of tendons    wisdom teeth         Social History     Tobacco Use    Smoking status:  "Never    Smokeless tobacco: Never    Tobacco comments:     I have never used tobacco. My dad was a heavy smoker so I did have second hand smoke exposure   Substance Use Topics    Alcohol use: Yes     Alcohol/week: 3.0 standard drinks of alcohol     Types: 3 Standard drinks or equivalent per week     Comment: social drinker, weekly/biweekly 2 drinks per outing       Family History   Problem Relation Age of Onset    Thyroid Disease Mother     Diabetes Mother         Mother  2021    Obesity Mother     Cerebrovascular Disease Mother          of stroke    Hypertension Mother     Colon Cancer Mother         6\" of bowel removed in     Arthritis Father     Respiratory Father         asbestos exposure    Chronic Obstructive Pulmonary Disease Father     Coronary Artery Disease Father 71        Father b - 1933   -     Skin Cancer Father     Other Cancer Father         Skin cancer on face and ears    Asthma Brother         Primarily affected him as a child.    Mental Illness Brother         Suffered severe mental \"break\" the 2022. Initial symptoms 2021    Heart Defect Son        Prior to Admission medications    Medication Sig Start Date End Date Taking? Authorizing Provider   allopurinol (ZYLOPRIM) 100 MG tablet Take 2 tablets (200 mg) by mouth daily 3/14/23  Yes Madeleine Lynch MD   bisacodyl (DULCOLAX) 5 MG EC tablet 2 days prior to procedure, take 2 tablets at 4 pm. 1 day prior to procedure, take 2 tablets at 4 pm. For additional instructions refer to your colonoscopy prep instructions. 23  Yes Shamar Dc MD   clotrimazole (LOTRIMIN) 1 % external cream Apply topically 2 times daily  Patient taking differently: Apply topically 2 times daily as needed 23  Yes Haase, Susan Rachele, APRN CNP   imiquimod (ALDARA) 5 % external cream Apply a small sized amount to warts QHS at bedtime.   Wash off after 8 hours.  Patient taking differently: as needed Apply a small sized " amount to warts QHS at bedtime.   Wash off after 8 hours. 4/18/23  Yes Shanthi Red PA-C   irbesartan (AVAPRO) 300 MG tablet Take 1 tablet (300 mg) by mouth daily 1/17/23  Yes Eriberto Oh MD   ketoconazole (NIZORAL) 2 % external cream Apply topically daily as needed for other (athletes foot) 3/8/22  Yes Eriberto Oh MD   Loratadine (CLARITIN PO) Take by mouth as needed   Yes Reported, Patient   polyethylene glycol (GOLYTELY) 236 g suspension 2 days prior at 5pm, mix and drink half of a jug of Golytely. Drink an 8 oz. glass of Golytely every 15 minutes until half of the jug is gone. Place remainder of Golytely in the refrigerator. 1 day prior at 5 pm, drink the 2nd half of a jug of Golytely bowel prep. 6 hours before your check-in time, drink an 8 oz. glass of Golytely every 15 minutes until half of the 2nd jug of Golytely is gone. Discard remainder of second jug. 9/8/23  Yes Shamar Dc MD   valACYclovir (VALTREX) 1000 mg tablet TAKE 2 TABLETS BY MOUTH TWICE DAILY FOR 1 DAY  Patient taking differently: as needed TAKE 2 TABLETS BY MOUTH TWICE DAILY FOR 1 DAY 1/17/23  Yes Eriberto Oh MD   triamcinolone (KENALOG) 0.1 % external cream Apply topically 2 times daily To lesions on foot until redness and itch is resolved then stop 8/11/23   Gloria Bruno APRN CNP   valACYclovir (VALTREX) 1000 mg tablet Take 2 tablets (2,000 mg) by mouth 2 times daily for 1 day 8/6/23 8/7/23  Shraddha Long PA-C       Allergies   Allergen Reactions    Amlodipine      Edema at 10 mg dose    Contrast Dye     Indomethacin GI Disturbance    Iodine      Iodine in IV contrast dye    Levaquin [Levofloxacin]      Gout flare in 2011    Lisinopril      cough    Seasonal Allergies     Losartan Rash     Penile rash        REVIEW OF SYSTEMS:   5 point ROS negative except as noted above in HPI, including Gen., Resp., CV, GI &  system review.    PHYSICAL EXAM:   There were no vitals taken for this visit.  "Estimated body mass index is 44.4 kg/m  as calculated from the following:    Height as of 8/4/23: 1.854 m (6' 1\").    Weight as of 9/13/23: 152.6 kg (336 lb 8 oz).   GENERAL APPEARANCE: alert, and oriented  MENTAL STATUS: alert  AIRWAY EXAM: Mallampatti Class I (visualization of the soft palate, fauces, uvula, anterior and posterior pillars)  RESP: lungs clear to auscultation - no rales, rhonchi or wheezes  CV: regular rates and rhythm  DIAGNOSTICS:    Not indicated    IMPRESSION   ASA Class 2 - Mild systemic disease    PLAN:   Plan for Colonoscopy with possible biopsy, possible polypectomy. We discussed the risks, benefits and alternatives and the patient wished to proceed.    The above has been forwarded to the consulting provider.      Signed Electronically by: Shamar Dc MD  September 22, 2023          "

## 2023-09-25 LAB
PATH REPORT.COMMENTS IMP SPEC: NORMAL
PATH REPORT.COMMENTS IMP SPEC: NORMAL
PATH REPORT.FINAL DX SPEC: NORMAL
PATH REPORT.GROSS SPEC: NORMAL
PATH REPORT.MICROSCOPIC SPEC OTHER STN: NORMAL
PATH REPORT.RELEVANT HX SPEC: NORMAL
PHOTO IMAGE: NORMAL

## 2023-09-25 PROCEDURE — 88305 TISSUE EXAM BY PATHOLOGIST: CPT | Mod: 26 | Performed by: PATHOLOGY

## 2023-11-20 ENCOUNTER — MYC MEDICAL ADVICE (OUTPATIENT)
Dept: FAMILY MEDICINE | Facility: CLINIC | Age: 48
End: 2023-11-20
Payer: COMMERCIAL

## 2023-11-20 DIAGNOSIS — M10.9 GOUT OF MULTIPLE SITES, UNSPECIFIED CAUSE, UNSPECIFIED CHRONICITY: ICD-10-CM

## 2023-11-20 NOTE — TELEPHONE ENCOUNTER
Dr. Lynch see [facility] messages below. Thank you, Jackelyn Lynch,     I have been prescribed Meloxicam for knee pain and gout inflammation by my previous medical providers. I am currently out of it.  I'm not sure if you have written me a prescription for this previously or not, but I don't see it in [facility].  It is in my medical records with the pharmacy, and should be visible within my files from your side.     What steps do I need to take to get a refill of this medication? Please advise.     Thanks.     - Emerson Hayden,     When I have a flare I take the meloxicam once a day as an anti-inflammatory along with Colchicine until the flare is knocked out. I've tried endomethecin years ago and it tore my stomach up so bad it was worse than the gout flare. I've also taken the meloxicam when my knee has acted up, as prescribed by the knee surgeon I saw in [place], [place].      - Emerson

## 2023-11-21 RX ORDER — MELOXICAM 15 MG/1
15 TABLET ORAL DAILY PRN
Qty: 30 TABLET | Refills: 0 | Status: SHIPPED | OUTPATIENT
Start: 2023-11-21 | End: 2023-12-19

## 2023-11-29 ENCOUNTER — NURSE TRIAGE (OUTPATIENT)
Dept: FAMILY MEDICINE | Facility: CLINIC | Age: 48
End: 2023-11-29
Payer: COMMERCIAL

## 2023-11-29 RX ORDER — METHYLPREDNISOLONE 32 MG/1
32 TABLET ORAL DAILY
Qty: 2 TABLET | Refills: 0 | Status: SHIPPED | OUTPATIENT
Start: 2023-11-29 | End: 2024-01-23

## 2023-11-29 RX ORDER — METHYLPREDNISOLONE 32 MG/1
32 TABLET ORAL DAILY
Qty: 2 TABLET | Refills: 0 | Status: CANCELLED | OUTPATIENT
Start: 2023-11-29

## 2023-11-29 NOTE — TELEPHONE ENCOUNTER
S-(situation): Patient calls wanting to be seen for left sided abdominal pain.    B-(background): Pain started late Saturday 11/25/23 or early Sunday 11/16/23. Some nausea, history of Diverticulitis, no vomiting. Pain was very intense 10/10 Unruly and Monday more mild today. No fever, no blood in urine.     A-(assessment): Pain of unknown etiology.    R-(recommendations): [facility]. Writer contacted [facility]. The provider states that [facility] will call the patient back and schedule him for today or tomorrow.      Referral to Acute and Diagnostic Services    [phone] (Agency) [place] [address] [facility], [address], [place] [address]    Transition to Acute & Diagnostic Services Clinic has been discussed with patient, and he agrees with next level of care.   Patient understands that evaluation/treatment at [facility] typically takes significantly longer than in clinic/urgent care (>2 hours).  [facility] has been contacted by provider/staff to confirm patient acceptance.         Special issues:      Allergy to contrast dye                                     Reason for Disposition   MILD pain (e.g., does not interfere with normal activities) and pain comes and goes (cramps) lasts > 48 hours  (Exception: This same abdominal pain is a chronic symptom recurrent or ongoing AND present > 4 weeks.)    Additional Information   Negative: Passed out (i.e., fainted, collapsed and was not responding)   Negative: Shock suspected (e.g., cold/pale/clammy skin, too weak to stand, low BP, rapid pulse)   Negative: Sounds like a life-threatening emergency to the triager   Negative: Followed an abdomen (stomach) injury   Negative: Chest pain   Negative: Pain is mainly in upper abdomen (if needed ask: 'is it mainly above the belly button?')   Negative: Abdomen bloating or swelling are main symptoms   Negative: SEVERE abdominal pain (e.g., excruciating)   Negative: Vomiting red blood or black  "(coffee ground) material   Negative: Blood in bowel movements  (Exception: Blood on surface of BM with constipation.)   Negative: Black or tarry bowel movements  (Exception: Chronic-unchanged black-grey BMs AND is taking iron pills or Pepto-Bismol.)   Negative: Unable to urinate (or only a few drops) and bladder feels very full   Negative: Pain in scrotum persists > 1 hour   Negative: MILD TO MODERATE constant pain lasting > 2 hours   Negative: Vomiting bile (green color)   Negative: Patient sounds very sick or weak to the triager   Negative: Vomiting and abdomen looks much more swollen than usual   Negative: White of the eyes have turned yellow (i.e., jaundice)   Negative: Blood in urine (red, pink, or tea-colored)   Negative: Fever > 103 F (39.4 C)   Negative: Fever > 101 F (38.3 C) and over 60 years of age   Negative: Fever > 100.0 F (37.8 C) and has diabetes mellitus or a weak immune system (e.g., HIV positive, cancer chemotherapy, organ transplant, splenectomy, chronic steroids)   Negative: Fever > 100.0 F (37.8 C) and bedridden (e.g., CVA, chronic illness, recovering from surgery)   Negative: MODERATE pain (e.g., interferes with normal activities) that comes and goes (cramps) lasts > 24 hours  (Exception: Pain with Vomiting or Diarrhea - see that Protocol.)   Negative: Age > 60 years   Negative: Patient wants to be seen    Answer Assessment - Initial Assessment Questions  1. LOCATION: \"Where does it hurt?\"       Left hand side bottom of ribcage  2. RADIATION: \"Does the pain shoot anywhere else?\" (e.g., chest, back)      Towards the back, but not to the spine  3. ONSET: \"When did the pain begin?\" (Minutes, hours or days ago)       11/26/23  4. SUDDEN: \"Gradual or sudden onset?\"      Sudden onset  5. PATTERN \"Does the pain come and go, or is it constant?\"     - If it comes and goes: \"How long does it last?\" \"Do you have pain now?\"      (Note: Comes and goes means the pain is intermittent. It goes away completely " "between bouts.)     - If constant: \"Is it getting better, staying the same, or getting worse?\"       (Note: Constant means the pain never goes away completely; most serious pain is constant and gets worse.)       Patient comes and goes, better today than Sunday/Monday   6. SEVERITY: \"How bad is the pain?\"  (e.g., Scale 1-10; mild, moderate, or severe)     - MILD (1-3): Doesn't interfere with normal activities, abdomen soft and not tender to touch.      - MODERATE (4-7): Interferes with normal activities or awakens from sleep, abdomen tender to touch.      - SEVERE (8-10): Excruciating pain, doubled over, unable to do any normal activities.        Mild today, Sunday and Monday  7. RECURRENT SYMPTOM: \"Have you ever had this type of stomach pain before?\" If Yes, ask: \"When was the last time?\" and \"What happened that time?\"       Nothing like this  8. CAUSE: \"What do you think is causing the stomach pain?\"      Unsure what it could be  9. RELIEVING/AGGRAVATING FACTORS: \"What makes it better or worse?\" (e.g., antacids, bending or twisting motion, bowel movement)      Walking sometimes helps, sometimes not.  10. OTHER SYMPTOMS: \"Do you have any other symptoms?\" (e.g., back pain, diarrhea, fever, urination pain, vomiting)        Occasional nausea, no vomiting    Protocols used: Abdominal Pain - Male-A-OH    "

## 2023-11-29 NOTE — TELEPHONE ENCOUNTER
Patient calls back and wanted to inform clinic that [facility] contacted him and has CT set up for tomorrow. No further action needed.    Thank you,  Francisco Loera, Triage RN [facility] [place]  1:42 PM 11/29/2023

## 2023-11-30 ENCOUNTER — OFFICE VISIT (OUTPATIENT)
Dept: PEDIATRICS | Facility: CLINIC | Age: 48
End: 2023-11-30
Payer: COMMERCIAL

## 2023-11-30 ENCOUNTER — HOSPITAL ENCOUNTER (OUTPATIENT)
Dept: CT IMAGING | Facility: CLINIC | Age: 48
Discharge: HOME | End: 2023-11-30
Attending: FAMILY MEDICINE | Admitting: FAMILY MEDICINE
Payer: COMMERCIAL

## 2023-11-30 VITALS
SYSTOLIC BLOOD PRESSURE: 145 MMHG | HEART RATE: 96 BPM | TEMPERATURE: 98.9 F | DIASTOLIC BLOOD PRESSURE: 79 MMHG | WEIGHT: 315 LBS | OXYGEN SATURATION: 97 % | RESPIRATION RATE: 18 BRPM | BODY MASS INDEX: 44.59 KG/M2

## 2023-11-30 DIAGNOSIS — R10.9 LEFT FLANK PAIN: ICD-10-CM

## 2023-11-30 DIAGNOSIS — R10.9 LEFT FLANK PAIN: Primary | ICD-10-CM

## 2023-11-30 DIAGNOSIS — Z91.041 CONTRAST MEDIA ALLERGY: ICD-10-CM

## 2023-11-30 LAB
ALBUMIN SERPL BCG-MCNC: 4.6 G/DL (ref 3.5–5.2)
ALBUMIN UR-MCNC: NEGATIVE MG/DL
ALP SERPL-CCNC: 85 U/L (ref 40–150)
ALT SERPL W P-5'-P-CCNC: 46 U/L (ref 0–70)
ANION GAP SERPL CALCULATED.3IONS-SCNC: 13 MMOL/L (ref 7–15)
APPEARANCE UR: CLEAR
AST SERPL W P-5'-P-CCNC: 25 U/L (ref 0–45)
BASOPHILS # BLD AUTO: 0 10E3/UL (ref 0–0.2)
BASOPHILS NFR BLD AUTO: 0 %
BILIRUB SERPL-MCNC: 0.5 MG/DL
BILIRUB UR QL STRIP: NEGATIVE
BUN SERPL-MCNC: 13.9 MG/DL (ref 6–20)
CALCIUM SERPL-MCNC: 9 MG/DL (ref 8.6–10)
CHLORIDE SERPL-SCNC: 101 MMOL/L (ref 98–107)
COLOR UR AUTO: ABNORMAL
CREAT SERPL-MCNC: 1.03 MG/DL (ref 0.67–1.17)
CRP SERPL-MCNC: 3.1 MG/L
DEPRECATED HCO3 PLAS-SCNC: 21 MMOL/L (ref 22–29)
EGFRCR SERPLBLD CKD-EPI 2021: 88 ML/MIN/1.73M2
EOSINOPHIL # BLD AUTO: 0 10E3/UL (ref 0–0.7)
EOSINOPHIL NFR BLD AUTO: 0 %
ERYTHROCYTE [DISTWIDTH] IN BLOOD BY AUTOMATED COUNT: 11.9 % (ref 10–15)
GLUCOSE SERPL-MCNC: 162 MG/DL (ref 70–99)
GLUCOSE UR STRIP-MCNC: NEGATIVE MG/DL
HCT VFR BLD AUTO: 46.1 % (ref 40–53)
HGB BLD-MCNC: 15.9 G/DL (ref 13.3–17.7)
HGB UR QL STRIP: NEGATIVE
IMM GRANULOCYTES # BLD: 0.1 10E3/UL
IMM GRANULOCYTES NFR BLD: 1 %
KETONES UR STRIP-MCNC: NEGATIVE MG/DL
LEUKOCYTE ESTERASE UR QL STRIP: NEGATIVE
LIPASE SERPL-CCNC: 25 U/L (ref 13–60)
LYMPHOCYTES # BLD AUTO: 0.6 10E3/UL (ref 0.8–5.3)
LYMPHOCYTES NFR BLD AUTO: 6 %
MCH RBC QN AUTO: 30.3 PG (ref 26.5–33)
MCHC RBC AUTO-ENTMCNC: 34.5 G/DL (ref 31.5–36.5)
MCV RBC AUTO: 88 FL (ref 78–100)
MONOCYTES # BLD AUTO: 0.2 10E3/UL (ref 0–1.3)
MONOCYTES NFR BLD AUTO: 2 %
MUCOUS THREADS #/AREA URNS LPF: PRESENT /LPF
NEUTROPHILS # BLD AUTO: 8.2 10E3/UL (ref 1.6–8.3)
NEUTROPHILS NFR BLD AUTO: 91 %
NITRATE UR QL: NEGATIVE
NRBC # BLD AUTO: 0 10E3/UL
NRBC BLD AUTO-RTO: 0 /100
PH UR STRIP: 5.5 [PH] (ref 5–7)
PLATELET # BLD AUTO: 253 10E3/UL (ref 150–450)
POTASSIUM SERPL-SCNC: 4.3 MMOL/L (ref 3.4–5.3)
PROT SERPL-MCNC: 7.2 G/DL (ref 6.4–8.3)
RBC # BLD AUTO: 5.24 10E6/UL (ref 4.4–5.9)
RBC URINE: <1 /HPF
SODIUM SERPL-SCNC: 135 MMOL/L (ref 135–145)
SP GR UR STRIP: 1.03 (ref 1–1.03)
UROBILINOGEN UR STRIP-MCNC: NORMAL MG/DL
WBC # BLD AUTO: 9.1 10E3/UL (ref 4–11)
WBC URINE: <1 /HPF

## 2023-11-30 PROCEDURE — 99214 OFFICE O/P EST MOD 30 MIN: CPT | Performed by: FAMILY MEDICINE

## 2023-11-30 PROCEDURE — 85025 COMPLETE CBC W/AUTO DIFF WBC: CPT | Performed by: FAMILY MEDICINE

## 2023-11-30 PROCEDURE — 81001 URINALYSIS AUTO W/SCOPE: CPT | Performed by: FAMILY MEDICINE

## 2023-11-30 PROCEDURE — 80053 COMPREHEN METABOLIC PANEL: CPT | Performed by: FAMILY MEDICINE

## 2023-11-30 PROCEDURE — 250N000011 HC RX IP 250 OP 636: Performed by: FAMILY MEDICINE

## 2023-11-30 PROCEDURE — 74177 CT ABD & PELVIS W/CONTRAST: CPT

## 2023-11-30 PROCEDURE — 83690 ASSAY OF LIPASE: CPT | Performed by: FAMILY MEDICINE

## 2023-11-30 PROCEDURE — 86140 C-REACTIVE PROTEIN: CPT | Performed by: FAMILY MEDICINE

## 2023-11-30 PROCEDURE — 250N000009 HC RX 250: Performed by: FAMILY MEDICINE

## 2023-11-30 PROCEDURE — 36415 COLL VENOUS BLD VENIPUNCTURE: CPT | Performed by: FAMILY MEDICINE

## 2023-11-30 RX ORDER — IOPAMIDOL 755 MG/ML
500 INJECTION, SOLUTION INTRAVASCULAR ONCE
Status: COMPLETED | OUTPATIENT
Start: 2023-11-30 | End: 2023-11-30

## 2023-11-30 RX ADMIN — IOPAMIDOL 100 ML: 755 INJECTION, SOLUTION INTRAVENOUS at 09:52

## 2023-11-30 RX ADMIN — SODIUM CHLORIDE 65 ML: 9 INJECTION, SOLUTION INTRAVENOUS at 09:52

## 2023-11-30 NOTE — PATIENT INSTRUCTIONS
Tylenol and/or ibuprofen 500mg every 4 hours as needed for back/muscle discomfort    Use heat packs to the area and topical muscle relief creams as needed      If new symptoms develop or if worsening seek help right away

## 2023-11-30 NOTE — PROGRESS NOTES
Assessment & Plan     Left flank pain  - methylPREDNISolone (MEDROL) 32 MG tablet  Dispense: 2 tablet; Refill: 0  - UA with Microscopic reflex to Culture  - Lipase  - CRP inflammation  - Comprehensive metabolic panel  - CBC with platelets differential  - CT Abdomen Pelvis w Contrast  - sodium chloride (PF) 0.9% PF flush 3 mL  - Lipase  - CRP inflammation  - Comprehensive metabolic panel  - CBC with platelets differential  - UA with Microscopic reflex to Culture    Contrast media allergy  - methylPREDNISolone (MEDROL) 32 MG tablet  Dispense: 2 tablet; Refill: 0     No evidence of infection. There is suggestion of a MSK related issue of the lower back area Belkis advised conservative therapy in the meantime    No suggestion of diverticulitis/infection or renal stone based on lab work /imaging    See AVS summary for additional recommendations reviewed with patient during this visit.           Royce Sevilla MD   [facility]    Ming Norman is a 48 year old male who presents to clinic today for the following health issues:  Chief Complaint   Patient presents with    Flank Pain     Left sided flank pain X 6 days       HPI  Called into to primary care yesterday to discuss left sided torso/flank discomfort. No urinary difficulties including frequency/pain or hematuria.     No fevers/vomiting/diarrhea.no prior hx of kidney stones. Hx of diverticulitis although that episode the pain was LLQ.     6-7 days of symptoms of stabbing to dull pain. No pain meds attempted.       Patient Active Problem List    Diagnosis Date Noted    Morbid obesity (H) 03/14/2023     Priority: Medium    Heartburn 01/17/2023     Priority: Medium    RACHID (obstructive sleep apnea) 11/09/2022     Priority: Medium    History of DVT left leg. postop 04/05/2017     Priority: Medium     Treated with 3 months of anticoagulation, follow-up ultrasound negative       Gout 01/07/2015     Priority: Medium     Onset age 33.  High uric acid.  Has had  flares of great toe, midfoot, and ankles.  Uses meloxicam and cholchicine for acute flares.  Used allopurinol, developed low back pain after a few months.  Ultrasound and CT fine.  Eye problems.  Symptoms all resolved with stopping med      Carpal tunnel syndrome 04/16/2012     Priority: Medium    Gastroesophageal reflux 04/16/2012     Priority: Medium    Essential hypertension 04/18/2007     Priority: Medium     Onset age 17, medications started in 2015.      Mixed Hyperlipidemia LDL goal <130 02/06/2007     Priority: Medium       Current Outpatient Medications   Medication    methylPREDNISolone (MEDROL) 32 MG tablet    allopurinol (ZYLOPRIM) 100 MG tablet    bisacodyl (DULCOLAX) 5 MG EC tablet    clotrimazole (LOTRIMIN) 1 % external cream    imiquimod (ALDARA) 5 % external cream    irbesartan (AVAPRO) 300 MG tablet    ketoconazole (NIZORAL) 2 % external cream    Loratadine (CLARITIN PO)    meloxicam (MOBIC) 15 MG tablet    polyethylene glycol (GOLYTELY) 236 g suspension    triamcinolone (KENALOG) 0.1 % external cream    valACYclovir (VALTREX) 1000 mg tablet    valACYclovir (VALTREX) 1000 mg tablet     Current Facility-Administered Medications   Medication    sodium chloride (PF) 0.9% PF flush 3 mL           Objective    BP (!) 145/79 (BP Location: Left arm, Patient Position: Chair, Cuff Size: Adult Large)   Pulse 96   Temp 98.9  F (37.2  C) (Oral)   Resp 18   Wt (!) 153.3 kg (338 lb)   SpO2 97%   BMI 44.59 kg/m    Physical Exam       GEN: NAD  Gait: normal  Back: able to flex to 45    Results for orders placed or performed during the hospital encounter of 11/30/23   CT Abdomen Pelvis w Contrast     Status: None (Preliminary result)    Narrative    CT ABDOMEN AND PELVIS WITH CONTRAST 11/30/2023 10:06 AM    CLINICAL HISTORY: Abdominal pain. Left-sided abdomen/flank pain x 6-7  days. History of diverticulitis.    TECHNIQUE: CT scan of the abdomen and pelvis was performed following  injection of IV contrast.  Multiplanar reformats were obtained. Dose  reduction techniques were used.  CONTRAST: 100mL Isovue-370    COMPARISON: June 21, 2007    FINDINGS:   LOWER CHEST: No infiltrates or effusions.    HEPATOBILIARY: Diffuse hepatic steatosis. No significant mass. No bile  duct dilatation. No calcified gallstones.    PANCREAS: No significant mass, duct dilatation, or inflammatory  change.    SPLEEN: Normal size.    ADRENAL GLANDS: No significant nodules.    KIDNEYS/BLADDER: Normal size and position. No hydronephrosis. Contrast  present in the urinary collecting system limits evaluation of small  stones. No large stones demonstrated.    BOWEL: Diverticulosis in the colon. No acute inflammatory change. No  obstruction.     PELVIC ORGANS: No pelvic masses.    ADDITIONAL FINDINGS: No adenopathy or free fluid. Normal caliber  aorta.    MUSCULOSKELETAL: No frankly destructive bony lesions. Bilateral L5  pars defects with grade 1 anterolisthesis of L5 on S1. Advanced  degenerative disc disease at L5-S1.      Impression    IMPRESSION:   1.  Mild diverticulosis without diverticulitis.  2.  Fatty liver.   Results for orders placed or performed in visit on 11/30/23   Lipase     Status: Normal   Result Value Ref Range    Lipase 25 13 - 60 U/L   CRP inflammation     Status: Normal   Result Value Ref Range    CRP Inflammation 3.10 <5.00 mg/L   Comprehensive metabolic panel     Status: Abnormal   Result Value Ref Range    Sodium 135 135 - 145 mmol/L    Potassium 4.3 3.4 - 5.3 mmol/L    Carbon Dioxide (CO2) 21 (L) 22 - 29 mmol/L    Anion Gap 13 7 - 15 mmol/L    Urea Nitrogen 13.9 6.0 - 20.0 mg/dL    Creatinine 1.03 0.67 - 1.17 mg/dL    GFR Estimate 88 >60 mL/min/1.73m2    Calcium 9.0 8.6 - 10.0 mg/dL    Chloride 101 98 - 107 mmol/L    Glucose 162 (H) 70 - 99 mg/dL    Alkaline Phosphatase 85 40 - 150 U/L    AST 25 0 - 45 U/L    ALT 46 0 - 70 U/L    Protein Total 7.2 6.4 - 8.3 g/dL    Albumin 4.6 3.5 - 5.2 g/dL    Bilirubin Total 0.5 <=1.2  mg/dL   CBC with platelets and differential     Status: Abnormal   Result Value Ref Range    WBC Count 9.1 4.0 - 11.0 10e3/uL    RBC Count 5.24 4.40 - 5.90 10e6/uL    Hemoglobin 15.9 13.3 - 17.7 g/dL    Hematocrit 46.1 40.0 - 53.0 %    MCV 88 78 - 100 fL    MCH 30.3 26.5 - 33.0 pg    MCHC 34.5 31.5 - 36.5 g/dL    RDW 11.9 10.0 - 15.0 %    Platelet Count 253 150 - 450 10e3/uL    % Neutrophils 91 %    % Lymphocytes 6 %    % Monocytes 2 %    % Eosinophils 0 %    % Basophils 0 %    % Immature Granulocytes 1 %    NRBCs per 100 WBC 0 <1 /100    Absolute Neutrophils 8.2 1.6 - 8.3 10e3/uL    Absolute Lymphocytes 0.6 (L) 0.8 - 5.3 10e3/uL    Absolute Monocytes 0.2 0.0 - 1.3 10e3/uL    Absolute Eosinophils 0.0 0.0 - 0.7 10e3/uL    Absolute Basophils 0.0 0.0 - 0.2 10e3/uL    Absolute Immature Granulocytes 0.1 <=0.4 10e3/uL    Absolute NRBCs 0.0 10e3/uL   UA with Microscopic reflex to Culture     Status: Abnormal    Specimen: Urine, Clean Catch   Result Value Ref Range    Color Urine Light Yellow Colorless, Straw, Light Yellow, Yellow    Appearance Urine Clear Clear    Glucose Urine Negative Negative mg/dL    Bilirubin Urine Negative Negative    Ketones Urine Negative Negative mg/dL    Specific Gravity Urine 1.032 1.003 - 1.035    Blood Urine Negative Negative    pH Urine 5.5 5.0 - 7.0    Protein Albumin Urine Negative Negative mg/dL    Urobilinogen Urine Normal Normal, 2.0 mg/dL    Nitrite Urine Negative Negative    Leukocyte Esterase Urine Negative Negative    Mucus Urine Present (A) None Seen /LPF    RBC Urine <1 <=2 /HPF    WBC Urine <1 <=5 /HPF    Narrative    Urine Culture not indicated   CBC with platelets differential     Status: Abnormal    Narrative    The following orders were created for panel order CBC with platelets differential.  Procedure                               Abnormality         Status                     ---------                               -----------         ------                     CBC with  platelets and d...[[id number]]  Abnormal            Final result                 Please view results for these tests on the individual orders.                     The use of Dragon/PowerMic dictation services may have been used to construct the content in this note; any grammatical or spelling errors are non-intentional. Please contact the author of this note directly if you are in need of any clarification.

## 2023-12-15 ENCOUNTER — MYC MEDICAL ADVICE (OUTPATIENT)
Dept: FAMILY MEDICINE | Facility: CLINIC | Age: 48
End: 2023-12-15

## 2023-12-15 ENCOUNTER — OFFICE VISIT (OUTPATIENT)
Dept: DERMATOLOGY | Facility: CLINIC | Age: 48
End: 2023-12-15
Payer: COMMERCIAL

## 2023-12-15 DIAGNOSIS — L30.1 DYSHIDROTIC ECZEMA: ICD-10-CM

## 2023-12-15 PROCEDURE — 99213 OFFICE O/P EST LOW 20 MIN: CPT | Performed by: NURSE PRACTITIONER

## 2023-12-15 RX ORDER — TRIAMCINOLONE ACETONIDE 1 MG/G
CREAM TOPICAL 2 TIMES DAILY
Qty: 45 G | Refills: 1 | Status: SHIPPED | OUTPATIENT
Start: 2023-12-15

## 2023-12-15 NOTE — TELEPHONE ENCOUNTER
S/w pt and scheduled with Amanda Bruno today at [facility] at 12:30 pm.     Eulalia ROGEL RN  [facility] [place]  [phone]

## 2023-12-15 NOTE — PROGRESS NOTES
"[facility] Dermatology Note  Encounter Date: Dec 15, 2023  Office Visit     Reviewed patients past medical history and pertinent chart review prior to patients visit today.     Dermatology Problem List:  Eczematous dermatitis    ____________________________________________    Assessment & Plan:     #Dyshidrotic eczema    -Start triamcinolone 0.1% ointment twice daily for 4 week, sooner if healed before that, decreasing as patients rash improves, repeat cycle for flares. Discussed risk of skin atrophy with long term chronic use. Not for face, armpits or groin.   -if not well healed in 1 month I will increase the strength of steroid.   -moisturize BID        Return to clinic PRN     Amanda Bruno CNP  Dermatology   _______________________________________    CC: Derm Problem (Ketoconazole applied last night after flare.with blister  to left foot/Blister that flared first time in 8/2023/Not barefoot since first flare/Cultured at Urgent care but showed nothing back 8/2023/\"Mat herpes\")    HPI:  Mr. Abhijit Garcia is a(n) 48 year old male who presents today for follow-up  for rash on the feet. It has been going on for a few months. He gets pinpoint \"blisters\" on the bottom of the feet, peeling and burning. The ketoconazole was not helpful. He also still has a spot on his right armpit     Patient is otherwise feeling well, without additional skin concerns.      Physical Exam:  Vitals: There were no vitals taken for this visit.  SKIN: Focused examination of feet and right axilla was performed.  - pink annular scaly patches with two pinpoint vesicles on the plantar feet  -pink annular patch on right axilla about 1 cm    - No other lesions of concern on areas examined.     Medications:  Current Outpatient Medications   Medication    allopurinol (ZYLOPRIM) 100 MG tablet    bisacodyl (DULCOLAX) 5 MG EC tablet    clotrimazole (LOTRIMIN) 1 % external cream    imiquimod (ALDARA) 5 % external cream    " irbesartan (AVAPRO) 300 MG tablet    ketoconazole (NIZORAL) 2 % external cream    Loratadine (CLARITIN PO)    meloxicam (MOBIC) 15 MG tablet    methylPREDNISolone (MEDROL) 32 MG tablet    polyethylene glycol (GOLYTELY) 236 g suspension    triamcinolone (KENALOG) 0.1 % external cream    valACYclovir (VALTREX) 1000 mg tablet    valACYclovir (VALTREX) 1000 mg tablet     Current Facility-Administered Medications   Medication    sodium chloride (PF) 0.9% PF flush 3 mL      Past Medical History:   Patient Active Problem List   Diagnosis    Mixed Hyperlipidemia LDL goal <130    Essential hypertension    Gout    History of DVT left leg. postop    RACHID (obstructive sleep apnea)    Carpal tunnel syndrome    Gastroesophageal reflux    Heartburn    Morbid obesity (H)     Past Medical History:   Diagnosis Date    Arthritis     Dysuria     Gout     Hyperplastic colon polyp 12/2018    Hypertension     RACHID (obstructive sleep apnea) 11/09/2022    Prostate infection        CC No referring provider defined for this encounter. on close of this encounter.

## 2023-12-15 NOTE — LETTER
"    12/15/2023         RE: Abhijit Garcia  [address] [place] [address]        Dear Colleague,    Thank you for referring your patient, Abhijit Garcia, to the [facility]. Please see a copy of my visit note below.    [facility] Dermatology Note  Encounter Date: Dec 15, 2023  Office Visit     Reviewed patients past medical history and pertinent chart review prior to patients visit today.     Dermatology Problem List:  Eczematous dermatitis    ____________________________________________    Assessment & Plan:     #Dyshidrotic eczema    -Start triamcinolone 0.1% ointment twice daily for 4 week, sooner if healed before that, decreasing as patients rash improves, repeat cycle for flares. Discussed risk of skin atrophy with long term chronic use. Not for face, armpits or groin.   -if not well healed in 1 month I will increase the strength of steroid.   -moisturize BID        Return to clinic PRN     Amanda Bruno CNP  Dermatology   _______________________________________    CC: Derm Problem (Ketoconazole applied last night after flare.with blister  to left foot/Blister that flared first time in 8/2023/Not barefoot since first flare/Cultured at Urgent care but showed nothing back 8/2023/\"Mat herpes\")    HPI:  Mr. Abhijit Garcia is a(n) 48 year old male who presents today for follow-up  for rash on the feet. It has been going on for a few months. He gets pinpoint \"blisters\" on the bottom of the feet, peeling and burning. The ketoconazole was not helpful. He also still has a spot on his right armpit     Patient is otherwise feeling well, without additional skin concerns.      Physical Exam:  Vitals: There were no vitals taken for this visit.  SKIN: Focused examination of feet and right axilla was performed.  - pink annular scaly patches with two pinpoint vesicles on the plantar feet  -pink annular patch on right axilla about 1 cm    - No other lesions of " concern on areas examined.     Medications:  Current Outpatient Medications   Medication     allopurinol (ZYLOPRIM) 100 MG tablet     bisacodyl (DULCOLAX) 5 MG EC tablet     clotrimazole (LOTRIMIN) 1 % external cream     imiquimod (ALDARA) 5 % external cream     irbesartan (AVAPRO) 300 MG tablet     ketoconazole (NIZORAL) 2 % external cream     Loratadine (CLARITIN PO)     meloxicam (MOBIC) 15 MG tablet     methylPREDNISolone (MEDROL) 32 MG tablet     polyethylene glycol (GOLYTELY) 236 g suspension     triamcinolone (KENALOG) 0.1 % external cream     valACYclovir (VALTREX) 1000 mg tablet     valACYclovir (VALTREX) 1000 mg tablet     Current Facility-Administered Medications   Medication     sodium chloride (PF) 0.9% PF flush 3 mL      Past Medical History:   Patient Active Problem List   Diagnosis     Mixed Hyperlipidemia LDL goal <130     Essential hypertension     Gout     History of DVT left leg. postop     RACHID (obstructive sleep apnea)     Carpal tunnel syndrome     Gastroesophageal reflux     Heartburn     Morbid obesity (H)     Past Medical History:   Diagnosis Date     Arthritis      Dysuria      Gout      Hyperplastic colon polyp 12/2018     Hypertension      RACHID (obstructive sleep apnea) 11/09/2022     Prostate infection        CC No referring provider defined for this encounter. on close of this encounter.       Again, thank you for allowing me to participate in the care of your patient.        Sincerely,        STEFANI Wood CNP

## 2023-12-15 NOTE — PATIENT INSTRUCTIONS
"Vesicles on the feet, not blisters. Dyshidrotic eczema      Eczema Action Plan    Name: Abhijit Garcia Provider: SUZIE ENGLISH Date: 12/15/2023    Step 1: Eczema Under Control (Skin is soft and flexible, not red or itchy)  Moisturize the whole body at least two times a day.  Watch for signs that the skin is turning red, itchy, or dry.  Use a cream in a jar from the list below. Do not use lotions from a pump.  Suggested creams:  CeraVe Cream, Cetaphil Cream, Vanicream, Aquaphor, Vaseline  Use a gentle cleanser/soap in the bath/shower such as dove sensitive cleanser or Cetaphil cleanser only to the armpits and groin areas, or where you are visibly dirty. All other areas should get washed with water only. Do not scrub. After bathing pat the skin dry with a towel instead of rubbing dry. Apply your moisturizer while you're still slightly damp to lock in some of the moisture.     Step 2:  Eczema Flare (Eczema is out of control and not responding to maintenance care)       Continue above procedures  Also, use prescribed steroid ointment two times a day for up to two weeks per month. Apply to red and itchy areas. Put the steroid on the skin first before other creams.   Feet: triamcinolone 0.1% ointment twice daily. Decrease as you improve    Use one fingertip unit of the ointment for eczema. A fingertip unit is the amount of ointment from the first bend in finger to the fingertip. This amount will cover an area equal to two adult hands. Using steroids for extended periods of time can thin the skin and cause stretch marks. Never use for longer than 3-4 weeks before following up with your provider. Please follow up in clinic if rash persists and does not resolve with this regimen in 1 months time.     Moisturize your whole body two times a day with a suggested cream.    Wet Wraps  If your rash is very itchy or getting out of control you can also try applying what we call \"wet wraps\". Use your " moisturizers/medications where instructed and THEN cover that body area with a wet cotton clothing or cloth and put a second dry layer over the top. You can do this for a short time or even overnight. For example: if your hands are the problem area, try wet cotton gloves or socks and cover with a dry pair. Or if your legs are affected, wet a pair of cotton pajama pants and ring them out then wear them covered with a dry pair overnight.     About Dry Skin    What is dry skin?  Common skin problem  Can be worse during the winter   Affects all ages  Occurs in people with or without other skin problems    What does it look like?  Fine lines in the skin become more visible   Rough feeling skin   Flaky skin  Most common on the arms and legs  Skin can become cracked, especially on the hands and feet    What are some problems caused by dry skin?   Itching  Rubbing or scratching can cause thickened, rough skin patches  Cracks in skin can be painful  Red, itchy, scaly skin (called eczema) can occur  Yellow crusting or pus could be signs of an infection    What causes dry skin?  A lack of water in the top layer of the skin  Too much soapy water,  hot water, or harsh chemicals  Aging and sun damage    How do I treat dry skin?  Shower or bathe daily for under ten minutes with lukewarm water and mild soap.  Pat yourself dry with a towel gently and leave your skin slightly damp.  Use moisturizing cream or ointment right away.  Avoid lotions.    What kind of mild soap should I be using?  Camay , Dove , Tone , Neutrogena , Purpose , or Oil of Olay   A non-detergent cleanser, like Cetaphil , can be used.    What should I stay away from?  Scented soaps   Bath oils    What moisturizers should I be using?  Cetaphil Cream,CeraVe Cream, Vanicream, Eucerin, Aquaphor, or Vaseline   Always apply after showering or bathing.  Reapply throughout the day, if possible.  If dry skin affects your hands, always reapply after handwashing.    What  else should I know?  Using a humidifier during winter months may help.  If dry skin gets worse or if eczema develops, a steroid cream may be needed.

## 2023-12-19 DIAGNOSIS — M10.9 GOUT OF MULTIPLE SITES, UNSPECIFIED CAUSE, UNSPECIFIED CHRONICITY: ICD-10-CM

## 2023-12-19 RX ORDER — MELOXICAM 15 MG/1
15 TABLET ORAL DAILY PRN
Qty: 30 TABLET | Refills: 0 | Status: SHIPPED | OUTPATIENT
Start: 2023-12-19 | End: 2024-01-19

## 2024-01-19 DIAGNOSIS — M10.9 GOUT OF MULTIPLE SITES, UNSPECIFIED CAUSE, UNSPECIFIED CHRONICITY: ICD-10-CM

## 2024-01-19 RX ORDER — MELOXICAM 15 MG/1
15 TABLET ORAL DAILY PRN
Qty: 30 TABLET | Refills: 0 | Status: SHIPPED | OUTPATIENT
Start: 2024-01-19

## 2024-01-23 ENCOUNTER — OFFICE VISIT (OUTPATIENT)
Dept: FAMILY MEDICINE | Facility: CLINIC | Age: 49
End: 2024-01-23
Payer: COMMERCIAL

## 2024-01-23 VITALS
HEART RATE: 73 BPM | DIASTOLIC BLOOD PRESSURE: 81 MMHG | HEIGHT: 73 IN | RESPIRATION RATE: 16 BRPM | TEMPERATURE: 98.3 F | BODY MASS INDEX: 41.75 KG/M2 | OXYGEN SATURATION: 99 % | WEIGHT: 315 LBS | SYSTOLIC BLOOD PRESSURE: 123 MMHG

## 2024-01-23 DIAGNOSIS — Z00.00 ROUTINE GENERAL MEDICAL EXAMINATION AT A HEALTH CARE FACILITY: Primary | ICD-10-CM

## 2024-01-23 DIAGNOSIS — M10.9 GOUT, UNSPECIFIED CAUSE, UNSPECIFIED CHRONICITY, UNSPECIFIED SITE: ICD-10-CM

## 2024-01-23 DIAGNOSIS — G47.33 OSA (OBSTRUCTIVE SLEEP APNEA): ICD-10-CM

## 2024-01-23 DIAGNOSIS — H69.92 DYSFUNCTION OF LEFT EUSTACHIAN TUBE: ICD-10-CM

## 2024-01-23 DIAGNOSIS — I10 ESSENTIAL HYPERTENSION: ICD-10-CM

## 2024-01-23 DIAGNOSIS — E78.5 HYPERLIPIDEMIA LDL GOAL <130: ICD-10-CM

## 2024-01-23 DIAGNOSIS — B00.1 COLD SORE: ICD-10-CM

## 2024-01-23 DIAGNOSIS — E66.01 MORBID OBESITY (H): ICD-10-CM

## 2024-01-23 PROBLEM — R12 HEARTBURN: Status: RESOLVED | Noted: 2023-01-17 | Resolved: 2024-01-23

## 2024-01-23 LAB
ALBUMIN SERPL BCG-MCNC: 4.2 G/DL (ref 3.5–5.2)
ALP SERPL-CCNC: 70 U/L (ref 40–150)
ALT SERPL W P-5'-P-CCNC: 40 U/L (ref 0–70)
ANION GAP SERPL CALCULATED.3IONS-SCNC: 11 MMOL/L (ref 7–15)
AST SERPL W P-5'-P-CCNC: 34 U/L (ref 0–45)
BILIRUB SERPL-MCNC: 0.6 MG/DL
BUN SERPL-MCNC: 24.1 MG/DL (ref 6–20)
CALCIUM SERPL-MCNC: 9.3 MG/DL (ref 8.6–10)
CHLORIDE SERPL-SCNC: 102 MMOL/L (ref 98–107)
CHOLEST SERPL-MCNC: 174 MG/DL
CREAT SERPL-MCNC: 1.29 MG/DL (ref 0.67–1.17)
DEPRECATED HCO3 PLAS-SCNC: 26 MMOL/L (ref 22–29)
EGFRCR SERPLBLD CKD-EPI 2021: 67 ML/MIN/1.73M2
FASTING STATUS PATIENT QL REPORTED: NO
GLUCOSE SERPL-MCNC: 99 MG/DL (ref 70–99)
HBA1C MFR BLD: 5.4 % (ref 0–5.6)
HDLC SERPL-MCNC: 34 MG/DL
LDLC SERPL CALC-MCNC: 98 MG/DL
NONHDLC SERPL-MCNC: 140 MG/DL
POTASSIUM SERPL-SCNC: 4.2 MMOL/L (ref 3.4–5.3)
PROT SERPL-MCNC: 6.7 G/DL (ref 6.4–8.3)
PSA SERPL DL<=0.01 NG/ML-MCNC: 2.64 NG/ML (ref 0–3.5)
SODIUM SERPL-SCNC: 139 MMOL/L (ref 135–145)
TRIGL SERPL-MCNC: 208 MG/DL
TSH SERPL DL<=0.005 MIU/L-ACNC: 3.27 UIU/ML (ref 0.3–4.2)
URATE SERPL-MCNC: 8.6 MG/DL (ref 3.4–7)

## 2024-01-23 PROCEDURE — 80053 COMPREHEN METABOLIC PANEL: CPT | Performed by: FAMILY MEDICINE

## 2024-01-23 PROCEDURE — 80061 LIPID PANEL: CPT | Performed by: FAMILY MEDICINE

## 2024-01-23 PROCEDURE — 99214 OFFICE O/P EST MOD 30 MIN: CPT | Mod: 25 | Performed by: FAMILY MEDICINE

## 2024-01-23 PROCEDURE — 84550 ASSAY OF BLOOD/URIC ACID: CPT | Performed by: FAMILY MEDICINE

## 2024-01-23 PROCEDURE — 84443 ASSAY THYROID STIM HORMONE: CPT | Performed by: FAMILY MEDICINE

## 2024-01-23 PROCEDURE — G0103 PSA SCREENING: HCPCS | Performed by: FAMILY MEDICINE

## 2024-01-23 PROCEDURE — 36415 COLL VENOUS BLD VENIPUNCTURE: CPT | Performed by: FAMILY MEDICINE

## 2024-01-23 PROCEDURE — 83036 HEMOGLOBIN GLYCOSYLATED A1C: CPT | Performed by: FAMILY MEDICINE

## 2024-01-23 PROCEDURE — 99396 PREV VISIT EST AGE 40-64: CPT | Performed by: FAMILY MEDICINE

## 2024-01-23 RX ORDER — IRBESARTAN 300 MG/1
300 TABLET ORAL DAILY
Qty: 90 TABLET | Refills: 3 | Status: SHIPPED | OUTPATIENT
Start: 2024-01-23

## 2024-01-23 RX ORDER — VALACYCLOVIR HYDROCHLORIDE 1 G/1
2000 TABLET, FILM COATED ORAL 2 TIMES DAILY
Qty: 4 TABLET | Refills: 4 | Status: SHIPPED | OUTPATIENT
Start: 2024-01-23

## 2024-01-23 RX ORDER — COLCHICINE 0.6 MG/1
0.6 TABLET ORAL 2 TIMES DAILY
Qty: 10 TABLET | Refills: 3 | Status: SHIPPED | OUTPATIENT
Start: 2024-01-23 | End: 2024-01-28

## 2024-01-23 RX ORDER — ALLOPURINOL 100 MG/1
200 TABLET ORAL DAILY
Qty: 180 TABLET | Refills: 3 | Status: SHIPPED | OUTPATIENT
Start: 2024-01-23

## 2024-01-23 SDOH — HEALTH STABILITY: PHYSICAL HEALTH: ON AVERAGE, HOW MANY MINUTES DO YOU ENGAGE IN EXERCISE AT THIS LEVEL?: 60 MIN

## 2024-01-23 SDOH — HEALTH STABILITY: PHYSICAL HEALTH: ON AVERAGE, HOW MANY DAYS PER WEEK DO YOU ENGAGE IN MODERATE TO STRENUOUS EXERCISE (LIKE A BRISK WALK)?: 3 DAYS

## 2024-01-23 ASSESSMENT — ENCOUNTER SYMPTOMS
HEARTBURN: 0
CONSTIPATION: 0
SHORTNESS OF BREATH: 0
DYSURIA: 0
NAUSEA: 0
MYALGIAS: 1
SORE THROAT: 0
CHILLS: 0
NERVOUS/ANXIOUS: 0
FEVER: 0
HEMATOCHEZIA: 0
WEAKNESS: 0
PARESTHESIAS: 0
ARTHRALGIAS: 1
DIZZINESS: 0
DIARRHEA: 0
PALPITATIONS: 0
HEMATURIA: 0
COUGH: 0
EYE PAIN: 0
FREQUENCY: 0
JOINT SWELLING: 0
HEADACHES: 0
ABDOMINAL PAIN: 0

## 2024-01-23 ASSESSMENT — SOCIAL DETERMINANTS OF HEALTH (SDOH)
HOW OFTEN DO YOU GET TOGETHER WITH FRIENDS OR RELATIVES?: NEVER
DO YOU BELONG TO ANY CLUBS OR ORGANIZATIONS SUCH AS CHURCH GROUPS UNIONS, FRATERNAL OR ATHLETIC GROUPS, OR SCHOOL GROUPS?: YES
HOW OFTEN DO YOU ATTENT MEETINGS OF THE CLUB OR ORGANIZATION YOU BELONG TO?: MORE THAN 4 TIMES PER YEAR
HOW OFTEN DO YOU ATTEND CHURCH OR RELIGIOUS SERVICES?: NEVER
IN A TYPICAL WEEK, HOW MANY TIMES DO YOU TALK ON THE PHONE WITH FAMILY, FRIENDS, OR NEIGHBORS?: MORE THAN THREE TIMES A WEEK

## 2024-01-23 ASSESSMENT — LIFESTYLE VARIABLES
SKIP TO QUESTIONS 9-10: 0
AUDIT-C TOTAL SCORE: 3
HOW MANY STANDARD DRINKS CONTAINING ALCOHOL DO YOU HAVE ON A TYPICAL DAY: 1 OR 2
HOW OFTEN DO YOU HAVE A DRINK CONTAINING ALCOHOL: 2-4 TIMES A MONTH
HOW OFTEN DO YOU HAVE SIX OR MORE DRINKS ON ONE OCCASION: LESS THAN MONTHLY

## 2024-01-23 ASSESSMENT — ASTHMA QUESTIONNAIRES
ACT_TOTALSCORE: 25
ACT_TOTALSCORE: 25
QUESTION_5 LAST FOUR WEEKS HOW WOULD YOU RATE YOUR ASTHMA CONTROL: COMPLETELY CONTROLLED
QUESTION_4 LAST FOUR WEEKS HOW OFTEN HAVE YOU USED YOUR RESCUE INHALER OR NEBULIZER MEDICATION (SUCH AS ALBUTEROL): NOT AT ALL
QUESTION_1 LAST FOUR WEEKS HOW MUCH OF THE TIME DID YOUR ASTHMA KEEP YOU FROM GETTING AS MUCH DONE AT WORK, SCHOOL OR AT HOME: NONE OF THE TIME
QUESTION_3 LAST FOUR WEEKS HOW OFTEN DID YOUR ASTHMA SYMPTOMS (WHEEZING, COUGHING, SHORTNESS OF BREATH, CHEST TIGHTNESS OR PAIN) WAKE YOU UP AT NIGHT OR EARLIER THAN USUAL IN THE MORNING: NOT AT ALL
QUESTION_2 LAST FOUR WEEKS HOW OFTEN HAVE YOU HAD SHORTNESS OF BREATH: NOT AT ALL

## 2024-01-23 NOTE — PROGRESS NOTES
Preventive Care Visit  [facility]  Madeleine Lynch MD, Family Medicine  Jan 23, 2024      SUBJECTIVE:   Emerson is a 49 year old, presenting for the following:  Physical        1/23/2024    10:05 AM   Additional Questions   Roomed by Maggie   Accompanied by self     Healthy Habits:     Getting at least 3 servings of Calcium per day:  Yes    Bi-annual eye exam:  Yes    Dental care twice a year:  Yes    Sleep apnea or symptoms of sleep apnea:  Sleep apnea    Diet:  Regular (no restrictions), Low salt and Carbohydrate counting    Frequency of exercise:  2-3 days/week    Duration of exercise:  Other    Taking medications regularly:  Yes    Medication side effects:  None    Additional concerns today:  Yes      Today's PHQ-2 Score:       1/23/2024     9:31 AM   PHQ-2 ( 1999 [employer])   Q1: Little interest or pleasure in doing things 1   Q2: Feeling down, depressed or hopeless 1   PHQ-2 Score 2   Q1: Little interest or pleasure in doing things Several days   Q2: Feeling down, depressed or hopeless Several days   PHQ-2 Score 2         Social History     Tobacco Use    Smoking status: Never    Smokeless tobacco: Never    Tobacco comments:     I have never used tobacco. My dad was a heavy smoker so I did have second hand smoke exposure   Substance Use Topics    Alcohol use: Yes     Alcohol/week: 3.0 standard drinks of alcohol     Types: 3 Standard drinks or equivalent per week     Comment: social drinker, weekly/biweekly 2 drinks per outing             1/23/2024     9:31 AM   Alcohol Use   Prescreen: >3 drinks/day or >7 drinks/week? No       Last PSA:   PSA Tumor Marker   Date Value Ref Range Status   11/23/2021 2.68 0.00 - 4.00 ug/L Final       Reviewed orders with patient. Reviewed health maintenance and updated orders accordingly - Yes  Patient Active Problem List   Diagnosis    Mixed Hyperlipidemia LDL goal <130    Essential hypertension    Gout    History of DVT left leg. postop    RACHID  "(obstructive sleep apnea)    Carpal tunnel syndrome    Gastroesophageal reflux    Heartburn    Morbid obesity (H)     Past Surgical History:   Procedure Laterality Date    ANKLE SURGERY      APPENDECTOMY      BIOPSY      biopsy from foreskin    COLONOSCOPY  2018    Dr. Dc [facility]    COLONOSCOPY N/A 2018    Procedure: COMBINED COLONOSCOPY THRU STOMA, BIOPSY BY SNARE using exacto snare;  Surgeon: Shamar Dc MD;  Location:  GI    COLONOSCOPY N/A 2023    Procedure: Colonoscopy;  Surgeon: Shamar Dc MD;  Location:  GI    CYSTOSCOPY      ESOPHAGOSCOPY, GASTROSCOPY, DUODENOSCOPY (EGD), COMBINED      for stomach pain done in [place]-acid reflux    HAND SURGERY      cyst removal from hand    ORTHOPEDIC SURGERY  2017    ankle left -repair of tendons    wisdom teeth         Social History     Tobacco Use    Smoking status: Never    Smokeless tobacco: Never    Tobacco comments:     I have never used tobacco. My dad was a heavy smoker so I did have second hand smoke exposure   Substance Use Topics    Alcohol use: Yes     Alcohol/week: 3.0 standard drinks of alcohol     Types: 3 Standard drinks or equivalent per week     Comment: social drinker, weekly/biweekly 2 drinks per outing     Family History   Problem Relation Age of Onset    Thyroid Disease Mother     Diabetes Mother         Mother  2021    Obesity Mother     Cerebrovascular Disease Mother          of stroke    Hypertension Mother     Colon Cancer Mother         6\" of bowel removed in     Arthritis Father     Respiratory Father         asbestos exposure    Chronic Obstructive Pulmonary Disease Father     Coronary Artery Disease Father 71        Father b - 1933  d -     Skin Cancer Father     Other Cancer Father         Skin cancer on face and ears    Asthma Brother         Primarily affected him as a child.    Mental Illness Brother         Suffered severe mental \"break\" the 2022. Initial " "symptoms October 2021    Heart Defect Son            Reviewed and updated as needed this visit by clinical staff   Tobacco  Allergies  Meds              Reviewed and updated as needed this visit by Provider                    Review of Systems   Constitutional:  Negative for chills and fever.   HENT:  Positive for congestion, ear pain and hearing loss. Negative for sore throat.    Eyes:  Negative for pain and visual disturbance.   Respiratory:  Negative for cough and shortness of breath.    Cardiovascular:  Negative for chest pain and palpitations.   Gastrointestinal:  Negative for abdominal pain, constipation, diarrhea and nausea.   Genitourinary:  Negative for dysuria, frequency, genital sores, hematuria, penile discharge and urgency.   Musculoskeletal:  Positive for arthralgias and myalgias. Negative for joint swelling.   Skin:  Negative for rash.   Neurological:  Negative for dizziness, weakness and headaches.   Psychiatric/Behavioral:  The patient is not nervous/anxious.        OBJECTIVE:   /81 (BP Location: Right arm, Patient Position: Chair, Cuff Size: Adult Large)   Pulse 73   Temp 98.3  F (36.8  C) (Oral)   Resp 16   Ht 1.854 m (6' 1\")   Wt (!) 150.6 kg (332 lb)   SpO2 99%   BMI 43.80 kg/m     Estimated body mass index is 43.8 kg/m  as calculated from the following:    Height as of this encounter: 1.854 m (6' 1\").    Weight as of this encounter: 150.6 kg (332 lb).  Physical Exam  GENERAL: alert and no distress  EYES: Eyes grossly normal to inspection, PERRL and conjunctivae and sclerae normal  HENT: serous effusion bilateral middle ears, TM's normal, nose and mouth without ulcers or lesions  NECK: no adenopathy, no asymmetry, masses, or scars  RESP: lungs clear to auscultation - no rales, rhonchi or wheezes  CV: regular rate and rhythm, normal S1 S2, no S3 or S4, no murmur, click or rub, no peripheral edema  ABDOMEN: soft, nontender, no hepatosplenomegaly, no masses and bowel sounds " "normal  MS: no gross musculoskeletal defects noted, no edema  SKIN: no suspicious lesions or rashes  NEURO: Normal strength and tone, mentation intact and speech normal  PSYCH: mentation appears normal, affect normal/bright  LYMPH: no cervical, supraclavicular, axillary, or inguinal adenopathy    Diagnostic Test Results:  Labs reviewed in Epic    ASSESSMENT/PLAN:     1. Routine general medical examination at a health care facility  - PSA, screen; Future  - Comprehensive metabolic panel (BMP + Alb, Alk Phos, ALT, AST, Total. Bili, TP); Future  - Hemoglobin A1c; Future  - TSH with free T4 reflex; Future  - PSA, screen  - Comprehensive metabolic panel (BMP + Alb, Alk Phos, ALT, AST, Total. Bili, TP)  - Hemoglobin A1c  - TSH with free T4 reflex    2. Hyperlipidemia LDL goal <130  - Lipid panel reflex to direct LDL Non-fasting; Future  - Lipid panel reflex to direct LDL Non-fasting    3. Gout, unspecified cause, unspecified chronicity, unspecified site - surveillance labs  - allopurinol (ZYLOPRIM) 100 MG tablet; Take 2 tablets (200 mg) by mouth daily  Dispense: 180 tablet; Refill: 3  - Uric acid; Future  - colchicine (COLCRYS) 0.6 MG tablet; Take 1 tablet (0.6 mg) by mouth 2 times daily for 5 days  Dispense: 10 tablet; Refill: 3  - Uric acid    4. Essential hypertension - controlled, continue current  - irbesartan (AVAPRO) 300 MG tablet; Take 1 tablet (300 mg) by mouth daily  Dispense: 90 tablet; Refill: 3    5. Dysfunction of left eustachian tube  - Adult ENT  Referral; Future    6. Cold sore - valtrex prn sent    7. Morbid obesity (H) - working on weight loss through diet and exercise    8. RACHID (obstructive sleep apnea) - using CPAP, benefiting from this.         Counseling  Reviewed preventive health counseling, as reflected in patient instructions      BMI  Estimated body mass index is 43.8 kg/m  as calculated from the following:    Height as of this encounter: 1.854 m (6' 1\").    Weight as of this " encounter: 150.6 kg (332 lb).       He reports that he has never smoked. He has never used smokeless tobacco.          Signed Electronically by: Madeleine Lynch MD  Answers submitted by the patient for this visit:  Annual Preventive Visit (Submitted on 1/23/2024)  Chief Complaint: Annual Exam:  Blood in stool: No  heartburn: No  peripheral edema: No  mood changes: No  Skin sensation changes: No  impotence: No

## 2024-03-15 ENCOUNTER — OFFICE VISIT (OUTPATIENT)
Dept: FAMILY MEDICINE | Facility: CLINIC | Age: 49
End: 2024-03-15
Payer: COMMERCIAL

## 2024-03-15 ENCOUNTER — NURSE TRIAGE (OUTPATIENT)
Dept: FAMILY MEDICINE | Facility: CLINIC | Age: 49
End: 2024-03-15

## 2024-03-15 VITALS
BODY MASS INDEX: 41.75 KG/M2 | HEART RATE: 84 BPM | SYSTOLIC BLOOD PRESSURE: 121 MMHG | DIASTOLIC BLOOD PRESSURE: 82 MMHG | RESPIRATION RATE: 16 BRPM | WEIGHT: 315 LBS | OXYGEN SATURATION: 95 % | HEIGHT: 73 IN

## 2024-03-15 DIAGNOSIS — M71.10 BURSITIS DUE TO BACTERIAL INFECTION: Primary | ICD-10-CM

## 2024-03-15 DIAGNOSIS — B96.89 BURSITIS DUE TO BACTERIAL INFECTION: Primary | ICD-10-CM

## 2024-03-15 PROCEDURE — 99213 OFFICE O/P EST LOW 20 MIN: CPT | Performed by: FAMILY MEDICINE

## 2024-03-15 NOTE — PROGRESS NOTES
"  Assessment & Plan   See patient instructions.  Bursitis due to bacterial infection    - amoxicillin-clavulanate (AUGMENTIN) 875-125 MG tablet  Dispense: 20 tablet; Refill: 0              BMI  Estimated body mass index is 43.72 kg/m  as calculated from the following:    Height as of this encounter: 1.854 m (6' 1\").    Weight as of this encounter: 150.3 kg (331 lb 6.4 oz).             Subjective   Emerson is a 49 year old, presenting for the following health issues:  Elbow Pain (Left Elbow - For the past couple weeks)        3/15/2024     1:33 PM   Additional Questions   Roomed by BERNABE Lin   Accompanied by Self     History of Present Illness       Reason for visit:  Elbow                  Review of Systems  Patient is seen for primary concern of left elbow swelling and discomfort.    Patient has had left elbow pain for  2-3 weeks, markedly worse pain past 5 days, and erythema and edema for 2 days.  He has slightly increased olecranon area discomfort with the end of active extension at elbow.        Objective    /82 (BP Location: Right arm, Patient Position: Sitting, Cuff Size: Adult Large)   Pulse 84   Resp 16   Ht 1.854 m (6' 1\")   Wt (!) 150.3 kg (331 lb 6.4 oz)   SpO2 95%   BMI 43.72 kg/m    Body mass index is 43.72 kg/m .  Physical Exam   Vital signs reviewed.  Patient is in no acute appearing distress.  Breathing appears nonlabored.  Patient is alert and oriented ×3.  Patient is very pleasant, making good eye contact and responding with clear fluent speech.    Left upper extremity exam: Patient has tender ballotable erythematous edema over the olecranon region of left elbow measuring 3 to 4 cm diameter.  No comedone noted.  This area is mildly warm to touch.  Patient has normal active range of motion in flexion and extension, supination, and pronation at the elbow.    Signed Electronically by: Dom Degroot DO    Answers submitted by the patient for this visit:  General Questionnaire " (Submitted on 3/15/2024)  Chief Complaint: Chronic problems general questions HPI Form  What is the reason for your visit today? : elbow

## 2024-03-15 NOTE — TELEPHONE ENCOUNTER
"Call received from patient stating he has had discomfort in his left elbow for the past 3 weeks. For the past week pain has gotten worse. Over the past 48 hours patient has developed fluid build up in the elbow with further increase in pain. Pain is mostly on the tip of the elbow but there is also swelling and pain in the back of the upper arm. Reports slight redness and warmth to area. Denies injury.  Patient has tried ice and antiinflammatory medications. Pain 8-9/10 to touch. 2-4/10 at rest. Advised appointment. Scheduled.     Reason for Disposition   Elbow pain is main symptom   Swollen joint   Fluid-filled sack located directly over point of elbow    Additional Information   Negative: Followed an elbow injury   Negative: Shock suspected (e.g., cold/pale/clammy skin, too weak to stand, low BP, rapid pulse)   Negative: [1] Similar pain previously AND [2] it was from \"heart attack\"   Negative: [1] Similar pain previously AND [2] it was from \"angina\" AND [3] not relieved by nitroglycerin   Negative: Sounds like a life-threatening emergency to the triager   Negative: Followed an elbow injury   Negative: Chest pain   Negative: Wound looks infected   Negative: Elbow swelling is main symptom   Negative: Arm pain is main symptom   Negative: Difficulty breathing or unusual sweating (e.g., sweating without exertion)   Negative: [1] Age > 40 AND [2] associated chest or jaw pain AND [3] pain lasts > 5 minutes   Negative: [1] Swollen joint AND [2] fever   Negative: [1] Red area or streak AND [2] fever   Negative: Patient sounds very sick or weak to the triager   Negative: [1] SEVERE pain AND [2] not improved 2 hours after pain medicine   Negative: [1] Looks infected (spreading redness, pus) AND [2] large red area (> 2 in. or 5 cm)   Negative: Entire arm is swollen   Negative: Weakness (i.e., loss of strength) in hand or fingers  (Exception: Not truly weak; hand feels weak because of pain.)    Protocols used: Elbow " Swelling-A-AH, Elbow Pain-A-AH

## 2024-03-15 NOTE — PATIENT INSTRUCTIONS
If your condition worsens while taking antibiotic, go to ER, especially if you have increasing pain with arm movement. Also be seen again if no improvement in 3 days, or  contact me.

## 2024-03-20 ENCOUNTER — MYC MEDICAL ADVICE (OUTPATIENT)
Dept: FAMILY MEDICINE | Facility: CLINIC | Age: 49
End: 2024-03-20
Payer: COMMERCIAL

## 2024-03-20 ENCOUNTER — HOSPITAL ENCOUNTER (EMERGENCY)
Facility: CLINIC | Age: 49
Discharge: HOME | End: 2024-03-20
Attending: STUDENT IN AN ORGANIZED HEALTH CARE EDUCATION/TRAINING PROGRAM | Admitting: STUDENT IN AN ORGANIZED HEALTH CARE EDUCATION/TRAINING PROGRAM
Payer: COMMERCIAL

## 2024-03-20 VITALS
OXYGEN SATURATION: 97 % | HEART RATE: 79 BPM | RESPIRATION RATE: 18 BRPM | SYSTOLIC BLOOD PRESSURE: 118 MMHG | BODY MASS INDEX: 43.67 KG/M2 | DIASTOLIC BLOOD PRESSURE: 82 MMHG | WEIGHT: 315 LBS | TEMPERATURE: 98.1 F

## 2024-03-20 DIAGNOSIS — M71.122 SEPTIC BURSITIS OF ELBOW, LEFT: ICD-10-CM

## 2024-03-20 PROCEDURE — 99283 EMERGENCY DEPT VISIT LOW MDM: CPT

## 2024-03-20 ASSESSMENT — COLUMBIA-SUICIDE SEVERITY RATING SCALE - C-SSRS
1. IN THE PAST MONTH, HAVE YOU WISHED YOU WERE DEAD OR WISHED YOU COULD GO TO SLEEP AND NOT WAKE UP?: NO
2. HAVE YOU ACTUALLY HAD ANY THOUGHTS OF KILLING YOURSELF IN THE PAST MONTH?: NO
6. HAVE YOU EVER DONE ANYTHING, STARTED TO DO ANYTHING, OR PREPARED TO DO ANYTHING TO END YOUR LIFE?: NO

## 2024-03-20 ASSESSMENT — ACTIVITIES OF DAILY LIVING (ADL): ADLS_ACUITY_SCORE: 35

## 2024-03-20 NOTE — DISCHARGE INSTRUCTIONS
Discharge Instructions  Cellulitis    Cellulitis is an infection of the skin that occurs when bacteria enter the skin.   Symptoms are generally redness, swelling, warmth and pain.  Your infection appeared to be appropriate to treat at home with antibiotics.  However, sometimes your infection may be worse than it seemed at first, or may worsen with time. If you have new or worse symptoms, you may need to be seen again in the Emergency Department or by your primary provider.    Generally, every Emergency Department visit should have a follow-up clinic visit with either a primary or a specialty clinic/provider. Please follow-up as instructed by your emergency provider today.    Return to the Emergency Department if:  The redness, pain, or swelling gets a lot worse.  If the red area was marked, return if it is red significantly beyond the marked area.  You are unable to get your antibiotics, or are vomiting (throwing up) these pills, or you cannot take them.  You are feeling more ill, weak or lightheaded.  You start to run a new fever (temperature >101 F).  Anything else about the infection worries or concerns you.  Treatment:  Start your antibiotics right away, and take them as prescribed. Be sure to finish the whole prescription, even if you are better.  Apply a heating pad, warm packs, or warm water soaks to the infected area for 15 minutes at a time, at least 3 times a day. Do not use a heating pad on your feet or legs if you have diabetes. Do not sleep with a heating pad on, since this can cause burns or skin injury.  Rest your injured area for at least 1-2 days. After that you may start using your extremity again as long as there is not too much pain.   Raise the injured area above the level of your heart as much as possible in the first 1-2 days.  Tylenol  (acetaminophen), Motrin  (ibuprofen), or Advil  (ibuprofen) may help may help reduce pain and fever and may help you feel more comfortable. Be sure to read and  follow the package directions, and ask your provider if you have questions.    If you were given a prescription for medicine here today, be sure to read all of the information (including the package insert) that comes with your prescription.  This will include important information about the medicine, its side effects, and any warnings that you need to know about.  The pharmacist who fills the prescription can provide more information and answer questions you may have about the medicine.  If you have questions or concerns that the pharmacist cannot address, please call or return to the Emergency Department.     Remember that you can always come back to the Emergency Department if you are not able to see your regular provider in the amount of time listed above, if you get any new symptoms, or if there is anything that worries you.  Return to the emergency department if symptoms are worsening, become concerning, or for any other concerns. Follow-up with your doctor in 2-3 and sooner if needed.    If you continue to have redness and mild pain, proceed with continuing the Augmentin that I prescribed and follow-up with your doctor.

## 2024-03-20 NOTE — ED PROVIDER NOTES
History     Chief Complaint:  Joint Swelling     HPI   Abhijit Garcia is a 49 year old right handed male with history of gout, hypertension, and obesity who presents for evaluation of joint swelling. The patient reports onset of left elbow pain about two weeks ago that worsened last week. States that about five days ago, he noticed some swelling and redness. Adds that he had subjective fever last week but does not feel this now. Notes that he was seen in urgent care, diagnosed with bursitis and prescribed Augmentin.  Compliant with medication.  He denies fever, chills, myalgia, nausea, vomiting, and diarrhea. He denies recent surgery.     He followed up with the provider who initially saw him and they wanted him checked in the ED due to persistent redness and discomfort.    Independent Historian:    None - Patient Only    Review of External Notes:  I reviewed urgent care note from 3/15/24.    Allergies:  Amlodipine  Contrast Dye  Indomethacin  Iodine  Levaquin [Levofloxacin]  Lisinopril  Seasonal Allergies  Losartan     Physical Exam   Patient Vitals for the past 24 hrs:   BP Temp Temp src Pulse Resp SpO2 Weight   03/20/24 1354 118/82 98.1  F (36.7  C) Temporal 79 18 97 % (!) 150.1 kg (331 lb)      Physical Exam  GENERAL: Patient well-appearing  HEAD: Atraumatic.  EYES: Anicteric  NOSE: No active bleeding  MOUTH: Moist mucosa  THROAT: Patent airway.   NECK: No rigidity  CV: RRR, no murmurs rubs or gallops  PULM: CTAB with good aeration; no retractions, rales, rhonchi, or wheezing  DERM: Mild erythema left bursa.  Skin warm and dry  EXTREMITY: Moving all extremities without difficulty. Left elbow with small bursitis inflammation and redness. Fully able to flex and extend at the elbow. Able to load and agitate elbow joint without pain. All compartments soft.  VASCULAR: Left radial pulse 2+.  NEURO: Strength 5-5 left upper extremity.  Sensation intact to light touch left upper extremity.    Emergency Department  Course     Laboratory: Imaging:   Labs Ordered and Resulted from Time of ED Arrival to Time of ED Departure - No data to display  No orders to display           Emergency Department Course & Assessments:     Interventions:  Medications - No data to display     Assessments, Independent Interpretation, Consult/Discussion of ManagementTests:  ED Course as of 03/20/24 1412   Wed Mar 20, 2024   1400 I obtained history and examined the patient as noted above.    1404 I rechecked and updated the patient prior to discharge.     Social Determinants of Health affecting care:  None    Disposition:  The patient was discharged to home.     Impression & Plan    CMS Diagnoses: None    Code Status: No Order    MIPS (If applicable):  N/A    Medical Decision Making:  Symptoms consistent with septic bursitis.    DDx considered septic joint, fracture, dislocation, necrotizing skin infection, however evaluation not consistent with these etiologies.    X-ray considered, but atraumatic - not indicated.  No prior left elbow surgery.     Not showing evidence of sepsis, nor requiring admission - labs not indicated.    Patient is already on Augmentin and he showed me a picture of what the wound initially looked like and it is significantly improved.  He has a 10-day course of antibiotics I recommend he continue this.  I also wrote him for additional days in case he needs it.  Up-to-date referenced and treatment duration can be up to 14 to 21 days.  Patient does not have a history of MRSA.  No high fever.  Clinically improving.  Do not think he requires MRSA coverage.  Patient states he can closely follow-up with his provider.  I do not see any evidence to suggest septic joint.  Do not think he requires arthrocentesis.    Patient was evaluated for acute medical emergencies. Based on my clinical assessment, I do not think any further acute management or work-up is required.  Patient stable for discharge. Given strict return precautions. All  questions answered. Patient content with plan. Recommended PCP follow-up in 2-3 days.        Critical Care:  None.    Diagnosis:    ICD-10-CM    1. Septic bursitis of elbow, left  M71.122            Discharge Medications:  New Prescriptions    AMOXICILLIN-CLAVULANATE (AUGMENTIN) 875-125 MG TABLET    Take 1 tablet by mouth 2 times daily for 4 days      Scribe Disclosure:  GLYNN, Dalia Mcconnell, am serving as a scribe at 2:02 PM on 3/20/2024 to document services personally performed by Teodoro Sheridan MD based on my observations and the provider's statements to me.    3/20/2024   Teodoro Sheridan MD Foss, Kevin, MD  03/20/24 9784

## 2024-03-20 NOTE — ED TRIAGE NOTES
Pt has swelling and redness in L elbow, dx bursitis and put on antibiotics which has been helping but pt is still worried it still looks red. No fever in last few days     Triage Assessment (Adult)       Row Name 03/20/24 4631          Triage Assessment    Airway WDL WDL        Respiratory WDL    Respiratory WDL WDL        Skin Circulation/Temperature WDL    Skin Circulation/Temperature WDL WDL        Cardiac WDL    Cardiac WDL WDL        Peripheral/Neurovascular WDL    Peripheral Neurovascular WDL WDL        Cognitive/Neuro/Behavioral WDL    Cognitive/Neuro/Behavioral WDL WDL

## 2024-03-21 NOTE — TELEPHONE ENCOUNTER
Increased creatinine could be from increase in protein. No need for changes until next lab appt.     Looks like he's already at the ER.

## 2024-06-13 DIAGNOSIS — G47.33 OBSTRUCTIVE SLEEP APNEA (ADULT) (PEDIATRIC): Primary | ICD-10-CM

## 2024-06-20 ENCOUNTER — MYC MEDICAL ADVICE (OUTPATIENT)
Dept: FAMILY MEDICINE | Facility: CLINIC | Age: 49
End: 2024-06-20

## 2024-06-20 ENCOUNTER — OFFICE VISIT (OUTPATIENT)
Dept: PEDIATRICS | Facility: CLINIC | Age: 49
End: 2024-06-20
Payer: COMMERCIAL

## 2024-06-20 VITALS
BODY MASS INDEX: 41.75 KG/M2 | RESPIRATION RATE: 18 BRPM | WEIGHT: 315 LBS | HEART RATE: 70 BPM | HEIGHT: 73 IN | OXYGEN SATURATION: 98 % | DIASTOLIC BLOOD PRESSURE: 87 MMHG | SYSTOLIC BLOOD PRESSURE: 133 MMHG | TEMPERATURE: 97.9 F

## 2024-06-20 DIAGNOSIS — R30.0 DIFFICULT OR PAINFUL URINATION: Primary | ICD-10-CM

## 2024-06-20 DIAGNOSIS — N48.1 BALANITIS: ICD-10-CM

## 2024-06-20 LAB
ALBUMIN UR-MCNC: NEGATIVE MG/DL
APPEARANCE UR: CLEAR
BILIRUB UR QL STRIP: NEGATIVE
COLOR UR AUTO: YELLOW
GLUCOSE UR STRIP-MCNC: NEGATIVE MG/DL
HGB UR QL STRIP: NEGATIVE
KETONES UR STRIP-MCNC: NEGATIVE MG/DL
LEUKOCYTE ESTERASE UR QL STRIP: NEGATIVE
NITRATE UR QL: NEGATIVE
PH UR STRIP: 6 [PH] (ref 5–7)
SP GR UR STRIP: <=1.005 (ref 1–1.03)
UROBILINOGEN UR STRIP-ACNC: 0.2 E.U./DL

## 2024-06-20 PROCEDURE — 81003 URINALYSIS AUTO W/O SCOPE: CPT | Performed by: INTERNAL MEDICINE

## 2024-06-20 PROCEDURE — 99213 OFFICE O/P EST LOW 20 MIN: CPT | Performed by: INTERNAL MEDICINE

## 2024-06-20 RX ORDER — PRENATAL VIT 91/IRON/FOLIC/DHA 28-975-200
COMBINATION PACKAGE (EA) ORAL 2 TIMES DAILY
Qty: 12 G | Refills: 0 | Status: SHIPPED | OUTPATIENT
Start: 2024-06-20 | End: 2024-08-29

## 2024-06-20 ASSESSMENT — PAIN SCALES - GENERAL: PAINLEVEL: NO PAIN (0)

## 2024-06-20 NOTE — PATIENT INSTRUCTIONS
Please apply antifungal cream to affected area twice daily for 7-14 days.  If no improvement, please contact me - next step will be a trial of steroid cream.

## 2024-06-20 NOTE — RESULT ENCOUNTER NOTE
Dear Emerson,    Nice to meet you today.    Good news.  Your urine results are normal.  At this time, I do not recommend any changes to your current plan of care.    Please feel free to call with any questions.  Otherwise, we can discuss further at your next appointment.    Sincerely,    Patricia Mcdonald MD

## 2024-06-21 ENCOUNTER — MYC MEDICAL ADVICE (OUTPATIENT)
Dept: PEDIATRICS | Facility: CLINIC | Age: 49
End: 2024-06-21
Payer: COMMERCIAL

## 2024-06-21 ENCOUNTER — MYC MEDICAL ADVICE (OUTPATIENT)
Dept: FAMILY MEDICINE | Facility: CLINIC | Age: 49
End: 2024-06-21
Payer: COMMERCIAL

## 2024-06-21 DIAGNOSIS — E66.01 MORBID OBESITY (H): ICD-10-CM

## 2024-06-21 DIAGNOSIS — M1A.09X0 CHRONIC GOUT OF MULTIPLE SITES, UNSPECIFIED CAUSE: Primary | ICD-10-CM

## 2024-06-21 DIAGNOSIS — R94.4 DECREASED GFR: ICD-10-CM

## 2024-06-21 NOTE — TELEPHONE ENCOUNTER
Please see [employer] message in regards to visit yesterday. Patient following up on red area on penis. Please advise.  Thanks!  Sera MUÑOZ RN, BSN

## 2024-06-27 NOTE — TELEPHONE ENCOUNTER
Would give full 14 days and am encouraged that things are improving.  If not improved, will refer to urology.    Patricia Mcdonald MD

## 2024-06-28 ENCOUNTER — TELEPHONE (OUTPATIENT)
Dept: PEDIATRICS | Facility: CLINIC | Age: 49
End: 2024-06-28

## 2024-06-28 ENCOUNTER — LAB (OUTPATIENT)
Dept: LAB | Facility: CLINIC | Age: 49
End: 2024-06-28
Payer: COMMERCIAL

## 2024-06-28 DIAGNOSIS — M1A.09X0 CHRONIC GOUT OF MULTIPLE SITES, UNSPECIFIED CAUSE: ICD-10-CM

## 2024-06-28 DIAGNOSIS — R94.4 DECREASED GFR: ICD-10-CM

## 2024-06-28 DIAGNOSIS — E66.01 MORBID OBESITY (H): ICD-10-CM

## 2024-06-28 LAB
ANION GAP SERPL CALCULATED.3IONS-SCNC: 10 MMOL/L (ref 7–15)
BUN SERPL-MCNC: 14.7 MG/DL (ref 6–20)
CALCIUM SERPL-MCNC: 9.3 MG/DL (ref 8.6–10)
CHLORIDE SERPL-SCNC: 102 MMOL/L (ref 98–107)
CREAT SERPL-MCNC: 1.31 MG/DL (ref 0.67–1.17)
DEPRECATED HCO3 PLAS-SCNC: 27 MMOL/L (ref 22–29)
EGFRCR SERPLBLD CKD-EPI 2021: 66 ML/MIN/1.73M2
GLUCOSE SERPL-MCNC: 95 MG/DL (ref 70–99)
HBA1C MFR BLD: 5.5 % (ref 0–5.6)
POTASSIUM SERPL-SCNC: 4.2 MMOL/L (ref 3.4–5.3)
SODIUM SERPL-SCNC: 139 MMOL/L (ref 135–145)
URATE SERPL-MCNC: 7.8 MG/DL (ref 3.4–7)

## 2024-06-28 PROCEDURE — 83036 HEMOGLOBIN GLYCOSYLATED A1C: CPT

## 2024-06-28 PROCEDURE — 80048 BASIC METABOLIC PNL TOTAL CA: CPT

## 2024-06-28 PROCEDURE — 84550 ASSAY OF BLOOD/URIC ACID: CPT

## 2024-06-28 PROCEDURE — 36415 COLL VENOUS BLD VENIPUNCTURE: CPT

## 2024-06-28 NOTE — TELEPHONE ENCOUNTER
Please advise pt.  Again, if symptoms are worsening, changing, or not improving after 14 days of treatment (he is showing some improvement after 7 days so far), please ask him to schedule an appt to be seen - can be with any provider including his pcp.  I cannot assess any new symptoms without evaluation.

## 2024-06-28 NOTE — TELEPHONE ENCOUNTER
Dr. Mcdonald,    Please see update from pt regarding his urination    Thank you  Jessica Mendoza RN on 6/28/2024 at 9:12 AM

## 2024-06-28 NOTE — TELEPHONE ENCOUNTER
See pt's [employer] message.     Called and spoke with pt,     Relayed message from Dr. Mcdonald below.     Pt verbalizes understanding, however is concerned about recovering as he will be going on a road trip for 14 days in a little over a week.     This writer advised him to possibly schedule a follow-up visit with PCP or covering provider prior to him leaving to be assessed before he goes.     Pt is agreeable with plan, and is agreeable to calling and getting placed on the schedule. He denies any further questions or concerns at this time.     Hanna NASSAR RN   PAL (Patient Advocate Liaison)  [facility]

## 2024-06-28 NOTE — TELEPHONE ENCOUNTER
Reason for Call:  Appointment Request    Patient requesting this type of appt:  patient called and is scheduled with Eryn HOLGUIN at [facility]  on June 5 but would like the 8:40 AM arrival appt     States permission needed for time slot.    Please contact patient.  Thank you.    Requested provider: Madeleine Lynch    Reason patient unable to be scheduled: Needs to be scheduled by clinic    When does patient want to be seen/preferred time: 3-7 days    Comments: na    Could we send this information to you in [employer] or would you prefer to receive a phone call?:   Patient would prefer a phone call   Okay to leave a detailed message?: Yes at Cell number on file:    Telephone Information:   Mobile [phone]       Call taken on 6/28/2024 at 2:05 PM by Arleen Kay

## 2024-07-01 NOTE — TELEPHONE ENCOUNTER
Appointment shows to have been scheduled.     Closing encounter.     Dilia Villalpando MA on 7/1/2024 at 1:16 PM

## 2024-07-05 ENCOUNTER — OFFICE VISIT (OUTPATIENT)
Dept: PEDIATRICS | Facility: CLINIC | Age: 49
End: 2024-07-05
Payer: COMMERCIAL

## 2024-07-05 VITALS
HEIGHT: 73 IN | BODY MASS INDEX: 41.75 KG/M2 | RESPIRATION RATE: 14 BRPM | TEMPERATURE: 97.8 F | OXYGEN SATURATION: 100 % | DIASTOLIC BLOOD PRESSURE: 74 MMHG | SYSTOLIC BLOOD PRESSURE: 122 MMHG | HEART RATE: 82 BPM | WEIGHT: 315 LBS

## 2024-07-05 DIAGNOSIS — N48.1 BALANITIS: Primary | ICD-10-CM

## 2024-07-05 DIAGNOSIS — R30.0 BURNING WITH URINATION: ICD-10-CM

## 2024-07-05 LAB
ALBUMIN UR-MCNC: NEGATIVE MG/DL
APPEARANCE UR: CLEAR
BILIRUB UR QL STRIP: NEGATIVE
COLOR UR AUTO: YELLOW
GLUCOSE UR STRIP-MCNC: NEGATIVE MG/DL
HGB UR QL STRIP: NEGATIVE
KETONES UR STRIP-MCNC: NEGATIVE MG/DL
LEUKOCYTE ESTERASE UR QL STRIP: NEGATIVE
NITRATE UR QL: NEGATIVE
PH UR STRIP: 6 [PH] (ref 5–7)
SP GR UR STRIP: 1.01 (ref 1–1.03)
UROBILINOGEN UR STRIP-ACNC: 0.2 E.U./DL

## 2024-07-05 PROCEDURE — 99213 OFFICE O/P EST LOW 20 MIN: CPT | Performed by: PHYSICIAN ASSISTANT

## 2024-07-05 PROCEDURE — 81003 URINALYSIS AUTO W/O SCOPE: CPT | Performed by: PHYSICIAN ASSISTANT

## 2024-07-05 NOTE — RESULT ENCOUNTER NOTE
Delia Norman ,    The results from your recent lab work are within normal limits.    -Urine is normal and does not show any sign or concern for infection.        Thank you for choosing [facility] for your health care needs,      Eryn Diaz PA-C

## 2024-07-05 NOTE — PROGRESS NOTES
Assessment & Plan     Balanitis    - Patient will continue with the Terbinafine cream as his rash and stymptoms are over 95% improved and better.    - He will monitor closely and will consider oral diflucan if topical cream does not fully clear symptoms.      Burning with urination    - UA Macroscopic with reflex to Microscopic and Culture - Clinic Collect  - Will recheck and r/o UTI as he is traveling and has little access to medical care.  He is feeling much improved and burning with urination is gone now, for the last 2-3 days.       Patient understands and agrees with the plan today.      Up-To-Date:    Empiric treatment -- Patients with nonspecific balanitis for whom an underlying etiology is not identified should be treated empirically with local hygiene and topical antifungal and steroid therapies, using the following stepwise approach (algorithm 1):  ?Uncircumcised patients - In uncircumcised patients, nonspecific balanitis may respond to saline solution bathing alone. If symptoms improve, twice-daily saline solution bathing should continue for at least 14 days or until symptoms resolve. Patients who have a low baseline level of hygiene may benefit from extended saline solution bathing indefinitely, as a prophylactic measure.  ?Circumcised patients and uncircumcised patients who do not respond to saline bathing - We suggest empiric treatment for candidal infection with clotrimazole 1% cream or miconazole 2% cream twice daily for 7 to 14 days. (See 'Candidal infection' below.)  ?Persistent symptoms - For those who have no improvement with saline bathing and antifungal therapy, we suggest a trial of hydrocortisone 1% cream or ointment twice daily for seven days for nonspecific dermatitis.  Directed treatment for identifiable causes -- Most cases of balanitis are nonspecific and are treated empirically (see 'Empiric treatment' above). Specific features from the patient's history and examination, when noted,  "can guide directed treatment. (See 'Assessing for etiology' above.)  Candidal infection -- For most patients, treatment includes the use of topical antifungal agents, usually for one to three weeks. First-line therapy includes a topical imidazole, either clotrimazole 1% or miconazole 2%, applied twice daily [21]. Nystatin cream (100,000 units/g) can be used in patients allergic to imidazoles. For patients who have severe symptoms, options are a single dose of oral fluconazole 150 mg [37] or the combination of a topical imidazole and hydrocortisone 1% cream twice daily.    BMI  Estimated body mass index is 43.75 kg/m  as calculated from the following:    Height as of this encounter: 1.854 m (6' 1\").    Weight as of this encounter: 150.4 kg (331 lb 9.6 oz).       Ming Norman is a 49 year old, presenting for the following health issues:  Follow Up (Balanitis)      7/5/2024    10:08 AM   Additional Questions   Roomed by Sera Siu CMA     History of Present Illness       Reason for visit:  Follow up re balanitis    He eats 0-1 servings of fruits and vegetables daily.He consumes 0 sweetened beverage(s) daily.He exercises with enough effort to increase his heart rate 30 to 60 minutes per day.  He exercises with enough effort to increase his heart rate 3 or less days per week. He is missing 1 dose(s) of medications per week.  He is not taking prescribed medications regularly due to remembering to take.       Patient is here wanting to address his current Balanitis infection.  He has been using the terbinafine cream as previously advised at his recent office visit with Dr. Mcdonald.  He reports that the symptoms are improving and he is about 95% improved.  He is not having fevers or any new symptoms or concerns.  He is concerned because the rash is not fully gone and he is traveling to [place] over the next week weeks and he wants to be sure he has a care plan in place for the rash.  He has had complications of similar " "infections in the past and has a history of non-gonorrheal urethritis and prostatitis due to poor care for  issues.    In general, he is much improved and wants to be sure things continue to improve, so he does not worsen while he is out of the country.    A couple of days ago, he did have some mild burning at the end of urination, but that has also improved.        Review of Systems  Constitutional, neuro, ENT, endocrine, pulmonary, cardiac, gastrointestinal, genitourinary, musculoskeletal, integument and psychiatric systems are negative, except as otherwise noted.      Objective    /74 (BP Location: Right arm, Patient Position: Sitting, Cuff Size: Adult Large)   Pulse 82   Temp 97.8  F (36.6  C) (Temporal)   Resp 14   Ht 1.854 m (6' 1\")   Wt (!) 150.4 kg (331 lb 9.6 oz)   SpO2 100%   BMI 43.75 kg/m    Body mass index is 43.75 kg/m .  Physical Exam   GENERAL: alert and no distress  EYES: Eyes grossly normal to inspection, PERRL and conjunctivae and sclerae normal  MS: no gross musculoskeletal defects noted, no edema  SKIN: no suspicious lesions or rashes.  Patient does have a mild pink/color change at the base of his second toenail.  No edema, erythema or drainage noted.    NEURO: Normal strength and tone, mentation intact and speech normal  PSYCH: mentation appears normal, affect normal/bright        Signed Electronically by: Eryn Diaz PA-C    "

## 2024-07-08 ENCOUNTER — OFFICE VISIT (OUTPATIENT)
Dept: FAMILY MEDICINE | Facility: CLINIC | Age: 49
End: 2024-07-08
Payer: COMMERCIAL

## 2024-07-08 VITALS
HEART RATE: 83 BPM | TEMPERATURE: 98.1 F | OXYGEN SATURATION: 96 % | DIASTOLIC BLOOD PRESSURE: 82 MMHG | BODY MASS INDEX: 41.75 KG/M2 | WEIGHT: 315 LBS | SYSTOLIC BLOOD PRESSURE: 116 MMHG | RESPIRATION RATE: 16 BRPM | HEIGHT: 73 IN

## 2024-07-08 DIAGNOSIS — M10.9 GOUT OF MULTIPLE SITES, UNSPECIFIED CAUSE, UNSPECIFIED CHRONICITY: ICD-10-CM

## 2024-07-08 DIAGNOSIS — S99.922A INJURY OF TOE ON LEFT FOOT, INITIAL ENCOUNTER: ICD-10-CM

## 2024-07-08 DIAGNOSIS — I10 ESSENTIAL HYPERTENSION: ICD-10-CM

## 2024-07-08 DIAGNOSIS — N48.1 BALANITIS: ICD-10-CM

## 2024-07-08 DIAGNOSIS — R94.4 DECREASED GFR: Primary | ICD-10-CM

## 2024-07-08 DIAGNOSIS — B07.8 VERRUCA PLANA: ICD-10-CM

## 2024-07-08 PROCEDURE — 99214 OFFICE O/P EST MOD 30 MIN: CPT | Performed by: FAMILY MEDICINE

## 2024-07-08 RX ORDER — IMIQUIMOD 12.5 MG/.25G
CREAM TOPICAL
Qty: 15 PACKET | Refills: 3 | Status: SHIPPED | OUTPATIENT
Start: 2024-07-08

## 2024-07-08 NOTE — PATIENT INSTRUCTIONS
Lower dose of irbesartan to 150 mg (1/2 tab)  Work on lowering urate containing foods (below)      Recommendations for specific foods or supplements include:    Organ and glandular meats. Avoid meats such as liver, kidney and sweetbreads, which have high purine levels and contribute to high blood levels of uric acid.  Red meat. Limit serving sizes of beef, lamb and pork.  Seafood. Some types of seafood -- such as anchovies, shellfish, sardines and tuna -- are higher in purines than are other types. But the overall health benefits of eating fish may outweigh the risks for people with gout. Moderate portions of fish can be part of a gout diet.  High-purine vegetables. Studies have shown that vegetables high in purines, such as asparagus and spinach, don't increase the risk of gout or recurring gout attacks.  Alcohol. Beer and distilled liquors are associated with an increased risk of gout and recurring attacks. Moderate consumption of wine doesn't appear to increase the risk of gout attacks. Avoid alcohol during gout attacks, and limit alcohol, especially beer, between attacks.  Sugary foods and beverages. Limit or avoid sugar-sweetened foods such as sweetened cereals, bakery goods and candies. Limit consumption of naturally sweet fruit juices.  Vitamin C. Vitamin C may help lower uric acid levels. Talk to your doctor about whether a 500-milligram vitamin C supplement fits into your diet and medication plan.  Coffee. Some research suggests that drinking coffee in moderation, especially regular caffeinated coffee, may be associated with a reduced risk of gout. Drinking coffee may not be appropriate if you have other medical conditions. Talk to your doctor about how much coffee is right for you.  Cherries. There is some evidence that eating cherries is associated with a reduced risk of gout attacks.

## 2024-07-08 NOTE — PROGRESS NOTES
Assessment & Plan     Decreased GFR - no clear precipitating cause, advised lower ARB and try reducing uric acid levels through dietary measures. 1 month labs.  - Basic metabolic panel  (Ca, Cl, CO2, Creat, Gluc, K, Na, BUN); Future  - Uric acid; Future    Balanitis - improved    Injury of toe on left foot, initial encounter - 1 week of nail discoloration and the bed. Discussed most likely injury, persistent pressure, should improve. He will continue to monitor.     Essential hypertension - will reduce irbesartan to 150 mg daily due to above. He will monitor BP. Will check Cr/GFR in a month. If no change, can increase back to 300 mg if BP warrants.     Gout of multiple sites, unspecified cause, unspecified chronicity - no issues currently, discussed dietary measures to lower uric acid.       Ming Norman is a 49 year old, presenting for the following health issues:    Follow Up (Balanitis and test results) and Wart        7/8/2024     6:48 AM   Additional Questions   Roomed by Nelly VERGARA     History of Present Illness       Reason for visit:  Follow up re balanitis    He eats 0-1 servings of fruits and vegetables daily.He consumes 0 sweetened beverage(s) daily.He exercises with enough effort to increase his heart rate 30 to 60 minutes per day.  He exercises with enough effort to increase his heart rate 3 or less days per week. He is missing 1 dose(s) of medications per week.  He is not taking prescribed medications regularly due to remembering to take.       Balanitis continues to improve. Using topical antifungal.     Warts  Onset/Duration: 3 days  Description (location/number):  1 on right upper arm and 1 on neck  Accompanying signs and symptoms (pain, redness): red  History: prior warts: YES  Therapies tried and outcome: none      Concerned about worsening kidney function.   No new meds.   Using NSAID infrequently.   Back to normal diet since March 2024, no increased protein.   Uric acid levels elevated, on  "200 mg allopurinol daily.       Review of Systems  Constitutional, HEENT, cardiovascular, pulmonary, gi and gu systems are negative, except as otherwise noted.      Objective    /82 (Cuff Size: Adult Large)   Pulse 83   Temp 98.1  F (36.7  C) (Oral)   Resp 16   Ht 1.854 m (6' 1\")   Wt 147 kg (324 lb)   SpO2 96%   BMI 42.75 kg/m    Body mass index is 42.75 kg/m .  Physical Exam   GENERAL: alert and no distress  SKIN: no warts present on skin, slight bruising to the nailbed, erythema proximal periungual skin          Signed Electronically by: Madeleine Lynch MD    "

## 2024-07-25 ENCOUNTER — MYC MEDICAL ADVICE (OUTPATIENT)
Dept: FAMILY MEDICINE | Facility: CLINIC | Age: 49
End: 2024-07-25
Payer: COMMERCIAL

## 2024-07-25 DIAGNOSIS — B37.42 CANDIDAL BALANITIS: ICD-10-CM

## 2024-07-25 RX ORDER — TERBINAFINE HYDROCHLORIDE 250 MG/1
250 TABLET ORAL DAILY
Qty: 14 TABLET | Refills: 0 | Status: SHIPPED | OUTPATIENT
Start: 2024-07-25 | End: 2024-08-08

## 2024-07-26 NOTE — TELEPHONE ENCOUNTER
Please see secondary MyChart message. Should pt stop taking topical Terbinafine ointment or continue while taking the oral form?    Comfort RANDALL RN

## 2024-08-01 ENCOUNTER — MYC MEDICAL ADVICE (OUTPATIENT)
Dept: FAMILY MEDICINE | Facility: CLINIC | Age: 49
End: 2024-08-01
Payer: COMMERCIAL

## 2024-08-02 NOTE — TELEPHONE ENCOUNTER
I have never even seen the rash - was just treating based on his UC assessment.     He is welcome to schedule with derm if he can get an appointment in a reasonable time.

## 2024-08-12 ENCOUNTER — TELEPHONE (OUTPATIENT)
Dept: SLEEP MEDICINE | Facility: CLINIC | Age: 49
End: 2024-08-12
Payer: COMMERCIAL

## 2024-08-12 NOTE — TELEPHONE ENCOUNTER
Chart reviewed. Patient scheduled for first available and on wait list. Clinic will reach out if sooner appointment becomes available.

## 2024-08-12 NOTE — TELEPHONE ENCOUNTER
Reason for Call:  Appointment Request    Patient requesting this type of appt:  sleep annual     Requested provider:  Dr timoteo Gonzalez    Reason patient unable to be scheduled: Not within requested timeframe    When does patient want to be seen/preferred time:  After 9/13/2024    Comments: Pt has his annual sleep yearly scheduled for 1/31/2025, pt would like to be seen much sooner anytime after 9/13/2024. Please reach   Could we send this information to you in [facility] or would you prefer to receive a phone call?:   Patient would prefer a phone call   Okay to leave a detailed message?: Yes at Cell number on file:    Telephone Information:   Mobile [phone]       Call taken on 8/12/2024 at 12:36 PM by Sherley Mays

## 2024-08-26 ENCOUNTER — LAB (OUTPATIENT)
Dept: LAB | Facility: CLINIC | Age: 49
End: 2024-08-26
Payer: COMMERCIAL

## 2024-08-26 DIAGNOSIS — R94.4 DECREASED GFR: ICD-10-CM

## 2024-08-26 LAB
ANION GAP SERPL CALCULATED.3IONS-SCNC: 10 MMOL/L (ref 7–15)
BUN SERPL-MCNC: 18.1 MG/DL (ref 6–20)
CALCIUM SERPL-MCNC: 9.1 MG/DL (ref 8.8–10.4)
CHLORIDE SERPL-SCNC: 106 MMOL/L (ref 98–107)
CREAT SERPL-MCNC: 1.2 MG/DL (ref 0.67–1.17)
EGFRCR SERPLBLD CKD-EPI 2021: 73 ML/MIN/1.73M2
GLUCOSE SERPL-MCNC: 105 MG/DL (ref 70–99)
HCO3 SERPL-SCNC: 24 MMOL/L (ref 22–29)
POTASSIUM SERPL-SCNC: 4.4 MMOL/L (ref 3.4–5.3)
SODIUM SERPL-SCNC: 140 MMOL/L (ref 135–145)
URATE SERPL-MCNC: 6.5 MG/DL (ref 3.4–7)

## 2024-08-26 PROCEDURE — 80048 BASIC METABOLIC PNL TOTAL CA: CPT

## 2024-08-26 PROCEDURE — 36415 COLL VENOUS BLD VENIPUNCTURE: CPT

## 2024-08-26 PROCEDURE — 84550 ASSAY OF BLOOD/URIC ACID: CPT

## 2024-08-29 ENCOUNTER — OFFICE VISIT (OUTPATIENT)
Dept: FAMILY MEDICINE | Facility: CLINIC | Age: 49
End: 2024-08-29
Payer: COMMERCIAL

## 2024-08-29 ENCOUNTER — MYC MEDICAL ADVICE (OUTPATIENT)
Dept: FAMILY MEDICINE | Facility: CLINIC | Age: 49
End: 2024-08-29

## 2024-08-29 VITALS
DIASTOLIC BLOOD PRESSURE: 87 MMHG | HEIGHT: 73 IN | TEMPERATURE: 98.5 F | OXYGEN SATURATION: 98 % | HEART RATE: 73 BPM | SYSTOLIC BLOOD PRESSURE: 128 MMHG | WEIGHT: 315 LBS | BODY MASS INDEX: 41.75 KG/M2 | RESPIRATION RATE: 20 BRPM

## 2024-08-29 DIAGNOSIS — L60.3 DYSTROPHIC NAIL: ICD-10-CM

## 2024-08-29 DIAGNOSIS — M10.9 GOUT OF MULTIPLE SITES, UNSPECIFIED CAUSE, UNSPECIFIED CHRONICITY: ICD-10-CM

## 2024-08-29 DIAGNOSIS — E66.01 MORBID OBESITY (H): ICD-10-CM

## 2024-08-29 DIAGNOSIS — Z11.3 SCREEN FOR STD (SEXUALLY TRANSMITTED DISEASE): ICD-10-CM

## 2024-08-29 DIAGNOSIS — N41.1 CHRONIC PROSTATITIS: Primary | ICD-10-CM

## 2024-08-29 DIAGNOSIS — N18.2 CKD (CHRONIC KIDNEY DISEASE) STAGE 2, GFR 60-89 ML/MIN: ICD-10-CM

## 2024-08-29 DIAGNOSIS — I10 ESSENTIAL HYPERTENSION: ICD-10-CM

## 2024-08-29 PROBLEM — B00.1 COLD SORE: Status: RESOLVED | Noted: 2024-01-23 | Resolved: 2024-08-29

## 2024-08-29 LAB
ALBUMIN UR-MCNC: NEGATIVE MG/DL
APPEARANCE UR: CLEAR
BACTERIA #/AREA URNS HPF: NORMAL /HPF
BILIRUB UR QL STRIP: NEGATIVE
COLOR UR AUTO: YELLOW
GLUCOSE UR STRIP-MCNC: NEGATIVE MG/DL
HGB UR QL STRIP: ABNORMAL
KETONES UR STRIP-MCNC: NEGATIVE MG/DL
LEUKOCYTE ESTERASE UR QL STRIP: NEGATIVE
NITRATE UR QL: NEGATIVE
PH UR STRIP: 5.5 [PH] (ref 5–7)
RBC #/AREA URNS AUTO: NORMAL /HPF
SP GR UR STRIP: 1.01 (ref 1–1.03)
SQUAMOUS #/AREA URNS AUTO: NORMAL /LPF
UROBILINOGEN UR STRIP-ACNC: 0.2 E.U./DL
WBC #/AREA URNS AUTO: NORMAL /HPF

## 2024-08-29 PROCEDURE — 87798 DETECT AGENT NOS DNA AMP: CPT | Mod: 90 | Performed by: FAMILY MEDICINE

## 2024-08-29 PROCEDURE — 86780 TREPONEMA PALLIDUM: CPT | Performed by: FAMILY MEDICINE

## 2024-08-29 PROCEDURE — 87563 M. GENITALIUM AMP PROBE: CPT | Mod: 90 | Performed by: FAMILY MEDICINE

## 2024-08-29 PROCEDURE — G0103 PSA SCREENING: HCPCS | Performed by: FAMILY MEDICINE

## 2024-08-29 PROCEDURE — 87389 HIV-1 AG W/HIV-1&-2 AB AG IA: CPT | Performed by: FAMILY MEDICINE

## 2024-08-29 PROCEDURE — 87591 N.GONORRHOEAE DNA AMP PROB: CPT | Performed by: FAMILY MEDICINE

## 2024-08-29 PROCEDURE — 99214 OFFICE O/P EST MOD 30 MIN: CPT | Performed by: FAMILY MEDICINE

## 2024-08-29 PROCEDURE — 81001 URINALYSIS AUTO W/SCOPE: CPT | Performed by: FAMILY MEDICINE

## 2024-08-29 PROCEDURE — 36415 COLL VENOUS BLD VENIPUNCTURE: CPT | Performed by: FAMILY MEDICINE

## 2024-08-29 PROCEDURE — 99000 SPECIMEN HANDLING OFFICE-LAB: CPT | Performed by: FAMILY MEDICINE

## 2024-08-29 PROCEDURE — 87491 CHLMYD TRACH DNA AMP PROBE: CPT | Performed by: FAMILY MEDICINE

## 2024-08-29 PROCEDURE — 86140 C-REACTIVE PROTEIN: CPT | Performed by: FAMILY MEDICINE

## 2024-08-29 PROCEDURE — G2211 COMPLEX E/M VISIT ADD ON: HCPCS | Performed by: FAMILY MEDICINE

## 2024-08-29 RX ORDER — NYSTATIN 100000 [USP'U]/G
POWDER TOPICAL
COMMUNITY
Start: 2024-08-06

## 2024-08-29 RX ORDER — DESONIDE 0.5 MG/G
CREAM TOPICAL
COMMUNITY
Start: 2024-08-06 | End: 2024-08-29

## 2024-08-29 RX ORDER — PIMECROLIMUS 10 MG/G
CREAM TOPICAL
COMMUNITY

## 2024-08-29 ASSESSMENT — ASTHMA QUESTIONNAIRES
QUESTION_3 LAST FOUR WEEKS HOW OFTEN DID YOUR ASTHMA SYMPTOMS (WHEEZING, COUGHING, SHORTNESS OF BREATH, CHEST TIGHTNESS OR PAIN) WAKE YOU UP AT NIGHT OR EARLIER THAN USUAL IN THE MORNING: NOT AT ALL
QUESTION_1 LAST FOUR WEEKS HOW MUCH OF THE TIME DID YOUR ASTHMA KEEP YOU FROM GETTING AS MUCH DONE AT WORK, SCHOOL OR AT HOME: NONE OF THE TIME
QUESTION_2 LAST FOUR WEEKS HOW OFTEN HAVE YOU HAD SHORTNESS OF BREATH: NOT AT ALL
ACT_TOTALSCORE: 25
QUESTION_5 LAST FOUR WEEKS HOW WOULD YOU RATE YOUR ASTHMA CONTROL: COMPLETELY CONTROLLED
ACT_TOTALSCORE: 25
QUESTION_4 LAST FOUR WEEKS HOW OFTEN HAVE YOU USED YOUR RESCUE INHALER OR NEBULIZER MEDICATION (SUCH AS ALBUTEROL): NOT AT ALL

## 2024-08-29 ASSESSMENT — ANXIETY QUESTIONNAIRES
GAD7 TOTAL SCORE: 3
GAD7 TOTAL SCORE: 3
5. BEING SO RESTLESS THAT IT IS HARD TO SIT STILL: NOT AT ALL
7. FEELING AFRAID AS IF SOMETHING AWFUL MIGHT HAPPEN: NOT AT ALL
GAD7 TOTAL SCORE: 3
IF YOU CHECKED OFF ANY PROBLEMS ON THIS QUESTIONNAIRE, HOW DIFFICULT HAVE THESE PROBLEMS MADE IT FOR YOU TO DO YOUR WORK, TAKE CARE OF THINGS AT HOME, OR GET ALONG WITH OTHER PEOPLE: NOT DIFFICULT AT ALL
4. TROUBLE RELAXING: NOT AT ALL
6. BECOMING EASILY ANNOYED OR IRRITABLE: NOT AT ALL
2. NOT BEING ABLE TO STOP OR CONTROL WORRYING: SEVERAL DAYS
1. FEELING NERVOUS, ANXIOUS, OR ON EDGE: SEVERAL DAYS
3. WORRYING TOO MUCH ABOUT DIFFERENT THINGS: SEVERAL DAYS
7. FEELING AFRAID AS IF SOMETHING AWFUL MIGHT HAPPEN: NOT AT ALL
8. IF YOU CHECKED OFF ANY PROBLEMS, HOW DIFFICULT HAVE THESE MADE IT FOR YOU TO DO YOUR WORK, TAKE CARE OF THINGS AT HOME, OR GET ALONG WITH OTHER PEOPLE?: NOT DIFFICULT AT ALL

## 2024-08-29 NOTE — PROGRESS NOTES
Assessment & Plan     Chronic prostatitis - for the past 15 years, has seen Urology and told this would be a chronic issue. Has had episodes where he has significant pain in the rectum that lasts for 30 min-1 hour then full resolves. Has symptom free periods but lately ongoing and now with balanitis issues. Has seen derm for the balanitis, improving with elidel. Will run labs as below to start. Suggested Urology consult, as perhaps there are new treatment options.   - PSA, screen; Future  - CRP, inflammation; Future  - UA Macroscopic with reflex to Microscopic and Culture - Lab Collect; Future  - Adult Urology  Referral; Future  - PSA, screen  - CRP, inflammation  - UA Macroscopic with reflex to Microscopic and Culture - Lab Collect  - UA Microscopic with Reflex to Culture    Screen for STD (sexually transmitted disease) - requesting this as he has read that STD can lead to prostatitis.  - Chlamydia trachomatis/Neisseria gonorrhoeae by PCR - Clinic Collect  - Urogenital Ureaplasma and Mycoplasma Species by PCR; Future  - HIV Antigen Antibody Combo; Future  - Treponema Abs w Reflex to RPR and Titer; Future  - Urogenital Ureaplasma and Mycoplasma Species by PCR  - HIV Antigen Antibody Combo  - Treponema Abs w Reflex to RPR and Titer    CKD (chronic kidney disease) stage 2, GFR 60-89 ml/min - discussed labs have improved. Continue on lower dose of irbesartan. Will monitor kidney function every 6 months for stability.    Essential hypertension - controlled    Gout of multiple sites, unspecified cause, unspecified chronicity - UA levels normal. He would like to continue working on weight loss and then possibly wean his allopurinol.    Morbid obesity (H) - working on weight loss through diet and exercise.     Dystrophic nail - healthy nail as base, advised he snap a photo and compare monthly. If more nail becomes dystrophic, would treat with terbinafine.     The longitudinal plan of care for the  "diagnosis(es)/condition(s) as documented were addressed during this visit. Due to the added complexity in care, I will continue to support Emerosn in the subsequent management and with ongoing continuity of care.    BMI  Estimated body mass index is 42.48 kg/m  as calculated from the following:    Height as of this encounter: 1.854 m (6' 1\").    Weight as of this encounter: 146.1 kg (322 lb).       Subjective   Emerson is a 49 year old, presenting for the following health issues:  Kidney Problem (Follow-up kidney function labs), Derm Problem (Follow-up penile rash), Arthritis (Follow-up gout), and Prostate Problem (Follow-up prostate symptoms)        8/29/2024     2:01 PM   Additional Questions   Roomed by Zo Arambula     History of Present Illness       Reason for visit:  Kidney function tests, uric acid,prostate issues    He eats 0-1 servings of fruits and vegetables daily.He consumes 0 sweetened beverage(s) daily.He exercises with enough effort to increase his heart rate 30 to 60 minutes per day.  He exercises with enough effort to increase his heart rate 5 days per week.   He is taking medications regularly.         Review of Systems  Constitutional, neuro, ENT, endocrine, pulmonary, cardiac, gastrointestinal, genitourinary, musculoskeletal, integument and psychiatric systems are negative, except as otherwise noted.      Objective    /87 (BP Location: Right arm, Patient Position: Chair, Cuff Size: Adult Large)   Pulse 73   Temp 98.5  F (36.9  C) (Oral)   Resp 20   Ht 1.854 m (6' 1\")   Wt 146.1 kg (322 lb)   SpO2 98%   BMI 42.48 kg/m    Body mass index is 42.48 kg/m .  Physical Exam   GENERAL: alert and no distress  SKIN: left 2nd toe - with thickening and white area portion of nail         Signed Electronically by: Madeleine Lynch MD    "

## 2024-08-30 ENCOUNTER — MYC MEDICAL ADVICE (OUTPATIENT)
Dept: FAMILY MEDICINE | Facility: CLINIC | Age: 49
End: 2024-08-30
Payer: COMMERCIAL

## 2024-08-30 LAB
C TRACH DNA SPEC QL PROBE+SIG AMP: NEGATIVE
CRP SERPL-MCNC: <3 MG/L
HIV 1+2 AB+HIV1 P24 AG SERPL QL IA: NONREACTIVE
N GONORRHOEA DNA SPEC QL NAA+PROBE: NEGATIVE
PSA SERPL DL<=0.01 NG/ML-MCNC: 3.46 NG/ML (ref 0–3.5)
T PALLIDUM AB SER QL: NONREACTIVE

## 2024-08-31 ENCOUNTER — LAB (OUTPATIENT)
Dept: LAB | Facility: CLINIC | Age: 49
End: 2024-08-31
Payer: COMMERCIAL

## 2024-08-31 DIAGNOSIS — N18.2 CKD (CHRONIC KIDNEY DISEASE) STAGE 2, GFR 60-89 ML/MIN: Primary | ICD-10-CM

## 2024-08-31 PROCEDURE — 82043 UR ALBUMIN QUANTITATIVE: CPT

## 2024-08-31 PROCEDURE — 82570 ASSAY OF URINE CREATININE: CPT

## 2024-09-01 LAB
CREAT UR-MCNC: 50.9 MG/DL
MICROALBUMIN UR-MCNC: <12 MG/L
MICROALBUMIN/CREAT UR: NORMAL MG/G{CREAT}

## 2024-09-02 LAB
M GENITALIUM DNA SPEC QL NAA+PROBE: NOT DETECTED
M HOMINIS DNA SPEC QL NAA+PROBE: NOT DETECTED
U PARVUM DNA SPEC QL NAA+PROBE: NOT DETECTED
U UREALYTICUM DNA SPEC QL NAA+PROBE: NOT DETECTED

## 2024-09-12 ENCOUNTER — MYC MEDICAL ADVICE (OUTPATIENT)
Dept: FAMILY MEDICINE | Facility: CLINIC | Age: 49
End: 2024-09-12
Payer: COMMERCIAL

## 2024-09-13 ENCOUNTER — OFFICE VISIT (OUTPATIENT)
Dept: UROLOGY | Facility: CLINIC | Age: 49
End: 2024-09-13
Attending: FAMILY MEDICINE
Payer: COMMERCIAL

## 2024-09-13 ENCOUNTER — VIRTUAL VISIT (OUTPATIENT)
Dept: FAMILY MEDICINE | Facility: CLINIC | Age: 49
End: 2024-09-13
Payer: COMMERCIAL

## 2024-09-13 ENCOUNTER — TELEPHONE (OUTPATIENT)
Dept: FAMILY MEDICINE | Facility: CLINIC | Age: 49
End: 2024-09-13

## 2024-09-13 VITALS
HEIGHT: 73 IN | WEIGHT: 315 LBS | DIASTOLIC BLOOD PRESSURE: 82 MMHG | HEART RATE: 64 BPM | BODY MASS INDEX: 41.75 KG/M2 | SYSTOLIC BLOOD PRESSURE: 120 MMHG

## 2024-09-13 DIAGNOSIS — E66.01 MORBID OBESITY (H): ICD-10-CM

## 2024-09-13 DIAGNOSIS — I10 ESSENTIAL HYPERTENSION: ICD-10-CM

## 2024-09-13 DIAGNOSIS — R97.20 ELEVATED PROSTATE SPECIFIC ANTIGEN (PSA): Primary | ICD-10-CM

## 2024-09-13 DIAGNOSIS — N41.1 CHRONIC PROSTATITIS: ICD-10-CM

## 2024-09-13 DIAGNOSIS — N18.2 CKD (CHRONIC KIDNEY DISEASE) STAGE 2, GFR 60-89 ML/MIN: Primary | ICD-10-CM

## 2024-09-13 LAB
ALBUMIN UR-MCNC: NEGATIVE MG/DL
APPEARANCE UR: CLEAR
BILIRUB UR QL STRIP: NEGATIVE
COLOR UR AUTO: YELLOW
GLUCOSE UR STRIP-MCNC: NEGATIVE MG/DL
HGB UR QL STRIP: NEGATIVE
KETONES UR STRIP-MCNC: NEGATIVE MG/DL
LEUKOCYTE ESTERASE UR QL STRIP: NEGATIVE
NITRATE UR QL: NEGATIVE
PH UR STRIP: 7.5 [PH] (ref 5–7)
RESIDUAL VOLUME (RV) (EXTERNAL): 40
SP GR UR STRIP: 1.02 (ref 1–1.03)
UROBILINOGEN UR STRIP-ACNC: 0.2 E.U./DL

## 2024-09-13 PROCEDURE — 99203 OFFICE O/P NEW LOW 30 MIN: CPT | Mod: 25 | Performed by: UROLOGY

## 2024-09-13 PROCEDURE — 51798 US URINE CAPACITY MEASURE: CPT | Performed by: UROLOGY

## 2024-09-13 PROCEDURE — 99214 OFFICE O/P EST MOD 30 MIN: CPT | Mod: 95 | Performed by: FAMILY MEDICINE

## 2024-09-13 PROCEDURE — 81003 URINALYSIS AUTO W/O SCOPE: CPT | Mod: QW | Performed by: UROLOGY

## 2024-09-13 ASSESSMENT — PATIENT HEALTH QUESTIONNAIRE - PHQ9
10. IF YOU CHECKED OFF ANY PROBLEMS, HOW DIFFICULT HAVE THESE PROBLEMS MADE IT FOR YOU TO DO YOUR WORK, TAKE CARE OF THINGS AT HOME, OR GET ALONG WITH OTHER PEOPLE: NOT DIFFICULT AT ALL
SUM OF ALL RESPONSES TO PHQ QUESTIONS 1-9: 1
SUM OF ALL RESPONSES TO PHQ QUESTIONS 1-9: 1

## 2024-09-13 ASSESSMENT — ANXIETY QUESTIONNAIRES
GAD7 TOTAL SCORE: 0
7. FEELING AFRAID AS IF SOMETHING AWFUL MIGHT HAPPEN: NOT AT ALL
GAD7 TOTAL SCORE: 0
8. IF YOU CHECKED OFF ANY PROBLEMS, HOW DIFFICULT HAVE THESE MADE IT FOR YOU TO DO YOUR WORK, TAKE CARE OF THINGS AT HOME, OR GET ALONG WITH OTHER PEOPLE?: NOT DIFFICULT AT ALL
GAD7 TOTAL SCORE: 0

## 2024-09-13 ASSESSMENT — PAIN SCALES - GENERAL: PAINLEVEL: NO PAIN (0)

## 2024-09-13 NOTE — TELEPHONE ENCOUNTER
Dr. Lynch asked writer to call patient to further triage the need for today's appointment. Writer left message to call clinic and to speak with a triage nurse.  Notes indicate patient has recently been diagnosed with CKD stage 2.

## 2024-09-13 NOTE — LETTER
9/13/2024       RE: Abhijit Garcia  [address] [facility] [id number]     Dear Colleague,    Thank you for referring your patient, Abhijit Garcia, to the [facility] [place] [facility]. Please see a copy of my visit note below.    [facility]  Main Office: [address]  [address]  [place] [address]       CHIEF COMPLAINT:  Chronic pelvic pain.      HISTORY:   He saw Dr. Nunes in 2021 and had a normal cystoscopy at the time.  He was referred to pelvic floor physical therapy.  He had a CT scan of the abdomen pelvis in 2023 as well which was negative.  His PSA is 3.46.  He reports what he describes as a prior history of urethritis and prostatitis.  He says he has not had problems for years but in June he thinks he may have had a flareup.  He had difficulty urinating and some increased frequency.  He says he was not treated but those symptoms are now resolved.      PAST MEDICAL HISTORY:   Past Medical History:   Diagnosis Date     Arthritis      Dysuria      Gout      Hyperplastic colon polyp 12/2018     Hypertension      RACHID (obstructive sleep apnea) 11/09/2022     Prostate infection        PAST SURGICAL HISTORY:   Past Surgical History:   Procedure Laterality Date     ANKLE SURGERY       APPENDECTOMY       BIOPSY  2011    biopsy from foreskin     COLONOSCOPY  12/21/2018    [facility]     COLONOSCOPY N/A 12/21/2018    Procedure: COMBINED COLONOSCOPY THRU STOMA, BIOPSY BY SNARE using exacto snare;  Surgeon: Shamar Dc MD;  Location:  GI     COLONOSCOPY N/A 9/22/2023    Procedure: Colonoscopy;  Surgeon: Shamar Dc MD;  Location:  GI     CYSTOSCOPY       ESOPHAGOSCOPY, GASTROSCOPY, DUODENOSCOPY (EGD), COMBINED  2012    for stomach pain done in [place]-acid reflux     HAND SURGERY  2020    cyst removal from hand     ORTHOPEDIC SURGERY  03/23/2017    ankle left -repair of tendons     wisdom  "teeth         FAMILY HISTORY:   Family History   Problem Relation Age of Onset     Thyroid Disease Mother      Diabetes Mother         Mother  2021     Obesity Mother      Cerebrovascular Disease Mother          of stroke     Hypertension Mother      Colon Cancer Mother         6\" of bowel removed in      Arthritis Father      Respiratory Father         asbestos exposure     Chronic Obstructive Pulmonary Disease Father      Coronary Artery Disease Father 71        Father b - 1933  d -      Skin Cancer Father      Other Cancer Father         Skin cancer on face and ears     Asthma Brother         Primarily affected him as a child.     Mental Illness Brother         Suffered severe mental \"break\" the 2022. Initial symptoms 2021     Heart Defect Son        SOCIAL HISTORY:   Social History     Tobacco Use     Smoking status: Never     Passive exposure: Never     Smokeless tobacco: Never     Tobacco comments:     I have never used tobacco. My dad was a heavy smoker so I did have second hand smoke exposure   Substance Use Topics     Alcohol use: Yes     Alcohol/week: 3.0 standard drinks of alcohol     Types: 3 Standard drinks or equivalent per week     Comment: social drinker, weekly/biweekly 2 drinks per outing          Allergies   Allergen Reactions     Amlodipine      Edema at 10 mg dose     Contrast Dye      Indomethacin GI Disturbance     Iodine      Iodine in IV contrast dye     Levaquin [Levofloxacin]      Gout flare in      Lisinopril      cough     Seasonal Allergies      Losartan Rash     Penile rash         Current Outpatient Medications:      allopurinol (ZYLOPRIM) 100 MG tablet, Take 2 tablets (200 mg) by mouth daily, Disp: 180 tablet, Rfl: 3     clotrimazole (LOTRIMIN) 1 % external cream, Apply topically 2 times daily, Disp: 60 g, Rfl: 0     imiquimod (ALDARA) 5 % external cream, Apply a small sized amount to warts QHS at bedtime.   Wash off after 8 hours., Disp: 15 " packet, Rfl: 3     irbesartan (AVAPRO) 300 MG tablet, Take 1 tablet (300 mg) by mouth daily, Disp: 90 tablet, Rfl: 3     ketoconazole (NIZORAL) 2 % external cream, Apply topically daily as needed for other (athletes foot), Disp: 60 g, Rfl: 3     Loratadine (CLARITIN PO), Take by mouth as needed, Disp: , Rfl:      meloxicam (MOBIC) 15 MG tablet, Take 1 tablet (15 mg) by mouth daily as needed, Disp: 30 tablet, Rfl: 0     nystatin (MYCOSTATIN) 636531 UNIT/GM external powder, Apply topically to genital area daily for maintenance, Disp: , Rfl:      pimecrolimus (ELIDEL) 1 % external cream, APPLY TOPICALLY TO AFFECTED AREA TWICE A DAY FOR MAINTENANCE, Disp: , Rfl:      triamcinolone (KENALOG) 0.1 % external cream, Apply topically 2 times daily To lesions on foot until redness and itch is resolved then stop, Disp: 45 g, Rfl: 1     valACYclovir (VALTREX) 1000 mg tablet, Take 2 tablets (2,000 mg) by mouth 2 times daily, Disp: 4 tablet, Rfl: 4    Current Facility-Administered Medications:      sodium chloride (PF) 0.9% PF flush 3 mL, 3 mL, Intravenous, q1 min prn, Royce Sevilla MD, 3 mL at 11/30/23 0930    Review Of Systems:  Skin: No rash, pruritis, or skin pigmentation  Eyes: No changes in vision  Ears/Nose/Throat: No changes in hearing, no nosebleeds  Respiratory: No shortness of breath, dyspnea on exertion, cough, or hemoptysis  Cardiovascular: No chest pain or palpitations  Gastrointestinal: No diarrhea or constipation. No abdominal pain. No hematochezia  Genitourinary: see HPI  Musculoskeletal: No pain or swelling of joints, normal range of motion  Neurologic: No weakness or tremors  Psychiatric: No recent changes in memory or mood  Hematologic/Lymphatic/Immunologic: No easy bruising or enlarged lymph nodes  Endocrine: No weight gain or loss      PHYSICAL EXAM:    There were no vitals taken for this visit.  General appearance: In NAD, conversant  HEENT: Normocephalic and atraumatic, anicteric  sclera  Cardiovascular: Not examined  Respiratory: normal, non-labored breathing  Gastrointestinal: negative, Abdomen soft, non-tender, and non-distended.   Musculoskeletal: Not Examined  Peripheral Vascular/extremity: No peripheral edema  Skin: Normal temperature, turgor, and texture. No rash  Psychiatric: Appropriate affect, alert and oriented to person, place, and time    Penis: Normal  Scrotal skin: Normal, no lesions  Testicles: Normal to palpation bilaterally  Epididymis: Normal to palpation bilaterally  Lymphatic: Normal inguinal lymph nodes    Digital Rectal Exam: The prostate does not feel enlarged, it is benign and symmetric to palpation.  No evidence of prostatitis today.    Cystoscopy: Not done      PSA: 3.46    UA RESULTS:  Recent Labs   Lab Test 08/29/24  1519   COLOR Yellow   APPEARANCE Clear   URINEGLC Negative   URINEBILI Negative   URINEKETONE Negative   SG 1.010   UBLD Trace*   URINEPH 5.5   PROTEIN Negative   UROBILINOGEN 0.2   NITRITE Negative   LEUKEST Negative   RBCU 0-2   WBCU 0-5       Bladder Scan:     Other Labs:      Imaging Studies: None      CLINICAL IMPRESSION:   High PSA velocity  Pelvic pain, currently resolved    PLAN:   His PSA connor about 1 point this year.  He is otherwise asymptomatic at this time and examination is benign.  I recommended that we follow the PSA more closely.  His last PSA was in August so I recommend that we check his next PSA in February.  I will follow-up with him regarding his next PSA in February.      Obdulio Sarmiento MD       Again, thank you for allowing me to participate in the care of your patient.      Sincerely,    Obdulio Sarmiento MD

## 2024-09-13 NOTE — PROGRESS NOTES
"[facility]  Main Office: [address]  [address]  [place] [address]       CHIEF COMPLAINT:  Chronic pelvic pain.      HISTORY:   He saw Dr. Nunes in  and had a normal cystoscopy at the time.  He was referred to pelvic floor physical therapy.  He had a CT scan of the abdomen pelvis in  as well which was negative.  His PSA is 3.46.  He reports what he describes as a prior history of urethritis and prostatitis.  He says he has not had problems for years but in  he thinks he may have had a flareup.  He had difficulty urinating and some increased frequency.  He says he was not treated but those symptoms are now resolved.      PAST MEDICAL HISTORY:   Past Medical History:   Diagnosis Date    Arthritis     Dysuria     Gout     Hyperplastic colon polyp 2018    Hypertension     RACHID (obstructive sleep apnea) 2022    Prostate infection        PAST SURGICAL HISTORY:   Past Surgical History:   Procedure Laterality Date    ANKLE SURGERY      APPENDECTOMY      BIOPSY      biopsy from foreskin    COLONOSCOPY  2018    Dr. Dc [facility]    COLONOSCOPY N/A 2018    Procedure: COMBINED COLONOSCOPY THRU STOMA, BIOPSY BY SNARE using exacto snare;  Surgeon: Shamar Dc MD;  Location:  GI    COLONOSCOPY N/A 2023    Procedure: Colonoscopy;  Surgeon: Shamar Dc MD;  Location:  GI    CYSTOSCOPY      ESOPHAGOSCOPY, GASTROSCOPY, DUODENOSCOPY (EGD), COMBINED      for stomach pain done in [place]-acid reflux    HAND SURGERY      cyst removal from hand    ORTHOPEDIC SURGERY  2017    ankle left -repair of tendons    wisdom teeth         FAMILY HISTORY:   Family History   Problem Relation Age of Onset    Thyroid Disease Mother     Diabetes Mother         Mother  2021    Obesity Mother     Cerebrovascular Disease Mother          of stroke    Hypertension Mother     Colon Cancer Mother         6\" of bowel removed in     Arthritis Father     " "Respiratory Father         asbestos exposure    Chronic Obstructive Pulmonary Disease Father     Coronary Artery Disease Father 71        Father b - 1933  d - 2004    Skin Cancer Father     Other Cancer Father         Skin cancer on face and ears    Asthma Brother         Primarily affected him as a child.    Mental Illness Brother         Suffered severe mental \"break\" the July 2022. Initial symptoms October 2021    Heart Defect Son        SOCIAL HISTORY:   Social History     Tobacco Use    Smoking status: Never     Passive exposure: Never    Smokeless tobacco: Never    Tobacco comments:     I have never used tobacco. My dad was a heavy smoker so I did have second hand smoke exposure   Substance Use Topics    Alcohol use: Yes     Alcohol/week: 3.0 standard drinks of alcohol     Types: 3 Standard drinks or equivalent per week     Comment: social drinker, weekly/biweekly 2 drinks per outing          Allergies   Allergen Reactions    Amlodipine      Edema at 10 mg dose    Contrast Dye     Indomethacin GI Disturbance    Iodine      Iodine in IV contrast dye    Levaquin [Levofloxacin]      Gout flare in 2011    Lisinopril      cough    Seasonal Allergies     Losartan Rash     Penile rash         Current Outpatient Medications:     allopurinol (ZYLOPRIM) 100 MG tablet, Take 2 tablets (200 mg) by mouth daily, Disp: 180 tablet, Rfl: 3    clotrimazole (LOTRIMIN) 1 % external cream, Apply topically 2 times daily, Disp: 60 g, Rfl: 0    imiquimod (ALDARA) 5 % external cream, Apply a small sized amount to warts QHS at bedtime.   Wash off after 8 hours., Disp: 15 packet, Rfl: 3    irbesartan (AVAPRO) 300 MG tablet, Take 1 tablet (300 mg) by mouth daily, Disp: 90 tablet, Rfl: 3    ketoconazole (NIZORAL) 2 % external cream, Apply topically daily as needed for other (athletes foot), Disp: 60 g, Rfl: 3    Loratadine (CLARITIN PO), Take by mouth as needed, Disp: , Rfl:     meloxicam (MOBIC) 15 MG tablet, Take 1 tablet (15 mg) by " mouth daily as needed, Disp: 30 tablet, Rfl: 0    nystatin (MYCOSTATIN) 220720 UNIT/GM external powder, Apply topically to genital area daily for maintenance, Disp: , Rfl:     pimecrolimus (ELIDEL) 1 % external cream, APPLY TOPICALLY TO AFFECTED AREA TWICE A DAY FOR MAINTENANCE, Disp: , Rfl:     triamcinolone (KENALOG) 0.1 % external cream, Apply topically 2 times daily To lesions on foot until redness and itch is resolved then stop, Disp: 45 g, Rfl: 1    valACYclovir (VALTREX) 1000 mg tablet, Take 2 tablets (2,000 mg) by mouth 2 times daily, Disp: 4 tablet, Rfl: 4    Current Facility-Administered Medications:     sodium chloride (PF) 0.9% PF flush 3 mL, 3 mL, Intravenous, q1 min prn, Royce Sevilla MD, 3 mL at 11/30/23 0930    Review Of Systems:  Skin: No rash, pruritis, or skin pigmentation  Eyes: No changes in vision  Ears/Nose/Throat: No changes in hearing, no nosebleeds  Respiratory: No shortness of breath, dyspnea on exertion, cough, or hemoptysis  Cardiovascular: No chest pain or palpitations  Gastrointestinal: No diarrhea or constipation. No abdominal pain. No hematochezia  Genitourinary: see HPI  Musculoskeletal: No pain or swelling of joints, normal range of motion  Neurologic: No weakness or tremors  Psychiatric: No recent changes in memory or mood  Hematologic/Lymphatic/Immunologic: No easy bruising or enlarged lymph nodes  Endocrine: No weight gain or loss      PHYSICAL EXAM:    There were no vitals taken for this visit.  General appearance: In NAD, conversant  HEENT: Normocephalic and atraumatic, anicteric sclera  Cardiovascular: Not examined  Respiratory: normal, non-labored breathing  Gastrointestinal: negative, Abdomen soft, non-tender, and non-distended.   Musculoskeletal: Not Examined  Peripheral Vascular/extremity: No peripheral edema  Skin: Normal temperature, turgor, and texture. No rash  Psychiatric: Appropriate affect, alert and oriented to person, place, and time    Penis:  Normal  Scrotal skin: Normal, no lesions  Testicles: Normal to palpation bilaterally  Epididymis: Normal to palpation bilaterally  Lymphatic: Normal inguinal lymph nodes    Digital Rectal Exam: The prostate does not feel enlarged, it is benign and symmetric to palpation.  No evidence of prostatitis today.    Cystoscopy: Not done      PSA: 3.46    UA RESULTS:  Recent Labs   Lab Test 08/29/24  1519   COLOR Yellow   APPEARANCE Clear   URINEGLC Negative   URINEBILI Negative   URINEKETONE Negative   SG 1.010   UBLD Trace*   URINEPH 5.5   PROTEIN Negative   UROBILINOGEN 0.2   NITRITE Negative   LEUKEST Negative   RBCU 0-2   WBCU 0-5       Bladder Scan:     Other Labs:      Imaging Studies: None      CLINICAL IMPRESSION:   High PSA velocity  Pelvic pain, currently resolved    PLAN:   His PSA connor about 1 point this year.  He is otherwise asymptomatic at this time and examination is benign.  I recommended that we follow the PSA more closely.  His last PSA was in August so I recommend that we check his next PSA in February.  I will follow-up with him regarding his next PSA in February.      Obdulio Sarmiento MD

## 2024-09-13 NOTE — PROGRESS NOTES
"Emerson is a 49 year old who is being evaluated via a billable video visit.    How would you like to obtain your AVS? MyChart  If the video visit is dropped, the invitation should be resent by: Text to cell phone: [phone]  Will anyone else be joining your video visit? No      Assessment & Plan     CKD (chronic kidney disease) stage 2, GFR 60-89 ml/min - discussed that he's had this diagnosis for years, but it appears it hadn't been properly discussed. Likely related to a long period of untreated HTN a couple decades ago. Discussed striving for stability from now on. He will work on weight loss, reducing risk of diabetes. Will plan to recheck kidney function in January at his next CPE.     2. Morbid obesity (H) - risk factor for above, he is working on weight loss through diet and exercise. Mentioned use of weight loss medications, he prefers to wait on this.     3. Essential hypertension - controlled today, continue current.         BMI  Estimated body mass index is 42.48 kg/m  as calculated from the following:    Height as of an earlier encounter on 9/13/24: 1.854 m (6' 1\").    Weight as of an earlier encounter on 9/13/24: 146.1 kg (322 lb).         Subjective   Emerson is a 49 year old, presenting for the following health issues:  Lab Result Notice        9/13/2024     2:08 PM   Additional Questions   Roomed by Stacia     History of Present Illness       CKD: He uses over the counter pain medication, including tylenol or ibuprophone depending upon whats hurting, a few times a month.    He eats 0-1 servings of fruits and vegetables daily.He consumes 0 sweetened beverage(s) daily.He exercises with enough effort to increase his heart rate 30 to 60 minutes per day.  He exercises with enough effort to increase his heart rate 4 days per week.   He is taking medications regularly.         Review of Systems  Constitutional, HEENT, cardiovascular, pulmonary, gi and gu systems are negative, except as otherwise noted.    " "  Objective    Vitals - Patient Reported  Weight (Patient Reported): 145.6 kg (321 lb)  Height (Patient Reported): 185.4 cm (6' 1\")  BMI (Based on Pt Reported Ht/Wt): 42.35        Physical Exam   GENERAL: alert and no distress  EYES: Eyes grossly normal to inspection.  No discharge or erythema, or obvious scleral/conjunctival abnormalities.  RESP: No audible wheeze, cough, or visible cyanosis.    SKIN: Visible skin clear. No significant rash, abnormal pigmentation or lesions.  NEURO: Cranial nerves grossly intact.  Mentation and speech appropriate for age.  PSYCH: Appropriate affect, tone, and pace of words        Video-Visit Details    Type of service:  Video Visit   Originating Location (pt. Location): Home    Distant Location (provider location):  Off-site  Platform used for Video Visit: Ginette  Signed Electronically by: Madeleine Lynch MD    "

## 2024-09-13 NOTE — NURSING NOTE
Chief Complaint   Patient presents with    Prostatitis      Pt states he started with balanitis in June, by August started having difficulties with urination. Burning with urination and ejaculation. Pt was having prostate pain. These symptoms have cleared up over the past two weeks.     PVR: 40 mL    Rosalind Jacob CMA

## 2024-09-13 NOTE — TELEPHONE ENCOUNTER
Patient returned call.  Per lab results patient is now in stage 2 CKD.  Patient wanting to understand this and wonders if there is any correlation between the flank pain, recent creatinine changes(decreased medication due to creatinine recently).  Stated the flank pain woke him up during the night 3 weeks ago.

## 2024-10-29 ENCOUNTER — OFFICE VISIT (OUTPATIENT)
Dept: FAMILY MEDICINE | Facility: CLINIC | Age: 49
End: 2024-10-29
Payer: COMMERCIAL

## 2024-10-29 VITALS
TEMPERATURE: 98.7 F | HEART RATE: 77 BPM | WEIGHT: 315 LBS | DIASTOLIC BLOOD PRESSURE: 84 MMHG | OXYGEN SATURATION: 96 % | BODY MASS INDEX: 41.75 KG/M2 | HEIGHT: 73 IN | SYSTOLIC BLOOD PRESSURE: 115 MMHG | RESPIRATION RATE: 14 BRPM

## 2024-10-29 DIAGNOSIS — G47.33 OSA (OBSTRUCTIVE SLEEP APNEA): ICD-10-CM

## 2024-10-29 DIAGNOSIS — Z86.718 PERSONAL HISTORY OF DVT (DEEP VEIN THROMBOSIS): ICD-10-CM

## 2024-10-29 DIAGNOSIS — E66.01 MORBID OBESITY (H): ICD-10-CM

## 2024-10-29 DIAGNOSIS — M23.8X1 CHONDRAL DEFECT OF CONDYLE OF RIGHT FEMUR: ICD-10-CM

## 2024-10-29 DIAGNOSIS — N18.2 CKD (CHRONIC KIDNEY DISEASE) STAGE 2, GFR 60-89 ML/MIN: ICD-10-CM

## 2024-10-29 DIAGNOSIS — M10.9 GOUT OF MULTIPLE SITES, UNSPECIFIED CAUSE, UNSPECIFIED CHRONICITY: ICD-10-CM

## 2024-10-29 DIAGNOSIS — S83.281D ACUTE LATERAL MENISCUS TEAR OF RIGHT KNEE, SUBSEQUENT ENCOUNTER: ICD-10-CM

## 2024-10-29 DIAGNOSIS — I10 ESSENTIAL HYPERTENSION: ICD-10-CM

## 2024-10-29 DIAGNOSIS — Z01.818 PRE-OP EXAM: Primary | ICD-10-CM

## 2024-10-29 LAB
ERYTHROCYTE [DISTWIDTH] IN BLOOD BY AUTOMATED COUNT: 12 % (ref 10–15)
HCT VFR BLD AUTO: 45.6 % (ref 40–53)
HGB BLD-MCNC: 14.8 G/DL (ref 13.3–17.7)
MCH RBC QN AUTO: 30.1 PG (ref 26.5–33)
MCHC RBC AUTO-ENTMCNC: 32.5 G/DL (ref 31.5–36.5)
MCV RBC AUTO: 93 FL (ref 78–100)
PLATELET # BLD AUTO: 201 10E3/UL (ref 150–450)
RBC # BLD AUTO: 4.91 10E6/UL (ref 4.4–5.9)
WBC # BLD AUTO: 7.8 10E3/UL (ref 4–11)

## 2024-10-29 PROCEDURE — 91320 SARSCV2 VAC 30MCG TRS-SUC IM: CPT | Performed by: FAMILY MEDICINE

## 2024-10-29 PROCEDURE — 90480 ADMN SARSCOV2 VAC 1/ONLY CMP: CPT | Performed by: FAMILY MEDICINE

## 2024-10-29 PROCEDURE — 36415 COLL VENOUS BLD VENIPUNCTURE: CPT | Performed by: FAMILY MEDICINE

## 2024-10-29 PROCEDURE — 90471 IMMUNIZATION ADMIN: CPT | Performed by: FAMILY MEDICINE

## 2024-10-29 PROCEDURE — 90673 RIV3 VACCINE NO PRESERV IM: CPT | Performed by: FAMILY MEDICINE

## 2024-10-29 PROCEDURE — 99214 OFFICE O/P EST MOD 30 MIN: CPT | Mod: 25 | Performed by: FAMILY MEDICINE

## 2024-10-29 PROCEDURE — 84550 ASSAY OF BLOOD/URIC ACID: CPT | Performed by: FAMILY MEDICINE

## 2024-10-29 PROCEDURE — 93000 ELECTROCARDIOGRAM COMPLETE: CPT | Performed by: FAMILY MEDICINE

## 2024-10-29 PROCEDURE — 85027 COMPLETE CBC AUTOMATED: CPT | Performed by: FAMILY MEDICINE

## 2024-10-29 PROCEDURE — 80048 BASIC METABOLIC PNL TOTAL CA: CPT | Performed by: FAMILY MEDICINE

## 2024-10-29 ASSESSMENT — ANXIETY QUESTIONNAIRES
1. FEELING NERVOUS, ANXIOUS, OR ON EDGE: NOT AT ALL
GAD7 TOTAL SCORE: 0
2. NOT BEING ABLE TO STOP OR CONTROL WORRYING: NOT AT ALL
5. BEING SO RESTLESS THAT IT IS HARD TO SIT STILL: NOT AT ALL
4. TROUBLE RELAXING: NOT AT ALL
IF YOU CHECKED OFF ANY PROBLEMS ON THIS QUESTIONNAIRE, HOW DIFFICULT HAVE THESE PROBLEMS MADE IT FOR YOU TO DO YOUR WORK, TAKE CARE OF THINGS AT HOME, OR GET ALONG WITH OTHER PEOPLE: NOT DIFFICULT AT ALL
6. BECOMING EASILY ANNOYED OR IRRITABLE: NOT AT ALL
3. WORRYING TOO MUCH ABOUT DIFFERENT THINGS: NOT AT ALL
GAD7 TOTAL SCORE: 0
7. FEELING AFRAID AS IF SOMETHING AWFUL MIGHT HAPPEN: NOT AT ALL
GAD7 TOTAL SCORE: 0
7. FEELING AFRAID AS IF SOMETHING AWFUL MIGHT HAPPEN: NOT AT ALL
8. IF YOU CHECKED OFF ANY PROBLEMS, HOW DIFFICULT HAVE THESE MADE IT FOR YOU TO DO YOUR WORK, TAKE CARE OF THINGS AT HOME, OR GET ALONG WITH OTHER PEOPLE?: NOT DIFFICULT AT ALL

## 2024-10-29 ASSESSMENT — PATIENT HEALTH QUESTIONNAIRE - PHQ9
SUM OF ALL RESPONSES TO PHQ QUESTIONS 1-9: 1
SUM OF ALL RESPONSES TO PHQ QUESTIONS 1-9: 1
10. IF YOU CHECKED OFF ANY PROBLEMS, HOW DIFFICULT HAVE THESE PROBLEMS MADE IT FOR YOU TO DO YOUR WORK, TAKE CARE OF THINGS AT HOME, OR GET ALONG WITH OTHER PEOPLE: NOT DIFFICULT AT ALL

## 2024-10-29 NOTE — PATIENT INSTRUCTIONS
Patient Education   Preparing for Your Surgery  For Adults  Getting started  In most cases, a nurse will call to review your health history and instructions. They will give you an arrival time based on your scheduled surgery time. Please be ready to share:  Your doctor's clinic name and phone number  Your medical, surgical, and anesthesia history  A list of allergies and sensitivities  A list of medicines, including herbal treatments and over-the-counter drugs  Whether the patient has a legal guardian (ask how to send us the papers in advance)  Note: You may not receive a call if you were seen at our PAC (Preoperative Assessment Center).  Please tell us if you're pregnant--or if there's any chance you might be pregnant. Some surgeries may injure a fetus (unborn baby), so they require a pregnancy test. Surgeries that are safe for a fetus don't always need a test, and you can choose whether to have one.   Preparing for surgery  Within 10 to 30 days of surgery: Have a pre-op exam (sometimes called an H&P, or History and Physical). This can be done at a clinic or pre-operative center.  If you're having a , you may not need this exam. Talk to your care team.  At your pre-op exam, talk to your care team about all medicines you take. (This includes CBD oil and any drugs, such as THC, marijuana, and other forms of cannabis.) If you need to stop any medicine before surgery, ask when to start taking it again.  This is for your safety. Many medicines and drugs can make you bleed too much during surgery. Some change how well surgery (anesthesia) drugs work.  Call your insurance company to let them know you're having surgery. (If you don't have insurance, call [phone].)  Call your clinic if there's any change in your health. This includes a scrape or scratch near the surgery site, or any signs of a cold (sore throat, runny nose, cough, rash, fever).  Eating and drinking guidelines  For your safety: Unless your  surgeon tells you otherwise, follow the guidelines below.  Eat and drink as normal until 8 hours before you arrive for surgery. After that, no food or milk. You can spit out gum when you arrive.  Drink clear liquids until 2 hours before you arrive. These are liquids you can see through, like water, Gatorade, and Propel Water. They also include plain black coffee and tea (no cream or milk).  No alcohol for 24 hours before you arrive. The night before surgery, stop any drinks that contain THC.  If your care team tells you to take medicine on the morning of surgery, it's okay to take it with a sip of water. No other medicines or drugs are allowed (including CBD oil)--follow your care team's instructions.  If you have questions the day of surgery, call your hospital or surgery center.   Preventing infection  Shower or bathe the night before and the morning of surgery. Follow the instructions your clinic gave you. (If no instructions, use regular soap.)  Don't shave or clip hair near your surgery site. We'll remove the hair if needed.  Don't smoke or vape the morning of surgery. No chewing tobacco for 6 hours before you arrive. A nicotine patch is okay. You may spit out nicotine gum when you arrive.  For some surgeries, the surgeon will tell you to fully quit smoking and nicotine.  We will make every effort to keep you safe from infection. We will:  Clean our hands often with soap and water (or an alcohol-based hand rub).  Clean the skin at your surgery site with a special soap that kills germs.  Give you a special gown to keep you warm. (Cold raises the risk of infection.)  Wear hair covers, masks, gowns, and gloves during surgery.  Give antibiotic medicine, if prescribed. Not all surgeries need this medicine.  What to bring on the day of surgery  Photo ID and insurance card  Copy of your health care directive, if you have one  Glasses and hearing aids (bring cases)  You can't wear contacts during surgery  Inhaler and  eye drops, if you use them (tell us about these when you arrive)  CPAP machine or breathing device, if you use them  A few personal items, if spending the night  If you have . . .  A pacemaker, ICD (cardiac defibrillator), or other implant: Bring the ID card.  An implanted stimulator: Bring the remote control.  A legal guardian: Bring a copy of the certified (court-stamped) guardianship papers.  Please remove any jewelry, including body piercings. Leave jewelry and other valuables at home.  If you're going home the day of surgery  You must have a responsible adult drive you home. They should stay with you overnight as well.  If you don't have someone to stay with you, and you aren't safe to go home alone, we may keep you overnight. Insurance often won't pay for this.  After surgery  If it's hard to control your pain or you need more pain medicine, please call your surgeon's office.  Questions?   If you have any questions for your care team, list them here:   ____________________________________________________________________________________________________________________________________________________________________________________________________________________________________________________________  For informational purposes only. Not to replace the advice of your health care provider. Copyright   2003, 2019 [email or web address]. All rights reserved. Clinically reviewed by Murali Grover MD. [place] [id number] - REV 08/24.

## 2024-10-29 NOTE — PROGRESS NOTES
"Preoperative Evaluation  [facility]  [address] [facility] [address]  Phone: [phone]  Primary Provider: Madeleine Lynch MD  Pre-op Performing Provider: Madeleine Lynch MD  Oct 29, 2024             10/29/2024   Surgical Information   What procedure is being done? Right Knee \"scope\" to deal with torn meniscus and other damage    Facility or Hospital where procedure/surgery will be performed: [facility]    Who is doing the procedure / surgery? Dr Jayant Reynolds MD    Date of surgery / procedure: November 5, 2024    Time of surgery / procedure: 10:45 a.m.    Where do you plan to recover after surgery? at home with family        Patient-reported     Fax number for surgical facility: [phone]    Assessment & Plan     The proposed surgical procedure is considered INTERMEDIATE risk.    Pre-op exam  - EKG 12-lead complete w/read - Clinics  - Basic metabolic panel  (Ca, Cl, CO2, Creat, Gluc, K, Na, BUN); Future  - CBC with platelets; Future  - Basic metabolic panel  (Ca, Cl, CO2, Creat, Gluc, K, Na, BUN)  - CBC with platelets    Chondral defect of condyle of right femur    Acute lateral meniscus tear of right knee, subsequent encounter    Morbid obesity (H)    RACHID (obstructive sleep apnea)    History of DVT left leg. Postop - provoked, post ankle surgery, was immobilized    Essential hypertension - controlled, continue current    CKD (chronic kidney disease) stage 2, GFR 60-89 ml/min - surveillance labs today    Gout of multiple sites, unspecified cause, unspecified chronicity - surveillance labs today  - Uric acid; Future  - Uric acid    The longitudinal plan of care for the diagnosis(es)/condition(s) as documented were addressed during this visit. Due to the added complexity in care, I will continue to support Emerson in the subsequent management and with ongoing continuity of care.    Risks and Recommendations  The patient has the following " additional risks and recommendations for perioperative complications:   - Morbid obesity (BMI >40)  Obstructive Sleep Apnea    Antiplatelet or Anticoagulation Medication Instructions   - Patient is on no antiplatelet or anticoagulation medications.    Additional Medication Instructions  Take all scheduled medications on the day of surgery    Recommendation  Approval given to proceed with proposed procedure, without further diagnostic evaluation.        Ming Norman is a 49 year old, presenting for the following:  Pre-Op Exam          10/29/2024     1:43 PM   Additional Questions   Roomed by Matt Granados   Accompanied by self     HPI related to upcoming procedure: prohibitive right knee pain, failed conservative measures        10/29/2024   Pre-Op Questionnaire   Have you ever had a heart attack or stroke? No    Have you ever had surgery on your heart or blood vessels, such as a stent placement, a coronary artery bypass, or surgery on an artery in your head, neck, heart, or legs? No    Do you have chest pain with activity? No    Do you have a history of heart failure? No    Do you currently have a cold, bronchitis or symptoms of other infection? No    Do you have a cough, shortness of breath, or wheezing? No    Do you or anyone in your family have previous history of blood clots? (!) YES     Do you or does anyone in your family have a serious bleeding problem such as prolonged bleeding following surgeries or cuts? No    Have you ever had problems with anemia or been told to take iron pills? No    Have you had any abnormal blood loss such as black, tarry or bloody stools? No    Have you ever had a blood transfusion? No    Are you willing to have a blood transfusion if it is medically needed before, during, or after your surgery? Yes    Have you or any of your relatives ever had problems with anesthesia? (!) YES     Do you have sleep apnea, excessive snoring or daytime drowsiness? (!) YES    Do you have a CPAP  machine? Yes    Do you have any artifical heart valves or other implanted medical devices like a pacemaker, defibrillator, or continuous glucose monitor? No    Do you have artificial joints? No    Are you allergic to latex? No        Patient-reported     Health Care Directive  Patient does not have a Health Care Directive: Discussed advance care planning with patient; information given to patient to review.    Preoperative Review of         Status of Chronic Conditions:  HYPERTENSION - Patient has longstanding history of HTN , currently denies any symptoms referable to elevated blood pressure. Specifically denies chest pain, palpitations, dyspnea, orthopnea, PND or peripheral edema. Blood pressure readings have been in normal range. Current medication regimen is as listed below. Patient denies any side effects of medication.       Patient Active Problem List    Diagnosis Date Noted    CKD (chronic kidney disease) stage 2, GFR 60-89 ml/min 08/29/2024     Priority: Medium    Chronic prostatitis 08/29/2024     Priority: Medium    Morbid obesity (H) 03/14/2023     Priority: Medium    RACHID (obstructive sleep apnea) 11/09/2022     Priority: Medium    History of DVT left leg. postop 04/05/2017     Priority: Medium     Treated with 3 months of anticoagulation, follow-up ultrasound negative       Gout 01/07/2015     Priority: Medium     Onset age 33.  High uric acid.  Has had flares of great toe, midfoot, and ankles.  Uses meloxicam and cholchicine for acute flares.  Used allopurinol, developed low back pain after a few months.  Ultrasound and CT fine.  Eye problems.  Symptoms all resolved with stopping med      Carpal tunnel syndrome 04/16/2012     Priority: Medium    Gastroesophageal reflux 04/16/2012     Priority: Medium    Essential hypertension 04/18/2007     Priority: Medium     Onset age 17, medications started in 2015.      Mixed Hyperlipidemia LDL goal <130 02/06/2007     Priority: Medium      Past Medical  History:   Diagnosis Date    Arthritis     Dysuria     Gout     Hyperplastic colon polyp 12/2018    Hypertension     RACHID (obstructive sleep apnea) 11/09/2022    Prostate infection      Past Surgical History:   Procedure Laterality Date    ANKLE SURGERY      APPENDECTOMY      BIOPSY  2011    biopsy from foreskin    COLONOSCOPY  12/21/2018    Dr. Dc [facility]    COLONOSCOPY N/A 12/21/2018    Procedure: COMBINED COLONOSCOPY THRU STOMA, BIOPSY BY SNARE using exacto snare;  Surgeon: Shamar Dc MD;  Location:  GI    COLONOSCOPY N/A 9/22/2023    Procedure: Colonoscopy;  Surgeon: Shamar Dc MD;  Location:  GI    CYSTOSCOPY      ESOPHAGOSCOPY, GASTROSCOPY, DUODENOSCOPY (EGD), COMBINED  2012    for stomach pain done in [place]-acid reflux    HAND SURGERY  2020    cyst removal from hand    ORTHOPEDIC SURGERY  03/23/2017    ankle left -repair of tendons    wisdom teeth       Current Outpatient Medications   Medication Sig Dispense Refill    allopurinol (ZYLOPRIM) 100 MG tablet Take 2 tablets (200 mg) by mouth daily 180 tablet 3    clotrimazole (LOTRIMIN) 1 % external cream Apply topically 2 times daily 60 g 0    imiquimod (ALDARA) 5 % external cream Apply a small sized amount to warts QHS at bedtime.   Wash off after 8 hours. 15 packet 3    irbesartan (AVAPRO) 300 MG tablet Take 1 tablet (300 mg) by mouth daily 90 tablet 3    ketoconazole (NIZORAL) 2 % external cream Apply topically daily as needed for other (athletes foot) 60 g 3    Loratadine (CLARITIN PO) Take by mouth as needed      meloxicam (MOBIC) 15 MG tablet Take 1 tablet (15 mg) by mouth daily as needed 30 tablet 0    nystatin (MYCOSTATIN) 827274 UNIT/GM external powder Apply topically to genital area daily for maintenance      pimecrolimus (ELIDEL) 1 % external cream APPLY TOPICALLY TO AFFECTED AREA TWICE A DAY FOR MAINTENANCE      triamcinolone (KENALOG) 0.1 % external cream Apply topically 2 times daily To lesions on foot until redness and itch is  "resolved then stop 45 g 1    valACYclovir (VALTREX) 1000 mg tablet Take 2 tablets (2,000 mg) by mouth 2 times daily 4 tablet 4       Allergies   Allergen Reactions    Amlodipine      Edema at 10 mg dose    Contrast Dye     Indomethacin GI Disturbance    Iodine      Iodine in IV contrast dye    Levaquin [Levofloxacin]      Gout flare in     Lisinopril      cough    Seasonal Allergies     Losartan Rash     Penile rash        Social History     Tobacco Use    Smoking status: Never     Passive exposure: Never    Smokeless tobacco: Never    Tobacco comments:     I have never used tobacco. My dad was a heavy smoker so I did have second hand smoke exposure   Substance Use Topics    Alcohol use: Yes     Alcohol/week: 3.0 standard drinks of alcohol     Types: 3 Standard drinks or equivalent per week     Comment: social drinker, weekly/biweekly 2 drinks per outing     Family History   Problem Relation Age of Onset    Thyroid Disease Mother     Diabetes Mother         Mother  2021    Obesity Mother     Cerebrovascular Disease Mother          of stroke    Hypertension Mother     Colon Cancer Mother         6\" of bowel removed in     Arthritis Father     Respiratory Father         asbestos exposure    Chronic Obstructive Pulmonary Disease Father     Coronary Artery Disease Father 71        Father b - 1933  d -     Skin Cancer Father     Other Cancer Father         Skin cancer on face and ears    Asthma Brother         Primarily affected him as a child.    Mental Illness Brother         Suffered severe mental \"break\" the 2022. Initial symptoms 2021    Heart Defect Son      History   Drug Use Unknown             Review of Systems  Constitutional, neuro, ENT, endocrine, pulmonary, cardiac, gastrointestinal, genitourinary, musculoskeletal, integument and psychiatric systems are negative, except as otherwise noted.    Objective    /84 (BP Location: Right arm, Patient Position: Chair, " "Cuff Size: Adult Large)   Pulse 77   Temp 98.7  F (37.1  C) (Oral)   Resp 14   Ht 1.842 m (6' 0.5\")   Wt 145.6 kg (321 lb)   SpO2 96%   BMI 42.94 kg/m     Estimated body mass index is 42.94 kg/m  as calculated from the following:    Height as of this encounter: 1.842 m (6' 0.5\").    Weight as of this encounter: 145.6 kg (321 lb).  Physical Exam  GENERAL: alert and no distress  EYES: Eyes grossly normal to inspection, PERRL and conjunctivae and sclerae normal  HENT: ear canals and TM's normal, nose and mouth without ulcers or lesions  NECK: no adenopathy, no asymmetry, masses, or scars  RESP: lungs clear to auscultation - no rales, rhonchi or wheezes  CV: regular rate and rhythm, normal S1 S2, no S3 or S4, no murmur, click or rub, no peripheral edema  ABDOMEN: soft, nontender, no hepatosplenomegaly, no masses and bowel sounds normal  MS: no gross musculoskeletal defects noted, no edema  SKIN: no suspicious lesions or rashes  NEURO: Normal strength and tone, mentation intact and speech normal  PSYCH: mentation appears normal, affect normal/bright    Recent Labs   Lab Test 08/26/24  0856 06/28/24  0916 01/23/24  1048 11/30/23  0930   HGB  --   --   --  15.9   PLT  --   --   --  253    139 139 135   POTASSIUM 4.4 4.2 4.2 4.3   CR 1.20* 1.31* 1.29* 1.03   A1C  --  5.5 5.4  --         Diagnostics  Labs pending at this time.  Results will be reviewed when available.   EKG: appears normal, NSR, normal axis, normal intervals, no acute ST/T changes c/w ischemia, no LVH by voltage criteria, unchanged from previous tracings    Revised Cardiac Risk Index (RCRI)  The patient has the following serious cardiovascular risks for perioperative complications:   - No serious cardiac risks = 0 points     RCRI Interpretation: 0 points: Class I (very low risk - 0.4% complication rate)         Signed Electronically by: Madeleine Lynch MD  A copy of this evaluation report is provided to the requesting physician.     "     Answers submitted by the patient for this visit:  Patient Health Questionnaire (Submitted on 10/29/2024)  If you checked off any problems, how difficult have these problems made it for you to do your work, take care of things at home, or get along with other people?: Not difficult at all  PHQ9 TOTAL SCORE: 1  Patient Health Questionnaire (G7) (Submitted on 10/29/2024)  TRENT 7 TOTAL SCORE: 0

## 2024-10-30 LAB
ANION GAP SERPL CALCULATED.3IONS-SCNC: 11 MMOL/L (ref 7–15)
BUN SERPL-MCNC: 17.1 MG/DL (ref 6–20)
CALCIUM SERPL-MCNC: 9.4 MG/DL (ref 8.8–10.4)
CHLORIDE SERPL-SCNC: 101 MMOL/L (ref 98–107)
CREAT SERPL-MCNC: 1.13 MG/DL (ref 0.67–1.17)
EGFRCR SERPLBLD CKD-EPI 2021: 79 ML/MIN/1.73M2
GLUCOSE SERPL-MCNC: 99 MG/DL (ref 70–99)
HCO3 SERPL-SCNC: 26 MMOL/L (ref 22–29)
POTASSIUM SERPL-SCNC: 4.5 MMOL/L (ref 3.4–5.3)
SODIUM SERPL-SCNC: 138 MMOL/L (ref 135–145)
URATE SERPL-MCNC: 6.9 MG/DL (ref 3.4–7)

## 2024-12-03 DIAGNOSIS — B00.1 COLD SORE: ICD-10-CM

## 2024-12-03 RX ORDER — VALACYCLOVIR HYDROCHLORIDE 1 G/1
2000 TABLET, FILM COATED ORAL 2 TIMES DAILY
Qty: 8 TABLET | Refills: 2 | Status: SHIPPED | OUTPATIENT
Start: 2024-12-03

## 2025-01-23 ENCOUNTER — OFFICE VISIT (OUTPATIENT)
Dept: FAMILY MEDICINE | Facility: CLINIC | Age: 50
End: 2025-01-23
Attending: FAMILY MEDICINE
Payer: COMMERCIAL

## 2025-01-23 VITALS
RESPIRATION RATE: 16 BRPM | TEMPERATURE: 98.1 F | SYSTOLIC BLOOD PRESSURE: 138 MMHG | WEIGHT: 315 LBS | HEIGHT: 73 IN | OXYGEN SATURATION: 98 % | HEART RATE: 75 BPM | DIASTOLIC BLOOD PRESSURE: 86 MMHG | BODY MASS INDEX: 41.75 KG/M2

## 2025-01-23 DIAGNOSIS — B00.1 COLD SORE: ICD-10-CM

## 2025-01-23 DIAGNOSIS — E78.5 HYPERLIPIDEMIA LDL GOAL <130: ICD-10-CM

## 2025-01-23 DIAGNOSIS — R53.83 OTHER FATIGUE: ICD-10-CM

## 2025-01-23 DIAGNOSIS — R97.20 ELEVATED PROSTATE SPECIFIC ANTIGEN (PSA): ICD-10-CM

## 2025-01-23 DIAGNOSIS — Z00.00 ROUTINE GENERAL MEDICAL EXAMINATION AT A HEALTH CARE FACILITY: Primary | ICD-10-CM

## 2025-01-23 DIAGNOSIS — I10 ESSENTIAL HYPERTENSION: ICD-10-CM

## 2025-01-23 DIAGNOSIS — M1A.00X0 IDIOPATHIC CHRONIC GOUT WITHOUT TOPHUS, UNSPECIFIED SITE: ICD-10-CM

## 2025-01-23 DIAGNOSIS — I77.819 AORTIC DILATATION: ICD-10-CM

## 2025-01-23 RX ORDER — IRBESARTAN 300 MG/1
300 TABLET ORAL DAILY
Qty: 90 TABLET | Refills: 3 | Status: CANCELLED | OUTPATIENT
Start: 2025-01-23

## 2025-01-23 RX ORDER — ALLOPURINOL 100 MG/1
200 TABLET ORAL DAILY
Qty: 180 TABLET | Refills: 3 | Status: SHIPPED | OUTPATIENT
Start: 2025-01-23

## 2025-01-23 RX ORDER — MELOXICAM 15 MG/1
15 TABLET ORAL DAILY PRN
Qty: 30 TABLET | Refills: 3 | Status: SHIPPED | OUTPATIENT
Start: 2025-01-23

## 2025-01-23 RX ORDER — IRBESARTAN 150 MG/1
150 TABLET ORAL AT BEDTIME
Qty: 90 TABLET | Refills: 3 | Status: SHIPPED | OUTPATIENT
Start: 2025-01-23

## 2025-01-23 RX ORDER — VALACYCLOVIR HYDROCHLORIDE 1 G/1
2000 TABLET, FILM COATED ORAL 2 TIMES DAILY
Qty: 8 TABLET | Refills: 2 | Status: SHIPPED | OUTPATIENT
Start: 2025-01-23

## 2025-01-23 SDOH — HEALTH STABILITY: PHYSICAL HEALTH: ON AVERAGE, HOW MANY DAYS PER WEEK DO YOU ENGAGE IN MODERATE TO STRENUOUS EXERCISE (LIKE A BRISK WALK)?: 3 DAYS

## 2025-01-23 SDOH — HEALTH STABILITY: PHYSICAL HEALTH: ON AVERAGE, HOW MANY MINUTES DO YOU ENGAGE IN EXERCISE AT THIS LEVEL?: 30 MIN

## 2025-01-23 ASSESSMENT — ASTHMA QUESTIONNAIRES
QUESTION_4 LAST FOUR WEEKS HOW OFTEN HAVE YOU USED YOUR RESCUE INHALER OR NEBULIZER MEDICATION (SUCH AS ALBUTEROL): NOT AT ALL
QUESTION_2 LAST FOUR WEEKS HOW OFTEN HAVE YOU HAD SHORTNESS OF BREATH: NOT AT ALL
QUESTION_3 LAST FOUR WEEKS HOW OFTEN DID YOUR ASTHMA SYMPTOMS (WHEEZING, COUGHING, SHORTNESS OF BREATH, CHEST TIGHTNESS OR PAIN) WAKE YOU UP AT NIGHT OR EARLIER THAN USUAL IN THE MORNING: NOT AT ALL
ACT_TOTALSCORE: 25
QUESTION_1 LAST FOUR WEEKS HOW MUCH OF THE TIME DID YOUR ASTHMA KEEP YOU FROM GETTING AS MUCH DONE AT WORK, SCHOOL OR AT HOME: NONE OF THE TIME
QUESTION_5 LAST FOUR WEEKS HOW WOULD YOU RATE YOUR ASTHMA CONTROL: COMPLETELY CONTROLLED
ACT_TOTALSCORE: 25

## 2025-01-23 ASSESSMENT — SOCIAL DETERMINANTS OF HEALTH (SDOH): HOW OFTEN DO YOU GET TOGETHER WITH FRIENDS OR RELATIVES?: ONCE A WEEK

## 2025-01-23 ASSESSMENT — ENCOUNTER SYMPTOMS: FATIGUE: 1

## 2025-01-23 NOTE — PROGRESS NOTES
"Preventive Care Visit  [facility]  Madeleine Lynch MD, Family Medicine  Jan 23, 2025      Assessment & Plan     Routine general medical examination at a health care facility  - Lipid panel reflex to direct LDL Fasting; Future  - Comprehensive metabolic panel; Future  - CBC with platelets; Future  - Hemoglobin A1c; Future    Hyperlipidemia LDL goal <130     Elevated prostate specific antigen (PSA) - per Urology, PSA check  - PSA tumor marker    Gout, unspecified cause, unspecified chronicity, unspecified site - UA levels today, refills sent  - allopurinol (ZYLOPRIM) 100 MG tablet; Take 2 tablets (200 mg) by mouth daily.  - meloxicam (MOBIC) 15 MG tablet; Take 1 tablet (15 mg) by mouth daily as needed for moderate pain.  - Uric acid; Future    Essential hypertension - upper limits of normal, encouraged weight loss as his BP was under control when he was 20 lb lighter.   - irbesartan (AVAPRO) 150 MG tablet; Take 1 tablet (150 mg) by mouth at bedtime.    Aortic dilatation - due for surveillance imaging  - Echocardiogram Complete; Future    Other fatigue - using CPAP nightly, discussed labs as below, also discussed impact of weight and stress.  - TSH; Future  - T4, free; Future  - Testosterone, total; Future    Cold sore - prn refills of valtrex  - valACYclovir (VALTREX) 1000 mg tablet; Take 2 tablets (2,000 mg) by mouth 2 times daily.    BMI 45.0-49.9, adult (H) - encouraged weight loss    BMI  Estimated body mass index is 45.61 kg/m  as calculated from the following:    Height as of this encounter: 1.842 m (6' 0.5\").    Weight as of this encounter: 154.7 kg (341 lb).       Counseling  Appropriate preventive services were addressed with this patient via screening, questionnaire, or discussion as appropriate for fall prevention, nutrition, physical activity, Tobacco-use cessation, social engagement, weight loss and cognition.  Checklist reviewing preventive services available has been given " to the patient.  Reviewed patient's diet, addressing concerns and/or questions.   He is at risk for lack of exercise and has been provided with information to increase physical activity for the benefit of his well-being.   He is at risk for psychosocial distress and has been provided with information to reduce risk.       Ming Norman is a 50 year old, presenting for the following:  Physical (Here today for his Routine Medical Exam. Non-Fasting. Discuss medications - wonders if he is on the correct doses or if there are meds he could discontinue. ), Ear Problem (Fluid build up on his left ear, not draining through his eustachian tube. Right is starting to manifest the same issue. ), Prostate Check (Was seen near [facility] for a Prostate Check, everything turned out great, but was recommended he repeat his PSA. ), Toenail (Left foot, 2nd toe, wondering if he has a fungal infection on his nail. ), and Fatigue (Has been more fatigued over the past 4 months. Has been under more stress lately. )        1/23/2025     5:01 PM   Additional Questions   Roomed by America Barnhart CMA   Accompanied by Self          Ear Problem    Toenail  Associated symptoms include fatigue.   Fatigue  Associated symptoms include fatigue.       Medication Follow Up  Taking Medication as prescribed: yes  Side Effects:  None  Medication Helping Symptoms:  yes    Health Care Directive  Patient does not have a Health Care Directive: Discussed advance care planning with patient; information given to patient to review.      1/23/2025   General Health   How would you rate your overall physical health? Good   Feel stress (tense, anxious, or unable to sleep) Rather much   (!) STRESS CONCERN      1/23/2025   Nutrition   Three or more servings of calcium each day? Yes   Diet: Regular (no restrictions)   How many servings of fruit and vegetables per day? (!) 0-1   How many sweetened beverages each day? 0-1         1/23/2025   Exercise   Days per week of  moderate/strenous exercise 3 days   Average minutes spent exercising at this level 30 min         1/23/2025   Social Factors   Frequency of gathering with friends or relatives Once a week   Worry food won't last until get money to buy more No   Food not last or not have enough money for food? No   Do you have housing? (Housing is defined as stable permanent housing and does not include staying ouside in a car, in a tent, in an abandoned building, in an overnight shelter, or couch-surfing.) Yes   Are you worried about losing your housing? No   Lack of transportation? No   Unable to get utilities (heat,electricity)? No         1/23/2025   Fall Risk   Fallen 2 or more times in the past year? No   Trouble with walking or balance? No          1/23/2025   Dental   Dentist two times every year? Yes         1/23/2025   TB Screening   Were you born outside of the [place]? No         Today's PHQ-2 Score:       1/23/2025     5:05 PM   PHQ-2 ( 1999 [employer])   Q1: Little interest or pleasure in doing things 0    Q2: Feeling down, depressed or hopeless 0    PHQ-2 Score 0    Q1: Little interest or pleasure in doing things Not at all   Q2: Feeling down, depressed or hopeless Not at all   PHQ-2 Score 0       Proxy-reported           1/23/2025   Substance Use   Alcohol more than 3/day or more than 7/wk No   Do you use any other substances recreationally? No     Social History     Tobacco Use    Smoking status: Never     Passive exposure: Never    Smokeless tobacco: Never    Tobacco comments:     I have never used tobacco. My dad was a heavy smoker so I did have second hand smoke exposure   Vaping Use    Vaping status: Never Used   Substance Use Topics    Alcohol use: Yes     Alcohol/week: 3.0 standard drinks of alcohol     Types: 3 Standard drinks or equivalent per week     Comment: social drinker.    Drug use: Never           1/23/2025   STI Screening   New sexual partner(s) since last STI/HIV test? No   ASCVD Risk   The 10-year  ASCVD risk score (Sheri YAN, et al., 2019) is: 6.8%    Values used to calculate the score:      Age: 50 years      Sex: Male      Is Non- : No      Diabetic: No      Tobacco smoker: No      Systolic Blood Pressure: 138 mmHg      Is BP treated: Yes      HDL Cholesterol: 34 mg/dL      Total Cholesterol: 174 mg/dL         Reviewed and updated as needed this visit by Provider                    Patient Active Problem List   Diagnosis    Mixed Hyperlipidemia LDL goal <130    Essential hypertension    Gout    History of DVT left leg. postop    RACHID (obstructive sleep apnea)    Carpal tunnel syndrome    Gastroesophageal reflux    Morbid obesity (H)    CKD (chronic kidney disease) stage 2, GFR 60-89 ml/min    Chronic prostatitis    BMI 45.0-49.9, adult (H)     Past Surgical History:   Procedure Laterality Date    ANKLE SURGERY      APPENDECTOMY      BIOPSY  2011    biopsy from foreskin    [facility]    COLONOSCOPY N/A 12/21/2018    Procedure: COMBINED COLONOSCOPY THRU STOMA, BIOPSY BY SNARE using exacto snare;  Surgeon: Shamar Dc MD;  Location:  GI    COLONOSCOPY N/A 9/22/2023    Procedure: Colonoscopy;  Surgeon: Shamar Dc MD;  Location:  GI    CYSTOSCOPY      ESOPHAGOSCOPY, GASTROSCOPY, DUODENOSCOPY (EGD), COMBINED  2012    for stomach pain done in [place]-acid reflux    HAND SURGERY  2020    cyst removal from hand    ORTHOPEDIC SURGERY  03/23/2017    ankle left -repair of tendons    wisdom teeth         Social History     Tobacco Use    Smoking status: Never     Passive exposure: Never    Smokeless tobacco: Never    Tobacco comments:     I have never used tobacco. My dad was a heavy smoker so I did have second hand smoke exposure   Substance Use Topics    Alcohol use: Yes     Alcohol/week: 3.0 standard drinks of alcohol     Types: 3 Standard drinks or equivalent per week     Comment: social drinker.     Family History   Problem Relation Age of  "Onset    Thyroid Disease Mother     Diabetes Mother         Mother  2021    Obesity Mother     Cerebrovascular Disease Mother          of stroke    Hypertension Mother     Colon Cancer Mother         6\" of bowel removed in     Arthritis Father     Respiratory Father         asbestos exposure    Chronic Obstructive Pulmonary Disease Father     Coronary Artery Disease Father 71        Father b - 1933  d -     Skin Cancer Father     Other Cancer Father         Skin cancer on face and ears    Asthma Brother         Primarily affected him as a child.    Mental Illness Brother         Suffered severe mental \"break\" 2022. Initial symptoms 2021    Heart Defect Son              Review of Systems  Constitutional, neuro, ENT, endocrine, pulmonary, cardiac, gastrointestinal, genitourinary, musculoskeletal, integument and psychiatric systems are negative, except as otherwise noted.     Objective    Exam  /86 (BP Location: Right arm, Patient Position: Sitting, Cuff Size: Adult Large)   Pulse 75   Temp 98.1  F (36.7  C) (Oral)   Resp 16   Ht 1.842 m (6' 0.5\")   Wt (!) 154.7 kg (341 lb)   SpO2 98%   BMI 45.61 kg/m     Estimated body mass index is 45.61 kg/m  as calculated from the following:    Height as of this encounter: 1.842 m (6' 0.5\").    Weight as of this encounter: 154.7 kg (341 lb).    Physical Exam  GENERAL: alert and no distress  EYES: Eyes grossly normal to inspection, PERRL and conjunctivae and sclerae normal  HENT: ear canals and TM's normal, nose and mouth without ulcers or lesions  NECK: no adenopathy, no asymmetry, masses, or scars  RESP: lungs clear to auscultation - no rales, rhonchi or wheezes  CV: regular rate and rhythm, normal S1 S2, no S3 or S4, no murmur, click or rub, no peripheral edema  ABDOMEN: soft, nontender, no hepatosplenomegaly, no masses and bowel sounds normal  MS: no gross musculoskeletal defects noted, no edema  SKIN: no suspicious lesions or " mitch  NEURO: Normal strength and tone, mentation intact and speech normal  PSYCH: mentation appears normal, affect normal/bright        Signed Electronically by: Madeleine Lynch MD

## 2025-01-23 NOTE — PATIENT INSTRUCTIONS
Patient Education   Preventive Care Advice   This is general advice given by our system to help you stay healthy. However, your care team may have specific advice just for you. Please talk to your care team about your preventive care needs.  Nutrition  Eat 5 or more servings of fruits and vegetables each day.  Try wheat bread, brown rice and whole grain pasta (instead of white bread, rice, and pasta).  Get enough calcium and vitamin D. Check the label on foods and aim for 100% of the RDA (recommended daily allowance).  Lifestyle  Exercise at least 150 minutes each week  (30 minutes a day, 5 days a week).  Do muscle strengthening activities 2 days a week. These help control your weight and prevent disease.  No smoking.  Wear sunscreen to prevent skin cancer.  Have a dental exam and cleaning every 6 months.  Yearly exams  See your health care team every year to talk about:  Any changes in your health.  Any medicines your care team has prescribed.  Preventive care, family planning, and ways to prevent chronic diseases.  Shots (vaccines)   HPV shots (up to age 26), if you've never had them before.  Hepatitis B shots (up to age 59), if you've never had them before.  COVID-19 shot: Get this shot when it's due.  Flu shot: Get a flu shot every year.  Tetanus shot: Get a tetanus shot every 10 years.  Pneumococcal, hepatitis A, and RSV shots: Ask your care team if you need these based on your risk.  Shingles shot (for age 50 and up)  General health tests  Diabetes screening:  Starting at age 35, Get screened for diabetes at least every 3 years.  If you are younger than age 35, ask your care team if you should be screened for diabetes.  Cholesterol test: At age 39, start having a cholesterol test every 5 years, or more often if advised.  Bone density scan (DEXA): At age 50, ask your care team if you should have this scan for osteoporosis (brittle bones).  Hepatitis C: Get tested at least once in your life.  STIs (sexually  transmitted infections)  Before age 24: Ask your care team if you should be screened for STIs.  After age 24: Get screened for STIs if you're at risk. You are at risk for STIs (including HIV) if:  You are sexually active with more than one person.  You don't use condoms every time.  You or a partner was diagnosed with a sexually transmitted infection.  If you are at risk for HIV, ask about PrEP medicine to prevent HIV.  Get tested for HIV at least once in your life, whether you are at risk for HIV or not.  Cancer screening tests  Cervical cancer screening: If you have a cervix, begin getting regular cervical cancer screening tests starting at age 21.  Breast cancer scan (mammogram): If you've ever had breasts, begin having regular mammograms starting at age 40. This is a scan to check for breast cancer.  Colon cancer screening: It is important to start screening for colon cancer at age 45.  Have a colonoscopy test every 10 years (or more often if you're at risk) Or, ask your provider about stool tests like a FIT test every year or Cologuard test every 3 years.  To learn more about your testing options, visit:   .  For help making a decision, visit:   https://bit.ly/pt60805.  Prostate cancer screening test: If you have a prostate, ask your care team if a prostate cancer screening test (PSA) at age 55 is right for you.  Lung cancer screening: If you are a current or former smoker ages 50 to 80, ask your care team if ongoing lung cancer screenings are right for you.  For informational purposes only. Not to replace the advice of your health care provider. Copyright   2023 [facility]. All rights reserved. Clinically reviewed by the [facility]. [employer] 155489 - REV 01/24.  Learning About Stress  What is stress?     Stress is your body's response to a hard situation. Your body can have a physical, emotional, or mental response. Stress is a fact of life for most people, and it  affects everyone differently. What causes stress for you may not be stressful for someone else.  A lot of things can cause stress. You may feel stress when you go on a job interview, take a test, or run a race. This kind of short-term stress is normal and even useful. It can help you if you need to work hard or react quickly. For example, stress can help you finish an important job on time.  Long-term stress is caused by ongoing stressful situations or events. Examples of long-term stress include long-term health problems, ongoing problems at work, or conflicts in your family. Long-term stress can harm your health.  How does stress affect your health?  When you are stressed, your body responds as though you are in danger. It makes hormones that speed up your heart, make you breathe faster, and give you a burst of energy. This is called the fight-or-flight stress response. If the stress is over quickly, your body goes back to normal and no harm is done.  But if stress happens too often or lasts too long, it can have bad effects. Long-term stress can make you more likely to get sick, and it can make symptoms of some diseases worse. If you tense up when you are stressed, you may develop neck, shoulder, or low back pain. Stress is linked to high blood pressure and heart disease.  Stress also harms your emotional health. It can make you mittal, tense, or depressed. Your relationships may suffer, and you may not do well at work or school.  What can you do to manage stress?  You can try these things to help manage stress:   Do something active. Exercise or activity can help reduce stress. Walking is a great way to get started. Even everyday activities such as housecleaning or yard work can help.  Try yoga or erna chi. These techniques combine exercise and meditation. You may need some training at first to learn them.  Do something you enjoy. For example, listen to music or go to a movie. Practice your hobby or do volunteer  "work.  Meditate. This can help you relax, because you are not worrying about what happened before or what may happen in the future.  Do guided imagery. Imagine yourself in any setting that helps you feel calm. You can use online videos, books, or a teacher to guide you.  Do breathing exercises. For example:  From a standing position, bend forward from the waist with your knees slightly bent. Let your arms dangle close to the floor.  Breathe in slowly and deeply as you return to a standing position. Roll up slowly and lift your head last.  Hold your breath for just a few seconds in the standing position.  Breathe out slowly and bend forward from the waist.  Let your feelings out. Talk, laugh, cry, and express anger when you need to. Talking with supportive friends or family, a counselor, or a kaitlyn leader about your feelings is a healthy way to relieve stress. Avoid discussing your feelings with people who make you feel worse.  Write. It may help to write about things that are bothering you. This helps you find out how much stress you feel and what is causing it. When you know this, you can find better ways to cope.  What can you do to prevent stress?  You might try some of these things to help prevent stress:  Manage your time. This helps you find time to do the things you want and need to do.  Get enough sleep. Your body recovers from the stresses of the day while you are sleeping.  Get support. Your family, friends, and community can make a difference in how you experience stress.  Limit your news feed. Avoid or limit time on social media or news that may make you feel stressed.  Do something active. Exercise or activity can help reduce stress. Walking is a great way to get started.  Where can you learn more?  Go to https://www.[employer].net/patiented  Enter N032 in the search box to learn more about \"Learning About Stress.\"  Current as of: October 24, 2023  Content Version: 14.3    2024 [employer]. "   Care instructions adapted under license by your healthcare professional. If you have questions about a medical condition or this instruction, always ask your healthcare professional. [employer] disclaims any warranty or liability for your use of this information.

## 2025-02-12 ENCOUNTER — VIRTUAL VISIT (OUTPATIENT)
Dept: UROLOGY | Facility: CLINIC | Age: 50
End: 2025-02-12
Payer: COMMERCIAL

## 2025-02-12 DIAGNOSIS — I10 ESSENTIAL HYPERTENSION: ICD-10-CM

## 2025-02-12 DIAGNOSIS — R97.20 ELEVATED PROSTATE SPECIFIC ANTIGEN (PSA): Primary | ICD-10-CM

## 2025-02-12 PROCEDURE — 98006 SYNCH AUDIO-VIDEO EST MOD 30: CPT | Performed by: UROLOGY

## 2025-02-12 NOTE — LETTER
2025       RE: Abhijit Garcia   [facility] [address]     Dear Colleague,    Thank you for referring your patient, Abhijit Garcia, to the [facility] [place] at [facility]. Please see a copy of my visit note below.    Office Visit Note  [facility]  [phone]    UROLOGIC DIAGNOSES:   Chronic pelvic pain  High PSA velocity    CURRENT INTERVENTIONS:   Prior office cystoscopy     HISTORY:   Emerson is scheduled for virtual follow-up on his PSA.  His PSA went up a small on further, currently 3.57.  He continues to have no pain or urinary complaints at this time.      PAST MEDICAL HISTORY:   Past Medical History:   Diagnosis Date     Arthritis      Dysuria      Gout      Hyperplastic colon polyp 2018     Hypertension      RACHID (obstructive sleep apnea) 2022     Prostate infection        PAST SURGICAL HISTORY:   Past Surgical History:   Procedure Laterality Date     ANKLE SURGERY       APPENDECTOMY       BIOPSY      biopsy from foreskin     COLONOSCOPY  2018    Dr. Dc [facility]     COLONOSCOPY N/A 2018    Procedure: COMBINED COLONOSCOPY THRU STOMA, BIOPSY BY SNARE using exacto snare;  Surgeon: Shamar Dc MD;  Location:  GI     COLONOSCOPY N/A 2023    Procedure: Colonoscopy;  Surgeon: Shamar Dc MD;  Location:  GI     CYSTOSCOPY       ESOPHAGOSCOPY, GASTROSCOPY, DUODENOSCOPY (EGD), COMBINED      for stomach pain done in [place]-acid reflux     HAND SURGERY      cyst removal from hand     ORTHOPEDIC SURGERY  2017    ankle left -repair of tendons     wisdom teeth         FAMILY HISTORY:   Family History   Problem Relation Age of Onset     Thyroid Disease Mother      Diabetes Mother         Mother  2021     Obesity Mother      Cerebrovascular Disease Mother          of stroke     Hypertension Mother      Colon Cancer Mother       "   6\" of bowel removed in 2011     Arthritis Father      Respiratory Father         asbestos exposure     Chronic Obstructive Pulmonary Disease Father      Coronary Artery Disease Father 71        Father b - 1933  d - 2004     Skin Cancer Father      Other Cancer Father         Skin cancer on face and ears     Asthma Brother         Primarily affected him as a child.     Mental Illness Brother         Suffered severe mental \"break\" July 2022. Initial symptoms October 2021     Heart Defect Son        SOCIAL HISTORY:   Social History     Tobacco Use     Smoking status: Never     Passive exposure: Never     Smokeless tobacco: Never     Tobacco comments:     I have never used tobacco. My dad was a heavy smoker so I did have second hand smoke exposure   Substance Use Topics     Alcohol use: Yes     Alcohol/week: 3.0 standard drinks of alcohol     Types: 3 Standard drinks or equivalent per week     Comment: social drinker.       REVIEW OF SYSTEMS:  Skin: No rash, pruritis, or skin pigmentation  Eyes: No changes in vision  Ears/Nose/Throat: No changes in hearing, no nosebleeds  Respiratory: No shortness of breath, dyspnea on exertion, cough, or hemoptysis  Cardiovascular: No chest pain or palpitations  Gastrointestinal: No diarrhea or constipation. No abdominal pain. No hematochezia  Genitourinary: see HPI  Musculoskeletal: No pain or swelling of joints, normal range of motion  Neurologic: No weakness or tremors  Psychiatric: No recent changes in memory or mood  Hematologic/Lymphatic/Immunologic: No easy bruising or enlarged lymph nodes  Endocrine: No weight gain or loss      PHYSICAL EXAM:    General: Alert and oriented to time, place, and self. In NAD   HEENT: Head AT/NC, EOMI, CN Grossly intact   Lungs: no respiratory distress, or pursed lip breathing   Heart: No obvious jugular venous distension present   Musculoskeltal: Normal movements. Normal appearing musculature  Skin: no suspicious lesions or rashes   Neuro: " Alert, oriented, speech and mentation normal; moving all 4 extremities equally.   Psych: affect and mood normal    Imaging: None    Urinalysis: UA RESULTS:  Recent Labs   Lab Test 09/13/24  1006 08/29/24  1519   COLOR Yellow Yellow   APPEARANCE Clear Clear   URINEGLC Negative Negative   URINEBILI Negative Negative   URINEKETONE Negative Negative   SG 1.020 1.010   UBLD Negative Trace*   URINEPH 7.5* 5.5   PROTEIN Negative Negative   UROBILINOGEN 0.2 0.2   NITRITE Negative Negative   LEUKEST Negative Negative   RBCU  --  0-2   WBCU  --  0-5       PSA: 3.57    Post Void Residual:     Other labs: None today      IMPRESSION:  Rising PSA    PLAN:  His PSA continues to rise.  The rise over the past few months has not been terribly high but over the past year it has been over a point.  We discussed options of continue to follow the PSA closely versus proceeding with MRI of the prostate.  He wishes to proceed with MRI of the prostate.  MRI of the prostate was ordered for him and I will call him when the results are available.      Obdulio Sarmiento M.D.              Virtual Visit Details    Video start time type of service:  Video Visit     Originating Location (pt. Location): Home    Distant Location (provider location):  On-site  Platform used for Video Visit: GusWell      Again, thank you for allowing me to participate in the care of your patient.      Sincerely,    Obdulio Sarmiento MD

## 2025-02-12 NOTE — TELEPHONE ENCOUNTER
Clinic RN: Please investigate patient's chart or contact patient if the information cannot be found because  a different strength than what is on medication list is being requested. Please clarify what patient is taking.

## 2025-02-12 NOTE — PROGRESS NOTES
"Office Visit Note  [facility]  [phone]    UROLOGIC DIAGNOSES:   Chronic pelvic pain  High PSA velocity    CURRENT INTERVENTIONS:   Prior office cystoscopy     HISTORY:   Emerson is scheduled for virtual follow-up on his PSA.  His PSA went up a small on further, currently 3.57.  He continues to have no pain or urinary complaints at this time.      PAST MEDICAL HISTORY:   Past Medical History:   Diagnosis Date    Arthritis     Dysuria     Gout     Hyperplastic colon polyp 2018    Hypertension     RACHID (obstructive sleep apnea) 2022    Prostate infection        PAST SURGICAL HISTORY:   Past Surgical History:   Procedure Laterality Date    ANKLE SURGERY      APPENDECTOMY      BIOPSY      biopsy from foreskin    COLONOSCOPY  2018    Dr. cD [facility]    COLONOSCOPY N/A 2018    Procedure: COMBINED COLONOSCOPY THRU STOMA, BIOPSY BY SNARE using exacto snare;  Surgeon: Shamar Dc MD;  Location:  GI    COLONOSCOPY N/A 2023    Procedure: Colonoscopy;  Surgeon: Shamar Dc MD;  Location:  GI    CYSTOSCOPY      ESOPHAGOSCOPY, GASTROSCOPY, DUODENOSCOPY (EGD), COMBINED      for stomach pain done in [place]-acid reflux    HAND SURGERY      cyst removal from hand    ORTHOPEDIC SURGERY  2017    ankle left -repair of tendons    wisdom teeth         FAMILY HISTORY:   Family History   Problem Relation Age of Onset    Thyroid Disease Mother     Diabetes Mother         Mother  2021    Obesity Mother     Cerebrovascular Disease Mother          of stroke    Hypertension Mother     Colon Cancer Mother         6\" of bowel removed in     Arthritis Father     Respiratory Father         asbestos exposure    Chronic Obstructive Pulmonary Disease Father     Coronary Artery Disease Father 71        Father b - 1933  d -     Skin Cancer Father     Other Cancer Father         Skin cancer on face and ears    Asthma Brother         Primarily affected " "him as a child.    Mental Illness Brother         Suffered severe mental \"break\" July 2022. Initial symptoms October 2021    Heart Defect Son        SOCIAL HISTORY:   Social History     Tobacco Use    Smoking status: Never     Passive exposure: Never    Smokeless tobacco: Never    Tobacco comments:     I have never used tobacco. My dad was a heavy smoker so I did have second hand smoke exposure   Substance Use Topics    Alcohol use: Yes     Alcohol/week: 3.0 standard drinks of alcohol     Types: 3 Standard drinks or equivalent per week     Comment: social drinker.       REVIEW OF SYSTEMS:  Skin: No rash, pruritis, or skin pigmentation  Eyes: No changes in vision  Ears/Nose/Throat: No changes in hearing, no nosebleeds  Respiratory: No shortness of breath, dyspnea on exertion, cough, or hemoptysis  Cardiovascular: No chest pain or palpitations  Gastrointestinal: No diarrhea or constipation. No abdominal pain. No hematochezia  Genitourinary: see HPI  Musculoskeletal: No pain or swelling of joints, normal range of motion  Neurologic: No weakness or tremors  Psychiatric: No recent changes in memory or mood  Hematologic/Lymphatic/Immunologic: No easy bruising or enlarged lymph nodes  Endocrine: No weight gain or loss      PHYSICAL EXAM:    General: Alert and oriented to time, place, and self. In NAD   HEENT: Head AT/NC, EOMI, CN Grossly intact   Lungs: no respiratory distress, or pursed lip breathing   Heart: No obvious jugular venous distension present   Musculoskeltal: Normal movements. Normal appearing musculature  Skin: no suspicious lesions or rashes   Neuro: Alert, oriented, speech and mentation normal; moving all 4 extremities equally.   Psych: affect and mood normal    Imaging: None    Urinalysis: UA RESULTS:  Recent Labs   Lab Test 09/13/24  1006 08/29/24  1519   COLOR Yellow Yellow   APPEARANCE Clear Clear   URINEGLC Negative Negative   URINEBILI Negative Negative   URINEKETONE Negative Negative   SG 1.020 1.010 "   UBLD Negative Trace*   URINEPH 7.5* 5.5   PROTEIN Negative Negative   UROBILINOGEN 0.2 0.2   NITRITE Negative Negative   LEUKEST Negative Negative   RBCU  --  0-2   WBCU  --  0-5       PSA: 3.57    Post Void Residual:     Other labs: None today      IMPRESSION:  Rising PSA    PLAN:  His PSA continues to rise.  The rise over the past few months has not been terribly high but over the past year it has been over a point.  We discussed options of continue to follow the PSA closely versus proceeding with MRI of the prostate.  He wishes to proceed with MRI of the prostate.  MRI of the prostate was ordered for him and I will call him when the results are available.      Obdulio Sarmiento M.D.              Virtual Visit Details    Video start time type of service:  Video Visit     Originating Location (pt. Location): Home    Distant Location (provider location):  On-site  Platform used for Video Visit: Ginette

## 2025-02-12 NOTE — NURSING NOTE
Current patient location: [place]    Is the patient currently in the state of [place]? YES    Visit mode: VIDEO    If the visit is dropped, the patient can be reconnected by:VIDEO VISIT: Send to e-mail at: [email or web address]    Will anyone else be joining the visit? NO  (If patient encounters technical issues they should call [phone]    Are changes needed to the allergy or medication list? Patient completed e-check in for visit. VF did not review this information again with patient due to this.    Are refills needed on medications prescribed by this physician? Discuss with provider    Rooming Documentation:  Not applicable  Patient reported creatinine levels have been off and another doctor is monitoring it. Patient reported fatigue and tiredness.     Reason for visit: RECHECK    Cristy JOHNSON

## 2025-02-13 RX ORDER — IRBESARTAN 300 MG/1
300 TABLET ORAL DAILY
Qty: 90 TABLET | Refills: 3 | OUTPATIENT
Start: 2025-02-13

## 2025-02-21 ENCOUNTER — HOSPITAL ENCOUNTER (OUTPATIENT)
Dept: CARDIOLOGY | Facility: CLINIC | Age: 50
Discharge: HOME | End: 2025-02-21
Attending: FAMILY MEDICINE | Admitting: FAMILY MEDICINE
Payer: COMMERCIAL

## 2025-02-21 DIAGNOSIS — I77.819 AORTIC DILATATION: ICD-10-CM

## 2025-02-21 LAB — LVEF ECHO: NORMAL

## 2025-02-21 PROCEDURE — 93306 TTE W/DOPPLER COMPLETE: CPT

## 2025-02-21 PROCEDURE — 93306 TTE W/DOPPLER COMPLETE: CPT | Mod: 26 | Performed by: INTERNAL MEDICINE

## 2025-02-26 ENCOUNTER — OFFICE VISIT (OUTPATIENT)
Dept: CARDIOLOGY | Facility: CLINIC | Age: 50
End: 2025-02-26
Attending: FAMILY MEDICINE
Payer: COMMERCIAL

## 2025-02-26 VITALS
HEART RATE: 72 BPM | BODY MASS INDEX: 40.43 KG/M2 | SYSTOLIC BLOOD PRESSURE: 122 MMHG | HEIGHT: 74 IN | WEIGHT: 315 LBS | DIASTOLIC BLOOD PRESSURE: 81 MMHG

## 2025-02-26 DIAGNOSIS — E78.2 MIXED HYPERLIPIDEMIA: ICD-10-CM

## 2025-02-26 DIAGNOSIS — E66.01 MORBID OBESITY (H): ICD-10-CM

## 2025-02-26 DIAGNOSIS — Q25.43 AORTIC ROOT ANEURYSM: Primary | ICD-10-CM

## 2025-02-26 DIAGNOSIS — I10 ESSENTIAL HYPERTENSION: ICD-10-CM

## 2025-02-26 RX ORDER — METHYLPREDNISOLONE 32 MG/1
TABLET ORAL
Qty: 2 TABLET | Refills: 0 | Status: SHIPPED | OUTPATIENT
Start: 2025-02-26

## 2025-02-26 RX ORDER — DIPHENHYDRAMINE HCL 50 MG
CAPSULE ORAL
Qty: 1 CAPSULE | Refills: 0 | Status: SHIPPED | OUTPATIENT
Start: 2025-02-26

## 2025-02-26 NOTE — LETTER
2025    Madeleine Lynch MD  [id number]  [facility] [address]    RE: Abhijit Garcia       Dear Colleague,     I had the pleasure of seeing Abhijit Garcia in the [facility].  CARDIOLOGY CLINIC CONSULTATION      REASON FOR CONSULT:   Aortic root aneurysm    PRIMARY CARE PHYSICIAN:  Madeleine Lynch        History of Present Illness  Abhijit Garcia is an extremely pleasant 50 year old male here as a new patient to establish care.  His medical history is notable for aortic root aneurysm, morbid obesity (though with significant muscle mass as well), hypertension, dyslipidemia, mild coronary calcification (CAC 10.4, 69th percentile, on 2023 calcium score), CKD 2, gout, and multijoint DJD.  No family history of aneurysms that he is aware of.  His father  in his mid 70s after a lifetime of heavy smoking, asbestos exposure, etc. and he may have had a heart attack as part of how he passed, but Emesron is not sure.  Outside of this, no history of heart disease including no family members with stents, bypass, etc.  Emerson is a never smoker, though he did have significant secondhand smoke exposure through his father.    He is referred to cardiology clinic to discuss the results of his recent echocardiogram.  At present he has no cardiac symptoms to speak of.  He did occasionally have episodes of chest pressure that were primarily provoked by stress at work, but this has not happened for several years now, and testing had never been consistent with ischemia or other worrisome cardiac etiology.  He also did have longstanding edema when he was not working out consistently, but he has been working out more consistently over the past couple of years, and the edema has resolved.      When he turned 48 in , he had a coronary calcium scan done which showed a total calcium score of 10.4 (69th percentile).  As part of this, he was also noted to have a mildly dilated ascending aorta  measuring 3.7 cm.  When he saw his PCP recently, they noted this mildly dilated aorta finding on the CT, and recommended a repeat transthoracic echocardiogram.  This was done on 2/21/2025, and I personally reviewed the images.  Overall the study is relatively unremarkable, except for the finding of a maximal diameter measurement of the aortic root at the sinuses of 4.6 cm.  However, this is a still frame, and when I pause the moving clip and measure at various still frames within this, I get at most 4.1 cm.  Of note, patient does tell me that he has gotten back to weightlifting over the past 2 years, and this has helped to lead to intermittent weight loss (though he has had some setbacks due to orthopedic injuries).  He does use significant weight with this as he developed significant strength working as a  during his teenage years.  In particular, he tells me that he usually uses between 450 and 500 pounds on the leg press.    In addition to the above testing, I also reviewed his most recent lipid panel, chemistry panel, and CBC.      Assessment & Plan    Aortic root aneurysm (4.6 cm on 2/2025 TTE, though suspect possible overestimation)  No FH of aneurysm/aneurysmal complication  Mild coronary calcification (CAC 10.4, 69th percentile, on 2/2023 calcium score), with CCS 0 angina  Hypertension  Dyslipidemia  Morbid obesity (though with significant muscle mass as well)  CKD 2  Gout  Multijoint DJD  Iodine contrast allergy  Never smoker (though with significant second hand smoke exposure through father)      It was a pleasure to meet with Driss and his wife in clinic today.  We discussed the results of his echo in detail, as well as the implications of an aortic root aneurysm and the appropriate management.  Briefly, I explained that I think it is very possible that the 4.6 cm measurement on his TTE is an overestimation.  That being said, given the heavy weightlifting requiring Valsalva that he has been doing,  it certainly is possible that he has seen significant growth compared to 2023.  To sort this out, I would recommend a CTA of the chest, and they are in agreement with this as well.  He does have a contrast allergy, so we will premedicate for this.  Based on these results, we can discuss further in regards to appropriate screening intervals, medical therapy, etc.      -CTA chest  -Screening interval frequency, aggressiveness of medical therapy/lifestyle interventions based on CTA findings  -Discussed importance of avoiding straining requiring Valsalva, including with heavy weightlifting.  He does enjoy weightlifting, so we discussed this issue in detail, and can continue discussing it in the future.  -Continue irbesartan 150 mg daily for now  -Likely will add beta-blocker at next visit, pending CTA results  -May consider discussing pros/cons of statin at future visit  -Avoid fluoroquinolones  -Recommend all first-degree relatives be screened for aortic aneurysm with TTE.  We will discuss this further at future visit.        Follow-up: 1 month with MICHAEL, or sooner as needed        On the date of the patient's visit, I spent a total of 62 minutes reviewing the patient's chart; interviewing, examining, and counseling the patient; coordinating with other providers as necessary, entering orders, and documenting in the medical chart.      Shubham Espino MD  Interventional Cardiology  February 26, 2025      The longitudinal plan of care for the diagnosis(es)/condition(s) as documented were addressed during this visit. Due to the added complexity in care, I will continue to support Emerson in the subsequent management and with ongoing continuity of care.        Medications  Current Outpatient Medications   Medication Sig Dispense Refill     allopurinol (ZYLOPRIM) 100 MG tablet Take 2 tablets (200 mg) by mouth daily. 180 tablet 3     clotrimazole (LOTRIMIN) 1 % external cream Apply topically 2 times daily 60 g 0      diphenhydrAMINE (BENADRYL) 50 MG capsule Take 50mg by mouth 1 hour prior to the cardiac procedure with IV contrast. 1 capsule 0     imiquimod (ALDARA) 5 % external cream Apply a small sized amount to warts QHS at bedtime.   Wash off after 8 hours. 15 packet 3     irbesartan (AVAPRO) 150 MG tablet Take 1 tablet (150 mg) by mouth at bedtime. 90 tablet 3     ketoconazole (NIZORAL) 2 % external cream Apply topically daily as needed for other (athletes foot) 60 g 3     Loratadine (CLARITIN PO) Take by mouth as needed       meloxicam (MOBIC) 15 MG tablet Take 1 tablet (15 mg) by mouth daily as needed for moderate pain. 30 tablet 3     methylPREDNISolone (MEDROL) 32 MG tablet Take one tablet by mouth 12 hours prior and 2 hours prior to the cardiac procedure with IV contrast. 2 tablet 0     nystatin (MYCOSTATIN) 380796 UNIT/GM external powder Apply topically to genital area daily for maintenance       pimecrolimus (ELIDEL) 1 % external cream APPLY TOPICALLY TO AFFECTED AREA TWICE A DAY FOR MAINTENANCE       triamcinolone (KENALOG) 0.1 % external cream Apply topically 2 times daily To lesions on foot until redness and itch is resolved then stop 45 g 1     valACYclovir (VALTREX) 1000 mg tablet Take 2 tablets (2,000 mg) by mouth 2 times daily. (Patient taking differently: Take 2,000 mg by mouth 2 times daily. Prn) 8 tablet 2     Current Facility-Administered Medications   Medication Dose Route Frequency Provider Last Rate Last Admin     sodium chloride (PF) 0.9% PF flush 3 mL  3 mL Intravenous q1 min prn Royce Sevilla MD   3 mL at 11/30/23 0930     Allergies  Allergies   Allergen Reactions     Amlodipine      Edema at 10 mg dose     Contrast Dye      Indomethacin GI Disturbance     Iodine      Iodine in IV contrast dye     Levaquin [Levofloxacin]      Gout flare in 2011     Lisinopril      cough     Seasonal Allergies      Losartan Rash     Penile rash         Physical Exam      BP: 122/81 Pulse: 72            Vital  Signs with Ranges  Pulse:  [72] 72  BP: (122)/(81) 122/81  339 lbs 0 oz    Constitutional: Well-appearing, no acute distress  Respiratory: Normal respiratory effort, CTAB  Cardiovascular: RRR, no m/r/g.  JVP < 7 cm H2O.  There is trace bilateral LE edema.  Normal carotid upstrokes, no carotid bruits.                    Thank you for allowing me to participate in the care of your patient.      Sincerely,     Shubhma Espino MD     [facility]  cc:   Madeleine Lynch MD  [id number] [place] [address]

## 2025-02-26 NOTE — PROGRESS NOTES
CARDIOLOGY CLINIC CONSULTATION      REASON FOR CONSULT:   Aortic root aneurysm    PRIMARY CARE PHYSICIAN:  Madeleine Lynch        History of Present Illness   Abhijit Garcia is an extremely pleasant 50 year old male here as a new patient to establish care.  His medical history is notable for aortic root aneurysm, morbid obesity (though with significant muscle mass as well), hypertension, dyslipidemia, mild coronary calcification (CAC 10.4, 69th percentile, on 2023 calcium score), CKD 2, gout, and multijoint DJD.  No family history of aneurysms that he is aware of.  His father  in his mid 70s after a lifetime of heavy smoking, asbestos exposure, etc. and he may have had a heart attack as part of how he passed, but Emerson is not sure.  Outside of this, no history of heart disease including no family members with stents, bypass, etc.  Emerson is a never smoker, though he did have significant secondhand smoke exposure through his father.    He is referred to cardiology clinic to discuss the results of his recent echocardiogram.  At present he has no cardiac symptoms to speak of.  He did occasionally have episodes of chest pressure that were primarily provoked by stress at work, but this has not happened for several years now, and testing had never been consistent with ischemia or other worrisome cardiac etiology.  He also did have longstanding edema when he was not working out consistently, but he has been working out more consistently over the past couple of years, and the edema has resolved.      When he turned 48 in , he had a coronary calcium scan done which showed a total calcium score of 10.4 (69th percentile).  As part of this, he was also noted to have a mildly dilated ascending aorta measuring 3.7 cm.  When he saw his PCP recently, they noted this mildly dilated aorta finding on the CT, and recommended a repeat transthoracic echocardiogram.  This was done on 2025, and I personally reviewed the  images.  Overall the study is relatively unremarkable, except for the finding of a maximal diameter measurement of the aortic root at the sinuses of 4.6 cm.  However, this is a still frame, and when I pause the moving clip and measure at various still frames within this, I get at most 4.1 cm.  Of note, patient does tell me that he has gotten back to weightlifting over the past 2 years, and this has helped to lead to intermittent weight loss (though he has had some setbacks due to orthopedic injuries).  He does use significant weight with this as he developed significant strength working as a  during his teenage years.  In particular, he tells me that he usually uses between 450 and 500 pounds on the leg press.    In addition to the above testing, I also reviewed his most recent lipid panel, chemistry panel, and CBC.      Assessment & Plan     Aortic root aneurysm (4.6 cm on 2/2025 TTE, though suspect possible overestimation)  No FH of aneurysm/aneurysmal complication  Mild coronary calcification (CAC 10.4, 69th percentile, on 2/2023 calcium score), with CCS 0 angina  Hypertension  Dyslipidemia  Morbid obesity (though with significant muscle mass as well)  CKD 2  Gout  Multijoint DJD  Iodine contrast allergy  Never smoker (though with significant second hand smoke exposure through father)      It was a pleasure to meet with Driss and his wife in clinic today.  We discussed the results of his echo in detail, as well as the implications of an aortic root aneurysm and the appropriate management.  Briefly, I explained that I think it is very possible that the 4.6 cm measurement on his TTE is an overestimation.  That being said, given the heavy weightlifting requiring Valsalva that he has been doing, it certainly is possible that he has seen significant growth compared to 2023.  To sort this out, I would recommend a CTA of the chest, and they are in agreement with this as well.  He does have a contrast allergy, so  we will premedicate for this.  Based on these results, we can discuss further in regards to appropriate screening intervals, medical therapy, etc.      -CTA chest  -Screening interval frequency, aggressiveness of medical therapy/lifestyle interventions based on CTA findings  -Discussed importance of avoiding straining requiring Valsalva, including with heavy weightlifting.  He does enjoy weightlifting, so we discussed this issue in detail, and can continue discussing it in the future.  -Continue irbesartan 150 mg daily for now  -Likely will add beta-blocker at next visit, pending CTA results  -May consider discussing pros/cons of statin at future visit  -Avoid fluoroquinolones  -Recommend all first-degree relatives be screened for aortic aneurysm with TTE.  We will discuss this further at future visit.        Follow-up: 1 month with MICHAEL, or sooner as needed        On the date of the patient's visit, I spent a total of 62 minutes reviewing the patient's chart; interviewing, examining, and counseling the patient; coordinating with other providers as necessary, entering orders, and documenting in the medical chart.      Shubham Espino MD  Interventional Cardiology  February 26, 2025      The longitudinal plan of care for the diagnosis(es)/condition(s) as documented were addressed during this visit. Due to the added complexity in care, I will continue to support Emerson in the subsequent management and with ongoing continuity of care.        Medications   Current Outpatient Medications   Medication Sig Dispense Refill    allopurinol (ZYLOPRIM) 100 MG tablet Take 2 tablets (200 mg) by mouth daily. 180 tablet 3    clotrimazole (LOTRIMIN) 1 % external cream Apply topically 2 times daily 60 g 0    diphenhydrAMINE (BENADRYL) 50 MG capsule Take 50mg by mouth 1 hour prior to the cardiac procedure with IV contrast. 1 capsule 0    imiquimod (ALDARA) 5 % external cream Apply a small sized amount to warts QHS at bedtime.   Wash  off after 8 hours. 15 packet 3    irbesartan (AVAPRO) 150 MG tablet Take 1 tablet (150 mg) by mouth at bedtime. 90 tablet 3    ketoconazole (NIZORAL) 2 % external cream Apply topically daily as needed for other (athletes foot) 60 g 3    Loratadine (CLARITIN PO) Take by mouth as needed      meloxicam (MOBIC) 15 MG tablet Take 1 tablet (15 mg) by mouth daily as needed for moderate pain. 30 tablet 3    methylPREDNISolone (MEDROL) 32 MG tablet Take one tablet by mouth 12 hours prior and 2 hours prior to the cardiac procedure with IV contrast. 2 tablet 0    nystatin (MYCOSTATIN) 996234 UNIT/GM external powder Apply topically to genital area daily for maintenance      pimecrolimus (ELIDEL) 1 % external cream APPLY TOPICALLY TO AFFECTED AREA TWICE A DAY FOR MAINTENANCE      triamcinolone (KENALOG) 0.1 % external cream Apply topically 2 times daily To lesions on foot until redness and itch is resolved then stop 45 g 1    valACYclovir (VALTREX) 1000 mg tablet Take 2 tablets (2,000 mg) by mouth 2 times daily. (Patient taking differently: Take 2,000 mg by mouth 2 times daily. Prn) 8 tablet 2     Current Facility-Administered Medications   Medication Dose Route Frequency Provider Last Rate Last Admin    sodium chloride (PF) 0.9% PF flush 3 mL  3 mL Intravenous q1 min prn Royce Sevilla MD   3 mL at 11/30/23 0930     Allergies   Allergies   Allergen Reactions    Amlodipine      Edema at 10 mg dose    Contrast Dye     Indomethacin GI Disturbance    Iodine      Iodine in IV contrast dye    Levaquin [Levofloxacin]      Gout flare in 2011    Lisinopril      cough    Seasonal Allergies     Losartan Rash     Penile rash         Physical Exam       BP: 122/81 Pulse: 72            Vital Signs with Ranges  Pulse:  [72] 72  BP: (122)/(81) 122/81  339 lbs 0 oz    Constitutional: Well-appearing, no acute distress  Respiratory: Normal respiratory effort, CTAB  Cardiovascular: RRR, no m/r/g.  JVP < 7 cm H2O.  There is trace bilateral  LE edema.  Normal carotid upstrokes, no carotid bruits.

## 2025-03-07 ENCOUNTER — ANCILLARY PROCEDURE (OUTPATIENT)
Dept: MRI IMAGING | Facility: CLINIC | Age: 50
End: 2025-03-07
Attending: UROLOGY
Payer: COMMERCIAL

## 2025-03-07 DIAGNOSIS — R97.20 ELEVATED PROSTATE SPECIFIC ANTIGEN (PSA): ICD-10-CM

## 2025-03-07 PROCEDURE — 72197 MRI PELVIS W/O & W/DYE: CPT | Mod: 26 | Performed by: RADIOLOGY

## 2025-03-07 PROCEDURE — 72197 MRI PELVIS W/O & W/DYE: CPT

## 2025-03-07 PROCEDURE — A9585 GADOBUTROL INJECTION: HCPCS | Performed by: UROLOGY

## 2025-03-07 PROCEDURE — 255N000002 HC RX 255 OP 636: Performed by: UROLOGY

## 2025-03-07 RX ORDER — GADOBUTROL 604.72 MG/ML
15 INJECTION INTRAVENOUS ONCE
Status: COMPLETED | OUTPATIENT
Start: 2025-03-07 | End: 2025-03-07

## 2025-03-07 RX ADMIN — GADOBUTROL 15 ML: 604.72 INJECTION INTRAVENOUS at 09:07

## 2025-03-13 ENCOUNTER — HOSPITAL ENCOUNTER (OUTPATIENT)
Dept: CARDIOLOGY | Facility: CLINIC | Age: 50
Discharge: HOME | End: 2025-03-13
Attending: INTERNAL MEDICINE
Payer: COMMERCIAL

## 2025-03-13 DIAGNOSIS — Q25.43 AORTIC ROOT ANEURYSM: ICD-10-CM

## 2025-03-13 PROCEDURE — 71275 CT ANGIOGRAPHY CHEST: CPT

## 2025-03-13 PROCEDURE — 250N000011 HC RX IP 250 OP 636: Performed by: INTERNAL MEDICINE

## 2025-03-13 RX ORDER — IOPAMIDOL 755 MG/ML
500 INJECTION, SOLUTION INTRAVASCULAR ONCE
Status: COMPLETED | OUTPATIENT
Start: 2025-03-13 | End: 2025-03-13

## 2025-03-13 RX ORDER — LIDOCAINE 40 MG/G
CREAM TOPICAL
Status: ACTIVE | OUTPATIENT
Start: 2025-03-13

## 2025-03-13 RX ORDER — ONDANSETRON 2 MG/ML
4 INJECTION INTRAMUSCULAR; INTRAVENOUS
Status: ACTIVE | OUTPATIENT
Start: 2025-03-13

## 2025-03-13 RX ADMIN — IOPAMIDOL 100 ML: 755 INJECTION, SOLUTION INTRAVENOUS at 10:34

## 2025-03-20 ENCOUNTER — OFFICE VISIT (OUTPATIENT)
Dept: CARDIOLOGY | Facility: CLINIC | Age: 50
End: 2025-03-20
Attending: INTERNAL MEDICINE
Payer: COMMERCIAL

## 2025-03-20 VITALS
HEIGHT: 74 IN | WEIGHT: 315 LBS | DIASTOLIC BLOOD PRESSURE: 90 MMHG | BODY MASS INDEX: 40.43 KG/M2 | HEART RATE: 74 BPM | OXYGEN SATURATION: 98 % | SYSTOLIC BLOOD PRESSURE: 140 MMHG

## 2025-03-20 DIAGNOSIS — I10 ESSENTIAL HYPERTENSION: ICD-10-CM

## 2025-03-20 DIAGNOSIS — Q25.43 AORTIC ROOT ANEURYSM: Primary | ICD-10-CM

## 2025-03-20 RX ORDER — METOPROLOL SUCCINATE 25 MG/1
12.5 TABLET, EXTENDED RELEASE ORAL DAILY
Qty: 45 TABLET | Refills: 3 | Status: SHIPPED | OUTPATIENT
Start: 2025-03-20

## 2025-03-20 NOTE — PROGRESS NOTES
Cardiology Clinic Progress Note  Abhijit Garcia MRN# [id number]   YOB: 1975 Age: 50 year old   Primary Cardiologist: Dr. Espino Reason for visit: Follow up recent testing             Assessment and Plan:       1.  Aortic root aneurysm  -3/2025 CTA chest with measurement of 4.1 cm    2.  Mild coronary artery calcification  -2023 CT calcium scan with a total score of 10.4, placing patient in the 69th percentile  -3/2025 CT chest noting mild calcification    3.  Hypertension, uncontrolled, goal 120/80 in the setting of #1   -Required dose reductions in irbesartan given #6    4.  Dyslipidemia  -PCP monitoring and managing    5.  Morbid obesity, with significant muscle mass    6.  CKD stage II  -PCP monitoring and managing     Plan:  Continue irbesartan 150 mg daily  Start metoprolol XL 12.5mg daily to optimize blood pressure control  Nurse blood pressure check in 2 weeks  Advised against heavy weight lifting, straining or bearing down  Recommend screening TTE of first-degree relatives for aortic aneurysm  Repeat TTE for continued surveillance of aortic root dilation in 1 year     Follow up: Follow up with Dr. Espino in 1 year        History of Presenting Illness:    Abhijit Garcia is a very pleasant 50 year old male with a history of aortic root aneurysm, morbid obesity, hypertension, dyslipidemia, mild coronary calcification, CKD stage II, gout, and multijoint DJD.  No family history of aortic aneurysms, or aortic rupture.    At the time of his coronary calcium score in 2023 (total score 10.4, 69 percentile), he was also noted to have a mildly dilated ascending aorta measuring 3.7 cm.    He was seen by Dr. Espino in late February of this year to establish care following a echocardiogram done by his primary care provider which revealed a aortic root measuring 4.6 cm.  He recommended a CTA to further assess the aortic dimensions.     CTA chest was done on 3/13/2025 and revealed a maximum  diameter of 4.1 cm of the ascending aorta, maximum diameter of the ascending thoracic aorta approximately 3.6 cm, mild coronary calcification.    Patient is here today for a follow-up of his recent chest CT. Patient reports feeling good. He is looking forward to being able to exercise more with weight lifting and lose more weight. He is fatigued during the day as he does not sleep long enough, but does use his CPAP routinely.     Patient denies chest pain or chest tightness. Denies dizziness, lightheadedness or other presyncopal symptoms. Denies tachycardia or palpitations.  Denies shortness of breath, orthopnea, or PND.    Most recent labs from 1/24/2025 show sodium 139, potassium 4.5, BUN 19, creatinine 1.3, GFR 66, total cholesterol 212, HDL 37, , triglycerides 212.     Blood pressure 140/90 and HR 74 in clinic today.        Recent Hospitalizations   None in [id number]           Social History      Social History     Socioeconomic History    Marital status:      Spouse name: Not on file    Number of children: 1    Years of education: Not on file    Highest education level: Not on file   Occupational History    Occupation:      Comment: [employer]   Tobacco Use    Smoking status: Never     Passive exposure: Never    Smokeless tobacco: Never    Tobacco comments:     I have never used tobacco. My dad was a heavy smoker so I did have second hand smoke exposure   Vaping Use    Vaping status: Never Used   Substance and Sexual Activity    Alcohol use: Yes     Alcohol/week: 3.0 standard drinks of alcohol     Types: 3 Standard drinks or equivalent per week     Comment: social drinker.    Drug use: Never    Sexual activity: Not Currently     Partners: Female     Birth control/protection: Abstinence, Pull-out method, Condom, Female Surgical     Comment: My wife has lost interest. No sex since 2018 (2019 maybe)   Other Topics Concern     Service Yes    Blood Transfusions No     Caffeine Concern Yes     Comment: 2 pots daily    Occupational Exposure No     Comment: Occ. all with in managible levels    Hobby Hazards No    Sleep Concern Yes    Stress Concern Yes    Weight Concern Yes    Special Diet No    Back Care Yes     Comment: L4 and L5 damaged    Exercise No    Bike Helmet Not Asked    Seat Belt Yes    Self-Exams No    Parent/sibling w/ CABG, MI or angioplasty before 65F 55M? No   Social History Narrative    Not on file     Social Drivers of Health     Financial Resource Strain: Low Risk  (1/23/2025)    Financial Resource Strain     Within the past 12 months, have you or your family members you live with been unable to get utilities (heat, electricity) when it was really needed?: No   Food Insecurity: Low Risk  (1/23/2025)    Food Insecurity     Within the past 12 months, did you worry that your food would run out before you got money to buy more?: No     Within the past 12 months, did the food you bought just not last and you didn t have money to get more?: No   Transportation Needs: Low Risk  (1/23/2025)    Transportation Needs     Within the past 12 months, has lack of transportation kept you from medical appointments, getting your medicines, non-medical meetings or appointments, work, or from getting things that you need?: No   Physical Activity: Insufficiently Active (1/23/2025)    Exercise Vital Sign     Days of Exercise per Week: 3 days     Minutes of Exercise per Session: 30 min   Stress: Stress Concern Present (1/23/2025)    [place] citizen [employer] - Occupational Stress Questionnaire     Feeling of Stress : Rather much   Social Connections: Unknown (1/23/2025)    Social Connection and Isolation Panel [NHANES]     Frequency of Communication with Friends and Family: Not on file     Frequency of Social Gatherings with Friends and Family: Once a week     Attends [employer] Services: Not on file     Active Member of Clubs or Organizations: Not on file     Attends  "Club or Organization Meetings: Not on file     Marital Status: Not on file   Interpersonal Safety: Low Risk  (1/23/2025)    Interpersonal Safety     Do you feel physically and emotionally safe where you currently live?: Yes     Within the past 12 months, have you been hit, slapped, kicked or otherwise physically hurt by someone?: No     Within the past 12 months, have you been humiliated or emotionally abused in other ways by your partner or ex-partner?: No   Housing Stability: Low Risk  (1/23/2025)    Housing Stability     Do you have housing? : Yes     Are you worried about losing your housing?: No            Review of Systems:   Skin:        Eyes:       ENT:       Respiratory:  Positive for sleep apnea, CPAP  Cardiovascular:  Negative    Gastroenterology:      Genitourinary:       Musculoskeletal:       Neurologic:       Psychiatric:       Heme/Lymph/Imm:       Endocrine:              Physical Exam:   Vitals: BP (!) 140/90   Pulse 74   Ht 1.867 m (6' 1.5\")   Wt (!) 154.2 kg (339 lb 14.4 oz)   SpO2 98%   BMI 44.23 kg/m     Wt Readings from Last 4 Encounters:   03/20/25 (!) 154.2 kg (339 lb 14.4 oz)   02/26/25 (!) 153.8 kg (339 lb)   01/31/25 (!) 154 kg (339 lb 6.4 oz)   01/23/25 (!) 154.7 kg (341 lb)     GEN: well nourished, in no acute distress.  HEENT:  Pupils equal, round. Sclerae nonicteric.   NECK: Supple, no masses appreciated.   C/V:  Regular rate and rhythm, no murmur, rub or gallop.    RESP: Respirations are unlabored. Clear to auscultation bilaterally without wheezing, rales, or rhonchi.  GI: Abdomen soft, nontender.  EXTREM: No LE edema.  NEURO: Alert and oriented, cooperative.  SKIN: Warm and dry.        Data:     LIPID RESULTS:  Lab Results   Component Value Date    CHOL 212 (H) 01/24/2025    CHOL 215 (H) 12/10/2019    HDL 37 (L) 01/24/2025    HDL 38 (L) 12/10/2019     (H) 01/24/2025     (H) 12/10/2019    TRIG 212 (H) 01/24/2025    TRIG 298 (H) 12/10/2019    CHOLHDLRATIO 5.7 (H) " 05/05/2015     LIVER ENZYME RESULTS:  Lab Results   Component Value Date    AST 31 01/24/2025    AST 23 06/10/2019    ALT 38 01/24/2025    ALT 37 06/10/2019     CBC RESULTS:  Lab Results   Component Value Date    WBC 6.7 01/24/2025    WBC 6.2 12/10/2019    RBC 4.94 01/24/2025    RBC 5.15 12/10/2019    HGB 14.9 01/24/2025    HGB 15.3 12/10/2019    HCT 43.8 01/24/2025    HCT 45.0 12/10/2019    MCV 89 01/24/2025    MCV 87 12/10/2019    MCH 30.2 01/24/2025    MCH 29.7 12/10/2019    MCHC 34.0 01/24/2025    MCHC 34.0 12/10/2019    RDW 12.0 01/24/2025    RDW 12.3 12/10/2019     01/24/2025     12/10/2019     BMP RESULTS:  Lab Results   Component Value Date     01/24/2025     09/17/2020    POTASSIUM 4.5 01/24/2025    POTASSIUM 4.4 11/23/2021    POTASSIUM 4.0 09/17/2020    CHLORIDE 105 01/24/2025    CHLORIDE 105 11/23/2021    CHLORIDE 106 09/17/2020    CO2 26 01/24/2025    CO2 28 11/23/2021    CO2 28 09/17/2020    ANIONGAP 8 01/24/2025    ANIONGAP 4 11/23/2021    ANIONGAP 5 09/17/2020     (H) 01/24/2025     (H) 11/23/2021    GLC 96 09/17/2020    BUN 19.3 01/24/2025    BUN 13 11/23/2021    BUN 16 09/17/2020    CR 1.30 (H) 01/24/2025    CR 1.07 09/17/2020    GFRESTIMATED 66 01/24/2025    GFRESTIMATED 82 09/17/2020    GFRESTBLACK >90 09/17/2020    KHUSHBU 9.4 01/24/2025    KHUSHBU 8.1 (L) 09/17/2020      A1C RESULTS:  Lab Results   Component Value Date    A1C 5.5 01/24/2025    A1C 5.4 08/01/2014     INR RESULTS:  Lab Results   Component Value Date    INR 2.4 (A) 06/23/2017    INR 1.4 (A) 06/14/2017    INR 1.71 (H) 04/18/2017            Medications     Current Outpatient Medications   Medication Sig Dispense Refill    allopurinol (ZYLOPRIM) 100 MG tablet Take 2 tablets (200 mg) by mouth daily. 180 tablet 3    clotrimazole (LOTRIMIN) 1 % external cream Apply topically 2 times daily 60 g 0    imiquimod (ALDARA) 5 % external cream Apply a small sized amount to warts QHS at bedtime.   Wash off after 8  hours. 15 packet 3    irbesartan (AVAPRO) 150 MG tablet Take 1 tablet (150 mg) by mouth at bedtime. 90 tablet 3    ketoconazole (NIZORAL) 2 % external cream Apply topically daily as needed for other (athletes foot) 60 g 3    Loratadine (CLARITIN PO) Take by mouth as needed      meloxicam (MOBIC) 15 MG tablet Take 1 tablet (15 mg) by mouth daily as needed for moderate pain. 30 tablet 3    metoprolol succinate ER (TOPROL XL) 25 MG 24 hr tablet Take 0.5 tablets (12.5 mg) by mouth daily. 45 tablet 3    nystatin (MYCOSTATIN) 135326 UNIT/GM external powder Apply topically to genital area daily for maintenance      pimecrolimus (ELIDEL) 1 % external cream APPLY TOPICALLY TO AFFECTED AREA TWICE A DAY FOR MAINTENANCE      triamcinolone (KENALOG) 0.1 % external cream Apply topically 2 times daily To lesions on foot until redness and itch is resolved then stop 45 g 1    valACYclovir (VALTREX) 1000 mg tablet Take 2 tablets (2,000 mg) by mouth 2 times daily. (Patient taking differently: Take 2,000 mg by mouth 2 times daily. Prn) 8 tablet 2          Past Medical History     Past Medical History:   Diagnosis Date    Arthritis     Dysuria     Gout     Hyperplastic colon polyp 12/2018    Hypertension     RACHID (obstructive sleep apnea) 11/09/2022    Prostate infection      Past Surgical History:   Procedure Laterality Date    ANKLE SURGERY      APPENDECTOMY      BIOPSY  2011    biopsy from foreskin    COLONOSCOPY  12/21/2018    Dr. Dc [facility]    COLONOSCOPY N/A 12/21/2018    Procedure: COMBINED COLONOSCOPY THRU STOMA, BIOPSY BY SNARE using exacto snare;  Surgeon: Shamar Dc MD;  Location:  GI    COLONOSCOPY N/A 9/22/2023    Procedure: Colonoscopy;  Surgeon: Shamar Dc MD;  Location:  GI    CYSTOSCOPY      ESOPHAGOSCOPY, GASTROSCOPY, DUODENOSCOPY (EGD), COMBINED  2012    for stomach pain done in [place]-acid reflux    HAND SURGERY  2020    cyst removal from hand    ORTHOPEDIC SURGERY  03/23/2017    ankle left -repair  "of tendons    wisdom teeth       Family History   Problem Relation Age of Onset    Thyroid Disease Mother     Diabetes Mother         Mother  2021    Obesity Mother     Cerebrovascular Disease Mother          of stroke    Hypertension Mother     Colon Cancer Mother         6\" of bowel removed in     Arthritis Father     Respiratory Father         asbestos exposure    Chronic Obstructive Pulmonary Disease Father     Coronary Artery Disease Father 71        Father b - 1933  d -     Skin Cancer Father     Other Cancer Father         Skin cancer on face and ears    Asthma Brother         Primarily affected him as a child.    Mental Illness Brother         Suffered severe mental \"break\" 2022. Initial symptoms 2021    Heart Defect Son             Allergies   Amlodipine, Contrast dye, Indomethacin, Iodine, Levaquin [levofloxacin], Lisinopril, Seasonal allergies, and Losartan        Gloria Escamilla NP  [facility]   "

## 2025-03-20 NOTE — LETTER
3/20/2025    Madeleine Lynch MD  [id number]  [facility] [address]    RE: Abhijit Garcia       Dear Colleague,     I had the pleasure of seeing Abhijit Garcia in the [facility].    Cardiology Clinic Progress Note  Abhijit Garcia MRN# [id number]   YOB: 1975 Age: 50 year old   Primary Cardiologist: Dr. Espino Reason for visit: Follow up recent testing             Assessment and Plan:       1.  Aortic root aneurysm  -3/2025 CTA chest with measurement of 4.1 cm    2.  Mild coronary artery calcification  -2023 CT calcium scan with a total score of 10.4, placing patient in the 69th percentile  -3/2025 CT chest noting mild calcification    3.  Hypertension, uncontrolled, goal 120/80 in the setting of #1   -Required dose reductions in irbesartan given #6    4.  Dyslipidemia  -PCP monitoring and managing    5.  Morbid obesity, with significant muscle mass    6.  CKD stage II  -PCP monitoring and managing     Plan:  Continue irbesartan 150 mg daily  Start metoprolol XL 12.5mg daily to optimize blood pressure control  Nurse blood pressure check in 2 weeks  Advised against heavy weight lifting, straining or bearing down  Recommend screening TTE of first-degree relatives for aortic aneurysm  Repeat TTE for continued surveillance of aortic root dilation in 1 year     Follow up: Follow up with Dr. Espino in 1 year        History of Presenting Illness:    Abhijit Garcia is a very pleasant 50 year old male with a history of aortic root aneurysm, morbid obesity, hypertension, dyslipidemia, mild coronary calcification, CKD stage II, gout, and multijoint DJD.  No family history of aortic aneurysms, or aortic rupture.    At the time of his coronary calcium score in 2023 (total score 10.4, 69 percentile), he was also noted to have a mildly dilated ascending aorta measuring 3.7 cm.    He was seen by Dr. Espino in late February of this year to establish care following a  echocardiogram done by his primary care provider which revealed a aortic root measuring 4.6 cm.  He recommended a CTA to further assess the aortic dimensions.     CTA chest was done on 3/13/2025 and revealed a maximum diameter of 4.1 cm of the ascending aorta, maximum diameter of the ascending thoracic aorta approximately 3.6 cm, mild coronary calcification.    Patient is here today for a follow-up of his recent chest CT. Patient reports feeling good. He is looking forward to being able to exercise more with weight lifting and lose more weight. He is fatigued during the day as he does not sleep long enough, but does use his CPAP routinely.     Patient denies chest pain or chest tightness. Denies dizziness, lightheadedness or other presyncopal symptoms. Denies tachycardia or palpitations.  Denies shortness of breath, orthopnea, or PND.    Most recent labs from 1/24/2025 show sodium 139, potassium 4.5, BUN 19, creatinine 1.3, GFR 66, total cholesterol 212, HDL 37, , triglycerides 212.     Blood pressure 140/90 and HR 74 in clinic today.        Recent Hospitalizations   None in [id number]           Social History      Social History     Socioeconomic History     Marital status:      Spouse name: Not on file     Number of children: 1     Years of education: Not on file     Highest education level: Not on file   Occupational History     Occupation:      Comment: [employer]   Tobacco Use     Smoking status: Never     Passive exposure: Never     Smokeless tobacco: Never     Tobacco comments:     I have never used tobacco. My dad was a heavy smoker so I did have second hand smoke exposure   Vaping Use     Vaping status: Never Used   Substance and Sexual Activity     Alcohol use: Yes     Alcohol/week: 3.0 standard drinks of alcohol     Types: 3 Standard drinks or equivalent per week     Comment: social drinker.     Drug use: Never     Sexual activity: Not Currently     Partners:  Female     Birth control/protection: Abstinence, Pull-out method, Condom, Female Surgical     Comment: My wife has lost interest. No sex since 2018 (2019 maybe)   Other Topics Concern      Service Yes     Blood Transfusions No     Caffeine Concern Yes     Comment: 2 pots daily     Occupational Exposure No     Comment: Occ. all with in managible levels     Hobby Hazards No     Sleep Concern Yes     Stress Concern Yes     Weight Concern Yes     Special Diet No     Back Care Yes     Comment: L4 and L5 damaged     Exercise No     Bike Helmet Not Asked     Seat Belt Yes     Self-Exams No     Parent/sibling w/ CABG, MI or angioplasty before 65F 55M? No   Social History Narrative     Not on file     Social Drivers of Health     Financial Resource Strain: Low Risk  (1/23/2025)    Financial Resource Strain      Within the past 12 months, have you or your family members you live with been unable to get utilities (heat, electricity) when it was really needed?: No   Food Insecurity: Low Risk  (1/23/2025)    Food Insecurity      Within the past 12 months, did you worry that your food would run out before you got money to buy more?: No      Within the past 12 months, did the food you bought just not last and you didn t have money to get more?: No   Transportation Needs: Low Risk  (1/23/2025)    Transportation Needs      Within the past 12 months, has lack of transportation kept you from medical appointments, getting your medicines, non-medical meetings or appointments, work, or from getting things that you need?: No   Physical Activity: Insufficiently Active (1/23/2025)    Exercise Vital Sign      Days of Exercise per Week: 3 days      Minutes of Exercise per Session: 30 min   Stress: Stress Concern Present (1/23/2025)    [employer] - Occupational Stress Questionnaire      Feeling of Stress : Rather much   Social Connections: Unknown (1/23/2025)    Social Connection and Isolation Panel  "[NHANES]      Frequency of Communication with Friends and Family: Not on file      Frequency of Social Gatherings with Friends and Family: Once a week      Attends Denominational Services: Not on file      Active Member of Clubs or Organizations: Not on file      Attends Club or Organization Meetings: Not on file      Marital Status: Not on file   Interpersonal Safety: Low Risk  (1/23/2025)    Interpersonal Safety      Do you feel physically and emotionally safe where you currently live?: Yes      Within the past 12 months, have you been hit, slapped, kicked or otherwise physically hurt by someone?: No      Within the past 12 months, have you been humiliated or emotionally abused in other ways by your partner or ex-partner?: No   Housing Stability: Low Risk  (1/23/2025)    Housing Stability      Do you have housing? : Yes      Are you worried about losing your housing?: No            Review of Systems:   Skin:        Eyes:       ENT:       Respiratory:  Positive for sleep apnea, CPAP  Cardiovascular:  Negative    Gastroenterology:      Genitourinary:       Musculoskeletal:       Neurologic:       Psychiatric:       Heme/Lymph/Imm:       Endocrine:              Physical Exam:   Vitals: BP (!) 140/90   Pulse 74   Ht 1.867 m (6' 1.5\")   Wt (!) 154.2 kg (339 lb 14.4 oz)   SpO2 98%   BMI 44.23 kg/m     Wt Readings from Last 4 Encounters:   03/20/25 (!) 154.2 kg (339 lb 14.4 oz)   02/26/25 (!) 153.8 kg (339 lb)   01/31/25 (!) 154 kg (339 lb 6.4 oz)   01/23/25 (!) 154.7 kg (341 lb)     GEN: well nourished, in no acute distress.  HEENT:  Pupils equal, round. Sclerae nonicteric.   NECK: Supple, no masses appreciated.   C/V:  Regular rate and rhythm, no murmur, rub or gallop.    RESP: Respirations are unlabored. Clear to auscultation bilaterally without wheezing, rales, or rhonchi.  GI: Abdomen soft, nontender.  EXTREM: No LE edema.  NEURO: Alert and oriented, cooperative.  SKIN: Warm and dry.        Data:     LIPID " RESULTS:  Lab Results   Component Value Date    CHOL 212 (H) 01/24/2025    CHOL 215 (H) 12/10/2019    HDL 37 (L) 01/24/2025    HDL 38 (L) 12/10/2019     (H) 01/24/2025     (H) 12/10/2019    TRIG 212 (H) 01/24/2025    TRIG 298 (H) 12/10/2019    CHOLHDLRATIO 5.7 (H) 05/05/2015     LIVER ENZYME RESULTS:  Lab Results   Component Value Date    AST 31 01/24/2025    AST 23 06/10/2019    ALT 38 01/24/2025    ALT 37 06/10/2019     CBC RESULTS:  Lab Results   Component Value Date    WBC 6.7 01/24/2025    WBC 6.2 12/10/2019    RBC 4.94 01/24/2025    RBC 5.15 12/10/2019    HGB 14.9 01/24/2025    HGB 15.3 12/10/2019    HCT 43.8 01/24/2025    HCT 45.0 12/10/2019    MCV 89 01/24/2025    MCV 87 12/10/2019    MCH 30.2 01/24/2025    MCH 29.7 12/10/2019    MCHC 34.0 01/24/2025    MCHC 34.0 12/10/2019    RDW 12.0 01/24/2025    RDW 12.3 12/10/2019     01/24/2025     12/10/2019     BMP RESULTS:  Lab Results   Component Value Date     01/24/2025     09/17/2020    POTASSIUM 4.5 01/24/2025    POTASSIUM 4.4 11/23/2021    POTASSIUM 4.0 09/17/2020    CHLORIDE 105 01/24/2025    CHLORIDE 105 11/23/2021    CHLORIDE 106 09/17/2020    CO2 26 01/24/2025    CO2 28 11/23/2021    CO2 28 09/17/2020    ANIONGAP 8 01/24/2025    ANIONGAP 4 11/23/2021    ANIONGAP 5 09/17/2020     (H) 01/24/2025     (H) 11/23/2021    GLC 96 09/17/2020    BUN 19.3 01/24/2025    BUN 13 11/23/2021    BUN 16 09/17/2020    CR 1.30 (H) 01/24/2025    CR 1.07 09/17/2020    GFRESTIMATED 66 01/24/2025    GFRESTIMATED 82 09/17/2020    GFRESTBLACK >90 09/17/2020    KHUSHBU 9.4 01/24/2025    KHUSHBU 8.1 (L) 09/17/2020      A1C RESULTS:  Lab Results   Component Value Date    A1C 5.5 01/24/2025    A1C 5.4 08/01/2014     INR RESULTS:  Lab Results   Component Value Date    INR 2.4 (A) 06/23/2017    INR 1.4 (A) 06/14/2017    INR 1.71 (H) 04/18/2017            Medications     Current Outpatient Medications   Medication Sig Dispense Refill      allopurinol (ZYLOPRIM) 100 MG tablet Take 2 tablets (200 mg) by mouth daily. 180 tablet 3     clotrimazole (LOTRIMIN) 1 % external cream Apply topically 2 times daily 60 g 0     imiquimod (ALDARA) 5 % external cream Apply a small sized amount to warts QHS at bedtime.   Wash off after 8 hours. 15 packet 3     irbesartan (AVAPRO) 150 MG tablet Take 1 tablet (150 mg) by mouth at bedtime. 90 tablet 3     ketoconazole (NIZORAL) 2 % external cream Apply topically daily as needed for other (athletes foot) 60 g 3     Loratadine (CLARITIN PO) Take by mouth as needed       meloxicam (MOBIC) 15 MG tablet Take 1 tablet (15 mg) by mouth daily as needed for moderate pain. 30 tablet 3     metoprolol succinate ER (TOPROL XL) 25 MG 24 hr tablet Take 0.5 tablets (12.5 mg) by mouth daily. 45 tablet 3     nystatin (MYCOSTATIN) 104968 UNIT/GM external powder Apply topically to genital area daily for maintenance       pimecrolimus (ELIDEL) 1 % external cream APPLY TOPICALLY TO AFFECTED AREA TWICE A DAY FOR MAINTENANCE       triamcinolone (KENALOG) 0.1 % external cream Apply topically 2 times daily To lesions on foot until redness and itch is resolved then stop 45 g 1     valACYclovir (VALTREX) 1000 mg tablet Take 2 tablets (2,000 mg) by mouth 2 times daily. (Patient taking differently: Take 2,000 mg by mouth 2 times daily. Prn) 8 tablet 2          Past Medical History     Past Medical History:   Diagnosis Date     Arthritis      Dysuria      Gout      Hyperplastic colon polyp 12/2018     Hypertension      RACHID (obstructive sleep apnea) 11/09/2022     Prostate infection      Past Surgical History:   Procedure Laterality Date     ANKLE SURGERY       APPENDECTOMY       BIOPSY  2011    biopsy from foreskin     COLONOSCOPY  12/21/2018    Dr. Dc [facility]     COLONOSCOPY N/A 12/21/2018    Procedure: COMBINED COLONOSCOPY THRU STOMA, BIOPSY BY SNARE using exacto snare;  Surgeon: Shamar Dc MD;  Location:  GI     COLONOSCOPY N/A  "2023    Procedure: Colonoscopy;  Surgeon: Shamar cD MD;  Location:  GI     CYSTOSCOPY       ESOPHAGOSCOPY, GASTROSCOPY, DUODENOSCOPY (EGD), COMBINED      for stomach pain done in [place]-acid reflux     HAND SURGERY      cyst removal from hand     ORTHOPEDIC SURGERY  2017    ankle left -repair of tendons     wisdom teeth       Family History   Problem Relation Age of Onset     Thyroid Disease Mother      Diabetes Mother         Mother  2021     Obesity Mother      Cerebrovascular Disease Mother          of stroke     Hypertension Mother      Colon Cancer Mother         6\" of bowel removed in      Arthritis Father      Respiratory Father         asbestos exposure     Chronic Obstructive Pulmonary Disease Father      Coronary Artery Disease Father 71        Father b - 1933  d -      Skin Cancer Father      Other Cancer Father         Skin cancer on face and ears     Asthma Brother         Primarily affected him as a child.     Mental Illness Brother         Suffered severe mental \"break\" 2022. Initial symptoms 2021     Heart Defect Son             Allergies   Amlodipine, Contrast dye, Indomethacin, Iodine, Levaquin [levofloxacin], Lisinopril, Seasonal allergies, and Losartan        Gloria Escamilla NP  [facility]       Thank you for allowing me to participate in the care of your patient.      Sincerely,     Gloria Escamilla NP     [facility]  cc:   Shubham Espino MD  [id number] [id number]      "

## 2025-03-20 NOTE — PATIENT INSTRUCTIONS
Today's Recommendations    Your CT showed a small dilation in the aortic root  We do recommend against very heavy weight lifting   Ideal blood pressure is 120/80, I am adding a medication called metoprolol XL, take half a tablet 12.5mg daily   Come in for a nurse blood pressure check in 2 weeks   Continue all other medications without changes.  Please follow up with Dr. Espino in 1 year with a repeat echo before.    Please send a [employer] message or call [phone] to the RN team with questions or concerns.     Scheduling number [phone]  STEFANI Toribio, CNP

## 2025-04-07 ENCOUNTER — DOCUMENTATION ONLY (OUTPATIENT)
Dept: CARDIOLOGY | Facility: CLINIC | Age: 50
End: 2025-04-07

## 2025-04-07 ENCOUNTER — MYC MEDICAL ADVICE (OUTPATIENT)
Dept: FAMILY MEDICINE | Facility: CLINIC | Age: 50
End: 2025-04-07

## 2025-04-07 ENCOUNTER — ALLIED HEALTH/NURSE VISIT (OUTPATIENT)
Dept: CARDIOLOGY | Facility: CLINIC | Age: 50
End: 2025-04-07
Payer: COMMERCIAL

## 2025-04-07 VITALS — SYSTOLIC BLOOD PRESSURE: 111 MMHG | OXYGEN SATURATION: 99 % | DIASTOLIC BLOOD PRESSURE: 68 MMHG | HEART RATE: 65 BPM

## 2025-04-07 DIAGNOSIS — I10 ESSENTIAL HYPERTENSION: ICD-10-CM

## 2025-04-07 PROCEDURE — 3074F SYST BP LT 130 MM HG: CPT

## 2025-04-07 PROCEDURE — 3078F DIAST BP <80 MM HG: CPT

## 2025-04-07 PROCEDURE — 99207 PR NO CHARGE LOS: CPT

## 2025-04-07 NOTE — PROGRESS NOTES
ALLIED HEALTH BLOOD PRESSURE CHECK     Last office visit: 3/20/25 with Amanda Escamilla     Previous blood pressure: 140/90 mm Hg  Previous heart rate: 74 bpm      Time of visit: 3:01    Morning medications were taken at: 7:30-9:30 AM      Today's blood pressure: 111/68 mm Hg  Today's heart rate: 65 bpm     Home monitor blood pressure: N/A mmHg  Home monitor heart rate: N/A bpm      Additional Comments: N/A      Results routed to: Team 7      Ordering Provider: Amanda Escamilla  In clinic Provider: Dr. Lui

## 2025-04-07 NOTE — PROGRESS NOTES
Gloria Escamilla, NP  You8 minutes ago (3:48 PM)       Blood pressure improved following addition of metoprolol. No changes recommended. Thanks.

## 2025-04-14 ENCOUNTER — TELEPHONE (OUTPATIENT)
Dept: NEPHROLOGY | Facility: CLINIC | Age: 50
End: 2025-04-14
Payer: COMMERCIAL

## 2025-04-14 NOTE — TELEPHONE ENCOUNTER
This encounter is being sent to inform the clinic that this patient has a referral from Madeleine Lynch for the diagnoses of LUIS ARMANDO and has requested that this patient be seen within 3-5 days. Based on the availability of our provider(s), we are unable to accommodate this request.    Were all sites offered this patient?  Yes    Does scheduling algorithm request to schedule next available?    Writer unable to schedule within timeframe requested by referring provider. Sending encounter message for review and follow-up with Pt for scheduling. Thank you!

## 2025-04-15 NOTE — TELEPHONE ENCOUNTER
Chart reviewed. Emerson's crt has fluctuated between 1.13 and 1.32 since January 2024. Does not appear to be an urgent 3-5 day referral.

## 2025-04-24 ENCOUNTER — MYC MEDICAL ADVICE (OUTPATIENT)
Dept: FAMILY MEDICINE | Facility: CLINIC | Age: 50
End: 2025-04-24
Payer: COMMERCIAL

## 2025-05-12 ENCOUNTER — MYC MEDICAL ADVICE (OUTPATIENT)
Dept: FAMILY MEDICINE | Facility: CLINIC | Age: 50
End: 2025-05-12
Payer: COMMERCIAL

## 2025-06-04 ENCOUNTER — RESULTS FOLLOW-UP (OUTPATIENT)
Dept: DERMATOLOGY | Facility: CLINIC | Age: 50
End: 2025-06-04

## 2025-06-04 DIAGNOSIS — L30.9 ECZEMA, UNSPECIFIED TYPE: Primary | ICD-10-CM

## 2025-06-04 RX ORDER — TRIAMCINOLONE ACETONIDE 1 MG/G
OINTMENT TOPICAL 2 TIMES DAILY
Qty: 30 G | Refills: 0 | Status: SHIPPED | OUTPATIENT
Start: 2025-06-04

## 2025-06-05 DIAGNOSIS — L20.9 ATOPIC DERMATITIS, UNSPECIFIED TYPE: Primary | ICD-10-CM

## 2025-06-05 RX ORDER — PIMECROLIMUS 10 MG/G
CREAM TOPICAL 2 TIMES DAILY
Qty: 30 G | Refills: 3 | Status: SHIPPED | OUTPATIENT
Start: 2025-06-05

## 2025-06-10 ENCOUNTER — TELEPHONE (OUTPATIENT)
Dept: FAMILY MEDICINE | Facility: CLINIC | Age: 50
End: 2025-06-10
Payer: COMMERCIAL

## 2025-06-10 NOTE — TELEPHONE ENCOUNTER
PRIOR AUTHORIZATION DENIED    Medication: PIMECROLIMUS 1 % EX CREA  Insurance Company: [employer] [place] - Phone [phone] Fax [phone]  Denial Date: 6/9/2025  Denial Reason(s):     Appeal Information:     Patient Notified: No

## 2025-06-11 DIAGNOSIS — L20.9 ATOPIC DERMATITIS, UNSPECIFIED TYPE: Primary | ICD-10-CM

## 2025-06-11 RX ORDER — TACROLIMUS 1 MG/G
OINTMENT TOPICAL 2 TIMES DAILY
Qty: 30 G | Refills: 1 | Status: SHIPPED | OUTPATIENT
Start: 2025-06-11

## 2025-07-31 ENCOUNTER — OFFICE VISIT (OUTPATIENT)
Dept: FAMILY MEDICINE | Facility: CLINIC | Age: 50
End: 2025-07-31
Payer: COMMERCIAL

## 2025-07-31 VITALS
HEIGHT: 74 IN | WEIGHT: 315 LBS | HEART RATE: 68 BPM | BODY MASS INDEX: 40.43 KG/M2 | DIASTOLIC BLOOD PRESSURE: 88 MMHG | OXYGEN SATURATION: 98 % | TEMPERATURE: 98.7 F | RESPIRATION RATE: 18 BRPM | SYSTOLIC BLOOD PRESSURE: 130 MMHG

## 2025-07-31 DIAGNOSIS — E66.01 MORBID OBESITY (H): ICD-10-CM

## 2025-07-31 DIAGNOSIS — R60.0 EDEMA OF LEFT LOWER EXTREMITY: Primary | ICD-10-CM

## 2025-07-31 DIAGNOSIS — I10 ESSENTIAL HYPERTENSION: ICD-10-CM

## 2025-07-31 RX ORDER — ALLOPURINOL 300 MG/1
300 TABLET ORAL DAILY
COMMUNITY
End: 2025-07-31

## 2025-07-31 RX ORDER — AZELASTINE HYDROCHLORIDE 137 UG/1
SPRAY, METERED NASAL
COMMUNITY

## 2025-07-31 RX ORDER — IRBESARTAN 150 MG/1
150 TABLET ORAL DAILY
COMMUNITY
End: 2025-07-31

## 2025-07-31 ASSESSMENT — ASTHMA QUESTIONNAIRES
QUESTION_5 LAST FOUR WEEKS HOW WOULD YOU RATE YOUR ASTHMA CONTROL: COMPLETELY CONTROLLED
ACT_TOTALSCORE: 25
QUESTION_1 LAST FOUR WEEKS HOW MUCH OF THE TIME DID YOUR ASTHMA KEEP YOU FROM GETTING AS MUCH DONE AT WORK, SCHOOL OR AT HOME: NONE OF THE TIME
QUESTION_3 LAST FOUR WEEKS HOW OFTEN DID YOUR ASTHMA SYMPTOMS (WHEEZING, COUGHING, SHORTNESS OF BREATH, CHEST TIGHTNESS OR PAIN) WAKE YOU UP AT NIGHT OR EARLIER THAN USUAL IN THE MORNING: NOT AT ALL
QUESTION_4 LAST FOUR WEEKS HOW OFTEN HAVE YOU USED YOUR RESCUE INHALER OR NEBULIZER MEDICATION (SUCH AS ALBUTEROL): NOT AT ALL
QUESTION_2 LAST FOUR WEEKS HOW OFTEN HAVE YOU HAD SHORTNESS OF BREATH: NOT AT ALL

## 2025-07-31 ASSESSMENT — PAIN SCALES - GENERAL: PAINLEVEL_OUTOF10: MODERATE PAIN (5)

## 2025-07-31 NOTE — PROGRESS NOTES
"  Assessment & Plan     Edema of left lower extremity - 1+ on left, trace on right. Unlikely DVT given no other symptoms. Discussed this can be appropriate in the setting of morbid obesity, recent decrease in physical activity. Encouraged more activity, consider compression stockings. Would avoid diuretics as this would worsen his kidney function. Discussed neither kidney disease nor heart failure would present moreso in just one leg.     2. Morbid obesity (H) - encouraged weight loss and activity once he is able    3. Essential hypertension - controlled, continue current    The longitudinal plan of care for the diagnosis(es)/condition(s) as documented were addressed during this visit. Due to the added complexity in care, I will continue to support Emerson in the subsequent management and with ongoing continuity of care.    BMI  Estimated body mass index is 42.14 kg/m  as calculated from the following:    Height as of this encounter: 1.867 m (6' 1.5\").    Weight as of this encounter: 146.9 kg (323 lb 12.8 oz).       Subjective   Emerson is a 50 year old, presenting for the following health issues:  Musculoskeletal Problem (Pt reports pain and swelling on left lower leg. Pt thinks it is related to fluid retention. Pt is been dealing with this for over 10 years. )        7/31/2025     1:13 PM   Additional Questions   Roomed by Dionne Godfrey MA Extern   Accompanied by Self     Musculoskeletal Problem    History of Present Illness       Reason for visit:  Issues with swelling, fluid retention, and pain in left lower leg.  Symptom onset:  More than a month  Symptoms include:  Swelling, fluid retention, and pain in left lower leg. Only left leg affected.  Symptom progression:  Worsening  Had these symptoms before:  Yes  Has tried/received treatment for these symptoms:  No  What makes it worse:  Nothing in particular that I have identified  What makes it better:  It seemed less when I was working out 5+ days a week, but " "was still an issue.   He is taking medications regularly.      Swelling of both calves but worse on left. History of left ankle surgery and DVT. No similar symptoms to previous DVT. Has had some swelling for the past 10 years, worse following surgery 7 years ago. When he was exercising regularly, he had near resolution of swelling. Unsure if swelling improves overnight.       Review of Systems  Constitutional, HEENT, cardiovascular, pulmonary, gi and gu systems are negative, except as otherwise noted.      Objective    /88 (BP Location: Right arm, Patient Position: Sitting, Cuff Size: Adult Large)   Pulse 68   Temp 98.7  F (37.1  C) (Oral)   Resp 18   Ht 1.867 m (6' 1.5\")   Wt (!) 146.9 kg (323 lb 12.8 oz)   SpO2 98%   BMI 42.14 kg/m    Body mass index is 42.14 kg/m .  Physical Exam   GENERAL: alert and no distress  SKIN: 1+ pitting edema left mid calf, trace pitting edema right mid calf        Signed Electronically by: Madeleine Lynch MD    "

## (undated) DEVICE — ENDO TRAP POLYP QUICK CATCH 710201

## (undated) DEVICE — KIT ENDO TURNOVER/PROCEDURE W/CLEAN A SCOPE LINERS 103888

## (undated) DEVICE — ENDO SNARE EXACTO COLD 9MM LOOP 2.4MMX230CM 00711115

## (undated) RX ORDER — FENTANYL CITRATE 50 UG/ML
INJECTION, SOLUTION INTRAMUSCULAR; INTRAVENOUS
Status: DISPENSED
Start: 2023-09-22

## (undated) RX ORDER — FENTANYL CITRATE 50 UG/ML
INJECTION, SOLUTION INTRAMUSCULAR; INTRAVENOUS
Status: DISPENSED
Start: 2018-12-21